# Patient Record
Sex: FEMALE | Race: WHITE | NOT HISPANIC OR LATINO | Employment: OTHER | URBAN - NONMETROPOLITAN AREA
[De-identification: names, ages, dates, MRNs, and addresses within clinical notes are randomized per-mention and may not be internally consistent; named-entity substitution may affect disease eponyms.]

---

## 2023-03-07 ENCOUNTER — TELEPHONE (OUTPATIENT)
Dept: PRIMARY CARE | Facility: CLINIC | Age: 88
End: 2023-03-07
Payer: MEDICARE

## 2023-03-07 NOTE — TELEPHONE ENCOUNTER
Dain with disability rehab asking for a verbal ok for the PT plan for patient. The PT want's to see patient for 1 time a week for 1 week and then 2 times a week for 8 weeks. Asking for a call at [phone].

## 2023-03-12 PROBLEM — R42 VERTIGO: Status: ACTIVE | Noted: 2023-03-12

## 2023-03-12 PROBLEM — N18.30 STAGE 3 CHRONIC KIDNEY DISEASE (MULTI): Status: ACTIVE | Noted: 2023-03-12

## 2023-03-12 PROBLEM — L30.1 ECZEMA, DYSHIDROTIC: Status: ACTIVE | Noted: 2023-03-12

## 2023-03-12 PROBLEM — E11.9 DM2 (DIABETES MELLITUS, TYPE 2) (MULTI): Status: ACTIVE | Noted: 2023-03-12

## 2023-03-12 PROBLEM — Z85.42 HISTORY OF ENDOMETRIAL CANCER: Status: ACTIVE | Noted: 2023-03-12

## 2023-03-12 PROBLEM — R29.6 FREQUENT FALLS: Status: ACTIVE | Noted: 2023-03-12

## 2023-03-12 PROBLEM — M54.2 NECK PAIN: Status: ACTIVE | Noted: 2023-03-12

## 2023-03-12 PROBLEM — R41.0 CONFUSION: Status: ACTIVE | Noted: 2023-03-12

## 2023-03-12 PROBLEM — E66.812 CLASS 2 SEVERE OBESITY WITH SERIOUS COMORBIDITY AND BODY MASS INDEX (BMI) OF 37.0 TO 37.9 IN ADULT: Status: ACTIVE | Noted: 2023-03-12

## 2023-03-12 PROBLEM — G89.29 CHRONIC LOW BACK PAIN: Status: ACTIVE | Noted: 2023-03-12

## 2023-03-12 PROBLEM — D64.9 ANEMIA: Status: ACTIVE | Noted: 2023-03-12

## 2023-03-12 PROBLEM — F33.1 DEPRESSION, MAJOR, RECURRENT, MODERATE (MULTI): Status: ACTIVE | Noted: 2023-03-12

## 2023-03-12 PROBLEM — R10.9 ABDOMINAL PAIN: Status: ACTIVE | Noted: 2023-03-12

## 2023-03-12 PROBLEM — E03.9 HYPOTHYROIDISM: Status: ACTIVE | Noted: 2023-03-12

## 2023-03-12 PROBLEM — J45.909 ASTHMA (HHS-HCC): Status: ACTIVE | Noted: 2023-03-12

## 2023-03-12 PROBLEM — D50.9 IRON DEFICIENCY ANEMIA: Status: ACTIVE | Noted: 2023-03-12

## 2023-03-12 PROBLEM — K92.2 LOWER GI BLEED: Status: ACTIVE | Noted: 2023-03-12

## 2023-03-12 PROBLEM — M67.911 TENDINOPATHY OF RIGHT ROTATOR CUFF: Status: ACTIVE | Noted: 2023-03-12

## 2023-03-12 PROBLEM — E78.2 MIXED DYSLIPIDEMIA: Status: ACTIVE | Noted: 2023-03-12

## 2023-03-12 PROBLEM — R15.9 FECAL INCONTINENCE: Status: ACTIVE | Noted: 2023-03-12

## 2023-03-12 PROBLEM — K44.9 HIATAL HERNIA: Status: ACTIVE | Noted: 2023-03-12

## 2023-03-12 PROBLEM — E66.01 CLASS 2 SEVERE OBESITY WITH SERIOUS COMORBIDITY AND BODY MASS INDEX (BMI) OF 37.0 TO 37.9 IN ADULT (MULTI): Status: ACTIVE | Noted: 2023-03-12

## 2023-03-12 PROBLEM — I10 HTN (HYPERTENSION): Status: ACTIVE | Noted: 2023-03-12

## 2023-03-12 PROBLEM — R05.9 COUGH: Status: ACTIVE | Noted: 2023-03-12

## 2023-03-12 PROBLEM — M54.50 CHRONIC LOW BACK PAIN: Status: ACTIVE | Noted: 2023-03-12

## 2023-03-12 PROBLEM — E79.0 HYPERURICEMIA: Status: ACTIVE | Noted: 2023-03-12

## 2023-03-12 PROBLEM — M54.9 MID BACK PAIN: Status: ACTIVE | Noted: 2023-03-12

## 2023-03-12 PROBLEM — M25.511 RIGHT SHOULDER PAIN: Status: ACTIVE | Noted: 2023-03-12

## 2023-03-12 PROBLEM — I73.9 CLAUDICATION (CMS-HCC): Status: ACTIVE | Noted: 2023-03-12

## 2023-03-12 RX ORDER — GLIMEPIRIDE 4 MG/1
4 TABLET ORAL
COMMUNITY
End: 2024-02-08 | Stop reason: WASHOUT

## 2023-03-12 RX ORDER — LEVOTHYROXINE SODIUM 137 UG/1
1 TABLET ORAL DAILY
COMMUNITY
Start: 2021-05-02 | End: 2023-05-26

## 2023-03-12 RX ORDER — CITALOPRAM 20 MG/1
0.5 TABLET, FILM COATED ORAL DAILY
COMMUNITY
End: 2023-03-17 | Stop reason: SDUPTHER

## 2023-03-12 RX ORDER — MECLIZINE HYDROCHLORIDE 25 MG/1
1 TABLET ORAL 3 TIMES DAILY PRN
COMMUNITY
Start: 2022-06-28 | End: 2023-04-21

## 2023-03-12 RX ORDER — BECLOMETHASONE DIPROPIONATE HFA 80 UG/1
2 AEROSOL, METERED RESPIRATORY (INHALATION) 2 TIMES DAILY
COMMUNITY
Start: 2022-05-04

## 2023-03-12 RX ORDER — METOPROLOL TARTRATE 50 MG/1
TABLET ORAL
COMMUNITY
End: 2023-08-18 | Stop reason: SDUPTHER

## 2023-03-12 RX ORDER — INDAPAMIDE 2.5 MG/1
1 TABLET ORAL DAILY
COMMUNITY
End: 2023-08-18 | Stop reason: SDUPTHER

## 2023-03-12 RX ORDER — LOVASTATIN 20 MG/1
1 TABLET ORAL NIGHTLY
COMMUNITY
End: 2024-01-18

## 2023-03-12 RX ORDER — HYDROCORTISONE 25 MG/G
CREAM TOPICAL
COMMUNITY
Start: 2022-06-28

## 2023-03-12 RX ORDER — MULTIVITAMIN
TABLET ORAL
COMMUNITY
End: 2024-03-07 | Stop reason: ALTCHOICE

## 2023-03-12 RX ORDER — LISINOPRIL 5 MG/1
1 TABLET ORAL DAILY
COMMUNITY
End: 2023-08-18 | Stop reason: SDUPTHER

## 2023-03-12 RX ORDER — FERROUS SULFATE 325(65) MG
1 TABLET ORAL DAILY
COMMUNITY

## 2023-03-12 RX ORDER — ALLOPURINOL 100 MG/1
1 TABLET ORAL DAILY
COMMUNITY

## 2023-03-12 RX ORDER — BLOOD SUGAR DIAGNOSTIC
STRIP MISCELLANEOUS
COMMUNITY
Start: 2021-12-01

## 2023-03-12 RX ORDER — LANOLIN ALCOHOL/MO/W.PET/CERES
1 CREAM (GRAM) TOPICAL DAILY
COMMUNITY

## 2023-03-12 RX ORDER — ALBUTEROL SULFATE 90 UG/1
2 AEROSOL, METERED RESPIRATORY (INHALATION) EVERY 4 HOURS PRN
COMMUNITY
Start: 2022-01-21 | End: 2023-04-21

## 2023-03-17 ENCOUNTER — OFFICE VISIT (OUTPATIENT)
Dept: PRIMARY CARE | Facility: CLINIC | Age: 88
End: 2023-03-17
Payer: MEDICARE

## 2023-03-17 VITALS
HEIGHT: 62 IN | WEIGHT: 206.3 LBS | BODY MASS INDEX: 37.97 KG/M2 | OXYGEN SATURATION: 94 % | DIASTOLIC BLOOD PRESSURE: 90 MMHG | HEART RATE: 94 BPM | SYSTOLIC BLOOD PRESSURE: 152 MMHG

## 2023-03-17 DIAGNOSIS — D64.9 ANEMIA, UNSPECIFIED TYPE: ICD-10-CM

## 2023-03-17 DIAGNOSIS — E03.9 HYPOTHYROIDISM, UNSPECIFIED TYPE: ICD-10-CM

## 2023-03-17 DIAGNOSIS — I10 PRIMARY HYPERTENSION: ICD-10-CM

## 2023-03-17 DIAGNOSIS — F33.1 DEPRESSION, MAJOR, RECURRENT, MODERATE (MULTI): Primary | ICD-10-CM

## 2023-03-17 PROCEDURE — 3080F DIAST BP >= 90 MM HG: CPT | Performed by: STUDENT IN AN ORGANIZED HEALTH CARE EDUCATION/TRAINING PROGRAM

## 2023-03-17 PROCEDURE — 3077F SYST BP >= 140 MM HG: CPT | Performed by: STUDENT IN AN ORGANIZED HEALTH CARE EDUCATION/TRAINING PROGRAM

## 2023-03-17 PROCEDURE — 99214 OFFICE O/P EST MOD 30 MIN: CPT | Performed by: STUDENT IN AN ORGANIZED HEALTH CARE EDUCATION/TRAINING PROGRAM

## 2023-03-17 PROCEDURE — 1036F TOBACCO NON-USER: CPT | Performed by: STUDENT IN AN ORGANIZED HEALTH CARE EDUCATION/TRAINING PROGRAM

## 2023-03-17 PROCEDURE — 1159F MED LIST DOCD IN RCRD: CPT | Performed by: STUDENT IN AN ORGANIZED HEALTH CARE EDUCATION/TRAINING PROGRAM

## 2023-03-17 PROCEDURE — 1157F ADVNC CARE PLAN IN RCRD: CPT | Performed by: STUDENT IN AN ORGANIZED HEALTH CARE EDUCATION/TRAINING PROGRAM

## 2023-03-17 RX ORDER — BUPROPION HYDROCHLORIDE 150 MG/1
1 TABLET, EXTENDED RELEASE ORAL 2 TIMES DAILY
COMMUNITY
Start: 2022-09-28 | End: 2023-04-21 | Stop reason: SDUPTHER

## 2023-03-17 RX ORDER — CITALOPRAM 10 MG/1
10 TABLET ORAL DAILY
Qty: 90 TABLET | Refills: 3 | Status: SHIPPED | OUTPATIENT
Start: 2023-03-17 | End: 2024-04-29 | Stop reason: HOSPADM

## 2023-03-17 ASSESSMENT — PATIENT HEALTH QUESTIONNAIRE - PHQ9
SUM OF ALL RESPONSES TO PHQ9 QUESTIONS 1 AND 2: 0
2. FEELING DOWN, DEPRESSED OR HOPELESS: NOT AT ALL
1. LITTLE INTEREST OR PLEASURE IN DOING THINGS: NOT AT ALL
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
## 2023-03-17 NOTE — PATIENT INSTRUCTIONS
Please start taking lower dose of Citalopram . I sent new prescription for 10mg. Please stop taking 20mg dose.     You can take Cyclobenzaprine for pain. Please let me know if you are taking it, then I will send a new prescription for pain medication.

## 2023-04-12 ENCOUNTER — LAB (OUTPATIENT)
Dept: LAB | Facility: LAB | Age: 88
End: 2023-04-12
Payer: MEDICARE

## 2023-04-12 DIAGNOSIS — E03.9 HYPOTHYROIDISM, UNSPECIFIED TYPE: ICD-10-CM

## 2023-04-12 DIAGNOSIS — I10 PRIMARY HYPERTENSION: ICD-10-CM

## 2023-04-12 DIAGNOSIS — D64.9 ANEMIA, UNSPECIFIED TYPE: ICD-10-CM

## 2023-04-12 LAB
ALANINE AMINOTRANSFERASE (SGPT) (U/L) IN SER/PLAS: 12 U/L (ref 7–45)
ALBUMIN (G/DL) IN SER/PLAS: 3.9 G/DL (ref 3.4–5)
ALKALINE PHOSPHATASE (U/L) IN SER/PLAS: 60 U/L (ref 33–136)
ANION GAP IN SER/PLAS: 15 MMOL/L (ref 10–20)
ASPARTATE AMINOTRANSFERASE (SGOT) (U/L) IN SER/PLAS: 16 U/L (ref 9–39)
BASOPHILS (10*3/UL) IN BLOOD BY AUTOMATED COUNT: 0.04 X10E9/L (ref 0–0.1)
BASOPHILS/100 LEUKOCYTES IN BLOOD BY AUTOMATED COUNT: 0.6 % (ref 0–2)
BILIRUBIN TOTAL (MG/DL) IN SER/PLAS: 0.3 MG/DL (ref 0–1.2)
CALCIUM (MG/DL) IN SER/PLAS: 9.2 MG/DL (ref 8.6–10.3)
CARBON DIOXIDE, TOTAL (MMOL/L) IN SER/PLAS: 28 MMOL/L (ref 21–32)
CHLORIDE (MMOL/L) IN SER/PLAS: 102 MMOL/L (ref 98–107)
CREATININE (MG/DL) IN SER/PLAS: 1.79 MG/DL (ref 0.5–1.05)
EOSINOPHILS (10*3/UL) IN BLOOD BY AUTOMATED COUNT: 0.2 X10E9/L (ref 0–0.4)
EOSINOPHILS/100 LEUKOCYTES IN BLOOD BY AUTOMATED COUNT: 2.9 % (ref 0–6)
ERYTHROCYTE DISTRIBUTION WIDTH (RATIO) BY AUTOMATED COUNT: 13.2 % (ref 11.5–14.5)
ERYTHROCYTE MEAN CORPUSCULAR HEMOGLOBIN CONCENTRATION (G/DL) BY AUTOMATED: 31.3 G/DL (ref 32–36)
ERYTHROCYTE MEAN CORPUSCULAR VOLUME (FL) BY AUTOMATED COUNT: 98 FL (ref 80–100)
ERYTHROCYTES (10*6/UL) IN BLOOD BY AUTOMATED COUNT: 3.73 X10E12/L (ref 4–5.2)
GFR FEMALE: 26 ML/MIN/1.73M2
GLUCOSE (MG/DL) IN SER/PLAS: 91 MG/DL (ref 74–99)
HEMATOCRIT (%) IN BLOOD BY AUTOMATED COUNT: 36.4 % (ref 36–46)
HEMOGLOBIN (G/DL) IN BLOOD: 11.4 G/DL (ref 12–16)
IMMATURE GRANULOCYTES/100 LEUKOCYTES IN BLOOD BY AUTOMATED COUNT: 0.7 % (ref 0–0.9)
LEUKOCYTES (10*3/UL) IN BLOOD BY AUTOMATED COUNT: 6.9 X10E9/L (ref 4.4–11.3)
LYMPHOCYTES (10*3/UL) IN BLOOD BY AUTOMATED COUNT: 1.79 X10E9/L (ref 0.8–3)
LYMPHOCYTES/100 LEUKOCYTES IN BLOOD BY AUTOMATED COUNT: 25.8 % (ref 13–44)
MONOCYTES (10*3/UL) IN BLOOD BY AUTOMATED COUNT: 0.56 X10E9/L (ref 0.05–0.8)
MONOCYTES/100 LEUKOCYTES IN BLOOD BY AUTOMATED COUNT: 8.1 % (ref 2–10)
NEUTROPHILS (10*3/UL) IN BLOOD BY AUTOMATED COUNT: 4.29 X10E9/L (ref 1.6–5.5)
NEUTROPHILS/100 LEUKOCYTES IN BLOOD BY AUTOMATED COUNT: 61.9 % (ref 40–80)
PLATELETS (10*3/UL) IN BLOOD AUTOMATED COUNT: 328 X10E9/L (ref 150–450)
POTASSIUM (MMOL/L) IN SER/PLAS: 4.8 MMOL/L (ref 3.5–5.3)
PROTEIN TOTAL: 6.4 G/DL (ref 6.4–8.2)
SODIUM (MMOL/L) IN SER/PLAS: 140 MMOL/L (ref 136–145)
THYROTROPIN (MIU/L) IN SER/PLAS BY DETECTION LIMIT <= 0.05 MIU/L: 33.95 MIU/L (ref 0.44–3.98)
UREA NITROGEN (MG/DL) IN SER/PLAS: 36 MG/DL (ref 6–23)

## 2023-04-12 PROCEDURE — 84443 ASSAY THYROID STIM HORMONE: CPT

## 2023-04-12 PROCEDURE — 80053 COMPREHEN METABOLIC PANEL: CPT

## 2023-04-12 PROCEDURE — 36415 COLL VENOUS BLD VENIPUNCTURE: CPT

## 2023-04-12 PROCEDURE — 85025 COMPLETE CBC W/AUTO DIFF WBC: CPT

## 2023-04-19 DIAGNOSIS — R42 DIZZINESS AND GIDDINESS: ICD-10-CM

## 2023-04-19 DIAGNOSIS — R06.02 SHORTNESS OF BREATH: ICD-10-CM

## 2023-04-21 ENCOUNTER — OFFICE VISIT (OUTPATIENT)
Dept: PRIMARY CARE | Facility: CLINIC | Age: 88
End: 2023-04-21
Payer: MEDICARE

## 2023-04-21 VITALS
BODY MASS INDEX: 37.59 KG/M2 | HEART RATE: 78 BPM | DIASTOLIC BLOOD PRESSURE: 72 MMHG | WEIGHT: 205.5 LBS | SYSTOLIC BLOOD PRESSURE: 120 MMHG | OXYGEN SATURATION: 95 %

## 2023-04-21 DIAGNOSIS — F41.9 ANXIETY: Primary | ICD-10-CM

## 2023-04-21 DIAGNOSIS — E03.9 HYPOTHYROIDISM, UNSPECIFIED TYPE: ICD-10-CM

## 2023-04-21 PROCEDURE — 1159F MED LIST DOCD IN RCRD: CPT | Performed by: STUDENT IN AN ORGANIZED HEALTH CARE EDUCATION/TRAINING PROGRAM

## 2023-04-21 PROCEDURE — 99214 OFFICE O/P EST MOD 30 MIN: CPT | Performed by: STUDENT IN AN ORGANIZED HEALTH CARE EDUCATION/TRAINING PROGRAM

## 2023-04-21 PROCEDURE — 3078F DIAST BP <80 MM HG: CPT | Performed by: STUDENT IN AN ORGANIZED HEALTH CARE EDUCATION/TRAINING PROGRAM

## 2023-04-21 PROCEDURE — 3074F SYST BP LT 130 MM HG: CPT | Performed by: STUDENT IN AN ORGANIZED HEALTH CARE EDUCATION/TRAINING PROGRAM

## 2023-04-21 PROCEDURE — 1036F TOBACCO NON-USER: CPT | Performed by: STUDENT IN AN ORGANIZED HEALTH CARE EDUCATION/TRAINING PROGRAM

## 2023-04-21 PROCEDURE — 1157F ADVNC CARE PLAN IN RCRD: CPT | Performed by: STUDENT IN AN ORGANIZED HEALTH CARE EDUCATION/TRAINING PROGRAM

## 2023-04-21 RX ORDER — ALBUTEROL SULFATE 90 UG/1
AEROSOL, METERED RESPIRATORY (INHALATION)
Qty: 18 G | Refills: 1 | Status: SHIPPED | OUTPATIENT
Start: 2023-04-21

## 2023-04-21 RX ORDER — HYDROXYZINE HYDROCHLORIDE 25 MG/1
25 TABLET, FILM COATED ORAL EVERY 8 HOURS PRN
Qty: 60 TABLET | Refills: 0 | Status: SHIPPED | OUTPATIENT
Start: 2023-04-21 | End: 2023-04-25 | Stop reason: SDUPTHER

## 2023-04-21 RX ORDER — MECLIZINE HYDROCHLORIDE 25 MG/1
TABLET ORAL
Qty: 15 TABLET | Refills: 1 | Status: SHIPPED | OUTPATIENT
Start: 2023-04-21 | End: 2023-06-15

## 2023-04-21 RX ORDER — BUPROPION HYDROCHLORIDE 150 MG/1
150 TABLET, EXTENDED RELEASE ORAL 2 TIMES DAILY
Qty: 180 TABLET | Refills: 3 | Status: SHIPPED | OUTPATIENT
Start: 2023-04-21 | End: 2024-05-10 | Stop reason: SDUPTHER

## 2023-04-21 ASSESSMENT — ENCOUNTER SYMPTOMS
LOSS OF SENSATION IN FEET: 0
OCCASIONAL FEELINGS OF UNSTEADINESS: 1
DEPRESSION: 1

## 2023-04-21 ASSESSMENT — PATIENT HEALTH QUESTIONNAIRE - PHQ9
SUM OF ALL RESPONSES TO PHQ9 QUESTIONS 1 AND 2: 2
10. IF YOU CHECKED OFF ANY PROBLEMS, HOW DIFFICULT HAVE THESE PROBLEMS MADE IT FOR YOU TO DO YOUR WORK, TAKE CARE OF THINGS AT HOME, OR GET ALONG WITH OTHER PEOPLE: SOMEWHAT DIFFICULT
1. LITTLE INTEREST OR PLEASURE IN DOING THINGS: NOT AT ALL
2. FEELING DOWN, DEPRESSED OR HOPELESS: MORE THAN HALF THE DAYS

## 2023-04-21 NOTE — PROGRESS NOTES
Subjective   Patient ID: Lakeshia Park is a 89 y.o. female who presents for 3 mth ov.    HPI    Has been having burning sensation in the left eye and has been noticing thick discharge.   She brought to the OTC eyedrops from the pharmacy recently.  She would like to try that to see if it improves her symptoms.    Discussed blood test results with the patient.    CKD4: Patient is previously seeing nephrologist.  Does not think she had any follow-up plan.  We have seen her at [facility].  This has been a year since she has seen them.  Recommended to follow-up with nephrologist.    Hypothyroidism: Her TSH is significantly elevated now.  Currently 33.95 now compared to 2.91 at the previous visit.   Patient reports that she is taking levothyroxine regularly.  However given the apparent increase I wonder if the patient taking the medication.  Difficult to take this medication fasting in the morning without any other medication or food.  Patient expressed understanding and she will try.  We will reassess closely in a few weeks.    Mild anemia noticed.  Recommended to eat iron rich food.    Still has some issues with her assisted living.  She is trying to work with them and resolving metabolic issues and other concerns.    Anxiety and depression: Reports that medications have fairly well.  Denies any significant side effects from the medication.    We discussed about her medications as well.       Review of Systems  ROS negative except discussed above in HPI.    Vitals:    04/21/23 1126   BP: 120/72   Pulse: 78   SpO2: 95%     Objective   Physical Exam  Constitutional:       Appearance: Normal appearance.   Cardiovascular:      Rate and Rhythm: Normal rate and regular rhythm.   Pulmonary:      Effort: Pulmonary effort is normal.      Breath sounds: Normal breath sounds.   Neurological:      Mental Status: She is alert.       Assessment/Plan   Lakeshia was seen today for 3 mth ov.  Diagnoses and all orders for this  visit:  Anxiety (Primary)  -     hydrOXYzine HCL (Atarax) 25 mg tablet; Take 1 tablet (25 mg) by mouth every 8 hours if needed for anxiety.  -     buPROPion SR (Wellbutrin SR) 150 mg 12 hr tablet; Take 1 tablet (150 mg) by mouth in the morning and 1 tablet (150 mg) before bedtime.  Hypothyroidism, unspecified type  -     Cancel: TSH with reflex to Free T4 if abnormal; Future  -     TSH with reflex to Free T4 if abnormal; Future      Follow up in 4 weeks.    Time Spent  Prep time on day of patient encounter: 5 minutes  Time spent directly with patient, family or caregiver: 30 minutes  Additional Time Spent on Patient Care Activities: 0 minutes  Documentation Time: 7 minutes  Other Time Spent: 0 minutes  Total: 42 minutes        Domingo Aquino MD MPH

## 2023-04-25 ENCOUNTER — OFFICE VISIT (OUTPATIENT)
Dept: PRIMARY CARE | Facility: CLINIC | Age: 88
End: 2023-04-25
Payer: MEDICARE

## 2023-04-25 VITALS
HEART RATE: 68 BPM | SYSTOLIC BLOOD PRESSURE: 132 MMHG | BODY MASS INDEX: 37.54 KG/M2 | HEIGHT: 62 IN | DIASTOLIC BLOOD PRESSURE: 80 MMHG | WEIGHT: 204 LBS

## 2023-04-25 DIAGNOSIS — F41.9 ANXIETY: ICD-10-CM

## 2023-04-25 PROCEDURE — 1036F TOBACCO NON-USER: CPT | Performed by: STUDENT IN AN ORGANIZED HEALTH CARE EDUCATION/TRAINING PROGRAM

## 2023-04-25 PROCEDURE — 1159F MED LIST DOCD IN RCRD: CPT | Performed by: STUDENT IN AN ORGANIZED HEALTH CARE EDUCATION/TRAINING PROGRAM

## 2023-04-25 PROCEDURE — 3075F SYST BP GE 130 - 139MM HG: CPT | Performed by: STUDENT IN AN ORGANIZED HEALTH CARE EDUCATION/TRAINING PROGRAM

## 2023-04-25 PROCEDURE — 3079F DIAST BP 80-89 MM HG: CPT | Performed by: STUDENT IN AN ORGANIZED HEALTH CARE EDUCATION/TRAINING PROGRAM

## 2023-04-25 PROCEDURE — 99213 OFFICE O/P EST LOW 20 MIN: CPT | Performed by: STUDENT IN AN ORGANIZED HEALTH CARE EDUCATION/TRAINING PROGRAM

## 2023-04-25 PROCEDURE — 1157F ADVNC CARE PLAN IN RCRD: CPT | Performed by: STUDENT IN AN ORGANIZED HEALTH CARE EDUCATION/TRAINING PROGRAM

## 2023-04-25 RX ORDER — HYDROXYZINE HYDROCHLORIDE 25 MG/1
25 TABLET, FILM COATED ORAL EVERY 8 HOURS PRN
Qty: 60 TABLET | Refills: 11 | Status: SHIPPED | OUTPATIENT
Start: 2023-04-25 | End: 2024-04-29 | Stop reason: HOSPADM

## 2023-04-25 ASSESSMENT — ANXIETY QUESTIONNAIRES
4. TROUBLE RELAXING: NEARLY EVERY DAY
5. BEING SO RESTLESS THAT IT IS HARD TO SIT STILL: NEARLY EVERY DAY
7. FEELING AFRAID AS IF SOMETHING AWFUL MIGHT HAPPEN: SEVERAL DAYS
1. FEELING NERVOUS, ANXIOUS, OR ON EDGE: MORE THAN HALF THE DAYS
2. NOT BEING ABLE TO STOP OR CONTROL WORRYING: NEARLY EVERY DAY
GAD7 TOTAL SCORE: 17
6. BECOMING EASILY ANNOYED OR IRRITABLE: MORE THAN HALF THE DAYS
3. WORRYING TOO MUCH ABOUT DIFFERENT THINGS: NEARLY EVERY DAY
IF YOU CHECKED OFF ANY PROBLEMS ON THIS QUESTIONNAIRE, HOW DIFFICULT HAVE THESE PROBLEMS MADE IT FOR YOU TO DO YOUR WORK, TAKE CARE OF THINGS AT HOME, OR GET ALONG WITH OTHER PEOPLE: EXTREMELY DIFFICULT

## 2023-04-25 NOTE — PROGRESS NOTES
Subjective   Patient ID: Lakeshia Park is a 89 y.o. female who presents for LOWER BACK PAIN  and Panic Attack (sTATES HER ANXIETY HAS INCREASED AND WANTS TO DISCUSS MEDICATION ).    HPI    Patient reports that she had lower back pain recently and started to feel better now.    Her main concern today was to clarify as needed medications that she is taking.  We discussed about the indications of the medications and how she should be taking her medications.    Reports that her anxiety and depression have been worse lately.  She misses her family(kids and grandchildren) and she has not been able to see them for the last 8 months.  Reports that she is going to be starting therapy soon.  Plan: Discussed about the options of medication adjustment at this time versus waiting until she goes to therapy.  We decided to wait until she goes to therapy for a few sessions.    Review of Systems  ROS negative except discussed above in HPI.    Vitals:    04/25/23 1225   BP: 132/80   Pulse: 68     Objective   Physical Exam  Neurological:      Mental Status: She is alert.   Psychiatric:      Comments: Tearful in the room.       Assessment/Plan   Lakeshia was seen today for lower back pain  and panic attack.  Diagnoses and all orders for this visit:  Anxiety  -     hydrOXYzine HCL (Atarax) 25 mg tablet; Take 1 tablet (25 mg) by mouth every 8 hours if needed for anxiety.      Follow up in 2 weeks.  Sooner if needed      Domingo Aquino MD MPH

## 2023-05-09 ENCOUNTER — APPOINTMENT (OUTPATIENT)
Dept: PRIMARY CARE | Facility: CLINIC | Age: 88
End: 2023-05-09
Payer: MEDICARE

## 2023-05-16 ENCOUNTER — LAB (OUTPATIENT)
Dept: LAB | Facility: LAB | Age: 88
End: 2023-05-16
Payer: MEDICARE

## 2023-05-16 DIAGNOSIS — R42 DIZZINESS AND GIDDINESS: ICD-10-CM

## 2023-05-16 DIAGNOSIS — E03.9 HYPOTHYROIDISM, UNSPECIFIED TYPE: ICD-10-CM

## 2023-05-16 DIAGNOSIS — I10 ESSENTIAL (PRIMARY) HYPERTENSION: ICD-10-CM

## 2023-05-16 LAB
THYROTROPIN (MIU/L) IN SER/PLAS BY DETECTION LIMIT <= 0.05 MIU/L: 23.91 MIU/L (ref 0.44–3.98)
THYROXINE (T4) FREE (NG/DL) IN SER/PLAS: 0.8 NG/DL (ref 0.61–1.12)

## 2023-05-16 PROCEDURE — 84439 ASSAY OF FREE THYROXINE: CPT

## 2023-05-16 PROCEDURE — 36415 COLL VENOUS BLD VENIPUNCTURE: CPT

## 2023-05-16 PROCEDURE — 84443 ASSAY THYROID STIM HORMONE: CPT

## 2023-05-16 RX ORDER — MECLIZINE HYDROCHLORIDE 25 MG/1
TABLET ORAL
Qty: 15 TABLET | Refills: 1 | OUTPATIENT
Start: 2023-05-16

## 2023-05-16 RX ORDER — INDAPAMIDE 2.5 MG/1
TABLET ORAL
Qty: 90 TABLET | Refills: 1 | OUTPATIENT
Start: 2023-05-16

## 2023-05-26 ENCOUNTER — OFFICE VISIT (OUTPATIENT)
Dept: PRIMARY CARE | Facility: CLINIC | Age: 88
End: 2023-05-26
Payer: MEDICARE

## 2023-05-26 VITALS
SYSTOLIC BLOOD PRESSURE: 109 MMHG | BODY MASS INDEX: 37.42 KG/M2 | OXYGEN SATURATION: 94 % | HEART RATE: 58 BPM | WEIGHT: 204.6 LBS | DIASTOLIC BLOOD PRESSURE: 71 MMHG

## 2023-05-26 DIAGNOSIS — E03.9 HYPOTHYROIDISM, UNSPECIFIED TYPE: Primary | ICD-10-CM

## 2023-05-26 PROCEDURE — 99213 OFFICE O/P EST LOW 20 MIN: CPT | Performed by: STUDENT IN AN ORGANIZED HEALTH CARE EDUCATION/TRAINING PROGRAM

## 2023-05-26 PROCEDURE — 3078F DIAST BP <80 MM HG: CPT | Performed by: STUDENT IN AN ORGANIZED HEALTH CARE EDUCATION/TRAINING PROGRAM

## 2023-05-26 PROCEDURE — 1036F TOBACCO NON-USER: CPT | Performed by: STUDENT IN AN ORGANIZED HEALTH CARE EDUCATION/TRAINING PROGRAM

## 2023-05-26 PROCEDURE — 1159F MED LIST DOCD IN RCRD: CPT | Performed by: STUDENT IN AN ORGANIZED HEALTH CARE EDUCATION/TRAINING PROGRAM

## 2023-05-26 PROCEDURE — 1157F ADVNC CARE PLAN IN RCRD: CPT | Performed by: STUDENT IN AN ORGANIZED HEALTH CARE EDUCATION/TRAINING PROGRAM

## 2023-05-26 PROCEDURE — 3074F SYST BP LT 130 MM HG: CPT | Performed by: STUDENT IN AN ORGANIZED HEALTH CARE EDUCATION/TRAINING PROGRAM

## 2023-05-26 RX ORDER — CYCLOBENZAPRINE HYDROCHLORIDE 7.5 MG/1
7.5 TABLET, FILM COATED ORAL EVERY 8 HOURS PRN
COMMUNITY

## 2023-05-26 ASSESSMENT — PATIENT HEALTH QUESTIONNAIRE - PHQ9
2. FEELING DOWN, DEPRESSED OR HOPELESS: NOT AT ALL
SUM OF ALL RESPONSES TO PHQ9 QUESTIONS 1 AND 2: 0
1. LITTLE INTEREST OR PLEASURE IN DOING THINGS: NOT AT ALL

## 2023-05-26 NOTE — PROGRESS NOTES
Subjective   Patient ID: Lakeshia Park is a 89 y.o. female who presents for Follow-up (5 week OV. Had labs done. ).    HPI        Hypothyroidism:  Increasing dose of levothyroxine.    Wants to swim [facility]. Provided recommendation to her facility regarding this.     Also wants higher toilet seat as it is difficult for her to stand up from blower toilet seat.     Clarified questions about medication. Cough improved with inhaler.     Back pain is feeling better.     Review of Systems  ROS negative except discussed above in HPI.    Vitals:    05/26/23 1105   BP: 109/71   Pulse: 58   SpO2: 94%     Objective   Physical Exam      Assessment/Plan   Lakeshia was seen today for follow-up.  Diagnoses and all orders for this visit:  Hypothyroidism, unspecified type (Primary)  -     TSH with reflex to Free T4 if abnormal; Future      Follow up in 3-4 weeks         Domingo Aquino MD MPH

## 2023-06-15 DIAGNOSIS — R42 DIZZINESS AND GIDDINESS: ICD-10-CM

## 2023-06-15 RX ORDER — MECLIZINE HYDROCHLORIDE 25 MG/1
TABLET ORAL
Qty: 15 TABLET | Refills: 1 | Status: SHIPPED | OUTPATIENT
Start: 2023-06-15 | End: 2023-08-21 | Stop reason: SDUPTHER

## 2023-06-20 ENCOUNTER — APPOINTMENT (OUTPATIENT)
Dept: PRIMARY CARE | Facility: CLINIC | Age: 88
End: 2023-06-20
Payer: MEDICARE

## 2023-06-21 ENCOUNTER — TELEPHONE (OUTPATIENT)
Dept: PRIMARY CARE | Facility: CLINIC | Age: 88
End: 2023-06-21
Payer: MEDICARE
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
## 2023-07-19 ENCOUNTER — LAB (OUTPATIENT)
Dept: LAB | Facility: LAB | Age: 88
End: 2023-07-19
Payer: MEDICARE

## 2023-07-19 DIAGNOSIS — E03.9 HYPOTHYROIDISM, UNSPECIFIED TYPE: ICD-10-CM

## 2023-07-19 LAB
THYROTROPIN (MIU/L) IN SER/PLAS BY DETECTION LIMIT <= 0.05 MIU/L: 10.92 MIU/L (ref 0.44–3.98)
THYROXINE (T4) FREE (NG/DL) IN SER/PLAS: 0.96 NG/DL (ref 0.61–1.12)

## 2023-07-19 PROCEDURE — 84439 ASSAY OF FREE THYROXINE: CPT

## 2023-07-19 PROCEDURE — 84443 ASSAY THYROID STIM HORMONE: CPT

## 2023-07-19 PROCEDURE — 36415 COLL VENOUS BLD VENIPUNCTURE: CPT

## 2023-07-21 ENCOUNTER — OFFICE VISIT (OUTPATIENT)
Dept: PRIMARY CARE | Facility: CLINIC | Age: 88
End: 2023-07-21
Payer: MEDICARE

## 2023-07-21 VITALS
DIASTOLIC BLOOD PRESSURE: 80 MMHG | BODY MASS INDEX: 37.6 KG/M2 | WEIGHT: 205.6 LBS | SYSTOLIC BLOOD PRESSURE: 110 MMHG | HEART RATE: 73 BPM | OXYGEN SATURATION: 96 %

## 2023-07-21 DIAGNOSIS — M54.9 MID BACK PAIN: ICD-10-CM

## 2023-07-21 DIAGNOSIS — E03.9 ACQUIRED HYPOTHYROIDISM: Primary | ICD-10-CM

## 2023-07-21 DIAGNOSIS — E78.2 MIXED DYSLIPIDEMIA: ICD-10-CM

## 2023-07-21 PROCEDURE — 99213 OFFICE O/P EST LOW 20 MIN: CPT | Performed by: STUDENT IN AN ORGANIZED HEALTH CARE EDUCATION/TRAINING PROGRAM

## 2023-07-21 PROCEDURE — 3074F SYST BP LT 130 MM HG: CPT | Performed by: STUDENT IN AN ORGANIZED HEALTH CARE EDUCATION/TRAINING PROGRAM

## 2023-07-21 PROCEDURE — 1157F ADVNC CARE PLAN IN RCRD: CPT | Performed by: STUDENT IN AN ORGANIZED HEALTH CARE EDUCATION/TRAINING PROGRAM

## 2023-07-21 PROCEDURE — 3079F DIAST BP 80-89 MM HG: CPT | Performed by: STUDENT IN AN ORGANIZED HEALTH CARE EDUCATION/TRAINING PROGRAM

## 2023-07-21 PROCEDURE — 1036F TOBACCO NON-USER: CPT | Performed by: STUDENT IN AN ORGANIZED HEALTH CARE EDUCATION/TRAINING PROGRAM

## 2023-07-21 PROCEDURE — 1159F MED LIST DOCD IN RCRD: CPT | Performed by: STUDENT IN AN ORGANIZED HEALTH CARE EDUCATION/TRAINING PROGRAM

## 2023-07-21 RX ORDER — LEVOTHYROXINE SODIUM 150 UG/1
150 TABLET ORAL
COMMUNITY
End: 2024-01-24 | Stop reason: SDUPTHER

## 2023-07-21 NOTE — PROGRESS NOTES
Subjective   Patient ID: Lakeshia Park is a 89 y.o. female who presents for Follow-up. Would like to talk about medication changes that were made at the nursing home, and has some itching all over her body that has been going on a week, denies changes to anything she uses and the only different activity that she did was go swimming but has never had a problem before. Denies rash just itching. Would like to talk about aquatic PT.     HPI    3 month follow up.    Ordered referral to aquatic therapy for back pain. I think it appropriate for her back pain. Ordered.     Her diarrhea has been better with Imodium. Can use intermittently.     Discussed about test results. Thyroid function is much better now. Will continue current dose.     Review of Systems  ROS negative except discussed above in HPI.    Vitals:    07/21/23 1045   BP: 110/80   Pulse: 73   SpO2: 96%     Objective   Physical Exam  Constitutional:       Appearance: Normal appearance.   Cardiovascular:      Rate and Rhythm: Normal rate and regular rhythm.      Pulses: Normal pulses.      Heart sounds: Normal heart sounds.   Pulmonary:      Effort: Pulmonary effort is normal.      Breath sounds: Normal breath sounds.   Neurological:      Mental Status: She is alert.       Assessment/Plan   Lakeshia was seen today for follow-up.  Diagnoses and all orders for this visit:  Acquired hypothyroidism (Primary)  -     TSH with reflex to Free T4 if abnormal; Future  Mid back pain  -     Referral to Physical Therapy; Future  Mixed dyslipidemia  -     Lipid Panel; Future      Follow up in 3 months.         Domingo Aquino MD MPH

## 2023-08-01 DIAGNOSIS — R15.9 INCONTINENCE OF FECES, UNSPECIFIED FECAL INCONTINENCE TYPE: Primary | ICD-10-CM

## 2023-08-01 DIAGNOSIS — R32 URINARY INCONTINENCE, UNSPECIFIED TYPE: ICD-10-CM

## 2023-08-01 RX ORDER — ROBITUSSIN DM 10; 100 MG/5ML; MG/5ML
LIQUID ORAL
Qty: 125 EACH | Refills: 11 | Status: SHIPPED | OUTPATIENT
Start: 2023-08-01 | End: 2023-10-25 | Stop reason: SDUPTHER

## 2023-08-18 ENCOUNTER — OFFICE VISIT (OUTPATIENT)
Dept: PRIMARY CARE | Facility: CLINIC | Age: 88
End: 2023-08-18
Payer: MEDICARE

## 2023-08-18 VITALS
BODY MASS INDEX: 36.85 KG/M2 | OXYGEN SATURATION: 92 % | SYSTOLIC BLOOD PRESSURE: 130 MMHG | WEIGHT: 201.5 LBS | HEART RATE: 67 BPM | DIASTOLIC BLOOD PRESSURE: 80 MMHG

## 2023-08-18 DIAGNOSIS — I10 HYPERTENSION, UNSPECIFIED TYPE: Primary | ICD-10-CM

## 2023-08-18 DIAGNOSIS — I10 HYPERTENSION, UNSPECIFIED TYPE: ICD-10-CM

## 2023-08-18 RX ORDER — METOPROLOL TARTRATE 50 MG/1
TABLET ORAL
Qty: 45 TABLET | Refills: 11 | Status: SHIPPED | OUTPATIENT
Start: 2023-08-18

## 2023-08-18 RX ORDER — INDAPAMIDE 2.5 MG/1
2.5 TABLET ORAL DAILY
Qty: 30 TABLET | Refills: 3 | Status: SHIPPED | OUTPATIENT
Start: 2023-08-18 | End: 2024-01-03 | Stop reason: SDUPTHER

## 2023-08-18 RX ORDER — LOPERAMIDE HYDROCHLORIDE 2 MG/1
4 CAPSULE ORAL 3 TIMES DAILY PRN
COMMUNITY

## 2023-08-18 RX ORDER — LISINOPRIL 5 MG/1
5 TABLET ORAL DAILY
Qty: 90 TABLET | Refills: 3 | Status: SHIPPED | OUTPATIENT
Start: 2023-08-18 | End: 2024-02-08 | Stop reason: WASHOUT

## 2023-08-18 ASSESSMENT — PATIENT HEALTH QUESTIONNAIRE - PHQ9
1. LITTLE INTEREST OR PLEASURE IN DOING THINGS: NOT AT ALL
2. FEELING DOWN, DEPRESSED OR HOPELESS: NOT AT ALL
SUM OF ALL RESPONSES TO PHQ9 QUESTIONS 1 AND 2: 0

## 2023-08-18 NOTE — PROGRESS NOTES
Patient was accidentally scheduled today for an appointment for refills.  She has an appointment in a month.  So we will cancel the appointment today.

## 2023-08-21 DIAGNOSIS — R42 DIZZINESS AND GIDDINESS: ICD-10-CM

## 2023-08-21 RX ORDER — MECLIZINE HYDROCHLORIDE 25 MG/1
25 TABLET ORAL 3 TIMES DAILY PRN
Qty: 15 TABLET | Refills: 2 | Status: SHIPPED | OUTPATIENT
Start: 2023-08-21 | End: 2024-01-18

## 2023-10-18 ENCOUNTER — LAB (OUTPATIENT)
Dept: LAB | Facility: LAB | Age: 88
End: 2023-10-18
Payer: MEDICARE

## 2023-10-18 DIAGNOSIS — E03.9 ACQUIRED HYPOTHYROIDISM: ICD-10-CM

## 2023-10-18 DIAGNOSIS — E78.2 MIXED DYSLIPIDEMIA: ICD-10-CM

## 2023-10-18 LAB
CHOLEST SERPL-MCNC: 150 MG/DL (ref 0–199)
CHOLESTEROL/HDL RATIO: 2.8
HDLC SERPL-MCNC: 54 MG/DL
LDLC SERPL CALC-MCNC: 80 MG/DL
NON HDL CHOLESTEROL: 96 MG/DL (ref 0–149)
T4 FREE SERPL-MCNC: 1.07 NG/DL (ref 0.61–1.12)
TRIGL SERPL-MCNC: 78 MG/DL (ref 0–149)
TSH SERPL-ACNC: 6.34 MIU/L (ref 0.44–3.98)
VLDL: 16 MG/DL (ref 0–40)

## 2023-10-18 PROCEDURE — 84443 ASSAY THYROID STIM HORMONE: CPT

## 2023-10-18 PROCEDURE — 84439 ASSAY OF FREE THYROXINE: CPT

## 2023-10-18 PROCEDURE — 36415 COLL VENOUS BLD VENIPUNCTURE: CPT

## 2023-10-18 PROCEDURE — 80061 LIPID PANEL: CPT

## 2023-10-20 ENCOUNTER — OFFICE VISIT (OUTPATIENT)
Dept: PRIMARY CARE | Facility: CLINIC | Age: 88
End: 2023-10-20
Payer: MEDICARE

## 2023-10-20 VITALS
OXYGEN SATURATION: 93 % | DIASTOLIC BLOOD PRESSURE: 66 MMHG | BODY MASS INDEX: 36.58 KG/M2 | WEIGHT: 200 LBS | HEART RATE: 61 BPM | SYSTOLIC BLOOD PRESSURE: 108 MMHG

## 2023-10-20 DIAGNOSIS — I10 HYPERTENSION, UNSPECIFIED TYPE: ICD-10-CM

## 2023-10-20 DIAGNOSIS — Z00.00 ROUTINE GENERAL MEDICAL EXAMINATION AT HEALTH CARE FACILITY: ICD-10-CM

## 2023-10-20 DIAGNOSIS — E66.01 CLASS 2 SEVERE OBESITY WITH SERIOUS COMORBIDITY AND BODY MASS INDEX (BMI) OF 37.0 TO 37.9 IN ADULT, UNSPECIFIED OBESITY TYPE (MULTI): ICD-10-CM

## 2023-10-20 DIAGNOSIS — D64.9 ANEMIA, UNSPECIFIED TYPE: ICD-10-CM

## 2023-10-20 DIAGNOSIS — E03.9 HYPOTHYROIDISM, UNSPECIFIED TYPE: ICD-10-CM

## 2023-10-20 DIAGNOSIS — R44.1 VISUAL HALLUCINATION: ICD-10-CM

## 2023-10-20 DIAGNOSIS — Z85.42 HISTORY OF ENDOMETRIAL CANCER: ICD-10-CM

## 2023-10-20 DIAGNOSIS — E11.69 TYPE 2 DIABETES MELLITUS WITH OTHER SPECIFIED COMPLICATION, WITHOUT LONG-TERM CURRENT USE OF INSULIN (MULTI): Primary | ICD-10-CM

## 2023-10-20 DIAGNOSIS — E21.3 HYPERPARATHYROIDISM (MULTI): ICD-10-CM

## 2023-10-20 DIAGNOSIS — N18.30 STAGE 3 CHRONIC KIDNEY DISEASE, UNSPECIFIED WHETHER STAGE 3A OR 3B CKD (MULTI): ICD-10-CM

## 2023-10-20 DIAGNOSIS — R41.3 MEMORY LOSS: ICD-10-CM

## 2023-10-20 DIAGNOSIS — E11.69 TYPE 2 DIABETES MELLITUS WITH OTHER SPECIFIED COMPLICATION, WITHOUT LONG-TERM CURRENT USE OF INSULIN (MULTI): ICD-10-CM

## 2023-10-20 DIAGNOSIS — I73.9 CLAUDICATION (CMS-HCC): ICD-10-CM

## 2023-10-20 DIAGNOSIS — Z13.31 DEPRESSION SCREENING: ICD-10-CM

## 2023-10-20 PROCEDURE — 1036F TOBACCO NON-USER: CPT | Performed by: STUDENT IN AN ORGANIZED HEALTH CARE EDUCATION/TRAINING PROGRAM

## 2023-10-20 PROCEDURE — G0439 PPPS, SUBSEQ VISIT: HCPCS | Performed by: STUDENT IN AN ORGANIZED HEALTH CARE EDUCATION/TRAINING PROGRAM

## 2023-10-20 PROCEDURE — 1159F MED LIST DOCD IN RCRD: CPT | Performed by: STUDENT IN AN ORGANIZED HEALTH CARE EDUCATION/TRAINING PROGRAM

## 2023-10-20 PROCEDURE — 99214 OFFICE O/P EST MOD 30 MIN: CPT | Performed by: STUDENT IN AN ORGANIZED HEALTH CARE EDUCATION/TRAINING PROGRAM

## 2023-10-20 PROCEDURE — 1170F FXNL STATUS ASSESSED: CPT | Performed by: STUDENT IN AN ORGANIZED HEALTH CARE EDUCATION/TRAINING PROGRAM

## 2023-10-20 PROCEDURE — 3074F SYST BP LT 130 MM HG: CPT | Performed by: STUDENT IN AN ORGANIZED HEALTH CARE EDUCATION/TRAINING PROGRAM

## 2023-10-20 PROCEDURE — 3078F DIAST BP <80 MM HG: CPT | Performed by: STUDENT IN AN ORGANIZED HEALTH CARE EDUCATION/TRAINING PROGRAM

## 2023-10-20 PROCEDURE — G0444 DEPRESSION SCREEN ANNUAL: HCPCS | Performed by: STUDENT IN AN ORGANIZED HEALTH CARE EDUCATION/TRAINING PROGRAM

## 2023-10-20 PROCEDURE — 1160F RVW MEDS BY RX/DR IN RCRD: CPT | Performed by: STUDENT IN AN ORGANIZED HEALTH CARE EDUCATION/TRAINING PROGRAM

## 2023-10-20 ASSESSMENT — PATIENT HEALTH QUESTIONNAIRE - PHQ9
9. THOUGHTS THAT YOU WOULD BE BETTER OFF DEAD, OR OF HURTING YOURSELF: NOT AT ALL
1. LITTLE INTEREST OR PLEASURE IN DOING THINGS: SEVERAL DAYS
10. IF YOU CHECKED OFF ANY PROBLEMS, HOW DIFFICULT HAVE THESE PROBLEMS MADE IT FOR YOU TO DO YOUR WORK, TAKE CARE OF THINGS AT HOME, OR GET ALONG WITH OTHER PEOPLE: SOMEWHAT DIFFICULT
2. FEELING DOWN, DEPRESSED OR HOPELESS: MORE THAN HALF THE DAYS
4. FEELING TIRED OR HAVING LITTLE ENERGY: NEARLY EVERY DAY
SUM OF ALL RESPONSES TO PHQ9 QUESTIONS 1 AND 2: 3
3. TROUBLE FALLING OR STAYING ASLEEP OR SLEEPING TOO MUCH: NEARLY EVERY DAY
6. FEELING BAD ABOUT YOURSELF - OR THAT YOU ARE A FAILURE OR HAVE LET YOURSELF OR YOUR FAMILY DOWN: NEARLY EVERY DAY
SUM OF ALL RESPONSES TO PHQ QUESTIONS 1-9: 12
7. TROUBLE CONCENTRATING ON THINGS, SUCH AS READING THE NEWSPAPER OR WATCHING TELEVISION: NOT AT ALL
8. MOVING OR SPEAKING SO SLOWLY THAT OTHER PEOPLE COULD HAVE NOTICED. OR THE OPPOSITE, BEING SO FIGETY OR RESTLESS THAT YOU HAVE BEEN MOVING AROUND A LOT MORE THAN USUAL: NOT AT ALL
5. POOR APPETITE OR OVEREATING: NOT AT ALL

## 2023-10-20 ASSESSMENT — ACTIVITIES OF DAILY LIVING (ADL)
TAKING_MEDICATION: NEEDS ASSISTANCE
GROCERY_SHOPPING: NEEDS ASSISTANCE
DOING_HOUSEWORK: NEEDS ASSISTANCE
MANAGING_FINANCES: INDEPENDENT
DRESSING: INDEPENDENT
BATHING: INDEPENDENT

## 2023-10-20 NOTE — PROGRESS NOTES
Subjective   Reason for Visit: Lakeshia Park is an 89 y.o. female here for a Medicare Wellness visit.     Past Medical, Surgical, and Family History reviewed and updated in chart.    Reviewed all medications by prescribing practitioner or clinical pharmacist (such as prescriptions, OTCs, herbal therapies and supplements) and documented in the medical record.    Chief Complaint   Patient presents with    Medicare Annual Wellness Visit Subsequent     HPI    Patient is here for follow-up and wellness visit.    Anemia: Patient is currently taking iron supplementation 3 times daily.  Anemia has improved now the most recent hemoglobin level being 11.7.  Given the concerns with diarrhea she will reduce the supplementation frequency to once daily to see if this improves her symptoms.    Diarrhea: Patient reports that she has been having intermittent episodes of diarrhea.  There is documentation that she takes staining on her close has been bad and unable to remove them.  As discussed above we will reduce iron supplementation and see her symptoms    Vertigo: Patient reports that her vertigo has been bad intermittently.  Reports that when she has vertigo she also has a hallucinations.  Her hesitations were worse recently but has improved.  She continues to have intermittent hallucinations like seeing a cat under the chair.  When she blinks her eyes and shakes her head the hallucinations typically go away.  Plan: CT scan of the head is ordered for the patient.    Depression Screening done  Patient reports that she feels depressed intermittent.  As she is not able to see her family as frequently as she would like.  Reports that all her family is in [place] but she rarely gets to see them.  She was sent that nobody helps her anymore.    Patient Care Team:  Domingo Aquino MD MPH as PCP - General  Domingo Aquino MD MPH as PCP - [employer] PCP     Review of Systems  ROS negative except discussed  above in HPI.    Objective   Vitals:  /66 (BP Location: Right arm, Patient Position: Sitting)   Pulse 61   Wt 90.7 kg (200 lb)   SpO2 93%   BMI 36.58 kg/m²       Physical Exam  Constitutional:       Appearance: Normal appearance.   Neurological:      Mental Status: She is alert.   Psychiatric:         Behavior: Behavior normal.      Comments: Slightly depressed.             Assessment/Plan   Problem List Items Addressed This Visit       Anemia    Relevant Orders    CBC and Auto Differential    Claudication (CMS/[facility])    DM2 (diabetes mellitus, type 2) (CMS/[facility]) - Primary    Relevant Orders    Hemoglobin A1C    History of endometrial cancer    HTN (hypertension)    Hypothyroidism    Relevant Orders    TSH with reflex to Free T4 if abnormal    Stage 3 chronic kidney disease (CMS/[facility])    Class 2 severe obesity with serious comorbidity and body mass index (BMI) of 37.0 to 37.9 in adult (CMS/[facility])    Hyperparathyroidism (CMS/[facility])    Relevant Orders    PTH, intact    Comprehensive Metabolic Panel     Other Visit Diagnoses       Visual hallucination        Relevant Orders    CT head wo IV contrast    Memory loss        Relevant Orders    CT head wo IV contrast    Routine general medical examination at health care facility        Depression screening                 Follow-up in 3 months    Voice mail was left with the results of CT head which did not show any acute changes.

## 2023-10-25 DIAGNOSIS — R15.9 INCONTINENCE OF FECES, UNSPECIFIED FECAL INCONTINENCE TYPE: ICD-10-CM

## 2023-10-25 DIAGNOSIS — R32 URINARY INCONTINENCE, UNSPECIFIED TYPE: ICD-10-CM

## 2023-10-25 RX ORDER — ROBITUSSIN DM 10; 100 MG/5ML; MG/5ML
LIQUID ORAL
Qty: 125 EACH | Refills: 11 | Status: SHIPPED | OUTPATIENT
Start: 2023-10-25

## 2023-11-01 ENCOUNTER — HOSPITAL ENCOUNTER (OUTPATIENT)
Dept: RADIOLOGY | Facility: HOSPITAL | Age: 88
Discharge: HOME | End: 2023-11-01
Payer: MEDICARE

## 2023-11-01 DIAGNOSIS — R44.1 VISUAL HALLUCINATION: ICD-10-CM

## 2023-11-01 DIAGNOSIS — R41.3 MEMORY LOSS: ICD-10-CM

## 2023-11-01 PROCEDURE — 70450 CT HEAD/BRAIN W/O DYE: CPT

## 2023-11-01 PROCEDURE — 70450 CT HEAD/BRAIN W/O DYE: CPT | Performed by: RADIOLOGY

## 2023-11-20 ENCOUNTER — TELEPHONE (OUTPATIENT)
Dept: PRIMARY CARE | Facility: CLINIC | Age: 88
End: 2023-11-20
Payer: MEDICARE

## 2023-11-20 NOTE — TELEPHONE ENCOUNTER
Needing new RX for size large pullup depends sent to [facility].

## 2023-11-21 DIAGNOSIS — R15.9 INCONTINENCE OF FECES, UNSPECIFIED FECAL INCONTINENCE TYPE: ICD-10-CM

## 2023-11-21 DIAGNOSIS — R15.9 INCONTINENCE OF FECES, UNSPECIFIED FECAL INCONTINENCE TYPE: Primary | ICD-10-CM

## 2023-11-21 RX ORDER — DIAPER,BRIEF,ADULT, DISPOSABLE
EACH MISCELLANEOUS
Qty: 120 EACH | Refills: 11 | Status: SHIPPED | OUTPATIENT
Start: 2023-11-21 | End: 2023-11-21 | Stop reason: SDUPTHER

## 2023-11-21 RX ORDER — DIAPER,BRIEF,ADULT, DISPOSABLE
EACH MISCELLANEOUS
Qty: 120 EACH | Refills: 11 | Status: SHIPPED | OUTPATIENT
Start: 2023-11-21

## 2023-12-06 ENCOUNTER — TELEPHONE (OUTPATIENT)
Dept: PRIMARY CARE | Facility: CLINIC | Age: 88
End: 2023-12-06
Payer: MEDICARE

## 2023-12-22 DIAGNOSIS — I10 HYPERTENSION, UNSPECIFIED TYPE: ICD-10-CM

## 2023-12-22 RX ORDER — INDAPAMIDE 2.5 MG/1
2.5 TABLET ORAL DAILY
Qty: 30 TABLET | Refills: 3 | OUTPATIENT
Start: 2023-12-22

## 2024-01-03 RX ORDER — INDAPAMIDE 2.5 MG/1
2.5 TABLET ORAL DAILY
Qty: 90 TABLET | Refills: 1 | Status: SHIPPED | OUTPATIENT
Start: 2024-01-03 | End: 2024-07-01

## 2024-01-17 ENCOUNTER — LAB (OUTPATIENT)
Dept: LAB | Facility: LAB | Age: 89
End: 2024-01-17
Payer: MEDICARE

## 2024-01-17 DIAGNOSIS — E11.69 TYPE 2 DIABETES MELLITUS WITH OTHER SPECIFIED COMPLICATION, WITHOUT LONG-TERM CURRENT USE OF INSULIN (MULTI): ICD-10-CM

## 2024-01-17 DIAGNOSIS — E03.9 HYPOTHYROIDISM, UNSPECIFIED TYPE: ICD-10-CM

## 2024-01-17 DIAGNOSIS — D64.9 ANEMIA, UNSPECIFIED TYPE: ICD-10-CM

## 2024-01-17 DIAGNOSIS — E21.3 HYPERPARATHYROIDISM (MULTI): ICD-10-CM

## 2024-01-17 LAB
ALBUMIN SERPL BCP-MCNC: 3.9 G/DL (ref 3.4–5)
ALP SERPL-CCNC: 69 U/L (ref 33–136)
ALT SERPL W P-5'-P-CCNC: 13 U/L (ref 7–45)
ANION GAP SERPL CALC-SCNC: 16 MMOL/L (ref 10–20)
AST SERPL W P-5'-P-CCNC: 15 U/L (ref 9–39)
BASOPHILS # BLD AUTO: 0.03 X10*3/UL (ref 0–0.1)
BASOPHILS NFR BLD AUTO: 0.5 %
BILIRUB SERPL-MCNC: 0.3 MG/DL (ref 0–1.2)
BUN SERPL-MCNC: 36 MG/DL (ref 6–23)
CALCIUM SERPL-MCNC: 8.6 MG/DL (ref 8.6–10.3)
CHLORIDE SERPL-SCNC: 105 MMOL/L (ref 98–107)
CO2 SERPL-SCNC: 25 MMOL/L (ref 21–32)
CREAT SERPL-MCNC: 2.26 MG/DL (ref 0.5–1.05)
EGFRCR SERPLBLD CKD-EPI 2021: 20 ML/MIN/1.73M*2
EOSINOPHIL # BLD AUTO: 0.2 X10*3/UL (ref 0–0.4)
EOSINOPHIL NFR BLD AUTO: 3.3 %
ERYTHROCYTE [DISTWIDTH] IN BLOOD BY AUTOMATED COUNT: 12.5 % (ref 11.5–14.5)
EST. AVERAGE GLUCOSE BLD GHB EST-MCNC: 117 MG/DL
GLUCOSE SERPL-MCNC: 92 MG/DL (ref 74–99)
HBA1C MFR BLD: 5.7 %
HCT VFR BLD AUTO: 35.8 % (ref 36–46)
HGB BLD-MCNC: 11.2 G/DL (ref 12–16)
IMM GRANULOCYTES # BLD AUTO: 0.02 X10*3/UL (ref 0–0.5)
IMM GRANULOCYTES NFR BLD AUTO: 0.3 % (ref 0–0.9)
LYMPHOCYTES # BLD AUTO: 1.19 X10*3/UL (ref 0.8–3)
LYMPHOCYTES NFR BLD AUTO: 19.7 %
MCH RBC QN AUTO: 31.5 PG (ref 26–34)
MCHC RBC AUTO-ENTMCNC: 31.3 G/DL (ref 32–36)
MCV RBC AUTO: 101 FL (ref 80–100)
MONOCYTES # BLD AUTO: 0.72 X10*3/UL (ref 0.05–0.8)
MONOCYTES NFR BLD AUTO: 11.9 %
NEUTROPHILS # BLD AUTO: 3.87 X10*3/UL (ref 1.6–5.5)
NEUTROPHILS NFR BLD AUTO: 64.3 %
NRBC BLD-RTO: 0 /100 WBCS (ref 0–0)
PLATELET # BLD AUTO: 294 X10*3/UL (ref 150–450)
POTASSIUM SERPL-SCNC: 4.5 MMOL/L (ref 3.5–5.3)
PROT SERPL-MCNC: 6.3 G/DL (ref 6.4–8.2)
PTH-INTACT SERPL-MCNC: 87.5 PG/ML (ref 18.5–88)
RBC # BLD AUTO: 3.56 X10*6/UL (ref 4–5.2)
SODIUM SERPL-SCNC: 141 MMOL/L (ref 136–145)
T4 FREE SERPL-MCNC: 1.19 NG/DL (ref 0.61–1.12)
TSH SERPL-ACNC: 6.19 MIU/L (ref 0.44–3.98)
WBC # BLD AUTO: 6 X10*3/UL (ref 4.4–11.3)

## 2024-01-17 PROCEDURE — 83970 ASSAY OF PARATHORMONE: CPT

## 2024-01-17 PROCEDURE — 80053 COMPREHEN METABOLIC PANEL: CPT

## 2024-01-17 PROCEDURE — 84443 ASSAY THYROID STIM HORMONE: CPT

## 2024-01-17 PROCEDURE — 83036 HEMOGLOBIN GLYCOSYLATED A1C: CPT

## 2024-01-17 PROCEDURE — 84439 ASSAY OF FREE THYROXINE: CPT

## 2024-01-17 PROCEDURE — 36415 COLL VENOUS BLD VENIPUNCTURE: CPT

## 2024-01-17 PROCEDURE — 85025 COMPLETE CBC W/AUTO DIFF WBC: CPT

## 2024-01-18 DIAGNOSIS — E11.9 TYPE 2 DIABETES MELLITUS WITHOUT COMPLICATIONS (MULTI): ICD-10-CM

## 2024-01-18 DIAGNOSIS — R42 DIZZINESS AND GIDDINESS: ICD-10-CM

## 2024-01-18 RX ORDER — LOVASTATIN 20 MG/1
20 TABLET ORAL NIGHTLY
Qty: 90 TABLET | Refills: 3 | Status: SHIPPED | OUTPATIENT
Start: 2024-01-18

## 2024-01-18 RX ORDER — MECLIZINE HYDROCHLORIDE 25 MG/1
25 TABLET ORAL 3 TIMES DAILY PRN
Qty: 15 TABLET | Refills: 2 | Status: SHIPPED | OUTPATIENT
Start: 2024-01-18

## 2024-01-24 ENCOUNTER — OFFICE VISIT (OUTPATIENT)
Dept: PRIMARY CARE | Facility: CLINIC | Age: 89
End: 2024-01-24
Payer: MEDICARE

## 2024-01-24 VITALS
DIASTOLIC BLOOD PRESSURE: 88 MMHG | WEIGHT: 195.3 LBS | BODY MASS INDEX: 35.72 KG/M2 | SYSTOLIC BLOOD PRESSURE: 136 MMHG | OXYGEN SATURATION: 94 % | HEART RATE: 84 BPM

## 2024-01-24 DIAGNOSIS — Z13.31 DEPRESSION SCREENING: ICD-10-CM

## 2024-01-24 DIAGNOSIS — E03.9 HYPOTHYROIDISM, UNSPECIFIED TYPE: Primary | ICD-10-CM

## 2024-01-24 DIAGNOSIS — R41.3 MEMORY LOSS: ICD-10-CM

## 2024-01-24 DIAGNOSIS — R19.7 DIARRHEA, UNSPECIFIED TYPE: ICD-10-CM

## 2024-01-24 DIAGNOSIS — N18.4: ICD-10-CM

## 2024-01-24 PROCEDURE — 1160F RVW MEDS BY RX/DR IN RCRD: CPT | Performed by: STUDENT IN AN ORGANIZED HEALTH CARE EDUCATION/TRAINING PROGRAM

## 2024-01-24 PROCEDURE — 3079F DIAST BP 80-89 MM HG: CPT | Performed by: STUDENT IN AN ORGANIZED HEALTH CARE EDUCATION/TRAINING PROGRAM

## 2024-01-24 PROCEDURE — 1159F MED LIST DOCD IN RCRD: CPT | Performed by: STUDENT IN AN ORGANIZED HEALTH CARE EDUCATION/TRAINING PROGRAM

## 2024-01-24 PROCEDURE — 1157F ADVNC CARE PLAN IN RCRD: CPT | Performed by: STUDENT IN AN ORGANIZED HEALTH CARE EDUCATION/TRAINING PROGRAM

## 2024-01-24 PROCEDURE — 99214 OFFICE O/P EST MOD 30 MIN: CPT | Performed by: STUDENT IN AN ORGANIZED HEALTH CARE EDUCATION/TRAINING PROGRAM

## 2024-01-24 PROCEDURE — 3075F SYST BP GE 130 - 139MM HG: CPT | Performed by: STUDENT IN AN ORGANIZED HEALTH CARE EDUCATION/TRAINING PROGRAM

## 2024-01-24 PROCEDURE — 1036F TOBACCO NON-USER: CPT | Performed by: STUDENT IN AN ORGANIZED HEALTH CARE EDUCATION/TRAINING PROGRAM

## 2024-01-24 RX ORDER — LEVOTHYROXINE SODIUM 175 UG/1
175 TABLET ORAL
Qty: 90 TABLET | Refills: 11 | Status: SHIPPED | OUTPATIENT
Start: 2024-01-24

## 2024-01-24 ASSESSMENT — PATIENT HEALTH QUESTIONNAIRE - PHQ9
1. LITTLE INTEREST OR PLEASURE IN DOING THINGS: NOT AT ALL
SUM OF ALL RESPONSES TO PHQ9 QUESTIONS 1 AND 2: 0
2. FEELING DOWN, DEPRESSED OR HOPELESS: NOT AT ALL

## 2024-01-24 NOTE — PROGRESS NOTES
Subjective   Patient ID: Lakeshia Park is a 89 y.o. female who presents for Follow-up (Pt is here today for 3 month OV. Reports she does have a cough, some congestion. Some wheezing, some SOB. ).    HPI    Reports that she is getting weaker. She is not allowed to go in to the pool in her facility.   Unable to schedule the ride which is she unable to use effectively to go to the local health center.     Still has concerns that she has not many people to talk to.     Patient reports that she is giving her medication administration over to the  facility staff.     Has not needed to take imodium frequently for her diarrhea.    Reports that food is worse- portion sizes are getting smaller. Anemia noticed on recent blood test.     Discussed results with patient.     CKD4 - worsening GFR. Patient has seen nephrology in thepast. She will contact them again to reestablish care given the worsening SOB.     Hypothyroidism: increasing the dose as     Anemia: She does not have any control on diet given that she is in facility. Discussed about diet and supplements.    Review of Systems  ROS negative except discussed above in HPI.    Vitals:    01/24/24 1101   BP: 136/88   Pulse: 84   SpO2: 94%     Objective   Physical Exam  Constitutional:       Appearance: Normal appearance.   Cardiovascular:      Rate and Rhythm: Normal rate and regular rhythm.   Pulmonary:      Effort: Pulmonary effort is normal.      Breath sounds: Normal breath sounds.   Musculoskeletal:      Cervical back: Normal range of motion and neck supple.   Lymphadenopathy:      Cervical: No cervical adenopathy.   Neurological:      Mental Status: She is alert.           Assessment/Plan   Lakeshia was seen today for follow-up.  Diagnoses and all orders for this visit:  Hypothyroidism, unspecified type (Primary)  -     levothyroxine (Synthroid, Levoxyl) 175 mcg tablet; Take 1 tablet (175 mcg) by mouth once daily in the morning. Take before meals.  Chronic kidney  disease (CKD) stage G4/A2, severely decreased glomerular filtration rate (GFR) between 15-29 mL/min/1.73 square meter and albuminuria creatinine ratio between  mg/g (CMS/[facility])  -     Basic Metabolic Panel; Future  Depression screening  Memory loss  Diarrhea, unspecified type      Follow up in 3 months     Domingo Aquino MD MPH

## 2024-02-06 DIAGNOSIS — M54.9 MID BACK PAIN: Primary | ICD-10-CM

## 2024-02-06 DIAGNOSIS — R26.81 UNSTABLE GAIT: ICD-10-CM

## 2024-02-06 DIAGNOSIS — G89.29 CHRONIC LOW BACK PAIN, UNSPECIFIED BACK PAIN LATERALITY, UNSPECIFIED WHETHER SCIATICA PRESENT: ICD-10-CM

## 2024-02-06 DIAGNOSIS — M54.50 CHRONIC LOW BACK PAIN, UNSPECIFIED BACK PAIN LATERALITY, UNSPECIFIED WHETHER SCIATICA PRESENT: ICD-10-CM

## 2024-02-06 DIAGNOSIS — R29.6 FREQUENT FALLS: ICD-10-CM

## 2024-02-06 RX ORDER — CALCIUM CARBONATE 160(400)MG
TABLET,CHEWABLE ORAL
Qty: 1 EACH | Refills: 0 | Status: SHIPPED | OUTPATIENT
Start: 2024-02-06

## 2024-02-08 ENCOUNTER — TELEPHONE (OUTPATIENT)
Dept: PRIMARY CARE | Facility: CLINIC | Age: 89
End: 2024-02-08

## 2024-02-08 ENCOUNTER — OFFICE VISIT (OUTPATIENT)
Dept: NEPHROLOGY | Facility: CLINIC | Age: 89
End: 2024-02-08
Payer: MEDICARE

## 2024-02-08 VITALS
WEIGHT: 200.2 LBS | BODY MASS INDEX: 36.84 KG/M2 | HEIGHT: 62 IN | HEART RATE: 78 BPM | DIASTOLIC BLOOD PRESSURE: 72 MMHG | SYSTOLIC BLOOD PRESSURE: 128 MMHG

## 2024-02-08 DIAGNOSIS — E11.22 TYPE 2 DIABETES MELLITUS WITH DIABETIC CHRONIC KIDNEY DISEASE, UNSPECIFIED CKD STAGE, UNSPECIFIED WHETHER LONG TERM INSULIN USE (MULTI): ICD-10-CM

## 2024-02-08 DIAGNOSIS — N18.4 STAGE 4 CHRONIC KIDNEY DISEASE (MULTI): Primary | ICD-10-CM

## 2024-02-08 DIAGNOSIS — E78.2 MIXED DYSLIPIDEMIA: ICD-10-CM

## 2024-02-08 DIAGNOSIS — I10 PRIMARY HYPERTENSION: ICD-10-CM

## 2024-02-08 PROCEDURE — 99214 OFFICE O/P EST MOD 30 MIN: CPT | Performed by: INTERNAL MEDICINE

## 2024-02-08 PROCEDURE — 3078F DIAST BP <80 MM HG: CPT | Performed by: INTERNAL MEDICINE

## 2024-02-08 PROCEDURE — 1160F RVW MEDS BY RX/DR IN RCRD: CPT | Performed by: INTERNAL MEDICINE

## 2024-02-08 PROCEDURE — 1157F ADVNC CARE PLAN IN RCRD: CPT | Performed by: INTERNAL MEDICINE

## 2024-02-08 PROCEDURE — 1036F TOBACCO NON-USER: CPT | Performed by: INTERNAL MEDICINE

## 2024-02-08 PROCEDURE — 1159F MED LIST DOCD IN RCRD: CPT | Performed by: INTERNAL MEDICINE

## 2024-02-08 PROCEDURE — 3074F SYST BP LT 130 MM HG: CPT | Performed by: INTERNAL MEDICINE

## 2024-02-08 ASSESSMENT — ENCOUNTER SYMPTOMS
ALLERGIC/IMMUNOLOGIC NEGATIVE: 1
PSYCHIATRIC NEGATIVE: 1
MUSCULOSKELETAL NEGATIVE: 1
EYES NEGATIVE: 1
GASTROINTESTINAL NEGATIVE: 1
CARDIOVASCULAR NEGATIVE: 1
RESPIRATORY NEGATIVE: 1
ENDOCRINE NEGATIVE: 1
NEUROLOGICAL NEGATIVE: 1
HEMATOLOGIC/LYMPHATIC NEGATIVE: 1
CONSTITUTIONAL NEGATIVE: 1

## 2024-02-08 NOTE — PROGRESS NOTES
Subjective   She is feeling well  No swelling  Has no major complaints  Diarrhea is much better    Patient ID: Lakeshia Park is a 89 y.o. female who presents for Follow-up (Review labs 1/17).  HPI  She is here for follow-up secondary to chronic kidney disease  She was asked to follow-up secondary to abnormal lab results  Her kidney function is worse than it has been recently  Labs were drawn on January 17  She is on lisinopril also on glimepiride  Her labs were reviewed  Her meds were also reviewed  Her blood pressure here in the office today is 120/72  Review of Systems   Constitutional: Negative.    HENT: Negative.     Eyes: Negative.    Respiratory: Negative.     Cardiovascular: Negative.    Gastrointestinal: Negative.    Endocrine: Negative.    Genitourinary: Negative.    Musculoskeletal: Negative.    Skin: Negative.    Allergic/Immunologic: Negative.    Neurological: Negative.    Hematological: Negative.    Psychiatric/Behavioral: Negative.         Objective   Physical Exam  Constitutional:       Appearance: Normal appearance.   HENT:      Head: Normocephalic and atraumatic.      Right Ear: External ear normal.      Left Ear: External ear normal.      Nose: Nose normal.      Mouth/Throat:      Mouth: Mucous membranes are moist.      Pharynx: Oropharynx is clear.   Eyes:      Extraocular Movements: Extraocular movements intact.      Conjunctiva/sclera: Conjunctivae normal.      Pupils: Pupils are equal, round, and reactive to light.   Cardiovascular:      Rate and Rhythm: Normal rate and regular rhythm.   Pulmonary:      Effort: Pulmonary effort is normal.      Breath sounds: Normal breath sounds.   Abdominal:      General: Abdomen is flat.      Palpations: Abdomen is soft.   Skin:     General: Skin is warm and dry.   Neurological:      General: No focal deficit present.      Mental Status: She is alert and oriented to person, place, and time.   Psychiatric:         Mood and Affect: Mood normal.          Behavior: Behavior normal.         Assessment/Plan   Problem List Items Addressed This Visit             ICD-10-CM    DM2 (diabetes mellitus, type 2) (CMS/[facility]) E11.9     A1C is 5.7.  Stop Glimepiride         HTN (hypertension) I10     BP is well controlled         Mixed dyslipidemia E78.2     On a statin         Stage 4 chronic kidney disease (CMS/[facility]) - Primary N18.4     Has had acute worsening  Stop Lisionpril  BP is good  Stop Glimepiride  Stay wellhydrated  Recheck labs in `1 month         Relevant Orders    Basic metabolic panel    Follow Up In Nephrology     Chronic kidney disease stage IIIb/IV with baseline creatinine 1.6-1.9  Acute kidney injury and worsening of kidney function recently  Diabetic Nephropathy and HTN  Diabetes mellitus type 2 on Metformin  Chronic diarrhea  History of gout on allopurinol  Hyperuricemia  Hypertension with blood pressure now on the low side  Hyperlipidemia  Bilateral renal cysts  Positive REJI screen with slightly high RNP  Depression   Slight Peripheral Edema        Dharmesh Garcia,  02/08/24 9:45 AM

## 2024-02-08 NOTE — ASSESSMENT & PLAN NOTE
Has had acute worsening  Stop Lisionpril  BP is good  Stop Glimepiride  Stay wellhydrated  Recheck labs in `1 month

## 2024-02-08 NOTE — TELEPHONE ENCOUNTER
Patient called and said that she has an apt with Kidney dr tomorrow and just wanted to update us, that way  didn't think that she was not giving him information from the apt.

## 2024-02-20 ENCOUNTER — LAB REQUISITION (OUTPATIENT)
Dept: LAB | Facility: HOSPITAL | Age: 89
End: 2024-02-20
Payer: MEDICARE

## 2024-02-20 DIAGNOSIS — R44.3 HALLUCINATIONS, UNSPECIFIED: ICD-10-CM

## 2024-02-20 LAB
APPEARANCE UR: CLEAR
BILIRUB UR STRIP.AUTO-MCNC: NEGATIVE MG/DL
COLOR UR: NORMAL
GLUCOSE UR STRIP.AUTO-MCNC: NEGATIVE MG/DL
KETONES UR STRIP.AUTO-MCNC: NEGATIVE MG/DL
LEUKOCYTE ESTERASE UR QL STRIP.AUTO: NEGATIVE
NITRITE UR QL STRIP.AUTO: NEGATIVE
PH UR STRIP.AUTO: 6 [PH]
PROT UR STRIP.AUTO-MCNC: NEGATIVE MG/DL
RBC # UR STRIP.AUTO: NEGATIVE /UL
SP GR UR STRIP.AUTO: 1.01
UROBILINOGEN UR STRIP.AUTO-MCNC: <2 MG/DL

## 2024-02-20 PROCEDURE — 87086 URINE CULTURE/COLONY COUNT: CPT | Mod: OUT,SAMLAB | Performed by: STUDENT IN AN ORGANIZED HEALTH CARE EDUCATION/TRAINING PROGRAM

## 2024-02-20 PROCEDURE — 81003 URINALYSIS AUTO W/O SCOPE: CPT | Mod: OUT | Performed by: STUDENT IN AN ORGANIZED HEALTH CARE EDUCATION/TRAINING PROGRAM

## 2024-02-21 LAB — BACTERIA UR CULT: NORMAL

## 2024-03-06 ENCOUNTER — LAB (OUTPATIENT)
Dept: LAB | Facility: LAB | Age: 89
End: 2024-03-06
Payer: MEDICARE

## 2024-03-06 DIAGNOSIS — N18.4 STAGE 4 CHRONIC KIDNEY DISEASE (MULTI): ICD-10-CM

## 2024-03-06 LAB
ANION GAP SERPL CALC-SCNC: 13 MMOL/L (ref 10–20)
BUN SERPL-MCNC: 35 MG/DL (ref 6–23)
CALCIUM SERPL-MCNC: 8.8 MG/DL (ref 8.6–10.3)
CHLORIDE SERPL-SCNC: 103 MMOL/L (ref 98–107)
CO2 SERPL-SCNC: 30 MMOL/L (ref 21–32)
CREAT SERPL-MCNC: 2.26 MG/DL (ref 0.5–1.05)
EGFRCR SERPLBLD CKD-EPI 2021: 20 ML/MIN/1.73M*2
GLUCOSE SERPL-MCNC: 156 MG/DL (ref 74–99)
POTASSIUM SERPL-SCNC: 4.3 MMOL/L (ref 3.5–5.3)
SODIUM SERPL-SCNC: 142 MMOL/L (ref 136–145)

## 2024-03-06 PROCEDURE — 36415 COLL VENOUS BLD VENIPUNCTURE: CPT

## 2024-03-06 PROCEDURE — 80048 BASIC METABOLIC PNL TOTAL CA: CPT

## 2024-03-07 ENCOUNTER — OFFICE VISIT (OUTPATIENT)
Dept: NEPHROLOGY | Facility: CLINIC | Age: 89
End: 2024-03-07
Payer: MEDICARE

## 2024-03-07 VITALS
SYSTOLIC BLOOD PRESSURE: 132 MMHG | BODY MASS INDEX: 36.03 KG/M2 | WEIGHT: 197 LBS | DIASTOLIC BLOOD PRESSURE: 80 MMHG | HEART RATE: 63 BPM | OXYGEN SATURATION: 95 %

## 2024-03-07 DIAGNOSIS — N18.4 STAGE 4 CHRONIC KIDNEY DISEASE (MULTI): ICD-10-CM

## 2024-03-07 DIAGNOSIS — I10 PRIMARY HYPERTENSION: Primary | ICD-10-CM

## 2024-03-07 DIAGNOSIS — N18.4 TYPE 2 DIABETES MELLITUS WITH STAGE 4 CHRONIC KIDNEY DISEASE, WITHOUT LONG-TERM CURRENT USE OF INSULIN (MULTI): ICD-10-CM

## 2024-03-07 DIAGNOSIS — E11.22 TYPE 2 DIABETES MELLITUS WITH STAGE 4 CHRONIC KIDNEY DISEASE, WITHOUT LONG-TERM CURRENT USE OF INSULIN (MULTI): ICD-10-CM

## 2024-03-07 PROCEDURE — 1036F TOBACCO NON-USER: CPT | Performed by: INTERNAL MEDICINE

## 2024-03-07 PROCEDURE — 3079F DIAST BP 80-89 MM HG: CPT | Performed by: INTERNAL MEDICINE

## 2024-03-07 PROCEDURE — 1157F ADVNC CARE PLAN IN RCRD: CPT | Performed by: INTERNAL MEDICINE

## 2024-03-07 PROCEDURE — 99213 OFFICE O/P EST LOW 20 MIN: CPT | Performed by: INTERNAL MEDICINE

## 2024-03-07 PROCEDURE — 1159F MED LIST DOCD IN RCRD: CPT | Performed by: INTERNAL MEDICINE

## 2024-03-07 PROCEDURE — 3075F SYST BP GE 130 - 139MM HG: CPT | Performed by: INTERNAL MEDICINE

## 2024-03-07 PROCEDURE — 1160F RVW MEDS BY RX/DR IN RCRD: CPT | Performed by: INTERNAL MEDICINE

## 2024-03-07 RX ORDER — ALUMINUM HYDROXIDE, MAGNESIUM HYDROXIDE, AND SIMETHICONE 2400; 240; 2400 MG/30ML; MG/30ML; MG/30ML
15 SUSPENSION ORAL EVERY 8 HOURS PRN
COMMUNITY

## 2024-03-07 RX ORDER — ADHESIVE BANDAGE
15 BANDAGE TOPICAL EVERY 12 HOURS PRN
COMMUNITY

## 2024-03-07 RX ORDER — GUAIFENESIN/DEXTROMETHORPHAN 100-10MG/5
15 SYRUP ORAL EVERY 4 HOURS PRN
COMMUNITY

## 2024-03-07 RX ORDER — POLYETHYLENE GLYCOL 3350 17 G/17G
17 POWDER, FOR SOLUTION ORAL 2 TIMES DAILY PRN
COMMUNITY

## 2024-03-07 ASSESSMENT — ENCOUNTER SYMPTOMS
MUSCULOSKELETAL NEGATIVE: 1
HEMATOLOGIC/LYMPHATIC NEGATIVE: 1
ENDOCRINE NEGATIVE: 1
WEAKNESS: 1
FATIGUE: 1
GASTROINTESTINAL NEGATIVE: 1
CARDIOVASCULAR NEGATIVE: 1
ALLERGIC/IMMUNOLOGIC NEGATIVE: 1
PSYCHIATRIC NEGATIVE: 1
EYES NEGATIVE: 1
RESPIRATORY NEGATIVE: 1

## 2024-03-07 NOTE — ASSESSMENT & PLAN NOTE
Baseline appears to be now new about 2-2.4  Off Lisinopril  Sugars are stable  Have optimized treatment as much as we can currently.    Electrolytes look good

## 2024-03-07 NOTE — PROGRESS NOTES
Subjective   She states her biggest issue right now is her mobility  Getting worse  Having balance issues    Patient ID: Lakeshia Park is a 89 y.o. female who presents for Follow-up (1 month follow-up).  HPI  She is here for follow-up with chronic kidney disease and had acute worsening of her renal function lately with diabetic nephropathy and hypertension  Also has a little bit of peripheral edema  At last visit we stopped lisinopril and glimepiride    Labs were repeated yesterday.  His renal function is about the same as it was a month ago.  His glucose 156  Electrolytes look pretty good BUN is 35 with a creatinine of 2.26 with an estimated GFR of 20 calcium is normal    Medications are reviewed currently on allopurinol iron levothyroxine loperamide lovastatin metoprolol  Review of Systems   Constitutional:  Positive for fatigue.   HENT: Negative.     Eyes: Negative.    Respiratory: Negative.     Cardiovascular: Negative.    Gastrointestinal: Negative.    Endocrine: Negative.    Genitourinary: Negative.    Musculoskeletal: Negative.    Skin: Negative.    Allergic/Immunologic: Negative.    Neurological:  Positive for weakness.        Falling often  Losing her balance   Hematological: Negative.    Psychiatric/Behavioral: Negative.         Objective   Physical Exam  Constitutional:       Appearance: Normal appearance.   HENT:      Head: Normocephalic and atraumatic.      Right Ear: External ear normal.      Left Ear: External ear normal.      Nose: Nose normal.      Mouth/Throat:      Mouth: Mucous membranes are moist.      Pharynx: Oropharynx is clear.   Eyes:      Extraocular Movements: Extraocular movements intact.      Conjunctiva/sclera: Conjunctivae normal.      Pupils: Pupils are equal, round, and reactive to light.   Cardiovascular:      Rate and Rhythm: Normal rate and regular rhythm.   Pulmonary:      Effort: Pulmonary effort is normal.      Breath sounds: Normal breath sounds.   Abdominal:      General:  Abdomen is flat.      Palpations: Abdomen is soft.   Skin:     General: Skin is warm and dry.   Neurological:      General: No focal deficit present.      Mental Status: She is alert and oriented to person, place, and time.   Psychiatric:         Mood and Affect: Mood normal.         Behavior: Behavior normal.       Assessment/Plan   Problem List Items Addressed This Visit             ICD-10-CM    DM2 (diabetes mellitus, type 2) (CMS/[facility]) E11.9    HTN (hypertension) - Primary I10    Stage 4 chronic kidney disease (CMS/[facility]) N18.4     Baseline appears to be now new about 2-2.4  Off Lisinopril  Sugars are stable  Have optimized treatment as much as we can currently.    Electrolytes look good        Discussion:  She seems to be progressing in a negative direction.  Balance and falling is an issue  BP is stable.  Renal function is stable.  This appears to be her new baseline  I would recommend palliative care.    Please call with any further issues but renal function is stable and I would not make any further medical changes besides what I have done now.    Chronic kidney disease stage IIIb/IV with baseline creatinine 2-2.4  Diabetic Nephropathy and HTN  Diabetes mellitus type 2  Chronic diarrhea  History of gout on allopurinol  Hyperuricemia  Hypertension BP is good now.  Hyperlipidemia  Bilateral renal cysts  Positive REJI screen with slightly high RNP  Depression        Dharmesh Garcia DO 03/07/24 11:32 AM

## 2024-03-14 ENCOUNTER — TELEPHONE (OUTPATIENT)
Dept: PRIMARY CARE | Facility: CLINIC | Age: 89
End: 2024-03-14
Payer: MEDICARE

## 2024-04-05 ENCOUNTER — TELEPHONE (OUTPATIENT)
Dept: PRIMARY CARE | Facility: CLINIC | Age: 89
End: 2024-04-05
Payer: MEDICARE

## 2024-04-05 DIAGNOSIS — R26.2 DIFFICULTY IN WALKING: Primary | ICD-10-CM

## 2024-04-05 NOTE — TELEPHONE ENCOUNTER
Brent are you able to send in this rx?   I spoke with Eliseo- pt would like walker with wheels and a [facility]   thank you!

## 2024-04-05 NOTE — TELEPHONE ENCOUNTER
Eliseo at [facility] asking for a RX for a walker to be sent to [facility] or can be faxed to [facility] [phone].

## 2024-04-11 ENCOUNTER — TELEPHONE (OUTPATIENT)
Dept: PRIMARY CARE | Facility: CLINIC | Age: 89
End: 2024-04-11
Payer: MEDICARE

## 2024-04-11 NOTE — TELEPHONE ENCOUNTER
Has had a cough for a couple weeks. Not getting better. Coughing up yellow phlegm, very tired. Calling about getting an x-ray or ATB. Can call [phone]

## 2024-04-12 ENCOUNTER — HOSPITAL ENCOUNTER (OUTPATIENT)
Dept: RADIOLOGY | Facility: HOSPITAL | Age: 89
Discharge: HOME | End: 2024-04-12
Payer: MEDICARE

## 2024-04-12 ENCOUNTER — OFFICE VISIT (OUTPATIENT)
Dept: URGENT CARE | Facility: CLINIC | Age: 89
End: 2024-04-12
Payer: MEDICARE

## 2024-04-12 VITALS
RESPIRATION RATE: 14 BRPM | TEMPERATURE: 98 F | HEART RATE: 60 BPM | SYSTOLIC BLOOD PRESSURE: 117 MMHG | DIASTOLIC BLOOD PRESSURE: 73 MMHG | WEIGHT: 197 LBS | HEIGHT: 62 IN | OXYGEN SATURATION: 95 % | BODY MASS INDEX: 36.25 KG/M2

## 2024-04-12 DIAGNOSIS — J20.9 ACUTE BRONCHITIS, UNSPECIFIED ORGANISM: Primary | ICD-10-CM

## 2024-04-12 DIAGNOSIS — J20.9 ACUTE BRONCHITIS, UNSPECIFIED ORGANISM: ICD-10-CM

## 2024-04-12 DIAGNOSIS — H60.501 ACUTE OTITIS EXTERNA OF RIGHT EAR, UNSPECIFIED TYPE: ICD-10-CM

## 2024-04-12 LAB
POC RAPID INFLUENZA A: NEGATIVE
POC RAPID INFLUENZA B: NEGATIVE
POC RSV RAPID ANTIGEN: NEGATIVE
POC SARS-COV-2 AG: NORMAL

## 2024-04-12 PROCEDURE — 87426 SARSCOV CORONAVIRUS AG IA: CPT | Performed by: NURSE PRACTITIONER

## 2024-04-12 PROCEDURE — 87804 INFLUENZA ASSAY W/OPTIC: CPT | Performed by: NURSE PRACTITIONER

## 2024-04-12 PROCEDURE — 99213 OFFICE O/P EST LOW 20 MIN: CPT | Performed by: NURSE PRACTITIONER

## 2024-04-12 PROCEDURE — 71046 X-RAY EXAM CHEST 2 VIEWS: CPT | Performed by: RADIOLOGY

## 2024-04-12 PROCEDURE — 71046 X-RAY EXAM CHEST 2 VIEWS: CPT

## 2024-04-12 RX ORDER — AZITHROMYCIN 250 MG/1
TABLET, FILM COATED ORAL
Qty: 6 TABLET | Refills: 0 | Status: ON HOLD | OUTPATIENT
Start: 2024-04-12 | End: 2024-04-28 | Stop reason: ALTCHOICE

## 2024-04-12 RX ORDER — NEOMYCIN SULFATE, POLYMYXIN B SULFATE, HYDROCORTISONE 3.5; 10000; 1 MG/ML; [USP'U]/ML; MG/ML
3 SOLUTION/ DROPS AURICULAR (OTIC) 4 TIMES DAILY
Qty: 10 ML | Refills: 0 | Status: SHIPPED | OUTPATIENT
Start: 2024-04-12 | End: 2024-04-19

## 2024-04-12 RX ORDER — BENZONATATE 100 MG/1
100 CAPSULE ORAL EVERY 8 HOURS PRN
Qty: 30 CAPSULE | Refills: 0 | Status: ON HOLD | OUTPATIENT
Start: 2024-04-12 | End: 2024-04-28 | Stop reason: ALTCHOICE

## 2024-04-12 NOTE — PROGRESS NOTES
[facility]  Valery Haines, APRN-CNP     Visit Note - 4/12/2024 2:38 PM   This note was generated with voice recognition software and may contain errors including spelling, grammar, syntax, and misrecognization of what was dictated.    Patient: Lakeshia Park, MRN: [id number], 89 y.o., female   PCP: Domingo Aquino MD M[facility]  ------------------------------------  ALLERGIES:   Allergies   Allergen Reactions    Celecoxib Other     Abdominal pain     Ciprofloxacin Unknown    Diazepam Hallucinations    Esomeprazole Other     Stomach pain    Ibuprofen Other     Kidney problems    Milk Diarrhea    Naproxen Unknown        CURRENT MEDICATIONS:   Current Outpatient Medications   Medication Instructions    albuterol 90 mcg/actuation inhaler INHALE 2 PUFFS Every 4 hours as needed for shortness of breath or wheezing    allopurinol (Zyloprim) 100 mg tablet 1 tablet, oral, Daily    alum-mag hydroxide-simeth (Mylanta Maximum Strength) 400-400-40 mg/5 mL suspension 15 mL, oral, Every 8 hours PRN    azithromycin (Zithromax Z-Sudarshan) 250 mg tablet Take 2 tablets by mouth at once on day 1, then 1 tablet once a day on days 2-5. Take with a meal.    beclomethasone dipropionate (Qvar RediHaler) 80 mcg/actuation inhaler 2 puffs, inhalation, 2 times daily    benzonatate (TESSALON) 100 mg, oral, Every 8 hours PRN, Do not crush or chew.    buPROPion SR (WELLBUTRIN SR) 150 mg, oral, 2 times daily    citalopram (CELEXA) 10 mg, oral, Daily    cyanocobalamin (Vitamin B-12) 1,000 mcg tablet 1 tablet, oral, Daily    cyclobenzaprine (FEXMID) 7.5 mg, oral, Every 8 hours PRN    dextromethorphan-guaifenesin (Robitussin DM)  mg/5 mL oral liquid 15 mL, oral, Every 4 hours PRN    diaper,brief,adult,disposable (Depend Underwear For Women Lrg) misc Use 4 times per day    ferrous sulfate 325 (65 Fe) MG tablet 1 tablet, oral, Daily    hydrocortisone 2.5 % cream Topical, Sparingly to affected area(s) 2 to 3 times daily     hydrOXYzine HCL (ATARAX) 25 mg, oral, Every 8 hours PRN    incontinence pad, liner, disp (Pads For Women) pad Extra long pads up to 4 per day    indapamide (LOZOL) 2.5 mg, oral, Daily    levothyroxine (SYNTHROID, LEVOXYL) 175 mcg, oral, Daily before breakfast    loperamide (IMODIUM) 4 mg, oral, 3 times daily PRN    lovastatin (MEVACOR) 20 mg, oral, Nightly    magnesium hydroxide (Milk of Magnesia) 400 mg/5 mL suspension 15 mL, oral, Every 12 hours PRN    meclizine (ANTIVERT) 25 mg, oral, 3 times daily PRN    metoprolol tartrate (Lopressor) 50 mg tablet Take 1/2 tablet in the morning and 1 tablet in the night    OneTouch Ultra Test strip Check blood sugar once a day    polyethylene glycol (GLYCOLAX, MIRALAX) 17 g, oral, Daily    pyridoxine HCl, vitamin B6, (VITAMIN B-6 ORAL) Vitamin B6 TABS   Refills: 0       Active    walker (Ultra-Light Rollator) misc Use for ambulation     ------------------------------------  PAST MEDICAL HX:  Patient Active Problem List   Diagnosis    Abdominal pain    Anemia    Asthma (HHS-HCC)    Chronic low back pain    Claudication (CMS-HCC)    Confusion    Cough    Depression, major, recurrent, moderate (Multi)    DM2 (diabetes mellitus, type 2) (Multi)    Eczema, dyshidrotic    Fecal incontinence    Frequent falls    Hiatal hernia    History of endometrial cancer    HTN (hypertension)    Hyperuricemia    Hypothyroidism    Iron deficiency anemia    Lower GI bleed    Mixed dyslipidemia    Neck pain    Right shoulder pain    Stage 4 chronic kidney disease (Multi)    Tendinopathy of right rotator cuff    Vertigo    Mid back pain    Class 2 severe obesity with serious comorbidity and body mass index (BMI) of 37.0 to 37.9 in adult (Multi)    Hyperparathyroidism (Multi)      SURGICAL HX:  Past Surgical History:   Procedure Laterality Date    OTHER SURGICAL HISTORY  09/13/2019    Facial surgery    OTHER SURGICAL HISTORY  09/13/2019    Gallbladder surgery    OTHER SURGICAL HISTORY  09/13/2019     Appendectomy    OTHER SURGICAL HISTORY  09/13/2019    Hysterectomy    OTHER SURGICAL HISTORY  09/13/2019    Colon surgery    OTHER SURGICAL HISTORY  10/24/2019    Thyroid surgery      FAMILY HX:   No pertinent history.   SOCIAL HX:    reports that she has never smoked. She has never used smokeless tobacco. Lives at [facility]. Previously worked in healthcare and also taught swim.   ------------------------------------  CHIEF COMPLAINT:   Chief Complaint   Patient presents with    Cough     COUGH X 5 WEEKS      HISTORY OF PRESENT ILLNESS: The history was obtained from patient. Lakeshia is a 89 y.o. female, who presents with a chief complaint of a productive cough (yellow phlegm), fatigue, chest congestion, intermittent wheezing, body aches, and chills - sxs started ~4 weeks ago. Winifred has history of asthma - has been using Albuterol inhaler and Qvar with good temporary relief, but is frustrated due to the duration of her cough. Also reports her R ear has been itching and draining white fluid. Denies any fevers, runny nose/congestion, sore throat, ear pain, abdominal pain, chest pain, shortness of breath, rashes, urinary symptoms, and nausea/vomiting. Winifred has intermittent diarrhea but she attributes this to her current diet. Denies any lightheadedness or dizziness; no changes in mental status. Winifred has intermittent visual hallucinations but reports PCP is aware and monitoring her sxs; is also following with a mental health specialist, per her report. Feels her mental health sxs are baseline and no worse than usual. No swelling in legs. Appetite is normal; is able to eat and drink fluids without difficulty; denies loss of sense of taste or smell. Reports symptoms are unchanged since onset. Has been taking  OTC cough syrup  without much relief; no other over-the-counter medications or home remedies for symptom management.  Reports she lives at [facility] and several residents have been sick recently; no  "other known ill contacts. Has received the COVID vaccine x 4; Has received this season's influenza vaccine. No known history of COVID infection.  Is not a smoker.  Denies any recent antibiotic use.      REVIEW OF SYSTEMS:  10 systems reviewed negative with exception of history of present illness as listed above.    TODAY'S VITALS: /73   Pulse 60   Temp 36.7 °C (98 °F)   Resp 14   Ht 1.575 m (5' 2\")   Wt 89.4 kg (197 lb)   SpO2 95%   BMI 36.03 kg/m²     PHYSICAL EXAMINATION:  General:  Mildly ill-appearing, well nourished elderly female; alert and oriented; in no acute distress. Sitting comfortably on exam table. Non-dyspneic.   Eyes:  Pupils equal, round and reactive to light. No conjunctival erythema; no scleral icterus.   HENT: No sinus tenderness; no notable audible nasal congestion. Airway patent, Bilat TMs unremarkable, R ear canal mildly edematous and erythematous, with milky otorrhea; L ear canal clear/unremarkable. Nasal mucosa mildly injected and edematous. Oral mucosa moist. Posterior pharynx mildly injected but without vesicles or oropharyngeal exudate. Uvula is midline. Managing oral secretions without difficulty.  Neck:  Supple. Mildly tender, mobile anterior cervical lymphadenopathy bilat (R>L). Trachea is midline.  Respiratory:  Respirations easy and unlabored, Breath sounds equal. Lungs clear to auscultation; no wheezing, rhonchi, or rales. Has good air movement throughout. Harsh, semi-productive cough noted. Non-dyspneic with ambulation; able to maintain SpO2.  Cardiovascular:  Normal rate, Regular rhythm. Normal S1S2. No m/r/g. No peripheral edema.   Gastrointestinal:  Soft, non-tender, non-distended; no palpable masses or organomegaly. Bowel sounds normoactive.  Musculoskeletal:  Grossly normal; appropriate for age.     Integumentary:  Pink, warm, dry, and intact. No rashes or skin discoloration appreciated. Good skin turgor.  Neurologic:  Alert and oriented, no gross deficits.  "   Cognition and Speech:  Oriented, Speech clear and coherent.    Psychiatric:  Cooperative, Appropriate mood & affect.       ------------------------------------  Medical Decision Making  LABORATORY or RADIOLOGICAL IMAGING ORDERS/RESULTS:     Study Result    Narrative & Impression   Interpreted By:  Sebas Garsia,   STUDY:  XR CHEST 2 VIEWS;  4/12/2024 3:15 pm      INDICATION:  Signs/Symptoms:Cough x 4-5 weeks..      COMPARISON:  05/15/2020      ACCESSION NUMBER(S):  [id number]      ORDERING CLINICIAN:  ELLIOT CARLSON      FINDINGS:      The cardiac silhouette is mildly enlarged. Costophrenic angles are  sharp. Lungs are hyperinflated and clear. The trachea is midline.  There is no pneumothorax. Degenerative changes and mild  dextroscoliosis of the thoracic spine are noted.      IMPRESSION:  1.  Mild cardiomegaly. No acute pulmonary process.          Signed by: Sebas Garsia 4/12/2024 3:32 PM  Dictation workstation:   [id number]     Recent Results (from the past 24 hour(s))   POCT BD Veritor Covid-19 Ag manually resulted    Collection Time: 04/12/24  3:17 PM   Result Value Ref Range    POC SARS-CoV Ag  Presumptive negative test for SARS-CoV-2 (no antigen detected)     Presumptive negative test for SARS-CoV-2 (no antigen detected)   POCT Influenza A/B manually resulted    Collection Time: 04/12/24  3:17 PM   Result Value Ref Range    POC Rapid Influenza A Negative Negative    POC Rapid Influenza B Negative Negative   POCT respiratory syncytial virus manually resulted    Collection Time: 04/12/24  3:17 PM   Result Value Ref Range    RSV Rapid Ag Negative Negative       Study Result    Narrative & Impression   Interpreted By:  Sebas Garsia,   STUDY:  XR CHEST 2 VIEWS;  4/12/2024 3:15 pm      INDICATION:  Signs/Symptoms:Cough x 4-5 weeks..      COMPARISON:  05/15/2020      ACCESSION NUMBER(S):  [id number]      ORDERING CLINICIAN:  ELLIOT CARLSON      FINDINGS:      The cardiac silhouette is mildly enlarged. Costophrenic  angles are  sharp. Lungs are hyperinflated and clear. The trachea is midline.  There is no pneumothorax. Degenerative changes and mild  dextroscoliosis of the thoracic spine are noted.      IMPRESSION:  1.  Mild cardiomegaly. No acute pulmonary process.          Signed by: Sebas Garsia 4/12/2024 3:32 PM  Dictation workstation:   [id number]     IMPRESSION/PLAN:  Course: Worsening; stable    1. Acute bronchitis, unspecified organism  2. Acute otitis externa of R ear  - XR chest 2 views; Future  - POCT BD Veritor Covid-19 Ag manually resulted  - POCT Influenza A/B manually resulted  - POCT respiratory syncytial virus manually resulted  - azithromycin (Zithromax Z-Sudarshan) 250 mg tablet; Take 2 tablets by mouth at once on day 1, then 1 tablet once a day on days 2-5. Take with a meal.  Dispense: 6 tablet; Refill: 0  - benzonatate (Tessalon) 100 mg capsule; Take 1 capsule (100 mg) by mouth every 8 hours if needed for cough. Do not crush or chew.  Dispense: 30 capsule; Refill: 0  -Cortisporin Otic Drops    No red flags on exam today, but stressed importance of close monitoring - advised should recheck with PCP first thing next week. I have reviewed the  COVID-19 algorithm, and counseled pt on COVID-19 current recommendations. CXR today showed mild cardiomegaly and hyperinflation of lungs, but no other acute abnormalities. Testing for COVID/influenza/RSV done - results were negative. Due to severity and duration of symptoms, will begin treatment with Zithromax and Cortisporin drops for R ear; will also start cough medication (Benzonatate), and advised to continue albuterol inhaler for PRN use, as well as scheduled Qvar. Instructed to push fluids, rest, and to use appropriate over the counter medications as needed for management of symptoms - plain Mucinex may be helpful. Reviewed instructions for self-isolation and continued monitoring. Reviewed red flags to monitor for, counseled on potential adverse reactions of  treatments, expectations for improvement in sxs, and advised to follow-up with primary care provider in 2-3 days for a recheck (and to discuss mild cardiomegaly noted on CXR), or to seek care sooner if worsening or if any additional concerns/red flags develop in the meantime.  Patient agreed with plan of care; questions were encouraged and answered.      JASSI Aquino-CNP   Advanced Practice Provider  [facility]

## 2024-04-12 NOTE — PATIENT INSTRUCTIONS
Sending for Chest X-Ray today due to duration of cough. Testing for COVID/influenza/RSV also done - will contact with results of all tests once available.     Will start Zpac antibiotic, antibiotic ear drops for R outer ear infection, and will start benzonatate as needed for cough. Should continue to drink plenty of fluids, and to rest.     Needs to follow up with family doctor if not improving over the next 5 days, or sooner if worsening or if any new symptoms/red flags develop.

## 2024-04-24 ENCOUNTER — TELEPHONE (OUTPATIENT)
Dept: PHARMACY | Facility: HOSPITAL | Age: 89
End: 2024-04-24
Payer: MEDICARE

## 2024-04-24 NOTE — TELEPHONE ENCOUNTER
Population Health: Outreach by Ambulatory Pharmacy Team    Payor: [employer]  Reason: Adherence  Medication: Lisinopril 5 mg   Outcome: Left Voicemail   Additional Notes:    Noted by Dr. Garcia to have stopped lisinopril 03/07/2024 due to peripheral edema. Attempted to contact patient regarding switching to  Home Delivery.   Anastasiya Phillips, PharmD

## 2024-04-26 ENCOUNTER — LAB (OUTPATIENT)
Dept: LAB | Facility: LAB | Age: 89
End: 2024-04-26
Payer: MEDICARE

## 2024-04-26 DIAGNOSIS — N18.4: ICD-10-CM

## 2024-04-26 LAB
ANION GAP SERPL CALC-SCNC: 12 MMOL/L (ref 10–20)
BUN SERPL-MCNC: 38 MG/DL (ref 6–23)
CALCIUM SERPL-MCNC: 9.1 MG/DL (ref 8.6–10.3)
CHLORIDE SERPL-SCNC: 100 MMOL/L (ref 98–107)
CO2 SERPL-SCNC: 30 MMOL/L (ref 21–32)
CREAT SERPL-MCNC: 2.02 MG/DL (ref 0.5–1.05)
EGFRCR SERPLBLD CKD-EPI 2021: 23 ML/MIN/1.73M*2
GLUCOSE SERPL-MCNC: 148 MG/DL (ref 74–99)
POTASSIUM SERPL-SCNC: 4.3 MMOL/L (ref 3.5–5.3)
SODIUM SERPL-SCNC: 138 MMOL/L (ref 136–145)

## 2024-04-26 PROCEDURE — 80048 BASIC METABOLIC PNL TOTAL CA: CPT

## 2024-04-26 PROCEDURE — 36415 COLL VENOUS BLD VENIPUNCTURE: CPT

## 2024-04-27 ENCOUNTER — HOSPITAL ENCOUNTER (OUTPATIENT)
Facility: HOSPITAL | Age: 89
Setting detail: OBSERVATION
LOS: 1 days | Discharge: HOME | DRG: 378 | End: 2024-04-29
Attending: EMERGENCY MEDICINE | Admitting: HOSPITALIST
Payer: MEDICARE

## 2024-04-27 ENCOUNTER — APPOINTMENT (OUTPATIENT)
Dept: RADIOLOGY | Facility: HOSPITAL | Age: 89
DRG: 378 | End: 2024-04-27
Payer: MEDICARE

## 2024-04-27 DIAGNOSIS — K92.2 UPPER GI BLEED: Primary | ICD-10-CM

## 2024-04-27 LAB
ABO GROUP (TYPE) IN BLOOD: NORMAL
ALBUMIN SERPL BCP-MCNC: 3.8 G/DL (ref 3.4–5)
ALP SERPL-CCNC: 51 U/L (ref 33–136)
ALT SERPL W P-5'-P-CCNC: 16 U/L (ref 7–45)
ANION GAP SERPL CALC-SCNC: 13 MMOL/L (ref 10–20)
ANTIBODY SCREEN: NORMAL
APPEARANCE UR: ABNORMAL
APTT PPP: 24 SECONDS (ref 27–38)
AST SERPL W P-5'-P-CCNC: 20 U/L (ref 9–39)
BACTERIA #/AREA URNS AUTO: ABNORMAL /HPF
BASOPHILS # BLD AUTO: 0.01 X10*3/UL (ref 0–0.1)
BASOPHILS NFR BLD AUTO: 0.1 %
BILIRUB SERPL-MCNC: 0.4 MG/DL (ref 0–1.2)
BILIRUB UR STRIP.AUTO-MCNC: NEGATIVE MG/DL
BUN SERPL-MCNC: 70 MG/DL (ref 6–23)
CALCIUM SERPL-MCNC: 8.6 MG/DL (ref 8.6–10.3)
CARDIAC TROPONIN I PNL SERPL HS: 5 NG/L (ref 0–13)
CHLORIDE SERPL-SCNC: 103 MMOL/L (ref 98–107)
CO2 SERPL-SCNC: 25 MMOL/L (ref 21–32)
COLOR UR: YELLOW
CREAT SERPL-MCNC: 2.48 MG/DL (ref 0.5–1.05)
EGFRCR SERPLBLD CKD-EPI 2021: 18 ML/MIN/1.73M*2
EOSINOPHIL # BLD AUTO: 0.02 X10*3/UL (ref 0–0.4)
EOSINOPHIL NFR BLD AUTO: 0.2 %
ERYTHROCYTE [DISTWIDTH] IN BLOOD BY AUTOMATED COUNT: 14.9 % (ref 11.5–14.5)
FERRITIN SERPL-MCNC: 66 NG/ML (ref 8–150)
GLUCOSE BLD MANUAL STRIP-MCNC: 163 MG/DL (ref 74–99)
GLUCOSE SERPL-MCNC: 200 MG/DL (ref 74–99)
GLUCOSE UR STRIP.AUTO-MCNC: NEGATIVE MG/DL
HCT VFR BLD AUTO: 32.2 % (ref 36–46)
HGB BLD-MCNC: 9.9 G/DL (ref 12–16)
HYALINE CASTS #/AREA URNS AUTO: ABNORMAL /LPF
IMM GRANULOCYTES # BLD AUTO: 0.06 X10*3/UL (ref 0–0.5)
IMM GRANULOCYTES NFR BLD AUTO: 0.5 % (ref 0–0.9)
INR PPP: 1 (ref 0.9–1.1)
IRON SATN MFR SERPL: 13 % (ref 25–45)
IRON SERPL-MCNC: 38 UG/DL (ref 35–150)
KETONES UR STRIP.AUTO-MCNC: NEGATIVE MG/DL
LACTATE SERPL-SCNC: 1.6 MMOL/L (ref 0.4–2)
LEUKOCYTE ESTERASE UR QL STRIP.AUTO: ABNORMAL
LYMPHOCYTES # BLD AUTO: 0.28 X10*3/UL (ref 0.8–3)
LYMPHOCYTES NFR BLD AUTO: 2.5 %
MCH RBC QN AUTO: 30.8 PG (ref 26–34)
MCHC RBC AUTO-ENTMCNC: 30.7 G/DL (ref 32–36)
MCV RBC AUTO: 100 FL (ref 80–100)
MONOCYTES # BLD AUTO: 0.5 X10*3/UL (ref 0.05–0.8)
MONOCYTES NFR BLD AUTO: 4.4 %
MUCOUS THREADS #/AREA URNS AUTO: ABNORMAL /LPF
NEUTROPHILS # BLD AUTO: 10.49 X10*3/UL (ref 1.6–5.5)
NEUTROPHILS NFR BLD AUTO: 92.3 %
NITRITE UR QL STRIP.AUTO: NEGATIVE
NRBC BLD-RTO: 0 /100 WBCS (ref 0–0)
PH UR STRIP.AUTO: 5 [PH]
PLATELET # BLD AUTO: 297 X10*3/UL (ref 150–450)
POTASSIUM SERPL-SCNC: 4.3 MMOL/L (ref 3.5–5.3)
PROT SERPL-MCNC: 6.9 G/DL (ref 6.4–8.2)
PROT UR STRIP.AUTO-MCNC: ABNORMAL MG/DL
PROTHROMBIN TIME: 11.7 SECONDS (ref 9.8–12.8)
RBC # BLD AUTO: 3.21 X10*6/UL (ref 4–5.2)
RBC # UR STRIP.AUTO: ABNORMAL /UL
RBC #/AREA URNS AUTO: ABNORMAL /HPF
RH FACTOR (ANTIGEN D): NORMAL
SODIUM SERPL-SCNC: 137 MMOL/L (ref 136–145)
SP GR UR STRIP.AUTO: 1.02
SQUAMOUS #/AREA URNS AUTO: ABNORMAL /HPF
TIBC SERPL-MCNC: 284 UG/DL (ref 240–445)
UIBC SERPL-MCNC: 246 UG/DL (ref 110–370)
UROBILINOGEN UR STRIP.AUTO-MCNC: <2 MG/DL
WBC # BLD AUTO: 11.4 X10*3/UL (ref 4.4–11.3)
WBC #/AREA URNS AUTO: ABNORMAL /HPF
WBC CLUMPS #/AREA URNS AUTO: ABNORMAL /HPF

## 2024-04-27 PROCEDURE — 1200000002 HC GENERAL ROOM WITH TELEMETRY DAILY

## 2024-04-27 PROCEDURE — 86901 BLOOD TYPING SEROLOGIC RH(D): CPT | Performed by: HOSPITALIST

## 2024-04-27 PROCEDURE — 36415 COLL VENOUS BLD VENIPUNCTURE: CPT | Performed by: HOSPITALIST

## 2024-04-27 PROCEDURE — C9113 INJ PANTOPRAZOLE SODIUM, VIA: HCPCS | Performed by: HOSPITALIST

## 2024-04-27 PROCEDURE — 82270 OCCULT BLOOD FECES: CPT | Mod: SAMLAB | Performed by: HOSPITALIST

## 2024-04-27 PROCEDURE — 82947 ASSAY GLUCOSE BLOOD QUANT: CPT | Mod: 59

## 2024-04-27 PROCEDURE — 80053 COMPREHEN METABOLIC PANEL: CPT | Performed by: EMERGENCY MEDICINE

## 2024-04-27 PROCEDURE — 96375 TX/PRO/DX INJ NEW DRUG ADDON: CPT

## 2024-04-27 PROCEDURE — 2500000004 HC RX 250 GENERAL PHARMACY W/ HCPCS (ALT 636 FOR OP/ED): Performed by: EMERGENCY MEDICINE

## 2024-04-27 PROCEDURE — C9113 INJ PANTOPRAZOLE SODIUM, VIA: HCPCS | Performed by: EMERGENCY MEDICINE

## 2024-04-27 PROCEDURE — 84484 ASSAY OF TROPONIN QUANT: CPT | Performed by: HOSPITALIST

## 2024-04-27 PROCEDURE — 85730 THROMBOPLASTIN TIME PARTIAL: CPT | Performed by: EMERGENCY MEDICINE

## 2024-04-27 PROCEDURE — 99223 1ST HOSP IP/OBS HIGH 75: CPT | Performed by: HOSPITALIST

## 2024-04-27 PROCEDURE — 2500000004 HC RX 250 GENERAL PHARMACY W/ HCPCS (ALT 636 FOR OP/ED): Mod: JZ | Performed by: HOSPITALIST

## 2024-04-27 PROCEDURE — 85610 PROTHROMBIN TIME: CPT | Performed by: EMERGENCY MEDICINE

## 2024-04-27 PROCEDURE — 87040 BLOOD CULTURE FOR BACTERIA: CPT | Mod: SAMLAB | Performed by: HOSPITALIST

## 2024-04-27 PROCEDURE — G0378 HOSPITAL OBSERVATION PER HR: HCPCS

## 2024-04-27 PROCEDURE — 36415 COLL VENOUS BLD VENIPUNCTURE: CPT | Performed by: EMERGENCY MEDICINE

## 2024-04-27 PROCEDURE — 96365 THER/PROPH/DIAG IV INF INIT: CPT

## 2024-04-27 PROCEDURE — 99285 EMERGENCY DEPT VISIT HI MDM: CPT | Mod: 25

## 2024-04-27 PROCEDURE — 87086 URINE CULTURE/COLONY COUNT: CPT | Mod: SAMLAB | Performed by: EMERGENCY MEDICINE

## 2024-04-27 PROCEDURE — 83605 ASSAY OF LACTIC ACID: CPT | Performed by: EMERGENCY MEDICINE

## 2024-04-27 PROCEDURE — 83540 ASSAY OF IRON: CPT | Performed by: HOSPITALIST

## 2024-04-27 PROCEDURE — 85025 COMPLETE CBC W/AUTO DIFF WBC: CPT | Performed by: EMERGENCY MEDICINE

## 2024-04-27 PROCEDURE — 74176 CT ABD & PELVIS W/O CONTRAST: CPT | Performed by: STUDENT IN AN ORGANIZED HEALTH CARE EDUCATION/TRAINING PROGRAM

## 2024-04-27 PROCEDURE — 82728 ASSAY OF FERRITIN: CPT | Performed by: HOSPITALIST

## 2024-04-27 PROCEDURE — 74176 CT ABD & PELVIS W/O CONTRAST: CPT

## 2024-04-27 PROCEDURE — 81001 URINALYSIS AUTO W/SCOPE: CPT | Performed by: EMERGENCY MEDICINE

## 2024-04-27 RX ORDER — METRONIDAZOLE 500 MG/100ML
500 INJECTION, SOLUTION INTRAVENOUS EVERY 8 HOURS
Status: DISCONTINUED | OUTPATIENT
Start: 2024-04-27 | End: 2024-04-29

## 2024-04-27 RX ORDER — BENZONATATE 100 MG/1
100 CAPSULE ORAL EVERY 8 HOURS PRN
Status: DISCONTINUED | OUTPATIENT
Start: 2024-04-27 | End: 2024-04-29 | Stop reason: HOSPADM

## 2024-04-27 RX ORDER — POLYETHYLENE GLYCOL 3350 17 G/17G
17 POWDER, FOR SOLUTION ORAL DAILY
Status: DISCONTINUED | OUTPATIENT
Start: 2024-04-27 | End: 2024-04-29 | Stop reason: HOSPADM

## 2024-04-27 RX ORDER — INDAPAMIDE 2.5 MG/1
2.5 TABLET ORAL DAILY
Status: DISCONTINUED | OUTPATIENT
Start: 2024-04-27 | End: 2024-04-29 | Stop reason: HOSPADM

## 2024-04-27 RX ORDER — BUPROPION HYDROCHLORIDE 150 MG/1
150 TABLET, EXTENDED RELEASE ORAL 2 TIMES DAILY
Status: DISCONTINUED | OUTPATIENT
Start: 2024-04-27 | End: 2024-04-28

## 2024-04-27 RX ORDER — DEXTROSE 50 % IN WATER (D50W) INTRAVENOUS SYRINGE
12.5
Status: DISCONTINUED | OUTPATIENT
Start: 2024-04-27 | End: 2024-04-29 | Stop reason: HOSPADM

## 2024-04-27 RX ORDER — PANTOPRAZOLE SODIUM 40 MG/10ML
40 INJECTION, POWDER, LYOPHILIZED, FOR SOLUTION INTRAVENOUS 2 TIMES DAILY
Status: DISCONTINUED | OUTPATIENT
Start: 2024-04-27 | End: 2024-04-29 | Stop reason: HOSPADM

## 2024-04-27 RX ORDER — ONDANSETRON 4 MG/1
4 TABLET, ORALLY DISINTEGRATING ORAL EVERY 8 HOURS PRN
Status: DISCONTINUED | OUTPATIENT
Start: 2024-04-27 | End: 2024-04-29 | Stop reason: HOSPADM

## 2024-04-27 RX ORDER — ACETAMINOPHEN 160 MG/5ML
650 SOLUTION ORAL EVERY 4 HOURS PRN
Status: DISCONTINUED | OUTPATIENT
Start: 2024-04-27 | End: 2024-04-29 | Stop reason: HOSPADM

## 2024-04-27 RX ORDER — DEXTROSE 50 % IN WATER (D50W) INTRAVENOUS SYRINGE
25
Status: DISCONTINUED | OUTPATIENT
Start: 2024-04-27 | End: 2024-04-29 | Stop reason: HOSPADM

## 2024-04-27 RX ORDER — LOVASTATIN 20 MG/1
20 TABLET ORAL NIGHTLY
Status: DISCONTINUED | OUTPATIENT
Start: 2024-04-27 | End: 2024-04-28

## 2024-04-27 RX ORDER — LANOLIN ALCOHOL/MO/W.PET/CERES
1000 CREAM (GRAM) TOPICAL DAILY
Status: DISCONTINUED | OUTPATIENT
Start: 2024-04-28 | End: 2024-04-29 | Stop reason: HOSPADM

## 2024-04-27 RX ORDER — ONDANSETRON HYDROCHLORIDE 2 MG/ML
4 INJECTION, SOLUTION INTRAVENOUS EVERY 8 HOURS PRN
Status: DISCONTINUED | OUTPATIENT
Start: 2024-04-27 | End: 2024-04-29

## 2024-04-27 RX ORDER — CEFTRIAXONE 1 G/50ML
1 INJECTION, SOLUTION INTRAVENOUS ONCE
Status: COMPLETED | OUTPATIENT
Start: 2024-04-27 | End: 2024-04-27

## 2024-04-27 RX ORDER — PANTOPRAZOLE SODIUM 40 MG/10ML
40 INJECTION, POWDER, LYOPHILIZED, FOR SOLUTION INTRAVENOUS ONCE
Status: COMPLETED | OUTPATIENT
Start: 2024-04-27 | End: 2024-04-27

## 2024-04-27 RX ORDER — ACETAMINOPHEN 650 MG/1
650 SUPPOSITORY RECTAL EVERY 4 HOURS PRN
Status: DISCONTINUED | OUTPATIENT
Start: 2024-04-27 | End: 2024-04-29 | Stop reason: HOSPADM

## 2024-04-27 RX ORDER — CITALOPRAM 10 MG/1
10 TABLET ORAL DAILY
Status: DISCONTINUED | OUTPATIENT
Start: 2024-04-28 | End: 2024-04-28

## 2024-04-27 RX ORDER — CEFTRIAXONE 1 G/50ML
1 INJECTION, SOLUTION INTRAVENOUS EVERY 24 HOURS
Status: DISCONTINUED | OUTPATIENT
Start: 2024-04-28 | End: 2024-04-28

## 2024-04-27 RX ORDER — FERROUS SULFATE 325(65) MG
1 TABLET ORAL DAILY
Status: DISCONTINUED | OUTPATIENT
Start: 2024-04-28 | End: 2024-04-29 | Stop reason: HOSPADM

## 2024-04-27 RX ORDER — ACETAMINOPHEN 325 MG/1
650 TABLET ORAL EVERY 4 HOURS PRN
Status: DISCONTINUED | OUTPATIENT
Start: 2024-04-27 | End: 2024-04-29 | Stop reason: HOSPADM

## 2024-04-27 RX ORDER — INSULIN LISPRO 100 [IU]/ML
0-5 INJECTION, SOLUTION INTRAVENOUS; SUBCUTANEOUS
Status: DISCONTINUED | OUTPATIENT
Start: 2024-04-28 | End: 2024-04-29 | Stop reason: HOSPADM

## 2024-04-27 RX ADMIN — CEFTRIAXONE SODIUM 1 G: 1 INJECTION, SOLUTION INTRAVENOUS at 16:22

## 2024-04-27 RX ADMIN — PANTOPRAZOLE SODIUM 40 MG: 40 INJECTION, POWDER, FOR SOLUTION INTRAVENOUS at 14:36

## 2024-04-27 RX ADMIN — PANTOPRAZOLE SODIUM 40 MG: 40 INJECTION, POWDER, FOR SOLUTION INTRAVENOUS at 20:30

## 2024-04-27 RX ADMIN — METRONIDAZOLE 500 MG: 500 INJECTION, SOLUTION INTRAVENOUS at 20:31

## 2024-04-27 SDOH — SOCIAL STABILITY: SOCIAL INSECURITY: HAVE YOU HAD THOUGHTS OF HARMING ANYONE ELSE?: NO

## 2024-04-27 SDOH — SOCIAL STABILITY: SOCIAL INSECURITY: ARE THERE ANY APPARENT SIGNS OF INJURIES/BEHAVIORS THAT COULD BE RELATED TO ABUSE/NEGLECT?: NO

## 2024-04-27 SDOH — SOCIAL STABILITY: SOCIAL INSECURITY: ABUSE: ADULT

## 2024-04-27 SDOH — SOCIAL STABILITY: SOCIAL INSECURITY: DO YOU FEEL ANYONE HAS EXPLOITED OR TAKEN ADVANTAGE OF YOU FINANCIALLY OR OF YOUR PERSONAL PROPERTY?: NO

## 2024-04-27 SDOH — SOCIAL STABILITY: SOCIAL INSECURITY: WERE YOU ABLE TO COMPLETE ALL THE BEHAVIORAL HEALTH SCREENINGS?: YES

## 2024-04-27 SDOH — SOCIAL STABILITY: SOCIAL INSECURITY: HAS ANYONE EVER THREATENED TO HURT YOUR FAMILY OR YOUR PETS?: NO

## 2024-04-27 SDOH — SOCIAL STABILITY: SOCIAL INSECURITY: DO YOU FEEL UNSAFE GOING BACK TO THE PLACE WHERE YOU ARE LIVING?: NO

## 2024-04-27 SDOH — SOCIAL STABILITY: SOCIAL INSECURITY: DOES ANYONE TRY TO KEEP YOU FROM HAVING/CONTACTING OTHER FRIENDS OR DOING THINGS OUTSIDE YOUR HOME?: NO

## 2024-04-27 SDOH — SOCIAL STABILITY: SOCIAL INSECURITY: ARE YOU OR HAVE YOU BEEN THREATENED OR ABUSED PHYSICALLY, EMOTIONALLY, OR SEXUALLY BY ANYONE?: NO

## 2024-04-27 SDOH — SOCIAL STABILITY: SOCIAL INSECURITY: HAVE YOU HAD ANY THOUGHTS OF HARMING ANYONE ELSE?: NO

## 2024-04-27 ASSESSMENT — COLUMBIA-SUICIDE SEVERITY RATING SCALE - C-SSRS
6. HAVE YOU EVER DONE ANYTHING, STARTED TO DO ANYTHING, OR PREPARED TO DO ANYTHING TO END YOUR LIFE?: NO
2. HAVE YOU ACTUALLY HAD ANY THOUGHTS OF KILLING YOURSELF?: NO
1. IN THE PAST MONTH, HAVE YOU WISHED YOU WERE DEAD OR WISHED YOU COULD GO TO SLEEP AND NOT WAKE UP?: NO

## 2024-04-27 ASSESSMENT — COGNITIVE AND FUNCTIONAL STATUS - GENERAL
MOVING TO AND FROM BED TO CHAIR: A LITTLE
DAILY ACTIVITIY SCORE: 21
TOILETING: A LITTLE
DAILY ACTIVITIY SCORE: 21
DRESSING REGULAR LOWER BODY CLOTHING: A LITTLE
MOBILITY SCORE: 21
MOVING TO AND FROM BED TO CHAIR: A LITTLE
TOILETING: A LITTLE
HELP NEEDED FOR BATHING: A LITTLE
WALKING IN HOSPITAL ROOM: A LITTLE
WALKING IN HOSPITAL ROOM: A LITTLE
DAILY ACTIVITIY SCORE: 21
HELP NEEDED FOR BATHING: A LITTLE
TOILETING: A LITTLE
MOVING TO AND FROM BED TO CHAIR: A LITTLE
DRESSING REGULAR LOWER BODY CLOTHING: A LITTLE
DRESSING REGULAR LOWER BODY CLOTHING: A LITTLE
MOBILITY SCORE: 21
WALKING IN HOSPITAL ROOM: A LITTLE
PATIENT BASELINE BEDBOUND: YES
CLIMB 3 TO 5 STEPS WITH RAILING: A LITTLE
CLIMB 3 TO 5 STEPS WITH RAILING: A LITTLE
HELP NEEDED FOR BATHING: A LITTLE
MOBILITY SCORE: 21
CLIMB 3 TO 5 STEPS WITH RAILING: A LITTLE

## 2024-04-27 ASSESSMENT — PATIENT HEALTH QUESTIONNAIRE - PHQ9
1. LITTLE INTEREST OR PLEASURE IN DOING THINGS: NOT AT ALL
SUM OF ALL RESPONSES TO PHQ9 QUESTIONS 1 & 2: 0
2. FEELING DOWN, DEPRESSED OR HOPELESS: NOT AT ALL

## 2024-04-27 ASSESSMENT — ACTIVITIES OF DAILY LIVING (ADL)
HEARING - RIGHT EAR: DIFFICULTY WITH NOISE
FEEDING YOURSELF: INDEPENDENT
BATHING: NEEDS ASSISTANCE
LACK_OF_TRANSPORTATION: NO
GROOMING: INDEPENDENT
PATIENT'S MEMORY ADEQUATE TO SAFELY COMPLETE DAILY ACTIVITIES?: YES
ADEQUATE_TO_COMPLETE_ADL: YES
WALKS IN HOME: NEEDS ASSISTANCE
DRESSING YOURSELF: NEEDS ASSISTANCE
ASSISTIVE_DEVICE: EYEGLASSES;DENTURES UPPER;WALKER
TOILETING: NEEDS ASSISTANCE
JUDGMENT_ADEQUATE_SAFELY_COMPLETE_DAILY_ACTIVITIES: YES
HEARING - LEFT EAR: DIFFICULTY WITH NOISE

## 2024-04-27 ASSESSMENT — LIFESTYLE VARIABLES
AUDIT-C TOTAL SCORE: 0
AUDIT-C TOTAL SCORE: 0
HOW MANY STANDARD DRINKS CONTAINING ALCOHOL DO YOU HAVE ON A TYPICAL DAY: PATIENT DOES NOT DRINK
HOW OFTEN DO YOU HAVE 6 OR MORE DRINKS ON ONE OCCASION: NEVER
SKIP TO QUESTIONS 9-10: 1
PRESCIPTION_ABUSE_PAST_12_MONTHS: NO
SUBSTANCE_ABUSE_PAST_12_MONTHS: NO
HOW OFTEN DO YOU HAVE A DRINK CONTAINING ALCOHOL: NEVER

## 2024-04-27 ASSESSMENT — PAIN SCALES - GENERAL
PAINLEVEL_OUTOF10: 0 - NO PAIN
PAINLEVEL_OUTOF10: 0 - NO PAIN

## 2024-04-27 ASSESSMENT — PAIN - FUNCTIONAL ASSESSMENT
PAIN_FUNCTIONAL_ASSESSMENT: 0-10
PAIN_FUNCTIONAL_ASSESSMENT: 0-10

## 2024-04-27 NOTE — H&P
History Of Present Illness  Lakeshia Park is a 89 y.o. female presenting with abdominal discomfort, episode of nausea vomiting/hematemesis x 1 as well melena.  Patient denies taking any blood thinners at home.  No appetite change lately or alarming weight loss.  She is unable to tell details of her past history but had GI bleeding according to her few years ago possibly but could not recall details.  She does have memory deficits.  Denies any history of heart attack or stroke to me.  No cough or hemoptysis.  Denies any shortness of breath or syncope or lightheadedness or worsening neck swelling.  Currently hemodynamically stable maintaining vital saturations.  No back pain flank pain hematuria or dysuria.  No signs of coagulopathy.  No ecchymosis bruising or fall or petechia.  Looks slightly dehydrated and elevated creatinine on lab workup with possible NUBIA.  No headache or focal weakness.  No joint pains or skin rash.  No URI per the patient.  Gastroenterology consulted with concern of GI bleeding.  No other complaints at this time  No burning acidity or any relationship of pain to food or meals.  Unable to get any further history with her memory issues  Past Medical History  Hypertension  Hyperlipidemia  Anxiety/depression  Anemia  Hypothyroidism  Diabetes mellitus type 2  Stage III-IV chronic kidney disease severe obesity  Claudication  Iron deficiency anemia/lower GI bleeding  Endometrial cancer  Surgical History  Past Surgical History:   Procedure Laterality Date    OTHER SURGICAL HISTORY  09/13/2019    Facial surgery    OTHER SURGICAL HISTORY  09/13/2019    Gallbladder surgery    OTHER SURGICAL HISTORY  09/13/2019    Appendectomy    OTHER SURGICAL HISTORY  09/13/2019    Hysterectomy    OTHER SURGICAL HISTORY  09/13/2019    Colon surgery    OTHER SURGICAL HISTORY  10/24/2019    Thyroid surgery        Social History  She reports that she has never smoked. She has never used smokeless tobacco. She reports that  "she does not currently use alcohol. She reports that she does not use drugs.    Family History  Family History   Problem Relation Name Age of Onset    Diabetes Mother      Brain cancer Mother      Brain cancer Brother          Allergies  Celecoxib, Ciprofloxacin, Diazepam, Esomeprazole, Ibuprofen, Milk, and Naproxen    Review of Systems  All other 12 point review of systems negative except HPI  Physical Exam   General Appearance: AAO x 3, not in acute distress  Skin: skin color pink, warm, and dry; no suspicious rashes or lesions  Eyes : PERRL, EOM's intact  ENT: mucous membranes pink and moist  Neck: normocephalic  Respiratory: lungs clear to auscultation anteriorly; no wheezing, rhonchi, or crackles.   Heart: regular rate and rhythm.   Abdomen: Nondistended, positive bowel sounds x4, soft,  nontender  Extremities: no edema   Peripheral pulses: normal x4 extremities  Neuro: alert, coherent and conversant, no focal motor deficits  Last Recorded Vitals  Blood pressure 105/60, pulse 75, temperature 35.9 °C (96.6 °F), temperature source Temporal, resp. rate 18, height 1.6 m (5' 2.99\"), weight 87.2 kg (192 lb 4.8 oz), SpO2 93%.    Relevant Results    Scheduled medications  buPROPion SR, 150 mg, oral, BID  [START ON 4/28/2024] cefTRIAXone, 1 g, intravenous, q24h  [START ON 4/28/2024] citalopram, 10 mg, oral, Daily  [START ON 4/28/2024] cyanocobalamin, 1,000 mcg, oral, Daily  [START ON 4/28/2024] ferrous sulfate (325 mg ferrous sulfate), 1 tablet, oral, Daily  indapamide, 2.5 mg, oral, Daily  [START ON 4/28/2024] levothyroxine, 175 mcg, oral, Daily before breakfast  lovastatin, 20 mg, oral, Nightly  pantoprazole, 40 mg, intravenous, BID  polyethylene glycol, 17 g, oral, Daily      Continuous medications     PRN medications  PRN medications: acetaminophen **OR** acetaminophen **OR** acetaminophen, acetaminophen **OR** acetaminophen **OR** acetaminophen, benzonatate, ondansetron ODT **OR** ondansetron    Results for orders " placed or performed during the hospital encounter of 04/27/24 (from the past 24 hour(s))   CBC and Auto Differential   Result Value Ref Range    WBC 11.4 (H) 4.4 - 11.3 x10*3/uL    nRBC 0.0 0.0 - 0.0 /100 WBCs    RBC 3.21 (L) 4.00 - 5.20 x10*6/uL    Hemoglobin 9.9 (L) 12.0 - 16.0 g/dL    Hematocrit 32.2 (L) 36.0 - 46.0 %     80 - 100 fL    MCH 30.8 26.0 - 34.0 pg    MCHC 30.7 (L) 32.0 - 36.0 g/dL    RDW 14.9 (H) 11.5 - 14.5 %    Platelets 297 150 - 450 x10*3/uL    Neutrophils % 92.3 40.0 - 80.0 %    Immature Granulocytes %, Automated 0.5 0.0 - 0.9 %    Lymphocytes % 2.5 13.0 - 44.0 %    Monocytes % 4.4 2.0 - 10.0 %    Eosinophils % 0.2 0.0 - 6.0 %    Basophils % 0.1 0.0 - 2.0 %    Neutrophils Absolute 10.49 (H) 1.60 - 5.50 x10*3/uL    Immature Granulocytes Absolute, Automated 0.06 0.00 - 0.50 x10*3/uL    Lymphocytes Absolute 0.28 (L) 0.80 - 3.00 x10*3/uL    Monocytes Absolute 0.50 0.05 - 0.80 x10*3/uL    Eosinophils Absolute 0.02 0.00 - 0.40 x10*3/uL    Basophils Absolute 0.01 0.00 - 0.10 x10*3/uL   Comprehensive metabolic panel   Result Value Ref Range    Glucose 200 (H) 74 - 99 mg/dL    Sodium 137 136 - 145 mmol/L    Potassium 4.3 3.5 - 5.3 mmol/L    Chloride 103 98 - 107 mmol/L    Bicarbonate 25 21 - 32 mmol/L    Anion Gap 13 10 - 20 mmol/L    Urea Nitrogen 70 (H) 6 - 23 mg/dL    Creatinine 2.48 (H) 0.50 - 1.05 mg/dL    eGFR 18 (L) >60 mL/min/1.73m*2    Calcium 8.6 8.6 - 10.3 mg/dL    Albumin 3.8 3.4 - 5.0 g/dL    Alkaline Phosphatase 51 33 - 136 U/L    Total Protein 6.9 6.4 - 8.2 g/dL    AST 20 9 - 39 U/L    Bilirubin, Total 0.4 0.0 - 1.2 mg/dL    ALT 16 7 - 45 U/L   Protime-INR   Result Value Ref Range    Protime 11.7 9.8 - 12.8 seconds    INR 1.0 0.9 - 1.1   aPTT   Result Value Ref Range    aPTT 24 (L) 27 - 38 seconds   Lactate   Result Value Ref Range    Lactate 1.6 0.4 - 2.0 mmol/L   Urinalysis with Reflex Culture and Microscopic   Result Value Ref Range    Color, Urine Yellow Straw, Yellow     Appearance, Urine Hazy (N) Clear    Specific Gravity, Urine 1.018 1.005 - 1.035    pH, Urine 5.0 5.0, 5.5, 6.0, 6.5, 7.0, 7.5, 8.0    Protein, Urine 30 (1+) (N) NEGATIVE mg/dL    Glucose, Urine NEGATIVE NEGATIVE mg/dL    Blood, Urine LARGE (3+) (A) NEGATIVE    Ketones, Urine NEGATIVE NEGATIVE mg/dL    Bilirubin, Urine NEGATIVE NEGATIVE    Urobilinogen, Urine <2.0 <2.0 mg/dL    Nitrite, Urine NEGATIVE NEGATIVE    Leukocyte Esterase, Urine SMALL (1+) (A) NEGATIVE   Microscopic Only, Urine   Result Value Ref Range    WBC, Urine 6-10 (A) 1-5, NONE /HPF    WBC Clumps, Urine OCCASIONAL Reference range not established. /HPF    RBC, Urine 3-5 NONE, 1-2, 3-5 /HPF    Squamous Epithelial Cells, Urine 10-25 (FEW) Reference range not established. /HPF    Bacteria, Urine 2+ (A) NONE SEEN /HPF    Mucus, Urine 1+ Reference range not established. /LPF    Hyaline Casts, Urine 2+ (A) NONE /LPF   Type and screen   Result Value Ref Range    ABO TYPE O     Rh TYPE POS        CT abdomen pelvis wo IV contrast    Result Date: 4/27/2024  Interpreted By:  Michael Randolph, STUDY: CT ABDOMEN PELVIS WO IV CONTRAST;  4/27/2024 3:19 pm   INDICATION: Signs/Symptoms:GI bleed.   COMPARISON: CT abdomen and pelvis 12/06/2019   ACCESSION NUMBER(S): [id number]   ORDERING CLINICIAN: FORTUNATO SHIRLEY   TECHNIQUE: CT of the abdomen and pelvis was performed. Contiguous axial images were obtained at 3 mm slice thickness through the abdomen and pelvis. Coronal and sagittal reconstructions at 3 mm slice thickness were performed.  No intravenous contrast was administered.   FINDINGS: Please note that the evaluation of vessels, lymph nodes and organs is limited without intravenous contrast.   LOWER CHEST: Within normal limits   ABDOMEN:   LIVER: The liver demonstrates homogeneous attenuation without evidence of focal liver lesions.   BILE DUCTS: The intrahepatic and extrahepatic ducts are not dilated.   GALLBLADDER: The gallbladder is surgically absent.    PANCREAS: The pancreas appears unremarkable without evidence of ductal dilatation or masses.   SPLEEN: The spleen is normal in size without focal lesions.   ADRENAL GLANDS: Left adrenal thickening. Right adrenal gland is unremarkable.   KIDNEYS AND URETERS: The kidneys are normal in size and unremarkable in appearance. Bilateral simple cysts the larger on the left measures 4.2 cm. No hydroureteronephrosis or nephroureterolithiasis is identified.   PELVIS:   BLADDER: The urinary bladder appears normal without abnormal wall thickening.   REPRODUCTIVE ORGANS: No pelvic masses.   BOWEL: Large hiatal hernia. The stomach is unremarkable. Prior postoperative changes partial colectomy with colocolic anastomosis in the right lower quadrant. No bowel dilation or significant wall thickening. Multiple fluid-filled small bowel and colonic loops, nonspecific, but can be seen in the setting of infectious enterocolitis. Colonic diverticulosis without diverticulitis. The appendix is not definitely visualized. There is however no pericecal stranding or fluid.   VESSELS: Severe atherosclerotic changes are noted to the aorta and branching vessels. There is no aneurysmal dilatation.   PERITONEUM/RETROPERITONEUM/LYMPH NODES: No ascites or free air, no fluid collection.  No abdominopelvic lymphadenopathy is present.   ABDOMINAL WALL: The abdominal wall soft tissues appear normal.   BONES: No suspicious osseous lesions are identified. Moderate levoscoliosis. Degenerative discogenic disease is noted in the lower thoracic and lumbar spine.       No acute findings in the abdomen and pelvis. Multiple fluid-filled nondilated small bowel and colonic loops, concerning for infectious enterocolitis. Colonic diverticulosis without diverticulitis.   Signed by: Michael Randolph 4/27/2024 4:15 PM Dictation workstation:   [id number]        Assessment/Plan   Principal Problem:    Upper GI bleed  89-year-old female with history  of  Hypertension  Hyperlipidemia  Anxiety/depression  Anemia  Hypothyroidism  Diabetes mellitus type 2  Stage III-IV chronic kidney disease severe obesity  Claudication  Iron deficiency anemia/lower GI bleeding  Endometrial cancer  Presented with  Possible GI bleeding  Infectious enterocolitis  UTI  Likely chronic kidney disease with dehydration    Plan  Cardiopulmonary monitoring and EKG  Lactate level fine  Follow vitals  Current hemodynamically stable maintaining vital saturations  Hold nephrotoxic medicines and prevent hypotension  Hold any antihypertensive medicines  Monitor clinically  Follow gastroenterology  Type and screen  Protonix 40 Mg IV twice daily  Avoid blood thinners  SCDs for DVT prophylaxis  Ceftriaxone and Flagyl IV, follow blood and urine cultures  Daily CBC BMP and monitor urine output  Watch for any signs of active bleeding  Consider palliative care consultation  Supportive care  Monitor fingerstick  On Wellbutrin for anxiety  Clear liquid diet  On citalopram for depression  Watch for deformity  Fall, aspiration, seizure precautions  B12 supplement  Iron for anemia  Iron panel and serum ferritin  Monitor fingerstick on insulin sliding scale  Antiemetics and pain control if needed  Transfuse for hemoglobin less than 7  Tylenol for pain control  Continue home medicines as tolerated      Eleni Mcmahon MD

## 2024-04-27 NOTE — ED PROVIDER NOTES
HPI   Chief Complaint   Patient presents with    Black or Bloody Stool     Brought to ED per [facility] squad from [facility] with report of black tarry rectal bleeding and vomiting black emesis last night and today after eating Chinese food for dinner. She denies any pain. EKG done at         Limitations to History: None    HPI: 89-year-old female presents with concern for dark stools as well as coffee-ground emesis.  States it began last night.  1 episode today.  States that she had Chinese for dinner.  Denies any fever, chills, chest pain, shortness of breath, abdominal pain, urinary symptoms.  States that she has had a history of lower GI bleeding approximately 5 years ago but attributes this to a surgery where she had an unknown form of cancer.    ------------------------------------------------------------------------------------------------------------------------------------------  Physical Exam:    VS: As documented in the triage note and EMR flowsheet from this visit were reviewed.    Appearance: Alert. cooperative,  in no acute distress.   Skin: Intact,  dry skin, no lesions, rash, petechiae or purpura.   Eyes: PERRLA, EOMs intact,  Conjunctiva pink with no redness or exudates.   HENT: Normocephalic, atraumatic. Nares patent. No intraoral lesions.   Neck: Supple, without meningismus. Trachea at midline. No lymphadenopathy.  Pulmonary: Clear bilaterally with good chest wall excursion. No rales, rhonchi or wheezing. No accessory muscle use or stridor.  Cardiac: Regular rate and rhythm, no rubs, murmurs, or gallops.   Abdomen: Abdomen is soft, nontender, and nondistended.  No palpable organomegaly.  No rebound or guarding.  No CVA tenderness. Nonsurgical abdomen.  Genitourinary: Exam deferred.  Musculoskeletal: Full range of motion.  Pulses full and equal. No cyanosis, clubbing, or edema.  Neurological:  Cranial nerves are grossly intact, grossly normal sensation, no weakness, no focal findings  identified.  Psychiatric: Appropriate mood and affect.                            Hillman Coma Scale Score: 15                     Patient History   No past medical history on file.  Past Surgical History:   Procedure Laterality Date    OTHER SURGICAL HISTORY  09/13/2019    Facial surgery    OTHER SURGICAL HISTORY  09/13/2019    Gallbladder surgery    OTHER SURGICAL HISTORY  09/13/2019    Appendectomy    OTHER SURGICAL HISTORY  09/13/2019    Hysterectomy    OTHER SURGICAL HISTORY  09/13/2019    Colon surgery    OTHER SURGICAL HISTORY  10/24/2019    Thyroid surgery     Family History   Problem Relation Name Age of Onset    Diabetes Mother      Brain cancer Mother      Brain cancer Brother       Social History     Tobacco Use    Smoking status: Never    Smokeless tobacco: Never   Vaping Use    Vaping status: Never Used   Substance Use Topics    Alcohol use: Not Currently    Drug use: Never       Physical Exam   ED Triage Vitals [04/27/24 1358]   Temperature Heart Rate Respirations BP   37.1 °C (98.7 °F) 69 16 111/69      Pulse Ox Temp src Heart Rate Source Patient Position   95 % -- -- --      BP Location FiO2 (%)     -- --       Physical Exam    ED Course & MDM   Diagnoses as of 04/27/24 1837   Upper GI bleed       Medical Decision Making  Labs Reviewed  CBC WITH AUTO DIFFERENTIAL - Abnormal     WBC                           11.4 (*)               nRBC                          0.0                    RBC                           3.21 (*)               Hemoglobin                    9.9 (*)                Hematocrit                    32.2 (*)               MCV                           100                    MCH                           30.8                   MCHC                          30.7 (*)               RDW                           14.9 (*)               Platelets                     297                    Neutrophils %                 92.3                   Immature Granulocytes %, Automated   0.5                     Lymphocytes %                 2.5                    Monocytes %                   4.4                    Eosinophils %                 0.2                    Basophils %                   0.1                    Neutrophils Absolute          10.49 (*)               Immature Granulocytes Absolute, Au*   0.06                   Lymphocytes Absolute          0.28 (*)               Monocytes Absolute            0.50                   Eosinophils Absolute          0.02                   Basophils Absolute            0.01                COMPREHENSIVE METABOLIC PANEL - Abnormal     Glucose                       200 (*)                Sodium                        137                    Potassium                     4.3                    Chloride                      103                    Bicarbonate                   25                     Anion Gap                     13                     Urea Nitrogen                 70 (*)                 Creatinine                    2.48 (*)               eGFR                          18 (*)                 Calcium                       8.6                    Albumin                       3.8                    Alkaline Phosphatase          51                     Total Protein                 6.9                    AST                           20                     Bilirubin, Total              0.4                    ALT                           16                  APTT - Abnormal     aPTT                          24 (*)                     Narrative: The APTT is no longer used for monitoring Unfractionated Heparin Therapy. For monitoring Heparin Therapy, use the Heparin Assay.  URINALYSIS WITH REFLEX CULTURE AND MICROSCOPIC - Abnormal     Color, Urine                  Yellow                 Appearance, Urine             Hazy (*)               Specific Gravity, Urine       1.018                  pH, Urine                     5.0                    Protein, Urine                 30 (1+) (*)               Glucose, Urine                NEGATIVE                Blood, Urine                  LARGE (3+) (*)               Ketones, Urine                NEGATIVE                Bilirubin, Urine              NEGATIVE                Urobilinogen, Urine           <2.0                   Nitrite, Urine                NEGATIVE                Leukocyte Esterase, Urine     SMALL (1+) (*)            MICROSCOPIC ONLY, URINE - Abnormal     WBC, Urine                    6-10 (*)               WBC Clumps, Urine             OCCASIONAL                RBC, Urine                    3-5                    Squamous Epithelial Cells, Urine   10-25 (FEW)                Bacteria, Urine               2+ (*)                 Mucus, Urine                  1+                     Hyaline Casts, Urine          2+ (*)              PROTIME-INR - Normal     Protime                       11.7                   INR                           1.0                 LACTATE - Normal     Lactate                       1.6                        Narrative: Venipuncture immediately after or during the administration of Metamizole may lead to falsely low results. Testing should be performed immediately                  prior to Metamizole dosing.  URINE CULTURE  OCCULT BLOOD X1, STOOL  URINALYSIS WITH REFLEX CULTURE AND MICROSCOPIC         Narrative: The following orders were created for panel order Urinalysis with Reflex Culture and Microscopic.                  Procedure                               Abnormality         Status                                     ---------                               -----------         ------                                     Urinalysis with Reflex C...[[id number]]  Abnormal            Final result                               Extra Urine Gray Tube[[id number]]                            In process                                                   Please view results for these tests on the individual  orders.  EXTRA URINE GRAY TUBE  TYPE AND SCREEN  IRON AND TIBC  FERRITIN  TROPONIN I, HIGH SENSITIVITY  CT abdomen pelvis wo IV contrast   Final Result    No acute findings in the abdomen and pelvis.    Multiple fluid-filled nondilated small bowel and colonic loops,    concerning for infectious enterocolitis. Colonic diverticulosis    without diverticulitis.          Signed by: Michael Randolph 4/27/2024 4:15 PM    Dictation workstation:   [id number]     Medical Decision Making:    Patient appears well nontoxic.  Vital signs within normal limits.  Does have an episode of melena in the emergency department.  Hemoglobin of 9.9.  Elevated BUN.  Concern for upper GI bleed the patient was treated with intravenous Protonix.  CT unremarkable.  Patient found to have a urinary tract infection and treated with Rocephin.  Case discussed with hospitalist who is agreeable with admission.  Patient also agreeable and admitted in stable condition.    Differential Diagnoses Considered: Upper GI bleed, anemia, electrolyte abnormality    Independent Interpretation of Studies:  I independently interpreted: CT of the abdomen pelvis shows no intra-abdominal free air.    Escalation of Care:  Appropriate for admission for further treatment and evaluation.    Discussion of Management with Other Providers:   I discussed the patient/results with: Admitting hospitalist.          Procedure  Procedures     Paulino Veronica DO  04/27/24 2577

## 2024-04-28 LAB
ALBUMIN SERPL BCP-MCNC: 3.2 G/DL (ref 3.4–5)
ALP SERPL-CCNC: 36 U/L (ref 33–136)
ALT SERPL W P-5'-P-CCNC: 17 U/L (ref 7–45)
ANION GAP SERPL CALC-SCNC: 12 MMOL/L (ref 10–20)
AST SERPL W P-5'-P-CCNC: 24 U/L (ref 9–39)
BILIRUB SERPL-MCNC: 0.2 MG/DL (ref 0–1.2)
BUN SERPL-MCNC: 75 MG/DL (ref 6–23)
C DIF TOX TCDA+TCDB STL QL NAA+PROBE: NOT DETECTED
CALCIUM SERPL-MCNC: 7.9 MG/DL (ref 8.6–10.3)
CHLORIDE SERPL-SCNC: 105 MMOL/L (ref 98–107)
CO2 SERPL-SCNC: 22 MMOL/L (ref 21–32)
CREAT SERPL-MCNC: 2.5 MG/DL (ref 0.5–1.05)
EGFRCR SERPLBLD CKD-EPI 2021: 18 ML/MIN/1.73M*2
ERYTHROCYTE [DISTWIDTH] IN BLOOD BY AUTOMATED COUNT: 15 % (ref 11.5–14.5)
GLUCOSE BLD MANUAL STRIP-MCNC: 122 MG/DL (ref 74–99)
GLUCOSE BLD MANUAL STRIP-MCNC: 124 MG/DL (ref 74–99)
GLUCOSE BLD MANUAL STRIP-MCNC: 134 MG/DL (ref 74–99)
GLUCOSE BLD MANUAL STRIP-MCNC: 138 MG/DL (ref 74–99)
GLUCOSE SERPL-MCNC: 125 MG/DL (ref 74–99)
HCT VFR BLD AUTO: 26 % (ref 36–46)
HCT VFR BLD AUTO: 27 % (ref 36–46)
HGB BLD-MCNC: 8.1 G/DL (ref 12–16)
HGB BLD-MCNC: 8.3 G/DL (ref 12–16)
HOLD SPECIMEN: NORMAL
MCH RBC QN AUTO: 30.9 PG (ref 26–34)
MCHC RBC AUTO-ENTMCNC: 31.2 G/DL (ref 32–36)
MCV RBC AUTO: 99 FL (ref 80–100)
NRBC BLD-RTO: 0 /100 WBCS (ref 0–0)
PLATELET # BLD AUTO: 226 X10*3/UL (ref 150–450)
POTASSIUM SERPL-SCNC: 3.7 MMOL/L (ref 3.5–5.3)
PROT SERPL-MCNC: 5.8 G/DL (ref 6.4–8.2)
RBC # BLD AUTO: 2.62 X10*6/UL (ref 4–5.2)
SODIUM SERPL-SCNC: 135 MMOL/L (ref 136–145)
WBC # BLD AUTO: 6.6 X10*3/UL (ref 4.4–11.3)

## 2024-04-28 PROCEDURE — 2500000001 HC RX 250 WO HCPCS SELF ADMINISTERED DRUGS (ALT 637 FOR MEDICARE OP): Performed by: HOSPITALIST

## 2024-04-28 PROCEDURE — 1200000002 HC GENERAL ROOM WITH TELEMETRY DAILY

## 2024-04-28 PROCEDURE — 2500000004 HC RX 250 GENERAL PHARMACY W/ HCPCS (ALT 636 FOR OP/ED): Mod: JZ | Performed by: HOSPITALIST

## 2024-04-28 PROCEDURE — 82947 ASSAY GLUCOSE BLOOD QUANT: CPT

## 2024-04-28 PROCEDURE — 36415 COLL VENOUS BLD VENIPUNCTURE: CPT | Performed by: HOSPITALIST

## 2024-04-28 PROCEDURE — C9113 INJ PANTOPRAZOLE SODIUM, VIA: HCPCS | Performed by: HOSPITALIST

## 2024-04-28 PROCEDURE — 84075 ASSAY ALKALINE PHOSPHATASE: CPT | Performed by: HOSPITALIST

## 2024-04-28 PROCEDURE — 2500000004 HC RX 250 GENERAL PHARMACY W/ HCPCS (ALT 636 FOR OP/ED)

## 2024-04-28 PROCEDURE — 36415 COLL VENOUS BLD VENIPUNCTURE: CPT | Performed by: INTERNAL MEDICINE

## 2024-04-28 PROCEDURE — 99222 1ST HOSP IP/OBS MODERATE 55: CPT | Performed by: INTERNAL MEDICINE

## 2024-04-28 PROCEDURE — 85014 HEMATOCRIT: CPT | Performed by: INTERNAL MEDICINE

## 2024-04-28 PROCEDURE — 85027 COMPLETE CBC AUTOMATED: CPT | Performed by: HOSPITALIST

## 2024-04-28 PROCEDURE — 87506 IADNA-DNA/RNA PROBE TQ 6-11: CPT | Mod: SAMLAB

## 2024-04-28 PROCEDURE — 2500000001 HC RX 250 WO HCPCS SELF ADMINISTERED DRUGS (ALT 637 FOR MEDICARE OP)

## 2024-04-28 PROCEDURE — 87493 C DIFF AMPLIFIED PROBE: CPT | Mod: 59

## 2024-04-28 PROCEDURE — G0378 HOSPITAL OBSERVATION PER HR: HCPCS

## 2024-04-28 RX ORDER — HYDROCHLOROTHIAZIDE 12.5 MG/1
12.5 CAPSULE ORAL DAILY
Status: DISCONTINUED | OUTPATIENT
Start: 2024-04-28 | End: 2024-04-29 | Stop reason: HOSPADM

## 2024-04-28 RX ORDER — SENNOSIDES 8.6 MG/1
1 TABLET ORAL 2 TIMES DAILY PRN
COMMUNITY

## 2024-04-28 RX ORDER — CALCIUM CARBONATE 200(500)MG
1 TABLET,CHEWABLE ORAL 4 TIMES DAILY PRN
COMMUNITY

## 2024-04-28 RX ORDER — PRAVASTATIN SODIUM 20 MG/1
20 TABLET ORAL NIGHTLY
Status: DISCONTINUED | OUTPATIENT
Start: 2024-04-28 | End: 2024-04-29 | Stop reason: HOSPADM

## 2024-04-28 RX ORDER — DOCUSATE SODIUM 100 MG/1
100 CAPSULE, LIQUID FILLED ORAL 2 TIMES DAILY PRN
COMMUNITY

## 2024-04-28 RX ORDER — CEFTRIAXONE 2 G/50ML
2 INJECTION, SOLUTION INTRAVENOUS EVERY 24 HOURS
Status: DISCONTINUED | OUTPATIENT
Start: 2024-04-28 | End: 2024-04-29

## 2024-04-28 RX ORDER — ESCITALOPRAM OXALATE 10 MG/1
5 TABLET ORAL DAILY
Status: DISCONTINUED | OUTPATIENT
Start: 2024-04-28 | End: 2024-04-29 | Stop reason: HOSPADM

## 2024-04-28 RX ORDER — BUPROPION HYDROCHLORIDE 300 MG/1
300 TABLET ORAL DAILY
Status: DISCONTINUED | OUTPATIENT
Start: 2024-04-28 | End: 2024-04-29 | Stop reason: HOSPADM

## 2024-04-28 RX ORDER — ACETAMINOPHEN 500 MG
500 TABLET ORAL EVERY 6 HOURS PRN
COMMUNITY

## 2024-04-28 RX ADMIN — HYDROCHLOROTHIAZIDE 12.5 MG: 12.5 CAPSULE ORAL at 11:35

## 2024-04-28 RX ADMIN — Medication 1000 MCG: at 09:34

## 2024-04-28 RX ADMIN — METRONIDAZOLE 500 MG: 500 INJECTION, SOLUTION INTRAVENOUS at 02:39

## 2024-04-28 RX ADMIN — ESCITALOPRAM OXALATE 5 MG: 10 TABLET ORAL at 09:34

## 2024-04-28 RX ADMIN — FERROUS SULFATE TAB 325 MG (65 MG ELEMENTAL FE) 1 TABLET: 325 (65 FE) TAB at 09:34

## 2024-04-28 RX ADMIN — METRONIDAZOLE 500 MG: 500 INJECTION, SOLUTION INTRAVENOUS at 10:35

## 2024-04-28 RX ADMIN — CEFTRIAXONE SODIUM 2 G: 2 INJECTION, SOLUTION INTRAVENOUS at 16:27

## 2024-04-28 RX ADMIN — METRONIDAZOLE 500 MG: 500 INJECTION, SOLUTION INTRAVENOUS at 18:05

## 2024-04-28 RX ADMIN — PANTOPRAZOLE SODIUM 40 MG: 40 INJECTION, POWDER, FOR SOLUTION INTRAVENOUS at 09:34

## 2024-04-28 RX ADMIN — PRAVASTATIN SODIUM 20 MG: 20 TABLET ORAL at 20:19

## 2024-04-28 RX ADMIN — PANTOPRAZOLE SODIUM 40 MG: 40 INJECTION, POWDER, FOR SOLUTION INTRAVENOUS at 20:19

## 2024-04-28 RX ADMIN — LEVOTHYROXINE SODIUM 175 MCG: 0.03 TABLET ORAL at 06:32

## 2024-04-28 RX ADMIN — BUPROPION HYDROCHLORIDE 300 MG: 300 TABLET, EXTENDED RELEASE ORAL at 09:34

## 2024-04-28 ASSESSMENT — ENCOUNTER SYMPTOMS
RESPIRATORY NEGATIVE: 1
EYES NEGATIVE: 1
NEUROLOGICAL NEGATIVE: 1
HEMATOLOGIC/LYMPHATIC NEGATIVE: 1
BLOOD IN STOOL: 1
ENDOCRINE NEGATIVE: 1
CONSTITUTIONAL NEGATIVE: 1
NAUSEA: 1
CARDIOVASCULAR NEGATIVE: 1

## 2024-04-28 ASSESSMENT — COGNITIVE AND FUNCTIONAL STATUS - GENERAL
CLIMB 3 TO 5 STEPS WITH RAILING: A LITTLE
DAILY ACTIVITIY SCORE: 21
WALKING IN HOSPITAL ROOM: A LITTLE
MOVING TO AND FROM BED TO CHAIR: A LITTLE
MOBILITY SCORE: 21
TOILETING: A LITTLE
MOVING TO AND FROM BED TO CHAIR: A LITTLE
HELP NEEDED FOR BATHING: A LITTLE
DAILY ACTIVITIY SCORE: 21
WALKING IN HOSPITAL ROOM: A LITTLE
HELP NEEDED FOR BATHING: A LITTLE
DRESSING REGULAR LOWER BODY CLOTHING: A LITTLE
MOBILITY SCORE: 21
TOILETING: A LITTLE
CLIMB 3 TO 5 STEPS WITH RAILING: A LITTLE
DRESSING REGULAR LOWER BODY CLOTHING: A LITTLE

## 2024-04-28 ASSESSMENT — PAIN - FUNCTIONAL ASSESSMENT
PAIN_FUNCTIONAL_ASSESSMENT: 0-10
PAIN_FUNCTIONAL_ASSESSMENT: 0-10

## 2024-04-28 ASSESSMENT — PAIN SCALES - GENERAL
PAINLEVEL_OUTOF10: 0 - NO PAIN
PAINLEVEL_OUTOF10: 0 - NO PAIN

## 2024-04-28 NOTE — CARE PLAN
Problem: Pain  Goal: My pain/discomfort is manageable  4/28/2024 0752 by Ambar Gonzalez RN  Outcome: Progressing  4/27/2024 1804 by Ambar Gonzalez RN  Outcome: Progressing     Problem: Safety  Goal: Patient will be injury free during hospitalization  4/28/2024 0752 by Ambar Gonzalez RN  Outcome: Progressing  4/27/2024 1804 by Ambar Gonzalez RN  Outcome: Progressing  Goal: I will remain free of falls  4/28/2024 0752 by Ambar Gonzalez RN  Outcome: Progressing  4/27/2024 1804 by Ambar Gonzalez RN  Outcome: Progressing     Problem: Daily Care  Goal: Daily care needs are met  4/28/2024 0752 by Ambar Gonzalez RN  Outcome: Progressing  4/27/2024 1804 by Ambar Gonzalez RN  Outcome: Progressing     Problem: Psychosocial Needs  Goal: Demonstrates ability to cope with hospitalization/illness  4/28/2024 0752 by Ambar Gonzalez RN  Outcome: Progressing  4/27/2024 1804 by Ambar Gonzalez RN  Outcome: Progressing  Goal: Collaborate with me, my family, and caregiver to identify my specific goals  4/28/2024 0752 by Ambar Gonzalez RN  Outcome: Progressing  Flowsheets (Taken 4/27/2024 1843)  Cultural Requests During Hospitalization: none  Spiritual Requests During Hospitalization: Uses Mr. Valle for spiritual help at the [facility]  4/27/2024 1804 by Ambar Gonzalez RN  Outcome: Progressing     Problem: Discharge Barriers  Goal: My discharge needs are met  4/28/2024 0752 by Ambar Gonzalez RN  Outcome: Progressing  4/27/2024 1804 by Ambar Gonzalez RN  Outcome: Progressing   The patient's goals for the shift include no falls    The clinical goals for the shift include no bloody stools    Over the shift, the patient did not make progress toward the following goals. Barriers to progression include   . Recommendations to address these barriers include   .

## 2024-04-28 NOTE — PROGRESS NOTES
Lakeshia Park is a 89 y.o. female on day 1 of admission presenting with Upper GI bleed.      Subjective   This delightful lady is resting quietly in bed.  Currently denies complaints of pain or shortness of breath.  States she had a small black stool this a.m. but does not feel that it was bloody       Objective     Last Recorded Vitals  /59 (BP Location: Right arm, Patient Position: Lying)   Pulse 82   Temp 36.8 °C (98.2 °F) (Temporal)   Resp 20   Wt 87.2 kg (192 lb 4.8 oz)   SpO2 90%   Intake/Output last 3 Shifts:    Intake/Output Summary (Last 24 hours) at 4/28/2024 1308  Last data filed at 4/28/2024 1135  Gross per 24 hour   Intake 730 ml   Output --   Net 730 ml       Admission Weight  Weight: 86.2 kg (190 lb) (04/27/24 1358)    Daily Weight  04/27/24 : 87.2 kg (192 lb 4.8 oz)    Image Results  CT abdomen pelvis wo IV contrast  Narrative: Interpreted By:  Mcihael Randolph,   STUDY:  CT ABDOMEN PELVIS WO IV CONTRAST;  4/27/2024 3:19 pm      INDICATION:  Signs/Symptoms:GI bleed.      COMPARISON:  CT abdomen and pelvis 12/06/2019      ACCESSION NUMBER(S):  [id number]      ORDERING CLINICIAN:  FORTUNATO SHIRLEY      TECHNIQUE:  CT of the abdomen and pelvis was performed. Contiguous axial images  were obtained at 3 mm slice thickness through the abdomen and pelvis.  Coronal and sagittal reconstructions at 3 mm slice thickness were  performed.  No intravenous contrast was administered.      FINDINGS:  Please note that the evaluation of vessels, lymph nodes and organs is  limited without intravenous contrast.      LOWER CHEST:  Within normal limits      ABDOMEN:      LIVER:  The liver demonstrates homogeneous attenuation without evidence of  focal liver lesions.      BILE DUCTS:  The intrahepatic and extrahepatic ducts are not dilated.      GALLBLADDER:  The gallbladder is surgically absent.      PANCREAS:  The pancreas appears unremarkable without evidence of ductal  dilatation or masses.       SPLEEN:  The spleen is normal in size without focal lesions.      ADRENAL GLANDS:  Left adrenal thickening. Right adrenal gland is unremarkable.      KIDNEYS AND URETERS:  The kidneys are normal in size and unremarkable in appearance.  Bilateral simple cysts the larger on the left measures 4.2 cm. No  hydroureteronephrosis or nephroureterolithiasis is identified.      PELVIS:      BLADDER:  The urinary bladder appears normal without abnormal wall thickening.      REPRODUCTIVE ORGANS:  No pelvic masses.      BOWEL:  Large hiatal hernia. The stomach is unremarkable. Prior postoperative  changes partial colectomy with colocolic anastomosis in the right  lower quadrant. No bowel dilation or significant wall thickening.  Multiple fluid-filled small bowel and colonic loops, nonspecific, but  can be seen in the setting of infectious enterocolitis. Colonic  diverticulosis without diverticulitis. The appendix is not definitely  visualized. There is however no pericecal stranding or fluid.      VESSELS:  Severe atherosclerotic changes are noted to the aorta and branching  vessels. There is no aneurysmal dilatation.      PERITONEUM/RETROPERITONEUM/LYMPH NODES:  No ascites or free air, no fluid collection.  No abdominopelvic  lymphadenopathy is present.      ABDOMINAL WALL:  The abdominal wall soft tissues appear normal.      BONES:  No suspicious osseous lesions are identified. Moderate levoscoliosis.  Degenerative discogenic disease is noted in the lower thoracic and  lumbar spine.      Impression: No acute findings in the abdomen and pelvis.  Multiple fluid-filled nondilated small bowel and colonic loops,  concerning for infectious enterocolitis. Colonic diverticulosis  without diverticulitis.      Signed by: Michael Randolph 4/27/2024 4:15 PM  Dictation workstation:   [id number]      Physical Exam  General Appearance: AAO x 3, not in acute distress  Skin: skin color pink, warm, and dry; no suspicious rashes or  lesions  Eyes : PERRL, EOM's intact  ENT: mucous membranes pink and moist  Neck: normocephalic  Respiratory: lungs clear to auscultation anteriorly; no wheezing, rhonchi, or crackles.   Heart: regular rate and rhythm. telemetry shows sinus rhythm  Abdomen: Nondistended, positive bowel sounds x4, soft,  nontender  Extremities: no edema   Peripheral pulses: normal x4 extremities  Neuro: alert, coherent and conversant, no focal motor deficits    Relevant Results  Results for orders placed or performed during the hospital encounter of 04/27/24 (from the past 24 hour(s))   CBC and Auto Differential   Result Value Ref Range    WBC 11.4 (H) 4.4 - 11.3 x10*3/uL    nRBC 0.0 0.0 - 0.0 /100 WBCs    RBC 3.21 (L) 4.00 - 5.20 x10*6/uL    Hemoglobin 9.9 (L) 12.0 - 16.0 g/dL    Hematocrit 32.2 (L) 36.0 - 46.0 %     80 - 100 fL    MCH 30.8 26.0 - 34.0 pg    MCHC 30.7 (L) 32.0 - 36.0 g/dL    RDW 14.9 (H) 11.5 - 14.5 %    Platelets 297 150 - 450 x10*3/uL    Neutrophils % 92.3 40.0 - 80.0 %    Immature Granulocytes %, Automated 0.5 0.0 - 0.9 %    Lymphocytes % 2.5 13.0 - 44.0 %    Monocytes % 4.4 2.0 - 10.0 %    Eosinophils % 0.2 0.0 - 6.0 %    Basophils % 0.1 0.0 - 2.0 %    Neutrophils Absolute 10.49 (H) 1.60 - 5.50 x10*3/uL    Immature Granulocytes Absolute, Automated 0.06 0.00 - 0.50 x10*3/uL    Lymphocytes Absolute 0.28 (L) 0.80 - 3.00 x10*3/uL    Monocytes Absolute 0.50 0.05 - 0.80 x10*3/uL    Eosinophils Absolute 0.02 0.00 - 0.40 x10*3/uL    Basophils Absolute 0.01 0.00 - 0.10 x10*3/uL   Comprehensive metabolic panel   Result Value Ref Range    Glucose 200 (H) 74 - 99 mg/dL    Sodium 137 136 - 145 mmol/L    Potassium 4.3 3.5 - 5.3 mmol/L    Chloride 103 98 - 107 mmol/L    Bicarbonate 25 21 - 32 mmol/L    Anion Gap 13 10 - 20 mmol/L    Urea Nitrogen 70 (H) 6 - 23 mg/dL    Creatinine 2.48 (H) 0.50 - 1.05 mg/dL    eGFR 18 (L) >60 mL/min/1.73m*2    Calcium 8.6 8.6 - 10.3 mg/dL    Albumin 3.8 3.4 - 5.0 g/dL    Alkaline  Phosphatase 51 33 - 136 U/L    Total Protein 6.9 6.4 - 8.2 g/dL    AST 20 9 - 39 U/L    Bilirubin, Total 0.4 0.0 - 1.2 mg/dL    ALT 16 7 - 45 U/L   Protime-INR   Result Value Ref Range    Protime 11.7 9.8 - 12.8 seconds    INR 1.0 0.9 - 1.1   aPTT   Result Value Ref Range    aPTT 24 (L) 27 - 38 seconds   Lactate   Result Value Ref Range    Lactate 1.6 0.4 - 2.0 mmol/L   Urinalysis with Reflex Culture and Microscopic   Result Value Ref Range    Color, Urine Yellow Straw, Yellow    Appearance, Urine Hazy (N) Clear    Specific Gravity, Urine 1.018 1.005 - 1.035    pH, Urine 5.0 5.0, 5.5, 6.0, 6.5, 7.0, 7.5, 8.0    Protein, Urine 30 (1+) (N) NEGATIVE mg/dL    Glucose, Urine NEGATIVE NEGATIVE mg/dL    Blood, Urine LARGE (3+) (A) NEGATIVE    Ketones, Urine NEGATIVE NEGATIVE mg/dL    Bilirubin, Urine NEGATIVE NEGATIVE    Urobilinogen, Urine <2.0 <2.0 mg/dL    Nitrite, Urine NEGATIVE NEGATIVE    Leukocyte Esterase, Urine SMALL (1+) (A) NEGATIVE   Extra Urine Gray Tube   Result Value Ref Range    Extra Tube Hold for add-ons.    Microscopic Only, Urine   Result Value Ref Range    WBC, Urine 6-10 (A) 1-5, NONE /HPF    WBC Clumps, Urine OCCASIONAL Reference range not established. /HPF    RBC, Urine 3-5 NONE, 1-2, 3-5 /HPF    Squamous Epithelial Cells, Urine 10-25 (FEW) Reference range not established. /HPF    Bacteria, Urine 2+ (A) NONE SEEN /HPF    Mucus, Urine 1+ Reference range not established. /LPF    Hyaline Casts, Urine 2+ (A) NONE /LPF   Type and screen   Result Value Ref Range    ABO TYPE O     Rh TYPE POS     ANTIBODY SCREEN NEG    Iron and TIBC   Result Value Ref Range    Iron 38 35 - 150 ug/dL    UIBC 246 110 - 370 ug/dL    TIBC 284 240 - 445 ug/dL    % Saturation 13 (L) 25 - 45 %   Ferritin   Result Value Ref Range    Ferritin 66 8 - 150 ng/mL   Troponin I, High Sensitivity   Result Value Ref Range    Troponin I, High Sensitivity 5 0 - 13 ng/L   Blood Culture    Specimen: Peripheral Venipuncture; Blood culture    Result Value Ref Range    Blood Culture Loaded on Instrument - Culture in progress    POCT GLUCOSE   Result Value Ref Range    POCT Glucose 163 (H) 74 - 99 mg/dL   Comprehensive metabolic panel   Result Value Ref Range    Glucose 125 (H) 74 - 99 mg/dL    Sodium 135 (L) 136 - 145 mmol/L    Potassium 3.7 3.5 - 5.3 mmol/L    Chloride 105 98 - 107 mmol/L    Bicarbonate 22 21 - 32 mmol/L    Anion Gap 12 10 - 20 mmol/L    Urea Nitrogen 75 (H) 6 - 23 mg/dL    Creatinine 2.50 (H) 0.50 - 1.05 mg/dL    eGFR 18 (L) >60 mL/min/1.73m*2    Calcium 7.9 (L) 8.6 - 10.3 mg/dL    Albumin 3.2 (L) 3.4 - 5.0 g/dL    Alkaline Phosphatase 36 33 - 136 U/L    Total Protein 5.8 (L) 6.4 - 8.2 g/dL    AST 24 9 - 39 U/L    Bilirubin, Total 0.2 0.0 - 1.2 mg/dL    ALT 17 7 - 45 U/L   CBC   Result Value Ref Range    WBC 6.6 4.4 - 11.3 x10*3/uL    nRBC 0.0 0.0 - 0.0 /100 WBCs    RBC 2.62 (L) 4.00 - 5.20 x10*6/uL    Hemoglobin 8.1 (L) 12.0 - 16.0 g/dL    Hematocrit 26.0 (L) 36.0 - 46.0 %    MCV 99 80 - 100 fL    MCH 30.9 26.0 - 34.0 pg    MCHC 31.2 (L) 32.0 - 36.0 g/dL    RDW 15.0 (H) 11.5 - 14.5 %    Platelets 226 150 - 450 x10*3/uL   POCT GLUCOSE   Result Value Ref Range    POCT Glucose 122 (H) 74 - 99 mg/dL   POCT GLUCOSE   Result Value Ref Range    POCT Glucose 134 (H) 74 - 99 mg/dL     Scheduled medications  buPROPion XL, 300 mg, oral, Daily  cefTRIAXone, 2 g, intravenous, q24h  cyanocobalamin, 1,000 mcg, oral, Daily  escitalopram, 5 mg, oral, Daily  ferrous sulfate (325 mg ferrous sulfate), 1 tablet, oral, Daily  hydroCHLOROthiazide, 12.5 mg, oral, Daily  [Held by provider] indapamide, 2.5 mg, oral, Daily  insulin lispro, 0-5 Units, subcutaneous, TID with meals  levothyroxine, 175 mcg, oral, Daily before breakfast  metroNIDAZOLE, 500 mg, intravenous, q8h  pantoprazole, 40 mg, intravenous, BID  polyethylene glycol, 17 g, oral, Daily  pravastatin, 20 mg, oral, Nightly      Continuous medications     PRN medications  PRN medications:  acetaminophen **OR** acetaminophen **OR** acetaminophen, acetaminophen **OR** acetaminophen **OR** acetaminophen, benzonatate, dextrose, dextrose, glucagon, glucagon, ondansetron ODT **OR** ondansetron    Assessment/Plan      Principal Problem:    Upper GI bleed    89-year-old female with history of Hypertension, Hyperlipidemia,  Anxiety/depression,Anemia, Hypothyroidism, Diabetes mellitus type 2, Stage III-IV chronic kidney disease severe obesity, Claudication, Iron deficiency anemia/lower GI bleeding, Endometrial cancer Presented with Possible GI bleeding, Infectious enterocolitis, UTI. Likely chronic kidney disease with dehydration    Plan:  Upper GI bleed  -Dr Kinney consulted- appreciate recommendations. Plan for EGD in AM  -NPO after midnight. Continue clear liquid diet today  -drop in H&H FROM 9.9 to 8.1  -stool for occult collected and results pending  -continue ferrous sulfate  -iron panel done and within normal limits except saturation at 13%  -continue vit b12  -continue protonix  -H&H repeated at 4. Monitor labs    Infectious enterocolitis  -spoke with pharmacy and recommending increasing rocephin to 2gm. Continue flagyl. Patient has an allergy to cipro  -informed per nursing that patient having multiple loose stool. Will order a culture and c-diff  -will hold miralax secondary to increased stools    Depression  -continue bupropion and lexapro    Hypertension  -pharmacy does not have patient indapamide on formulary so changed to hydrochlorothiazide    Hypothyroidism  -continue levothyroxine    Hyperlipidemia  -continue pravastatin    Discharge disposition: Anticipate return to assisted living when medically stable    Sary Finn, APRN-CNP

## 2024-04-28 NOTE — H&P (VIEW-ONLY)
Consults    Reason For Consult  Melena    History Of Present Illness  Lakeshia Park is a 89 y.o. female presenting with melena/anemia presents with diarrhea melena beginning after having Chinese food.  States she had Chinese food Friday night awoke Saturday with stomach ache had several melanic stools followed by vomiting of what appeared to be coffee-ground material.  At that point she was brought by squad to the emergency room.  Patient has had drop in hemoglobin since admission she has had no stool overnight denies any abdominal pain.  Had prior episode of bleeding although it was bright red rectal bleeding 5 years ago workup was unremarkable.    She denies any abdominal pain nausea chest pain or dizziness..     Past Medical History  She has no past medical history on file.    Surgical History  She has a past surgical history that includes Other surgical history (09/13/2019); Other surgical history (09/13/2019); Other surgical history (09/13/2019); Other surgical history (09/13/2019); Other surgical history (09/13/2019); and Other surgical history (10/24/2019).     Social History  She reports that she has never smoked. She has never used smokeless tobacco. She reports that she does not currently use alcohol. She reports that she does not use drugs.    Family History  Family History   Problem Relation Name Age of Onset    Diabetes Mother      Brain cancer Mother      Brain cancer Brother          Allergies  Celecoxib, Ciprofloxacin, Diazepam, Esomeprazole, Ibuprofen, and Naproxen    Review of Systems   Constitutional: Negative.    HENT: Negative.     Eyes: Negative.    Respiratory: Negative.     Cardiovascular: Negative.    Gastrointestinal:  Positive for blood in stool and nausea.   Endocrine: Negative.    Genitourinary: Negative.    Neurological: Negative.    Hematological: Negative.         Physical Exam  Vitals and nursing note reviewed.   Constitutional:       Appearance: Normal appearance.   HENT:       Head: Normocephalic.      Mouth/Throat:      Mouth: Mucous membranes are moist.      Pharynx: Oropharynx is clear.   Eyes:      Conjunctiva/sclera: Conjunctivae normal.      Pupils: Pupils are equal, round, and reactive to light.   Cardiovascular:      Rate and Rhythm: Normal rate and regular rhythm.      Heart sounds: Normal heart sounds.   Pulmonary:      Effort: Pulmonary effort is normal.      Breath sounds: Normal breath sounds.   Abdominal:      General: Abdomen is flat. Bowel sounds are normal.      Palpations: Abdomen is soft.   Musculoskeletal:         General: Normal range of motion.      Cervical back: Normal range of motion and neck supple.   Skin:     General: Skin is warm and dry.   Neurological:      General: No focal deficit present.      Mental Status: She is alert and oriented to person, place, and time.   Psychiatric:         Behavior: Behavior normal.          Last Recorded Vitals  /59 (BP Location: Right arm, Patient Position: Lying)   Pulse 82   Temp 36.8 °C (98.2 °F) (Temporal)   Resp 20   Wt 87.2 kg (192 lb 4.8 oz)   SpO2 90%     Relevant Results  Results for orders placed or performed during the hospital encounter of 04/27/24 (from the past 24 hour(s))   CBC and Auto Differential   Result Value Ref Range    WBC 11.4 (H) 4.4 - 11.3 x10*3/uL    nRBC 0.0 0.0 - 0.0 /100 WBCs    RBC 3.21 (L) 4.00 - 5.20 x10*6/uL    Hemoglobin 9.9 (L) 12.0 - 16.0 g/dL    Hematocrit 32.2 (L) 36.0 - 46.0 %     80 - 100 fL    MCH 30.8 26.0 - 34.0 pg    MCHC 30.7 (L) 32.0 - 36.0 g/dL    RDW 14.9 (H) 11.5 - 14.5 %    Platelets 297 150 - 450 x10*3/uL    Neutrophils % 92.3 40.0 - 80.0 %    Immature Granulocytes %, Automated 0.5 0.0 - 0.9 %    Lymphocytes % 2.5 13.0 - 44.0 %    Monocytes % 4.4 2.0 - 10.0 %    Eosinophils % 0.2 0.0 - 6.0 %    Basophils % 0.1 0.0 - 2.0 %    Neutrophils Absolute 10.49 (H) 1.60 - 5.50 x10*3/uL    Immature Granulocytes Absolute, Automated 0.06 0.00 - 0.50 x10*3/uL     Lymphocytes Absolute 0.28 (L) 0.80 - 3.00 x10*3/uL    Monocytes Absolute 0.50 0.05 - 0.80 x10*3/uL    Eosinophils Absolute 0.02 0.00 - 0.40 x10*3/uL    Basophils Absolute 0.01 0.00 - 0.10 x10*3/uL   Comprehensive metabolic panel   Result Value Ref Range    Glucose 200 (H) 74 - 99 mg/dL    Sodium 137 136 - 145 mmol/L    Potassium 4.3 3.5 - 5.3 mmol/L    Chloride 103 98 - 107 mmol/L    Bicarbonate 25 21 - 32 mmol/L    Anion Gap 13 10 - 20 mmol/L    Urea Nitrogen 70 (H) 6 - 23 mg/dL    Creatinine 2.48 (H) 0.50 - 1.05 mg/dL    eGFR 18 (L) >60 mL/min/1.73m*2    Calcium 8.6 8.6 - 10.3 mg/dL    Albumin 3.8 3.4 - 5.0 g/dL    Alkaline Phosphatase 51 33 - 136 U/L    Total Protein 6.9 6.4 - 8.2 g/dL    AST 20 9 - 39 U/L    Bilirubin, Total 0.4 0.0 - 1.2 mg/dL    ALT 16 7 - 45 U/L   Protime-INR   Result Value Ref Range    Protime 11.7 9.8 - 12.8 seconds    INR 1.0 0.9 - 1.1   aPTT   Result Value Ref Range    aPTT 24 (L) 27 - 38 seconds   Lactate   Result Value Ref Range    Lactate 1.6 0.4 - 2.0 mmol/L   Urinalysis with Reflex Culture and Microscopic   Result Value Ref Range    Color, Urine Yellow Straw, Yellow    Appearance, Urine Hazy (N) Clear    Specific Gravity, Urine 1.018 1.005 - 1.035    pH, Urine 5.0 5.0, 5.5, 6.0, 6.5, 7.0, 7.5, 8.0    Protein, Urine 30 (1+) (N) NEGATIVE mg/dL    Glucose, Urine NEGATIVE NEGATIVE mg/dL    Blood, Urine LARGE (3+) (A) NEGATIVE    Ketones, Urine NEGATIVE NEGATIVE mg/dL    Bilirubin, Urine NEGATIVE NEGATIVE    Urobilinogen, Urine <2.0 <2.0 mg/dL    Nitrite, Urine NEGATIVE NEGATIVE    Leukocyte Esterase, Urine SMALL (1+) (A) NEGATIVE   Extra Urine Gray Tube   Result Value Ref Range    Extra Tube Hold for add-ons.    Microscopic Only, Urine   Result Value Ref Range    WBC, Urine 6-10 (A) 1-5, NONE /HPF    WBC Clumps, Urine OCCASIONAL Reference range not established. /HPF    RBC, Urine 3-5 NONE, 1-2, 3-5 /HPF    Squamous Epithelial Cells, Urine 10-25 (FEW) Reference range not established. /HPF     Bacteria, Urine 2+ (A) NONE SEEN /HPF    Mucus, Urine 1+ Reference range not established. /LPF    Hyaline Casts, Urine 2+ (A) NONE /LPF   Type and screen   Result Value Ref Range    ABO TYPE O     Rh TYPE POS     ANTIBODY SCREEN NEG    Iron and TIBC   Result Value Ref Range    Iron 38 35 - 150 ug/dL    UIBC 246 110 - 370 ug/dL    TIBC 284 240 - 445 ug/dL    % Saturation 13 (L) 25 - 45 %   Ferritin   Result Value Ref Range    Ferritin 66 8 - 150 ng/mL   Troponin I, High Sensitivity   Result Value Ref Range    Troponin I, High Sensitivity 5 0 - 13 ng/L   Blood Culture    Specimen: Peripheral Venipuncture; Blood culture   Result Value Ref Range    Blood Culture Loaded on Instrument - Culture in progress    POCT GLUCOSE   Result Value Ref Range    POCT Glucose 163 (H) 74 - 99 mg/dL   Comprehensive metabolic panel   Result Value Ref Range    Glucose 125 (H) 74 - 99 mg/dL    Sodium 135 (L) 136 - 145 mmol/L    Potassium 3.7 3.5 - 5.3 mmol/L    Chloride 105 98 - 107 mmol/L    Bicarbonate 22 21 - 32 mmol/L    Anion Gap 12 10 - 20 mmol/L    Urea Nitrogen 75 (H) 6 - 23 mg/dL    Creatinine 2.50 (H) 0.50 - 1.05 mg/dL    eGFR 18 (L) >60 mL/min/1.73m*2    Calcium 7.9 (L) 8.6 - 10.3 mg/dL    Albumin 3.2 (L) 3.4 - 5.0 g/dL    Alkaline Phosphatase 36 33 - 136 U/L    Total Protein 5.8 (L) 6.4 - 8.2 g/dL    AST 24 9 - 39 U/L    Bilirubin, Total 0.2 0.0 - 1.2 mg/dL    ALT 17 7 - 45 U/L   CBC   Result Value Ref Range    WBC 6.6 4.4 - 11.3 x10*3/uL    nRBC 0.0 0.0 - 0.0 /100 WBCs    RBC 2.62 (L) 4.00 - 5.20 x10*6/uL    Hemoglobin 8.1 (L) 12.0 - 16.0 g/dL    Hematocrit 26.0 (L) 36.0 - 46.0 %    MCV 99 80 - 100 fL    MCH 30.9 26.0 - 34.0 pg    MCHC 31.2 (L) 32.0 - 36.0 g/dL    RDW 15.0 (H) 11.5 - 14.5 %    Platelets 226 150 - 450 x10*3/uL   POCT GLUCOSE   Result Value Ref Range    POCT Glucose 122 (H) 74 - 99 mg/dL     CT abdomen pelvis wo IV contrast    Result Date: 4/27/2024  Interpreted By:  Michael Randolph, STUDY: CT ABDOMEN PELVIS  WO IV CONTRAST;  4/27/2024 3:19 pm   INDICATION: Signs/Symptoms:GI bleed.   COMPARISON: CT abdomen and pelvis 12/06/2019   ACCESSION NUMBER(S): [id number]   ORDERING CLINICIAN: FORTUNATO SHIRLEY   TECHNIQUE: CT of the abdomen and pelvis was performed. Contiguous axial images were obtained at 3 mm slice thickness through the abdomen and pelvis. Coronal and sagittal reconstructions at 3 mm slice thickness were performed.  No intravenous contrast was administered.   FINDINGS: Please note that the evaluation of vessels, lymph nodes and organs is limited without intravenous contrast.   LOWER CHEST: Within normal limits   ABDOMEN:   LIVER: The liver demonstrates homogeneous attenuation without evidence of focal liver lesions.   BILE DUCTS: The intrahepatic and extrahepatic ducts are not dilated.   GALLBLADDER: The gallbladder is surgically absent.   PANCREAS: The pancreas appears unremarkable without evidence of ductal dilatation or masses.   SPLEEN: The spleen is normal in size without focal lesions.   ADRENAL GLANDS: Left adrenal thickening. Right adrenal gland is unremarkable.   KIDNEYS AND URETERS: The kidneys are normal in size and unremarkable in appearance. Bilateral simple cysts the larger on the left measures 4.2 cm. No hydroureteronephrosis or nephroureterolithiasis is identified.   PELVIS:   BLADDER: The urinary bladder appears normal without abnormal wall thickening.   REPRODUCTIVE ORGANS: No pelvic masses.   BOWEL: Large hiatal hernia. The stomach is unremarkable. Prior postoperative changes partial colectomy with colocolic anastomosis in the right lower quadrant. No bowel dilation or significant wall thickening. Multiple fluid-filled small bowel and colonic loops, nonspecific, but can be seen in the setting of infectious enterocolitis. Colonic diverticulosis without diverticulitis. The appendix is not definitely visualized. There is however no pericecal stranding or fluid.   VESSELS: Severe atherosclerotic  changes are noted to the aorta and branching vessels. There is no aneurysmal dilatation.   PERITONEUM/RETROPERITONEUM/LYMPH NODES: No ascites or free air, no fluid collection.  No abdominopelvic lymphadenopathy is present.   ABDOMINAL WALL: The abdominal wall soft tissues appear normal.   BONES: No suspicious osseous lesions are identified. Moderate levoscoliosis. Degenerative discogenic disease is noted in the lower thoracic and lumbar spine.       No acute findings in the abdomen and pelvis. Multiple fluid-filled nondilated small bowel and colonic loops, concerning for infectious enterocolitis. Colonic diverticulosis without diverticulitis.   Signed by: Michale Randolph 4/27/2024 4:15 PM Dictation workstation:   [id number]    XR chest 2 views    Result Date: 4/12/2024  Interpreted By:  Sebas Garsia, STUDY: XR CHEST 2 VIEWS;  4/12/2024 3:15 pm   INDICATION: Signs/Symptoms:Cough x 4-5 weeks..   COMPARISON: 05/15/2020   ACCESSION NUMBER(S): [id number]   ORDERING CLINICIAN: ELLIOT CARLSON   FINDINGS:   The cardiac silhouette is mildly enlarged. Costophrenic angles are sharp. Lungs are hyperinflated and clear. The trachea is midline. There is no pneumothorax. Degenerative changes and mild dextroscoliosis of the thoracic spine are noted.       1.  Mild cardiomegaly. No acute pulmonary process.     Signed by: Sebas Garsia 4/12/2024 3:32 PM Dictation workstation:   [id number]        Assessment/Plan     89-year-old female with melena, signs of upper GI bleeding now with sudden drop in hemoglobin.  Recommend EGD.  Will arrange for tomorrow morning continue PPI therapy    Julian Kinney DO

## 2024-04-28 NOTE — CONSULTS
Consults    Reason For Consult  Melena    History Of Present Illness  Lakeshia Park is a 89 y.o. female presenting with melena/anemia presents with diarrhea melena beginning after having Chinese food.  States she had Chinese food Friday night awoke Saturday with stomach ache had several melanic stools followed by vomiting of what appeared to be coffee-ground material.  At that point she was brought by squad to the emergency room.  Patient has had drop in hemoglobin since admission she has had no stool overnight denies any abdominal pain.  Had prior episode of bleeding although it was bright red rectal bleeding 5 years ago workup was unremarkable.    She denies any abdominal pain nausea chest pain or dizziness..     Past Medical History  She has no past medical history on file.    Surgical History  She has a past surgical history that includes Other surgical history (09/13/2019); Other surgical history (09/13/2019); Other surgical history (09/13/2019); Other surgical history (09/13/2019); Other surgical history (09/13/2019); and Other surgical history (10/24/2019).     Social History  She reports that she has never smoked. She has never used smokeless tobacco. She reports that she does not currently use alcohol. She reports that she does not use drugs.    Family History  Family History   Problem Relation Name Age of Onset    Diabetes Mother      Brain cancer Mother      Brain cancer Brother          Allergies  Celecoxib, Ciprofloxacin, Diazepam, Esomeprazole, Ibuprofen, and Naproxen    Review of Systems   Constitutional: Negative.    HENT: Negative.     Eyes: Negative.    Respiratory: Negative.     Cardiovascular: Negative.    Gastrointestinal:  Positive for blood in stool and nausea.   Endocrine: Negative.    Genitourinary: Negative.    Neurological: Negative.    Hematological: Negative.         Physical Exam  Vitals and nursing note reviewed.   Constitutional:       Appearance: Normal appearance.   HENT:       Head: Normocephalic.      Mouth/Throat:      Mouth: Mucous membranes are moist.      Pharynx: Oropharynx is clear.   Eyes:      Conjunctiva/sclera: Conjunctivae normal.      Pupils: Pupils are equal, round, and reactive to light.   Cardiovascular:      Rate and Rhythm: Normal rate and regular rhythm.      Heart sounds: Normal heart sounds.   Pulmonary:      Effort: Pulmonary effort is normal.      Breath sounds: Normal breath sounds.   Abdominal:      General: Abdomen is flat. Bowel sounds are normal.      Palpations: Abdomen is soft.   Musculoskeletal:         General: Normal range of motion.      Cervical back: Normal range of motion and neck supple.   Skin:     General: Skin is warm and dry.   Neurological:      General: No focal deficit present.      Mental Status: She is alert and oriented to person, place, and time.   Psychiatric:         Behavior: Behavior normal.          Last Recorded Vitals  /59 (BP Location: Right arm, Patient Position: Lying)   Pulse 82   Temp 36.8 °C (98.2 °F) (Temporal)   Resp 20   Wt 87.2 kg (192 lb 4.8 oz)   SpO2 90%     Relevant Results  Results for orders placed or performed during the hospital encounter of 04/27/24 (from the past 24 hour(s))   CBC and Auto Differential   Result Value Ref Range    WBC 11.4 (H) 4.4 - 11.3 x10*3/uL    nRBC 0.0 0.0 - 0.0 /100 WBCs    RBC 3.21 (L) 4.00 - 5.20 x10*6/uL    Hemoglobin 9.9 (L) 12.0 - 16.0 g/dL    Hematocrit 32.2 (L) 36.0 - 46.0 %     80 - 100 fL    MCH 30.8 26.0 - 34.0 pg    MCHC 30.7 (L) 32.0 - 36.0 g/dL    RDW 14.9 (H) 11.5 - 14.5 %    Platelets 297 150 - 450 x10*3/uL    Neutrophils % 92.3 40.0 - 80.0 %    Immature Granulocytes %, Automated 0.5 0.0 - 0.9 %    Lymphocytes % 2.5 13.0 - 44.0 %    Monocytes % 4.4 2.0 - 10.0 %    Eosinophils % 0.2 0.0 - 6.0 %    Basophils % 0.1 0.0 - 2.0 %    Neutrophils Absolute 10.49 (H) 1.60 - 5.50 x10*3/uL    Immature Granulocytes Absolute, Automated 0.06 0.00 - 0.50 x10*3/uL     Lymphocytes Absolute 0.28 (L) 0.80 - 3.00 x10*3/uL    Monocytes Absolute 0.50 0.05 - 0.80 x10*3/uL    Eosinophils Absolute 0.02 0.00 - 0.40 x10*3/uL    Basophils Absolute 0.01 0.00 - 0.10 x10*3/uL   Comprehensive metabolic panel   Result Value Ref Range    Glucose 200 (H) 74 - 99 mg/dL    Sodium 137 136 - 145 mmol/L    Potassium 4.3 3.5 - 5.3 mmol/L    Chloride 103 98 - 107 mmol/L    Bicarbonate 25 21 - 32 mmol/L    Anion Gap 13 10 - 20 mmol/L    Urea Nitrogen 70 (H) 6 - 23 mg/dL    Creatinine 2.48 (H) 0.50 - 1.05 mg/dL    eGFR 18 (L) >60 mL/min/1.73m*2    Calcium 8.6 8.6 - 10.3 mg/dL    Albumin 3.8 3.4 - 5.0 g/dL    Alkaline Phosphatase 51 33 - 136 U/L    Total Protein 6.9 6.4 - 8.2 g/dL    AST 20 9 - 39 U/L    Bilirubin, Total 0.4 0.0 - 1.2 mg/dL    ALT 16 7 - 45 U/L   Protime-INR   Result Value Ref Range    Protime 11.7 9.8 - 12.8 seconds    INR 1.0 0.9 - 1.1   aPTT   Result Value Ref Range    aPTT 24 (L) 27 - 38 seconds   Lactate   Result Value Ref Range    Lactate 1.6 0.4 - 2.0 mmol/L   Urinalysis with Reflex Culture and Microscopic   Result Value Ref Range    Color, Urine Yellow Straw, Yellow    Appearance, Urine Hazy (N) Clear    Specific Gravity, Urine 1.018 1.005 - 1.035    pH, Urine 5.0 5.0, 5.5, 6.0, 6.5, 7.0, 7.5, 8.0    Protein, Urine 30 (1+) (N) NEGATIVE mg/dL    Glucose, Urine NEGATIVE NEGATIVE mg/dL    Blood, Urine LARGE (3+) (A) NEGATIVE    Ketones, Urine NEGATIVE NEGATIVE mg/dL    Bilirubin, Urine NEGATIVE NEGATIVE    Urobilinogen, Urine <2.0 <2.0 mg/dL    Nitrite, Urine NEGATIVE NEGATIVE    Leukocyte Esterase, Urine SMALL (1+) (A) NEGATIVE   Extra Urine Gray Tube   Result Value Ref Range    Extra Tube Hold for add-ons.    Microscopic Only, Urine   Result Value Ref Range    WBC, Urine 6-10 (A) 1-5, NONE /HPF    WBC Clumps, Urine OCCASIONAL Reference range not established. /HPF    RBC, Urine 3-5 NONE, 1-2, 3-5 /HPF    Squamous Epithelial Cells, Urine 10-25 (FEW) Reference range not established. /HPF     Bacteria, Urine 2+ (A) NONE SEEN /HPF    Mucus, Urine 1+ Reference range not established. /LPF    Hyaline Casts, Urine 2+ (A) NONE /LPF   Type and screen   Result Value Ref Range    ABO TYPE O     Rh TYPE POS     ANTIBODY SCREEN NEG    Iron and TIBC   Result Value Ref Range    Iron 38 35 - 150 ug/dL    UIBC 246 110 - 370 ug/dL    TIBC 284 240 - 445 ug/dL    % Saturation 13 (L) 25 - 45 %   Ferritin   Result Value Ref Range    Ferritin 66 8 - 150 ng/mL   Troponin I, High Sensitivity   Result Value Ref Range    Troponin I, High Sensitivity 5 0 - 13 ng/L   Blood Culture    Specimen: Peripheral Venipuncture; Blood culture   Result Value Ref Range    Blood Culture Loaded on Instrument - Culture in progress    POCT GLUCOSE   Result Value Ref Range    POCT Glucose 163 (H) 74 - 99 mg/dL   Comprehensive metabolic panel   Result Value Ref Range    Glucose 125 (H) 74 - 99 mg/dL    Sodium 135 (L) 136 - 145 mmol/L    Potassium 3.7 3.5 - 5.3 mmol/L    Chloride 105 98 - 107 mmol/L    Bicarbonate 22 21 - 32 mmol/L    Anion Gap 12 10 - 20 mmol/L    Urea Nitrogen 75 (H) 6 - 23 mg/dL    Creatinine 2.50 (H) 0.50 - 1.05 mg/dL    eGFR 18 (L) >60 mL/min/1.73m*2    Calcium 7.9 (L) 8.6 - 10.3 mg/dL    Albumin 3.2 (L) 3.4 - 5.0 g/dL    Alkaline Phosphatase 36 33 - 136 U/L    Total Protein 5.8 (L) 6.4 - 8.2 g/dL    AST 24 9 - 39 U/L    Bilirubin, Total 0.2 0.0 - 1.2 mg/dL    ALT 17 7 - 45 U/L   CBC   Result Value Ref Range    WBC 6.6 4.4 - 11.3 x10*3/uL    nRBC 0.0 0.0 - 0.0 /100 WBCs    RBC 2.62 (L) 4.00 - 5.20 x10*6/uL    Hemoglobin 8.1 (L) 12.0 - 16.0 g/dL    Hematocrit 26.0 (L) 36.0 - 46.0 %    MCV 99 80 - 100 fL    MCH 30.9 26.0 - 34.0 pg    MCHC 31.2 (L) 32.0 - 36.0 g/dL    RDW 15.0 (H) 11.5 - 14.5 %    Platelets 226 150 - 450 x10*3/uL   POCT GLUCOSE   Result Value Ref Range    POCT Glucose 122 (H) 74 - 99 mg/dL     CT abdomen pelvis wo IV contrast    Result Date: 4/27/2024  Interpreted By:  Michael Randolph, STUDY: CT ABDOMEN PELVIS  WO IV CONTRAST;  4/27/2024 3:19 pm   INDICATION: Signs/Symptoms:GI bleed.   COMPARISON: CT abdomen and pelvis 12/06/2019   ACCESSION NUMBER(S): [id number]   ORDERING CLINICIAN: FORTUNATO SHIRLEY   TECHNIQUE: CT of the abdomen and pelvis was performed. Contiguous axial images were obtained at 3 mm slice thickness through the abdomen and pelvis. Coronal and sagittal reconstructions at 3 mm slice thickness were performed.  No intravenous contrast was administered.   FINDINGS: Please note that the evaluation of vessels, lymph nodes and organs is limited without intravenous contrast.   LOWER CHEST: Within normal limits   ABDOMEN:   LIVER: The liver demonstrates homogeneous attenuation without evidence of focal liver lesions.   BILE DUCTS: The intrahepatic and extrahepatic ducts are not dilated.   GALLBLADDER: The gallbladder is surgically absent.   PANCREAS: The pancreas appears unremarkable without evidence of ductal dilatation or masses.   SPLEEN: The spleen is normal in size without focal lesions.   ADRENAL GLANDS: Left adrenal thickening. Right adrenal gland is unremarkable.   KIDNEYS AND URETERS: The kidneys are normal in size and unremarkable in appearance. Bilateral simple cysts the larger on the left measures 4.2 cm. No hydroureteronephrosis or nephroureterolithiasis is identified.   PELVIS:   BLADDER: The urinary bladder appears normal without abnormal wall thickening.   REPRODUCTIVE ORGANS: No pelvic masses.   BOWEL: Large hiatal hernia. The stomach is unremarkable. Prior postoperative changes partial colectomy with colocolic anastomosis in the right lower quadrant. No bowel dilation or significant wall thickening. Multiple fluid-filled small bowel and colonic loops, nonspecific, but can be seen in the setting of infectious enterocolitis. Colonic diverticulosis without diverticulitis. The appendix is not definitely visualized. There is however no pericecal stranding or fluid.   VESSELS: Severe atherosclerotic  changes are noted to the aorta and branching vessels. There is no aneurysmal dilatation.   PERITONEUM/RETROPERITONEUM/LYMPH NODES: No ascites or free air, no fluid collection.  No abdominopelvic lymphadenopathy is present.   ABDOMINAL WALL: The abdominal wall soft tissues appear normal.   BONES: No suspicious osseous lesions are identified. Moderate levoscoliosis. Degenerative discogenic disease is noted in the lower thoracic and lumbar spine.       No acute findings in the abdomen and pelvis. Multiple fluid-filled nondilated small bowel and colonic loops, concerning for infectious enterocolitis. Colonic diverticulosis without diverticulitis.   Signed by: Michael Randolph 4/27/2024 4:15 PM Dictation workstation:   [id number]    XR chest 2 views    Result Date: 4/12/2024  Interpreted By:  Sebas Garsia, STUDY: XR CHEST 2 VIEWS;  4/12/2024 3:15 pm   INDICATION: Signs/Symptoms:Cough x 4-5 weeks..   COMPARISON: 05/15/2020   ACCESSION NUMBER(S): [id number]   ORDERING CLINICIAN: ELLIOT CARLSON   FINDINGS:   The cardiac silhouette is mildly enlarged. Costophrenic angles are sharp. Lungs are hyperinflated and clear. The trachea is midline. There is no pneumothorax. Degenerative changes and mild dextroscoliosis of the thoracic spine are noted.       1.  Mild cardiomegaly. No acute pulmonary process.     Signed by: Sebas Garsia 4/12/2024 3:32 PM Dictation workstation:   [id number]        Assessment/Plan     89-year-old female with melena, signs of upper GI bleeding now with sudden drop in hemoglobin.  Recommend EGD.  Will arrange for tomorrow morning continue PPI therapy    Julian Kinney DO

## 2024-04-29 ENCOUNTER — ANESTHESIA (OUTPATIENT)
Dept: OPERATING ROOM | Facility: HOSPITAL | Age: 89
DRG: 378 | End: 2024-04-29
Payer: MEDICARE

## 2024-04-29 ENCOUNTER — ANESTHESIA EVENT (OUTPATIENT)
Dept: OPERATING ROOM | Facility: HOSPITAL | Age: 89
DRG: 378 | End: 2024-04-29
Payer: MEDICARE

## 2024-04-29 ENCOUNTER — APPOINTMENT (OUTPATIENT)
Dept: OPERATING ROOM | Facility: HOSPITAL | Age: 89
DRG: 378 | End: 2024-04-29
Payer: MEDICARE

## 2024-04-29 ENCOUNTER — APPOINTMENT (OUTPATIENT)
Dept: PRIMARY CARE | Facility: CLINIC | Age: 89
End: 2024-04-29
Payer: MEDICARE

## 2024-04-29 VITALS
WEIGHT: 192.3 LBS | BODY MASS INDEX: 34.07 KG/M2 | HEIGHT: 63 IN | RESPIRATION RATE: 16 BRPM | TEMPERATURE: 97.5 F | SYSTOLIC BLOOD PRESSURE: 135 MMHG | HEART RATE: 70 BPM | DIASTOLIC BLOOD PRESSURE: 78 MMHG | OXYGEN SATURATION: 94 %

## 2024-04-29 PROBLEM — K92.2 UPPER GI BLEED: Status: RESOLVED | Noted: 2024-04-27 | Resolved: 2024-04-29

## 2024-04-29 LAB
ANION GAP SERPL CALC-SCNC: 13 MMOL/L (ref 10–20)
BACTERIA UR CULT: NORMAL
BUN SERPL-MCNC: 63 MG/DL (ref 6–23)
C COLI+JEJ+UPSA DNA STL QL NAA+PROBE: NOT DETECTED
CALCIUM SERPL-MCNC: 7.7 MG/DL (ref 8.6–10.3)
CHLORIDE SERPL-SCNC: 106 MMOL/L (ref 98–107)
CO2 SERPL-SCNC: 20 MMOL/L (ref 21–32)
CREAT SERPL-MCNC: 2.56 MG/DL (ref 0.5–1.05)
EC STX1 GENE STL QL NAA+PROBE: NOT DETECTED
EC STX2 GENE STL QL NAA+PROBE: NOT DETECTED
EGFRCR SERPLBLD CKD-EPI 2021: 17 ML/MIN/1.73M*2
ERYTHROCYTE [DISTWIDTH] IN BLOOD BY AUTOMATED COUNT: 14.8 % (ref 11.5–14.5)
ERYTHROCYTE [DISTWIDTH] IN BLOOD BY AUTOMATED COUNT: 15 % (ref 11.5–14.5)
GLUCOSE BLD MANUAL STRIP-MCNC: 132 MG/DL (ref 74–99)
GLUCOSE BLD MANUAL STRIP-MCNC: 162 MG/DL (ref 74–99)
GLUCOSE SERPL-MCNC: 130 MG/DL (ref 74–99)
HCT VFR BLD AUTO: 25.9 % (ref 36–46)
HCT VFR BLD AUTO: 29.5 % (ref 36–46)
HEMOCCULT SP1 STL QL: POSITIVE
HGB BLD-MCNC: 8.1 G/DL (ref 12–16)
HGB BLD-MCNC: 9.1 G/DL (ref 12–16)
MCH RBC QN AUTO: 31 PG (ref 26–34)
MCH RBC QN AUTO: 31.4 PG (ref 26–34)
MCHC RBC AUTO-ENTMCNC: 30.8 G/DL (ref 32–36)
MCHC RBC AUTO-ENTMCNC: 31.3 G/DL (ref 32–36)
MCV RBC AUTO: 100 FL (ref 80–100)
MCV RBC AUTO: 100 FL (ref 80–100)
NOROVIRUS GI + GII RNA STL NAA+PROBE: DETECTED
NRBC BLD-RTO: 0 /100 WBCS (ref 0–0)
NRBC BLD-RTO: 0 /100 WBCS (ref 0–0)
PLATELET # BLD AUTO: 198 X10*3/UL (ref 150–450)
PLATELET # BLD AUTO: 217 X10*3/UL (ref 150–450)
POTASSIUM SERPL-SCNC: 3.1 MMOL/L (ref 3.5–5.3)
RBC # BLD AUTO: 2.58 X10*6/UL (ref 4–5.2)
RBC # BLD AUTO: 2.94 X10*6/UL (ref 4–5.2)
RV RNA STL NAA+PROBE: NOT DETECTED
SALMONELLA DNA STL QL NAA+PROBE: NOT DETECTED
SHIGELLA DNA SPEC QL NAA+PROBE: NOT DETECTED
SODIUM SERPL-SCNC: 136 MMOL/L (ref 136–145)
V CHOLERAE DNA STL QL NAA+PROBE: NOT DETECTED
WBC # BLD AUTO: 3.6 X10*3/UL (ref 4.4–11.3)
WBC # BLD AUTO: 4.3 X10*3/UL (ref 4.4–11.3)
Y ENTEROCOL DNA STL QL NAA+PROBE: NOT DETECTED

## 2024-04-29 PROCEDURE — 43270 EGD LESION ABLATION: CPT | Performed by: INTERNAL MEDICINE

## 2024-04-29 PROCEDURE — 85027 COMPLETE CBC AUTOMATED: CPT | Mod: 59,91 | Performed by: NURSE PRACTITIONER

## 2024-04-29 PROCEDURE — 36415 COLL VENOUS BLD VENIPUNCTURE: CPT

## 2024-04-29 PROCEDURE — 36415 COLL VENOUS BLD VENIPUNCTURE: CPT | Performed by: NURSE PRACTITIONER

## 2024-04-29 PROCEDURE — 85027 COMPLETE CBC AUTOMATED: CPT

## 2024-04-29 PROCEDURE — 2500000001 HC RX 250 WO HCPCS SELF ADMINISTERED DRUGS (ALT 637 FOR MEDICARE OP): Performed by: HOSPITALIST

## 2024-04-29 PROCEDURE — 2500000004 HC RX 250 GENERAL PHARMACY W/ HCPCS (ALT 636 FOR OP/ED): Performed by: ANESTHESIOLOGY

## 2024-04-29 PROCEDURE — 99222 1ST HOSP IP/OBS MODERATE 55: CPT | Performed by: INTERNAL MEDICINE

## 2024-04-29 PROCEDURE — 3600000002 HC OR TIME - INITIAL BASE CHARGE - PROCEDURE LEVEL TWO: Performed by: INTERNAL MEDICINE

## 2024-04-29 PROCEDURE — 7100000001 HC RECOVERY ROOM TIME - INITIAL BASE CHARGE: Performed by: INTERNAL MEDICINE

## 2024-04-29 PROCEDURE — 2500000001 HC RX 250 WO HCPCS SELF ADMINISTERED DRUGS (ALT 637 FOR MEDICARE OP): Performed by: NURSE PRACTITIONER

## 2024-04-29 PROCEDURE — 82947 ASSAY GLUCOSE BLOOD QUANT: CPT

## 2024-04-29 PROCEDURE — 3600000007 HC OR TIME - EACH INCREMENTAL 1 MINUTE - PROCEDURE LEVEL TWO: Performed by: INTERNAL MEDICINE

## 2024-04-29 PROCEDURE — 0753T DGTZ GLS MCRSCP SLD LEVEL IV: CPT | Mod: TC,SUR,SAMLAB | Performed by: INTERNAL MEDICINE

## 2024-04-29 PROCEDURE — 43239 EGD BIOPSY SINGLE/MULTIPLE: CPT | Performed by: INTERNAL MEDICINE

## 2024-04-29 PROCEDURE — 80048 BASIC METABOLIC PNL TOTAL CA: CPT

## 2024-04-29 PROCEDURE — 88342 IMHCHEM/IMCYTCHM 1ST ANTB: CPT | Performed by: PATHOLOGY

## 2024-04-29 PROCEDURE — 2500000001 HC RX 250 WO HCPCS SELF ADMINISTERED DRUGS (ALT 637 FOR MEDICARE OP)

## 2024-04-29 PROCEDURE — 2500000004 HC RX 250 GENERAL PHARMACY W/ HCPCS (ALT 636 FOR OP/ED): Mod: JZ | Performed by: HOSPITALIST

## 2024-04-29 PROCEDURE — 88305 TISSUE EXAM BY PATHOLOGIST: CPT | Performed by: PATHOLOGY

## 2024-04-29 PROCEDURE — 3700000001 HC GENERAL ANESTHESIA TIME - INITIAL BASE CHARGE: Performed by: INTERNAL MEDICINE

## 2024-04-29 PROCEDURE — 2500000005 HC RX 250 GENERAL PHARMACY W/O HCPCS: Performed by: ANESTHESIOLOGY

## 2024-04-29 PROCEDURE — C9113 INJ PANTOPRAZOLE SODIUM, VIA: HCPCS | Performed by: HOSPITALIST

## 2024-04-29 PROCEDURE — 3700000002 HC GENERAL ANESTHESIA TIME - EACH INCREMENTAL 1 MINUTE: Performed by: INTERNAL MEDICINE

## 2024-04-29 PROCEDURE — 7100000002 HC RECOVERY ROOM TIME - EACH INCREMENTAL 1 MINUTE: Performed by: INTERNAL MEDICINE

## 2024-04-29 PROCEDURE — G0378 HOSPITAL OBSERVATION PER HR: HCPCS

## 2024-04-29 RX ORDER — PANTOPRAZOLE SODIUM 40 MG/1
40 TABLET, DELAYED RELEASE ORAL DAILY
Qty: 30 TABLET | Refills: 0 | Status: SHIPPED | OUTPATIENT
Start: 2024-04-29 | End: 2024-05-29

## 2024-04-29 RX ORDER — POTASSIUM CHLORIDE 1.5 G/1.58G
40 POWDER, FOR SOLUTION ORAL ONCE
Status: COMPLETED | OUTPATIENT
Start: 2024-04-29 | End: 2024-04-29

## 2024-04-29 RX ORDER — LOPERAMIDE HYDROCHLORIDE 2 MG/1
2 CAPSULE ORAL 4 TIMES DAILY PRN
Status: DISCONTINUED | OUTPATIENT
Start: 2024-04-29 | End: 2024-04-29 | Stop reason: HOSPADM

## 2024-04-29 RX ORDER — POTASSIUM CHLORIDE 1.5 G/1.58G
40 POWDER, FOR SOLUTION ORAL ONCE
Status: DISCONTINUED | OUTPATIENT
Start: 2024-04-29 | End: 2024-04-29

## 2024-04-29 RX ORDER — ONDANSETRON HYDROCHLORIDE 2 MG/ML
4 INJECTION, SOLUTION INTRAVENOUS ONCE AS NEEDED
OUTPATIENT
Start: 2024-04-29

## 2024-04-29 RX ORDER — SODIUM CHLORIDE, SODIUM LACTATE, POTASSIUM CHLORIDE, CALCIUM CHLORIDE 600; 310; 30; 20 MG/100ML; MG/100ML; MG/100ML; MG/100ML
50 INJECTION, SOLUTION INTRAVENOUS CONTINUOUS
Status: CANCELLED | OUTPATIENT
Start: 2024-04-29

## 2024-04-29 RX ORDER — MORPHINE SULFATE 4 MG/ML
4 INJECTION, SOLUTION INTRAMUSCULAR; INTRAVENOUS EVERY 5 MIN PRN
OUTPATIENT
Start: 2024-04-29

## 2024-04-29 RX ORDER — BENZONATATE 100 MG/1
100 CAPSULE ORAL EVERY 8 HOURS PRN
Qty: 20 CAPSULE | Refills: 0 | Status: SHIPPED | OUTPATIENT
Start: 2024-04-29

## 2024-04-29 RX ORDER — POTASSIUM CHLORIDE 20 MEQ/1
40 TABLET, EXTENDED RELEASE ORAL ONCE
Status: DISCONTINUED | OUTPATIENT
Start: 2024-04-29 | End: 2024-04-29

## 2024-04-29 RX ORDER — PROPOFOL 10 MG/ML
INJECTION, EMULSION INTRAVENOUS AS NEEDED
Status: DISCONTINUED | OUTPATIENT
Start: 2024-04-29 | End: 2024-04-29

## 2024-04-29 RX ORDER — MORPHINE SULFATE 2 MG/ML
2 INJECTION, SOLUTION INTRAMUSCULAR; INTRAVENOUS EVERY 5 MIN PRN
OUTPATIENT
Start: 2024-04-29

## 2024-04-29 RX ORDER — LABETALOL HYDROCHLORIDE 5 MG/ML
5 INJECTION, SOLUTION INTRAVENOUS ONCE AS NEEDED
OUTPATIENT
Start: 2024-04-29

## 2024-04-29 RX ORDER — SODIUM CHLORIDE, SODIUM LACTATE, POTASSIUM CHLORIDE, CALCIUM CHLORIDE 600; 310; 30; 20 MG/100ML; MG/100ML; MG/100ML; MG/100ML
100 INJECTION, SOLUTION INTRAVENOUS CONTINUOUS
OUTPATIENT
Start: 2024-04-29

## 2024-04-29 RX ORDER — OXYCODONE HYDROCHLORIDE 5 MG/1
5 TABLET ORAL EVERY 4 HOURS PRN
OUTPATIENT
Start: 2024-04-29

## 2024-04-29 RX ORDER — ONDANSETRON HYDROCHLORIDE 2 MG/ML
4 INJECTION, SOLUTION INTRAVENOUS ONCE
Status: CANCELLED | OUTPATIENT
Start: 2024-04-29 | End: 2024-04-29

## 2024-04-29 RX ADMIN — PROPOFOL 100 MG: 10 INJECTION, EMULSION INTRAVENOUS at 07:26

## 2024-04-29 RX ADMIN — ESCITALOPRAM OXALATE 5 MG: 10 TABLET ORAL at 09:50

## 2024-04-29 RX ADMIN — METRONIDAZOLE 500 MG: 500 INJECTION, SOLUTION INTRAVENOUS at 02:20

## 2024-04-29 RX ADMIN — Medication 20 MG: at 07:26

## 2024-04-29 RX ADMIN — FERROUS SULFATE TAB 325 MG (65 MG ELEMENTAL FE) 1 TABLET: 325 (65 FE) TAB at 09:50

## 2024-04-29 RX ADMIN — HYDROCHLOROTHIAZIDE 12.5 MG: 12.5 CAPSULE ORAL at 09:50

## 2024-04-29 RX ADMIN — LEVOTHYROXINE SODIUM 175 MCG: 0.03 TABLET ORAL at 09:50

## 2024-04-29 RX ADMIN — PANTOPRAZOLE SODIUM 40 MG: 40 INJECTION, POWDER, FOR SOLUTION INTRAVENOUS at 09:51

## 2024-04-29 RX ADMIN — METRONIDAZOLE 500 MG: 500 INJECTION, SOLUTION INTRAVENOUS at 10:04

## 2024-04-29 RX ADMIN — BUPROPION HYDROCHLORIDE 300 MG: 300 TABLET, EXTENDED RELEASE ORAL at 09:50

## 2024-04-29 RX ADMIN — Medication 1000 MCG: at 09:50

## 2024-04-29 RX ADMIN — POTASSIUM CHLORIDE 40 MEQ: 1.5 POWDER, FOR SOLUTION ORAL at 10:04

## 2024-04-29 ASSESSMENT — COGNITIVE AND FUNCTIONAL STATUS - GENERAL
TOILETING: A LITTLE
HELP NEEDED FOR BATHING: A LITTLE
WALKING IN HOSPITAL ROOM: A LITTLE
CLIMB 3 TO 5 STEPS WITH RAILING: A LITTLE
MOBILITY SCORE: 21
DRESSING REGULAR LOWER BODY CLOTHING: A LITTLE
DAILY ACTIVITIY SCORE: 21
MOVING TO AND FROM BED TO CHAIR: A LITTLE

## 2024-04-29 ASSESSMENT — PAIN SCALES - GENERAL
PAINLEVEL_OUTOF10: 0 - NO PAIN

## 2024-04-29 ASSESSMENT — PAIN - FUNCTIONAL ASSESSMENT
PAIN_FUNCTIONAL_ASSESSMENT: 0-10

## 2024-04-29 ASSESSMENT — ACTIVITIES OF DAILY LIVING (ADL): LACK_OF_TRANSPORTATION: NO

## 2024-04-29 NOTE — PROGRESS NOTES
04/29/24 1103   Discharge Planning   Living Arrangements Alone  (in [facility]   Support Systems Children   Assistance Needed independent in bathing, dressing, some cleaning. Facility does cooking, some cleaning, driving.Facility manages meds. Frequent falls, uses walker, shower seat.   Type of Residence Assisted living   Do you have animals or pets at home? No   Care Facility Name [facility].   Who is requesting discharge planning? Provider   Home or Post Acute Services In home services   Type of Post Acute Facility Services Assisted living   Type of Home Care Services Home OT;Home PT   Patient expects to be discharged to: [facility].   Does the patient need discharge transport arranged? Yes   RoundTrip coordination needed? Yes   Has discharge transport been arranged? No   Financial Resource Strain   How hard is it for you to pay for the very basics like food, housing, medical care, and heating? Not hard   Housing Stability   In the last 12 months, was there a time when you were not able to pay the mortgage or rent on time? N   In the last 12 months, how many places have you lived? 1   In the last 12 months, was there a time when you did not have a steady place to sleep or slept in a shelter (including now)? N   Transportation Needs   In the past 12 months, has lack of transportation kept you from medical appointments or from getting medications? no   In the past 12 months, has lack of transportation kept you from meetings, work, or from getting things needed for daily living? No     REMOTE COVERAGE- Called into pt room, role of TCC explained. Demographics confirmed. PT sees Dr. Gregory Q6 months. Pharmacy- through [facility] who manages all meds. Pt here for GI bleed, to have hgb rechecked and possible discharge later today. Pt here under [place] Medicare dual advantage. Reports frequent falls. [facility] 21. [facility] nurse reports that pt may return if ambulating at baseline. Pt reports that  Medical Week 4 Survey      Responses   [facility] patient discharged from? [facility]   Does the patient have one of the following disease processes/diagnoses(primary or secondary)? Other   Week 4 attempt successful? Yes   Call start time 1007   Call end time 1008   Discharge diagnosis Pacemaker placement    Person spoke with today (if not patient) and relationship    Meds reviewed with patient/caregiver? Yes   Is the patient taking all medications as directed (includes completed medication regime)? Yes   Has the patient kept scheduled appointments due by today? Yes   Comments CT scan Nov 23rd- completed   Is the patient still receiving Home Health Services? N/A   Psychosocial issues? No   What is the patient's perception of their health status since discharge? Improving   Is the patient/caregiver able to teach back signs and symptoms related to disease process for when to call PCP? Yes   Is the patient/caregiver able to teach back signs and symptoms related to disease process for when to call 911? Yes   Is the patient/caregiver able to teach back the hierarchy of who to call/visit for symptoms/problems? PCP, Specialist, Home health nurse, Urgent Care, ED, 911 Yes   Week 4 Call Completed? Yes   Would the patient like one additional call? No   Graduated Yes   Is the patient interested in additional calls from an ambulatory ?  NOTE:  applies to high risk patients requiring additional follow-up. No   Wrap up additional comments  states Pt is doing well. No needs.           Sumi Leach RN   for therapy she desires to go to [facility] for swimming but has not been able to because she is too weak and cannot pay a . Pt also likes silver sneakers but does not have a ride to same. Pt does not prefer [facility] but states that she knows she needs it and is willing to participate. Pt requests that call be made to [facility] to see who they normally use or who they have used for her in the past. Call made to [facility] nurse who stats that they use [facility] and request referral be made to same. Built and sent referral. Awaiting acceptance and SOC. Notified provider of orders needed. Pt will need a ride home. CT will follow.      1230- [facility] and require fax. Sent H&P, consult notes and AVS via Epic right fax. Spoke to Nancy at [employer] who confirms they did receive. Unable to send [facility]CO orders in the same manner. Attempted to save hco as pdf and send email. Same unsuccessful. Reached out to on-site staff to assist in same. HCO orders to be faxed to [employer] at [phone]. Will call [facility] to follow up on acceptance and SOC. CT will follow.

## 2024-04-29 NOTE — ANESTHESIA PROCEDURE NOTES
Airway  Date/Time: 4/29/2024 7:26 AM  Urgency: elective      Staffing  Performed: ASHKAN   Authorized by: Franklin Tirado MD    Performed by: Franklin Tirado MD  Patient location during procedure: OR    Indications and Patient Condition  Indications for airway management: anesthesia      Final Airway Details  Final airway type: supraglottic airway      Successful airway: Size 4

## 2024-04-29 NOTE — PROCEDURES
Upper endoscopy performed under general anesthetic.    Findings: Moderate hiatal hernia, 4 cm.  Single angiectasia in duodenal bulb treated with monopolar cautery.  Shallow ulceration duodenal bulb no bleeding.  Biopsy taken from antrum for H. pylori testing.    Advance diet continue to monitor blood count.

## 2024-04-29 NOTE — CARE PLAN
Problem: Pain  Goal: My pain/discomfort is manageable  Outcome: Progressing     Problem: Safety  Goal: Patient will be injury free during hospitalization  Outcome: Progressing  Goal: I will remain free of falls  Outcome: Progressing     Problem: Daily Care  Goal: Daily care needs are met  Outcome: Progressing     Problem: Psychosocial Needs  Goal: Demonstrates ability to cope with hospitalization/illness  Outcome: Progressing  Goal: Collaborate with me, my family, and caregiver to identify my specific goals  Outcome: Progressing     Problem: Discharge Barriers  Goal: My discharge needs are met  Outcome: Progressing   The patient's goals for the shift include use the call light    The clinical goals for the shift include no bloody stools    Over the shift, the patient did not make progress toward the following goals. Barriers to progression include . Recommendations to address these barriers include .

## 2024-04-29 NOTE — CONSULTS
Reason For Consult  CKD    History Of Present Illness  Lakeshia Park is a 89 y.o. female presenting with Hematemesis.   She presented to the emergency room secondary to abdominal pain with nausea and vomiting and she had hematemesis and also had some melena.  I was asked to see her secondary to renal dysfunction.  She had a workup for her GI bleeding and this a.m. when I met her she was actually just back from her endoscopy.  She had a negative endoscopy and there was no findings of note.  This a.m. she states she is feeling pretty well she denies any swelling.  She denies feeling poorly at all currently feels like she is back to her normal self    She has a past medical history of chronic kidney disease with a baseline creatinine of 2-2.4  Past Medical History  She has no past medical history on file.    Surgical History  She has a past surgical history that includes Other surgical history (09/13/2019); Other surgical history (09/13/2019); Other surgical history (09/13/2019); Other surgical history (09/13/2019); Other surgical history (09/13/2019); Other surgical history (10/24/2019); and Esophagogastroduodenoscopy (04/29/2024).     Social History  She reports that she has never smoked. She has never used smokeless tobacco. She reports that she does not currently use alcohol. She reports that she does not use drugs.    Family History  Family History   Problem Relation Name Age of Onset    Diabetes Mother      Brain cancer Mother      Brain cancer Brother          Allergies  Celecoxib, Ciprofloxacin, Diazepam, Esomeprazole, Ibuprofen, and Naproxen    Review of Systems  A full 10 point review of systems was obtained is negative except HPI as above     Physical Exam  Physical Exam  Constitutional:       Appearance: Normal appearance. She is obese.   HENT:      Head: Normocephalic and atraumatic.      Right Ear: External ear normal.      Left Ear: External ear normal.      Nose: Nose normal.      Mouth/Throat:       Mouth: Mucous membranes are moist.      Pharynx: Oropharynx is clear.   Eyes:      Extraocular Movements: Extraocular movements intact.      Conjunctiva/sclera: Conjunctivae normal.      Pupils: Pupils are equal, round, and reactive to light.   Cardiovascular:      Rate and Rhythm: Normal rate and regular rhythm.   Pulmonary:      Effort: Pulmonary effort is normal.      Breath sounds: Normal breath sounds.   Abdominal:      General: Abdomen is flat.      Palpations: Abdomen is soft.   Skin:     General: Skin is warm and dry.   Neurological:      General: No focal deficit present.      Mental Status: She is alert and oriented to person, place, and time.   Psychiatric:         Mood and Affect: Mood normal.         Behavior: Behavior normal.                 I&O 24HR    Intake/Output Summary (Last 24 hours) at 4/29/2024 1131  Last data filed at 4/29/2024 1104  Gross per 24 hour   Intake 1050 ml   Output 2 ml   Net 1048 ml       Vitals 24HR  Heart Rate:  [65-79]   Temp:  [36.2 °C (97.1 °F)-36.9 °C (98.5 °F)]   Resp:  [11-20]   BP: (104-151)/(55-91)   SpO2:  [93 %-99 %]     Scheduled medications  buPROPion XL, 300 mg, oral, Daily  cyanocobalamin, 1,000 mcg, oral, Daily  escitalopram, 5 mg, oral, Daily  ferrous sulfate (325 mg ferrous sulfate), 1 tablet, oral, Daily  hydroCHLOROthiazide, 12.5 mg, oral, Daily  [Held by provider] indapamide, 2.5 mg, oral, Daily  insulin lispro, 0-5 Units, subcutaneous, TID with meals  levothyroxine, 175 mcg, oral, Daily before breakfast  pantoprazole, 40 mg, intravenous, BID  [Held by provider] polyethylene glycol, 17 g, oral, Daily  pravastatin, 20 mg, oral, Nightly      Continuous medications     PRN medications  PRN medications: acetaminophen **OR** acetaminophen **OR** acetaminophen, acetaminophen **OR** acetaminophen **OR** acetaminophen, benzonatate, dextrose, dextrose, glucagon, glucagon, loperamide, lubricating eye drops, ondansetron ODT **OR** [DISCONTINUED]  ondansetron      Relevant Results  Reviewed lab results     Assessment/Plan       Chronic kidney disease stage IIIb/IV with baseline creatinine 2-2.4  Diabetic Nephropathy and HTN  Diabetes mellitus type 2  Chronic diarrhea  History of gout on allopurinol  Hyperuricemia  Hypertension BP is good now.  Hyperlipidemia  Bilateral renal cysts  Positive REJI screen with slightly high RNP  Depression  Hypokalemia    Plan:  At this time her renal function is fairly stable and pretty close to her baseline  I would continue as she is currently on supportive measures  Hemoglobin is stable  From my standpoint no major changes  She does have a very slight metabolic acidosis which we will have to watch closely  Thanks for the consult    Active Problems:  There are no active Hospital Problems.          Dharmesh Garcia, DO

## 2024-04-29 NOTE — ANESTHESIA PREPROCEDURE EVALUATION
Patient: Lakeshia Park    Procedure Information       Date/Time: 04/29/24 0700    Scheduled providers: Julian Kinney DO    Procedure: EGD    Location: [facility] OR            Relevant Problems   Cardiac   (+) HTN (hypertension)      Pulmonary   (+) Asthma (HHS-HCC)      Neuro   (+) Depression, major, recurrent, moderate (Multi)      GI   (+) Hiatal hernia   (+) Lower GI bleed   (+) Upper GI bleed      Endocrine   (+) Class 2 severe obesity with serious comorbidity and body mass index (BMI) of 37.0 to 37.9 in adult (Multi)   (+) DM2 (diabetes mellitus, type 2) (Multi)   (+) Hyperparathyroidism (Multi)   (+) Hypothyroidism      Hematology   (+) Anemia   (+) Iron deficiency anemia      Musculoskeletal   (+) Chronic low back pain      Skin   (+) Eczema, dyshidrotic       Clinical information reviewed:    Allergies  Meds               NPO Detail:  NPO/Void Status  Carbohydrate Drink Given Prior to Surgery? : N  Date of Last Liquid: 04/28/24  Time of Last Liquid: 2359  Date of Last Solid: 04/28/24  Time of Last Solid: 1800  Last Intake Type: Clear fluids  Time of Last Void: 0632         Physical Exam    Airway  Mallampati: II  TM distance: >3 FB  Neck ROM: full     Cardiovascular   Rhythm: regular     Dental    Pulmonary    Abdominal        Anesthesia Plan    History of general anesthesia?: yes  History of complications of general anesthesia?: no    ASA 2     general     intravenous induction   Anesthetic plan and risks discussed with patient.

## 2024-04-29 NOTE — ANESTHESIA POSTPROCEDURE EVALUATION
Patient: Lakeshia Park    Procedure Summary       Date: 04/29/24 Room / Location: [facility] OR    Anesthesia Start: 0717 Anesthesia Stop: 0739    Procedure: EGD Diagnosis: Upper GI bleed    Scheduled Providers: Julian Kinney DO Responsible Provider: Franklin Tirado MD    Anesthesia Type: general ASA Status: 2            Anesthesia Type: general    Vitals Value Taken Time   Vitals:    04/29/24 0630   BP: 127/55   Pulse: 70   Resp: 12   Temp: 36.9 °C (98.5 °F)   SpO2: 96%       04/29/24 0739     04/29/24 0739     04/29/24 0739     04/29/24 0739   SpO2 98 04/29/24 0739       Anesthesia Post Evaluation    Patient location during evaluation: bedside  Patient participation: complete - patient participated  Level of consciousness: sleepy but conscious  Pain management: adequate  Airway patency: patent  Cardiovascular status: acceptable  Respiratory status: acceptable  Hydration status: acceptable  Postoperative Nausea and Vomiting: none      No notable events documented.

## 2024-04-29 NOTE — NURSING NOTE
Discharge Note: 4/29/2024 1630 Discharged via stretcher by [facility] to [facility] living, paperwork packet sent with transporter, personal belongings taken by transporter, no distress noted, no complaints voiced. Abbey WILSON

## 2024-04-29 NOTE — DISCHARGE SUMMARY
Discharge Diagnosis  Upper GI bleed    Issues Requiring Follow-Up  It is recommended that she follow up with primary care physician in 1 week for hospital follow up     Discharge Meds     Your medication list        START taking these medications        Instructions Last Dose Given Next Dose Due   pantoprazole 40 mg EC tablet  Commonly known as: ProtoNix      Take 1 tablet (40 mg) by mouth once daily. Do not crush, chew, or split.              CONTINUE taking these medications        Instructions Last Dose Given Next Dose Due   acetaminophen 500 mg tablet  Commonly known as: Tylenol           albuterol 90 mcg/actuation inhaler      INHALE 2 PUFFS Every 4 hours as needed for shortness of breath or wheezing       allopurinol 100 mg tablet  Commonly known as: Zyloprim           benzonatate 100 mg capsule  Commonly known as: Tessalon      Take 1 capsule (100 mg) by mouth every 8 hours if needed for cough. Do not crush or chew.       buPROPion  mg 12 hr tablet  Commonly known as: Wellbutrin SR      Take 1 tablet (150 mg) by mouth in the morning and 1 tablet (150 mg) before bedtime.       calcium carbonate 200 mg calcium chewable tablet  Commonly known as: Tums           cyanocobalamin 1,000 mcg tablet  Commonly known as: Vitamin B-12           cyclobenzaprine 7.5 mg tablet  Commonly known as: Fexmid           Depend Underwear For Women Lrg misc  Generic drug: diaper,brief,adult,disposable      Use 4 times per day       dextromethorphan-guaifenesin  mg/5 mL oral liquid  Commonly known as: Robitussin DM           docusate sodium 100 mg capsule  Commonly known as: Colace           ferrous sulfate (325 mg ferrous sulfate) tablet           hydrocortisone 2.5 % cream           indapamide 2.5 mg tablet  Commonly known as: Lozol      Take 1 tablet (2.5 mg) by mouth once daily.       levothyroxine 175 mcg tablet  Commonly known as: Synthroid, Levoxyl      Take 1 tablet (175 mcg) by mouth once daily in the morning.  Take before meals.       loperamide 2 mg capsule  Commonly known as: Imodium           lovastatin 20 mg tablet  Commonly known as: Mevacor      TAKE 1 TABLET Bedtime       lubricating eye drops ophthalmic solution           meclizine 25 mg tablet  Commonly known as: Antivert      TAKE 1 TABLET BY MOUTH THREE TIMES DAILY AS NEEDED for dizziness       metoprolol tartrate 50 mg tablet  Commonly known as: Lopressor      Take 1/2 tablet in the morning and 1 tablet in the night       Milk of Magnesia 400 mg/5 mL suspension  Generic drug: magnesium hydroxide           Mylanta Maximum Strength 400-400-40 mg/5 mL suspension  Generic drug: alum-mag hydroxide-simeth           OneTouch Ultra Test strip  Generic drug: blood sugar diagnostic           Pads For Women pad  Generic drug: incontinence pad, liner, disp      Extra long pads up to 4 per day       polyethylene glycol 17 gram packet  Commonly known as: Glycolax, Miralax           Qvar RediHaler 80 mcg/actuation inhaler  Generic drug: beclomethasone dipropionate           sennosides 8.6 mg tablet  Commonly known as: Senokot           Ultra-Light Rollator misc  Generic drug: walker      Use for ambulation       VITAMIN B-6 ORAL                  STOP taking these medications      citalopram 10 mg tablet  Commonly known as: CeleXA        hydrOXYzine HCL 25 mg tablet  Commonly known as: Atarax                  Where to Get Your Medications        These medications were sent to [id number] - [place] [id number]  [facility] [address]      Phone: [phone]   benzonatate 100 mg capsule  pantoprazole 40 mg EC tablet         Test Results Pending At Discharge  Pending Labs       Order Current Status    Surgical Pathology Exam Collected (04/29/24 1603)    Blood Culture Preliminary result            Hospital Course   89-year-old female with history of Hypertension, Hyperlipidemia,  Anxiety/depression,Anemia, Hypothyroidism, Diabetes  mellitus type 2, Stage III-IV chronic kidney disease severe obesity, Claudication, Iron deficiency anemia/lower GI bleeding, Endometrial cancer Presented with Possible GI bleeding, Infectious enterocolitis, UTI. Likely chronic kidney disease with dehydration. Pt was seen by GI and had an EGD done today and it was found that she moderate hiatal hernia and small shallow ulceration but not bleeding. She was also seen by Dr. Garcia for her CKD and it is close to her baseline and her hemoglobin is close to her baseline an it is recommended that she continue to follow up with Dr. Garcia as needed. It is recommended that she follow up with Primary care physician in 1 week for hospital follow up     Pertinent Physical Exam At Time of Discharge  Physical Exam  General Appearance: AAO x 3, not in acute distress  Skin: skin color pink, warm, and dry; no suspicious rashes or lesions  Eyes : PERRL, EOM's intact  ENT: mucous membranes pink and moist  Neck: normocephalic  Respiratory: lungs clear to auscultation anteriorly; no wheezing, rhonchi, or crackles.   Heart: regular rate and rhythm. telemetry shows sinus rhythm  Abdomen: Nondistended, positive bowel sounds x4, soft,  nontender  Extremities: no edema   Peripheral pulses: normal x4 extremities  Neuro: alert, coherent and conversant, no focal motor deficits    Outpatient Follow-Up  No future appointments.      Johanny Akins, JASSI-CNP

## 2024-05-01 ENCOUNTER — PATIENT OUTREACH (OUTPATIENT)
Dept: PRIMARY CARE | Facility: CLINIC | Age: 89
End: 2024-05-01
Payer: MEDICARE

## 2024-05-01 NOTE — PROGRESS NOTES
Discharge Facility: [facility]  Discharge Diagnosis: GI Bleed  Admission Date: 4/27/2024  Discharge Date: 4/29/2024    PCP Appointment Date: 5/10/2024  Specialist Appointment Date: none  Hospital Encounter and Summary: Linked     START taking:   pantoprazole (ProtoNix)    STOP taking:   citalopram 10 mg tablet (CeleXA)   hydrOXYzine HCL 25 mg tablet (Atarax)     Unsuccessful attempts x 2 to reach patient for PCP follow up.   Patient has an appt with PCP scheduled

## 2024-05-02 LAB — BACTERIA BLD CULT: NORMAL

## 2024-05-07 DIAGNOSIS — J45.909 ASTHMA, UNSPECIFIED ASTHMA SEVERITY, UNSPECIFIED WHETHER COMPLICATED, UNSPECIFIED WHETHER PERSISTENT (HHS-HCC): Primary | ICD-10-CM

## 2024-05-10 ENCOUNTER — TELEPHONE (OUTPATIENT)
Dept: PRIMARY CARE | Facility: CLINIC | Age: 89
End: 2024-05-10

## 2024-05-10 ENCOUNTER — OFFICE VISIT (OUTPATIENT)
Dept: PRIMARY CARE | Facility: CLINIC | Age: 89
End: 2024-05-10
Payer: MEDICARE

## 2024-05-10 VITALS
DIASTOLIC BLOOD PRESSURE: 92 MMHG | SYSTOLIC BLOOD PRESSURE: 134 MMHG | HEART RATE: 55 BPM | BODY MASS INDEX: 34.09 KG/M2 | WEIGHT: 192.4 LBS | OXYGEN SATURATION: 98 % | HEIGHT: 63 IN

## 2024-05-10 DIAGNOSIS — N18.30 STAGE 3 CHRONIC KIDNEY DISEASE, UNSPECIFIED WHETHER STAGE 3A OR 3B CKD (MULTI): ICD-10-CM

## 2024-05-10 DIAGNOSIS — F45.8 OTHER SOMATOFORM DISORDERS: ICD-10-CM

## 2024-05-10 DIAGNOSIS — M54.9 MID BACK PAIN: ICD-10-CM

## 2024-05-10 DIAGNOSIS — F03.93 DEMENTIA WITH MOOD DISTURBANCE, UNSPECIFIED DEMENTIA SEVERITY, UNSPECIFIED DEMENTIA TYPE (MULTI): ICD-10-CM

## 2024-05-10 DIAGNOSIS — R26.81 UNSTABLE GAIT: ICD-10-CM

## 2024-05-10 DIAGNOSIS — Z09 HOSPITAL DISCHARGE FOLLOW-UP: Primary | ICD-10-CM

## 2024-05-10 DIAGNOSIS — R44.1 VISUAL HALLUCINATIONS: ICD-10-CM

## 2024-05-10 DIAGNOSIS — F41.9 ANXIETY: ICD-10-CM

## 2024-05-10 LAB
LAB AP ASR DISCLAIMER: NORMAL
LABORATORY COMMENT REPORT: NORMAL
PATH REPORT.FINAL DX SPEC: NORMAL
PATH REPORT.GROSS SPEC: NORMAL
PATH REPORT.TOTAL CANCER: NORMAL
POC APPEARANCE, URINE: CLEAR
POC BILIRUBIN, URINE: NEGATIVE
POC BLOOD, URINE: NEGATIVE
POC COLOR, URINE: YELLOW
POC GLUCOSE, URINE: NEGATIVE MG/DL
POC KETONES, URINE: NEGATIVE MG/DL
POC LEUKOCYTES, URINE: NEGATIVE
POC NITRITE,URINE: NEGATIVE
POC PH, URINE: 5.5 PH
POC PROTEIN, URINE: NEGATIVE MG/DL
POC SPECIFIC GRAVITY, URINE: 1.02
POC UROBILINOGEN, URINE: 0.2 EU/DL

## 2024-05-10 PROCEDURE — 3075F SYST BP GE 130 - 139MM HG: CPT | Performed by: STUDENT IN AN ORGANIZED HEALTH CARE EDUCATION/TRAINING PROGRAM

## 2024-05-10 PROCEDURE — 1157F ADVNC CARE PLAN IN RCRD: CPT | Performed by: STUDENT IN AN ORGANIZED HEALTH CARE EDUCATION/TRAINING PROGRAM

## 2024-05-10 PROCEDURE — 1159F MED LIST DOCD IN RCRD: CPT | Performed by: STUDENT IN AN ORGANIZED HEALTH CARE EDUCATION/TRAINING PROGRAM

## 2024-05-10 PROCEDURE — 99214 OFFICE O/P EST MOD 30 MIN: CPT | Performed by: STUDENT IN AN ORGANIZED HEALTH CARE EDUCATION/TRAINING PROGRAM

## 2024-05-10 PROCEDURE — 81003 URINALYSIS AUTO W/O SCOPE: CPT | Performed by: STUDENT IN AN ORGANIZED HEALTH CARE EDUCATION/TRAINING PROGRAM

## 2024-05-10 PROCEDURE — 3080F DIAST BP >= 90 MM HG: CPT | Performed by: STUDENT IN AN ORGANIZED HEALTH CARE EDUCATION/TRAINING PROGRAM

## 2024-05-10 PROCEDURE — 1036F TOBACCO NON-USER: CPT | Performed by: STUDENT IN AN ORGANIZED HEALTH CARE EDUCATION/TRAINING PROGRAM

## 2024-05-10 PROCEDURE — 1111F DSCHRG MED/CURRENT MED MERGE: CPT | Performed by: STUDENT IN AN ORGANIZED HEALTH CARE EDUCATION/TRAINING PROGRAM

## 2024-05-10 PROCEDURE — 1160F RVW MEDS BY RX/DR IN RCRD: CPT | Performed by: STUDENT IN AN ORGANIZED HEALTH CARE EDUCATION/TRAINING PROGRAM

## 2024-05-10 RX ORDER — BUPROPION HYDROCHLORIDE 150 MG/1
150 TABLET, EXTENDED RELEASE ORAL 2 TIMES DAILY
Qty: 180 TABLET | Refills: 3 | Status: SHIPPED | OUTPATIENT
Start: 2024-05-10 | End: 2025-05-10

## 2024-05-10 RX ORDER — CALCIUM CARBONATE 160(400)MG
TABLET,CHEWABLE ORAL
Qty: 1 EACH | Refills: 0 | Status: SHIPPED | OUTPATIENT
Start: 2024-05-10

## 2024-05-10 RX ORDER — CITALOPRAM 10 MG/1
TABLET ORAL
COMMUNITY
Start: 2024-05-09

## 2024-05-10 RX ORDER — METOPROLOL TARTRATE 25 MG/1
TABLET, FILM COATED ORAL
COMMUNITY
Start: 2024-05-09

## 2024-05-10 ASSESSMENT — PATIENT HEALTH QUESTIONNAIRE - PHQ9
2. FEELING DOWN, DEPRESSED OR HOPELESS: NOT AT ALL
1. LITTLE INTEREST OR PLEASURE IN DOING THINGS: NOT AT ALL
SUM OF ALL RESPONSES TO PHQ9 QUESTIONS 1 AND 2: 0

## 2024-05-10 NOTE — PROGRESS NOTES
Subjective   Patient ID: Lakeshia Park is a 89 y.o. female who presents for Hospital FUV (PT is here today for a hospital FUV. Also needs an order for a new walker. AWV needs scheduled 10/21/24 and Dexa scan needs ordered. ).    HPI    Reports that she is not happy with the nursing facility she is in.   Concerned about her medication. Feels that she is being ignored.     Reports that she does not have any further bleeding at this time.     Patient uses rollator to ambulate but her current rollator is damaged and is a safety issue at this time. Ordering a new rollator. Has balance issues, arthritis of the lower extremities.     Has been having visual hallucinations. Reports that she is seeing people who have already . She feels like they are there and talking to her.  She has psychologist. She will ask if they can get her connected to psychiatrist.   Will check her urine for UTI.     Was recommended to see nephrologist when discharged from hospital.  Referral to nephrology is provided.       Review of Systems  ROS negative except discussed above in HPI.    Vitals:    05/10/24 1055   BP: (!) 134/92   Pulse: 55   SpO2: 98%     Objective   Physical Exam  Constitutional:       Appearance: Normal appearance.   Cardiovascular:      Rate and Rhythm: Normal rate and regular rhythm.      Pulses: Normal pulses.   Pulmonary:      Effort: Pulmonary effort is normal.      Breath sounds: Normal breath sounds.   Musculoskeletal:      Comments: Ambulating with rollator. Unstable gait without walker.    Neurological:      Mental Status: She is alert.           Assessment/Plan   Lakeshia was seen today for hospital fuv.  Diagnoses and all orders for this visit:  Hospital discharge follow-up (Primary)  Anxiety  -     buPROPion SR (Wellbutrin SR) 150 mg 12 hr tablet; Take 1 tablet (150 mg) by mouth 2 times a day.  Stage 3 chronic kidney disease, unspecified whether stage 3a or 3b CKD (Multi)  -     Referral to Nephrology;  Future  Mid back pain  -     walker (Ultra-Light Rollator) misc; Use with ambulation to support your balance.  Unstable gait  -     walker (Ultra-Light Rollator) misc; Use with ambulation to support your balance.      Follow up in 3 months.          Domingo Aquino MD MPH

## 2024-05-10 NOTE — PATIENT INSTRUCTIONS
I am placing a referral to nephrology for checking your kidney function.     Please ask your psychologist if they have a psychiatrist that you can see.     I ordered rollator again.

## 2024-05-13 ENCOUNTER — OFFICE VISIT (OUTPATIENT)
Dept: NEPHROLOGY | Facility: CLINIC | Age: 89
End: 2024-05-13
Payer: MEDICARE

## 2024-05-13 VITALS
DIASTOLIC BLOOD PRESSURE: 80 MMHG | BODY MASS INDEX: 35.12 KG/M2 | HEART RATE: 68 BPM | WEIGHT: 198.2 LBS | SYSTOLIC BLOOD PRESSURE: 134 MMHG | HEIGHT: 63 IN

## 2024-05-13 DIAGNOSIS — N18.30 STAGE 3 CHRONIC KIDNEY DISEASE, UNSPECIFIED WHETHER STAGE 3A OR 3B CKD (MULTI): ICD-10-CM

## 2024-05-13 DIAGNOSIS — N18.4 STAGE 4 CHRONIC KIDNEY DISEASE (MULTI): ICD-10-CM

## 2024-05-13 DIAGNOSIS — E21.3 HYPERPARATHYROIDISM (MULTI): ICD-10-CM

## 2024-05-13 DIAGNOSIS — I10 PRIMARY HYPERTENSION: Primary | ICD-10-CM

## 2024-05-13 PROCEDURE — 3075F SYST BP GE 130 - 139MM HG: CPT | Performed by: CLINICAL NURSE SPECIALIST

## 2024-05-13 PROCEDURE — 1036F TOBACCO NON-USER: CPT | Performed by: CLINICAL NURSE SPECIALIST

## 2024-05-13 PROCEDURE — 1160F RVW MEDS BY RX/DR IN RCRD: CPT | Performed by: CLINICAL NURSE SPECIALIST

## 2024-05-13 PROCEDURE — 1159F MED LIST DOCD IN RCRD: CPT | Performed by: CLINICAL NURSE SPECIALIST

## 2024-05-13 PROCEDURE — 1157F ADVNC CARE PLAN IN RCRD: CPT | Performed by: CLINICAL NURSE SPECIALIST

## 2024-05-13 PROCEDURE — 3079F DIAST BP 80-89 MM HG: CPT | Performed by: CLINICAL NURSE SPECIALIST

## 2024-05-13 PROCEDURE — 99213 OFFICE O/P EST LOW 20 MIN: CPT | Performed by: CLINICAL NURSE SPECIALIST

## 2024-05-13 PROCEDURE — 1111F DSCHRG MED/CURRENT MED MERGE: CPT | Performed by: CLINICAL NURSE SPECIALIST

## 2024-05-13 ASSESSMENT — ENCOUNTER SYMPTOMS
NEUROLOGICAL NEGATIVE: 1
MUSCULOSKELETAL NEGATIVE: 1
ENDOCRINE NEGATIVE: 1
GASTROINTESTINAL NEGATIVE: 1
RESPIRATORY NEGATIVE: 1
PSYCHIATRIC NEGATIVE: 1
CARDIOVASCULAR NEGATIVE: 1
CONSTITUTIONAL NEGATIVE: 1

## 2024-05-13 NOTE — ASSESSMENT & PLAN NOTE
Lab work stable at time of discharge, did have diarrhea which would not have her potassium low and increase her creatinine slightly, will repeat lab work sometime this week

## 2024-05-13 NOTE — PROGRESS NOTES
Subjective   Patient ID: Lakeshia Park is a 89 y.o. female who presents for Chronic Kidney Disease and Follow-up (Review Labs 4/27).  Patient being seen post discharge from the hospital, no labs were completed prior to her appointment with us  Last labs completed on April 29 showed H&H of 9.1 and 29.5  BMP with glucose 130  Sodium 136, potassium 3.1, chloride 106, bicarb 20  Renal function with a BUN of 63 and creatinine of 2.56, these labs were taken on the day of discharge from the hospital    She is being seen postdischarge from the hospital because she states that she did not get many answers while she was there  She has not had any repeat lab work  She will get that today  She has not had any further diarrhea  She is eating well  She is drinking without difficulties  Her biggest concern is that she has been seeing people who are no longer living, she also states that she is depressed, she has not been able to see her sons for quite some time  She has not been able to go to [facility] for about 1 year        Review of Systems   Constitutional: Negative.    Respiratory: Negative.     Cardiovascular: Negative.    Gastrointestinal: Negative.    Endocrine: Negative.    Genitourinary: Negative.    Musculoskeletal: Negative.    Skin: Negative.    Neurological: Negative.    Psychiatric/Behavioral: Negative.         Objective   Physical Exam  Vitals reviewed.   Constitutional:       Appearance: Normal appearance.   HENT:      Head: Normocephalic.   Cardiovascular:      Rate and Rhythm: Normal rate and regular rhythm.   Pulmonary:      Effort: Pulmonary effort is normal.      Breath sounds: Normal breath sounds.   Abdominal:      Palpations: Abdomen is soft.   Musculoskeletal:      Comments: Uses a walker   Skin:     General: Skin is warm and dry.   Neurological:      Mental Status: She is alert and oriented to person, place, and time.   Psychiatric:         Mood and Affect: Mood normal.         Behavior: Behavior normal.          Assessment/Plan   Problem List Items Addressed This Visit             ICD-10-CM    HTN (hypertension) - Primary I10     Blood pressure is currently well-controlled on metoprolol         Stage 4 chronic kidney disease (Multi) N18.4     Lab work stable at time of discharge, did have diarrhea which would not have her potassium low and increase her creatinine slightly, will repeat lab work sometime this week         Hyperparathyroidism (Multi) E21.3     Other Visit Diagnoses         Codes    Stage 3 chronic kidney disease, unspecified whether stage 3a or 3b CKD (Multi)     N18.30    Relevant Orders    Basic metabolic panel    CBC    Follow Up In Nephrology          Chronic kidney disease stage IIIb/IV with baseline creatinine 2-2.4  Diabetic Nephropathy and HTN  Diabetes mellitus type 2  Chronic diarrhea  History of gout on allopurinol  Hyperuricemia  Hypertension BP is good now.  Hyperlipidemia  Bilateral renal cysts  Positive REJI screen with slightly high RNP  Depression  Hypokalemia          JASSI Allen-LU, DNP 05/13/24 10:53 AM

## 2024-05-15 ENCOUNTER — PATIENT OUTREACH (OUTPATIENT)
Dept: PRIMARY CARE | Facility: CLINIC | Age: 89
End: 2024-05-15
Payer: MEDICARE

## 2024-05-15 NOTE — PROGRESS NOTES
Unable to reach patient for call back after patient's follow up appointment with PCP.  5/10/2024  M with call back number for patient to call if needed   If no voicemail available call attempts x 2 were made to contact the patient to assist with any questions or concerns patient may have.

## 2024-05-17 ENCOUNTER — LAB (OUTPATIENT)
Dept: LAB | Facility: LAB | Age: 89
End: 2024-05-17
Payer: MEDICARE

## 2024-05-17 DIAGNOSIS — N18.30 STAGE 3 CHRONIC KIDNEY DISEASE, UNSPECIFIED WHETHER STAGE 3A OR 3B CKD (MULTI): ICD-10-CM

## 2024-05-17 LAB
ANION GAP SERPL CALC-SCNC: 13 MMOL/L (ref 10–20)
BUN SERPL-MCNC: 29 MG/DL (ref 6–23)
CALCIUM SERPL-MCNC: 8.2 MG/DL (ref 8.6–10.3)
CHLORIDE SERPL-SCNC: 100 MMOL/L (ref 98–107)
CO2 SERPL-SCNC: 30 MMOL/L (ref 21–32)
CREAT SERPL-MCNC: 2.22 MG/DL (ref 0.5–1.05)
EGFRCR SERPLBLD CKD-EPI 2021: 20 ML/MIN/1.73M*2
ERYTHROCYTE [DISTWIDTH] IN BLOOD BY AUTOMATED COUNT: 14.9 % (ref 11.5–14.5)
GLUCOSE SERPL-MCNC: 162 MG/DL (ref 74–99)
HCT VFR BLD AUTO: 31.2 % (ref 36–46)
HGB BLD-MCNC: 9.5 G/DL (ref 12–16)
MCH RBC QN AUTO: 30.4 PG (ref 26–34)
MCHC RBC AUTO-ENTMCNC: 30.4 G/DL (ref 32–36)
MCV RBC AUTO: 100 FL (ref 80–100)
NRBC BLD-RTO: 0 /100 WBCS (ref 0–0)
PLATELET # BLD AUTO: 288 X10*3/UL (ref 150–450)
POTASSIUM SERPL-SCNC: 4.5 MMOL/L (ref 3.5–5.3)
RBC # BLD AUTO: 3.13 X10*6/UL (ref 4–5.2)
SODIUM SERPL-SCNC: 138 MMOL/L (ref 136–145)
WBC # BLD AUTO: 6.3 X10*3/UL (ref 4.4–11.3)

## 2024-05-17 PROCEDURE — 85027 COMPLETE CBC AUTOMATED: CPT

## 2024-05-17 PROCEDURE — 80048 BASIC METABOLIC PNL TOTAL CA: CPT

## 2024-05-17 PROCEDURE — 36415 COLL VENOUS BLD VENIPUNCTURE: CPT

## 2024-06-10 ENCOUNTER — TELEPHONE (OUTPATIENT)
Dept: PRIMARY CARE | Facility: CLINIC | Age: 89
End: 2024-06-10
Payer: MEDICARE

## 2024-06-10 DIAGNOSIS — F41.9 ANXIETY: Primary | ICD-10-CM

## 2024-06-10 RX ORDER — HYDROXYZINE HYDROCHLORIDE 25 MG/1
25 TABLET, FILM COATED ORAL EVERY 8 HOURS PRN
Qty: 90 TABLET | Refills: 0 | Status: SHIPPED | OUTPATIENT
Start: 2024-06-10 | End: 2024-07-10

## 2024-06-10 NOTE — TELEPHONE ENCOUNTER
Patient called in she has an appointment with you on the 25th but she is having an increase in panic attacks. She was wondering if there was something you could do before her appointment.    Thank you

## 2024-06-14 ENCOUNTER — PATIENT OUTREACH (OUTPATIENT)
Dept: PRIMARY CARE | Facility: CLINIC | Age: 89
End: 2024-06-14
Payer: MEDICARE

## 2024-06-19 ENCOUNTER — TELEPHONE (OUTPATIENT)
Dept: PRIMARY CARE | Facility: CLINIC | Age: 89
End: 2024-06-19
Payer: MEDICARE

## 2024-06-19 NOTE — TELEPHONE ENCOUNTER
Asking for a verbal order for: [facility] dns?  Nurse line is [phone] ([place])  Due to Smelly urine, increased urination

## 2024-06-25 ENCOUNTER — APPOINTMENT (OUTPATIENT)
Dept: PRIMARY CARE | Facility: CLINIC | Age: 89
End: 2024-06-25
Payer: MEDICARE

## 2024-06-25 ENCOUNTER — LAB REQUISITION (OUTPATIENT)
Dept: LAB | Facility: HOSPITAL | Age: 89
End: 2024-06-25
Payer: MEDICARE

## 2024-06-25 DIAGNOSIS — R30.0 DYSURIA: ICD-10-CM

## 2024-06-25 LAB
APPEARANCE UR: CLEAR
BILIRUB UR STRIP.AUTO-MCNC: NEGATIVE MG/DL
COLOR UR: NORMAL
GLUCOSE UR STRIP.AUTO-MCNC: NORMAL MG/DL
KETONES UR STRIP.AUTO-MCNC: NEGATIVE MG/DL
LEUKOCYTE ESTERASE UR QL STRIP.AUTO: NEGATIVE
NITRITE UR QL STRIP.AUTO: NEGATIVE
PH UR STRIP.AUTO: 5 [PH]
PROT UR STRIP.AUTO-MCNC: NORMAL MG/DL
RBC # UR STRIP.AUTO: NEGATIVE /UL
RBC #/AREA URNS AUTO: NORMAL /HPF
SP GR UR STRIP.AUTO: 1.02
SQUAMOUS #/AREA URNS AUTO: NORMAL /HPF
UROBILINOGEN UR STRIP.AUTO-MCNC: NORMAL MG/DL
WBC #/AREA URNS AUTO: NORMAL /HPF

## 2024-06-25 PROCEDURE — 87086 URINE CULTURE/COLONY COUNT: CPT | Mod: OUT,SAMLAB | Performed by: STUDENT IN AN ORGANIZED HEALTH CARE EDUCATION/TRAINING PROGRAM

## 2024-06-25 PROCEDURE — 81003 URINALYSIS AUTO W/O SCOPE: CPT | Mod: OUT | Performed by: STUDENT IN AN ORGANIZED HEALTH CARE EDUCATION/TRAINING PROGRAM

## 2024-06-27 LAB — BACTERIA UR CULT: NORMAL

## 2024-07-16 ENCOUNTER — PATIENT OUTREACH (OUTPATIENT)
Dept: PRIMARY CARE | Facility: CLINIC | Age: 89
End: 2024-07-16
Payer: MEDICARE

## 2024-08-08 ENCOUNTER — LAB (OUTPATIENT)
Dept: LAB | Facility: LAB | Age: 89
End: 2024-08-08
Payer: MEDICARE

## 2024-08-08 ENCOUNTER — TRANSCRIBE ORDERS (OUTPATIENT)
Dept: NEPHROLOGY | Facility: CLINIC | Age: 89
End: 2024-08-08
Payer: MEDICARE

## 2024-08-08 DIAGNOSIS — E11.9 TYPE 2 DIABETES MELLITUS WITHOUT COMPLICATION, UNSPECIFIED WHETHER LONG TERM INSULIN USE (MULTI): ICD-10-CM

## 2024-08-08 DIAGNOSIS — N18.4 ANEMIA DUE TO STAGE 4 CHRONIC KIDNEY DISEASE (MULTI): ICD-10-CM

## 2024-08-08 DIAGNOSIS — D63.1 ANEMIA DUE TO STAGE 4 CHRONIC KIDNEY DISEASE (MULTI): ICD-10-CM

## 2024-08-08 LAB
ANION GAP SERPL CALC-SCNC: 12 MMOL/L (ref 10–20)
BASOPHILS # BLD AUTO: 0.04 X10*3/UL (ref 0–0.1)
BASOPHILS NFR BLD AUTO: 0.5 %
BUN SERPL-MCNC: 35 MG/DL (ref 6–23)
CALCIUM SERPL-MCNC: 8.8 MG/DL (ref 8.6–10.3)
CHLORIDE SERPL-SCNC: 103 MMOL/L (ref 98–107)
CO2 SERPL-SCNC: 29 MMOL/L (ref 21–32)
CREAT SERPL-MCNC: 2.15 MG/DL (ref 0.5–1.05)
EGFRCR SERPLBLD CKD-EPI 2021: 21 ML/MIN/1.73M*2
EOSINOPHIL # BLD AUTO: 0.11 X10*3/UL (ref 0–0.4)
EOSINOPHIL NFR BLD AUTO: 1.5 %
ERYTHROCYTE [DISTWIDTH] IN BLOOD BY AUTOMATED COUNT: 13.6 % (ref 11.5–14.5)
GLUCOSE SERPL-MCNC: 145 MG/DL (ref 74–99)
HCT VFR BLD AUTO: 38.7 % (ref 36–46)
HGB BLD-MCNC: 12 G/DL (ref 12–16)
IMM GRANULOCYTES # BLD AUTO: 0.03 X10*3/UL (ref 0–0.5)
IMM GRANULOCYTES NFR BLD AUTO: 0.4 % (ref 0–0.9)
LYMPHOCYTES # BLD AUTO: 1.52 X10*3/UL (ref 0.8–3)
LYMPHOCYTES NFR BLD AUTO: 20.6 %
MCH RBC QN AUTO: 29.7 PG (ref 26–34)
MCHC RBC AUTO-ENTMCNC: 31 G/DL (ref 32–36)
MCV RBC AUTO: 96 FL (ref 80–100)
MONOCYTES # BLD AUTO: 0.63 X10*3/UL (ref 0.05–0.8)
MONOCYTES NFR BLD AUTO: 8.5 %
NEUTROPHILS # BLD AUTO: 5.05 X10*3/UL (ref 1.6–5.5)
NEUTROPHILS NFR BLD AUTO: 68.5 %
NRBC BLD-RTO: 0 /100 WBCS (ref 0–0)
PLATELET # BLD AUTO: 281 X10*3/UL (ref 150–450)
POTASSIUM SERPL-SCNC: 4.4 MMOL/L (ref 3.5–5.3)
RBC # BLD AUTO: 4.04 X10*6/UL (ref 4–5.2)
SODIUM SERPL-SCNC: 140 MMOL/L (ref 136–145)
WBC # BLD AUTO: 7.4 X10*3/UL (ref 4.4–11.3)

## 2024-08-08 PROCEDURE — 36415 COLL VENOUS BLD VENIPUNCTURE: CPT

## 2024-08-08 PROCEDURE — 85025 COMPLETE CBC W/AUTO DIFF WBC: CPT

## 2024-08-08 PROCEDURE — 80048 BASIC METABOLIC PNL TOTAL CA: CPT

## 2024-08-12 ENCOUNTER — APPOINTMENT (OUTPATIENT)
Dept: PRIMARY CARE | Facility: CLINIC | Age: 89
End: 2024-08-12
Payer: MEDICARE

## 2024-08-13 ENCOUNTER — APPOINTMENT (OUTPATIENT)
Dept: NEPHROLOGY | Facility: CLINIC | Age: 89
End: 2024-08-13
Payer: MEDICARE

## 2024-08-13 VITALS
BODY MASS INDEX: 34.62 KG/M2 | WEIGHT: 195.4 LBS | DIASTOLIC BLOOD PRESSURE: 78 MMHG | HEART RATE: 70 BPM | SYSTOLIC BLOOD PRESSURE: 142 MMHG | HEIGHT: 63 IN

## 2024-08-13 DIAGNOSIS — E78.2 MIXED DYSLIPIDEMIA: ICD-10-CM

## 2024-08-13 DIAGNOSIS — E79.0 HYPERURICEMIA: ICD-10-CM

## 2024-08-13 DIAGNOSIS — N18.4 STAGE 4 CHRONIC KIDNEY DISEASE (MULTI): Primary | ICD-10-CM

## 2024-08-13 PROCEDURE — 3077F SYST BP >= 140 MM HG: CPT | Performed by: CLINICAL NURSE SPECIALIST

## 2024-08-13 PROCEDURE — 1159F MED LIST DOCD IN RCRD: CPT | Performed by: CLINICAL NURSE SPECIALIST

## 2024-08-13 PROCEDURE — 1160F RVW MEDS BY RX/DR IN RCRD: CPT | Performed by: CLINICAL NURSE SPECIALIST

## 2024-08-13 PROCEDURE — 1036F TOBACCO NON-USER: CPT | Performed by: CLINICAL NURSE SPECIALIST

## 2024-08-13 PROCEDURE — 3078F DIAST BP <80 MM HG: CPT | Performed by: CLINICAL NURSE SPECIALIST

## 2024-08-13 PROCEDURE — 1157F ADVNC CARE PLAN IN RCRD: CPT | Performed by: CLINICAL NURSE SPECIALIST

## 2024-08-13 PROCEDURE — 99213 OFFICE O/P EST LOW 20 MIN: CPT | Performed by: CLINICAL NURSE SPECIALIST

## 2024-08-13 ASSESSMENT — ENCOUNTER SYMPTOMS
RESPIRATORY NEGATIVE: 1
CARDIOVASCULAR NEGATIVE: 1
PSYCHIATRIC NEGATIVE: 1
WEAKNESS: 1
ENDOCRINE NEGATIVE: 1
MUSCULOSKELETAL NEGATIVE: 1
GASTROINTESTINAL NEGATIVE: 1
CONSTITUTIONAL NEGATIVE: 1

## 2024-08-13 NOTE — ASSESSMENT & PLAN NOTE
Renal function is stable with creatinine of 2.15, she is staying within her baseline, blood pressure is well-controlled, not having any hypotension, only on metoprolol at this time, not on any diabetic medications, not using nephrotoxic medications

## 2024-08-13 NOTE — PROGRESS NOTES
Subjective   Patient ID: Lakeshia Park is a 89 y.o. female who presents for Follow-up (3 month ck/Review labs ).  Patient being seen in follow-up for chronic kidney disease stage IIIb/IV with history of diabetes and hypertension    Labs reviewed  H&H 12.0 and 38.7  Glucose 145  Sodium 140, potassium 4.4, chloride 103, bicarb 29  Renal function with BUN of 35 and creatinine of 2.15, GFR is 21    She is doing well  Her biggest concern is that she wanted to start some water aerobics and she did not pass the physical therapy test for this  She is trying to increase some of her activity so she will be able to do this  Blood pressure is well-controlled  She has no edema  She is voiding without difficulties        Review of Systems   Constitutional: Negative.    Respiratory: Negative.     Cardiovascular: Negative.    Gastrointestinal: Negative.    Endocrine: Negative.    Genitourinary: Negative.    Musculoskeletal: Negative.    Skin: Negative.    Neurological:  Positive for weakness.   Psychiatric/Behavioral: Negative.         Objective   Physical Exam  Vitals reviewed.   Constitutional:       Appearance: Normal appearance.   HENT:      Head: Normocephalic.   Cardiovascular:      Rate and Rhythm: Normal rate and regular rhythm.   Pulmonary:      Effort: Pulmonary effort is normal.      Breath sounds: Normal breath sounds.   Abdominal:      Palpations: Abdomen is soft.   Musculoskeletal:      Comments: Walks with a walker   Skin:     General: Skin is warm and dry.   Neurological:      Mental Status: She is alert and oriented to person, place, and time.   Psychiatric:         Mood and Affect: Mood normal.         Behavior: Behavior normal.         Assessment/Plan   Problem List Items Addressed This Visit             ICD-10-CM    Hyperuricemia E79.0     On allopurinol 100 mg daily, will recheck at her next office visit         Relevant Orders    Uric acid    Mixed dyslipidemia E78.2    Stage 4 chronic kidney disease  (Multi) - Primary N18.4     Renal function is stable with creatinine of 2.15, she is staying within her baseline, blood pressure is well-controlled, not having any hypotension, only on metoprolol at this time, not on any diabetic medications, not using nephrotoxic medications         Relevant Orders    Basic metabolic panel    Follow Up In Nephrology     Chronic kidney disease stage IIIb/IV with baseline creatinine 2-2.4  Diabetic Nephropathy and HTN  Diabetes mellitus type 2  Chronic diarrhea  History of gout on allopurinol  Hyperuricemia  Hypertension BP is good now.  Hyperlipidemia  Bilateral renal cysts  Positive REJI screen with slightly high RNP  Depression  Hypokalemia       Tracy Garcia, JASSI-LU, DNP 08/13/24 10:57 AM

## 2024-10-24 ENCOUNTER — TELEPHONE (OUTPATIENT)
Age: 89
End: 2024-10-24
Payer: MEDICARE

## 2024-10-24 NOTE — TELEPHONE ENCOUNTER
Stefany from [facility] called stating the pt has an apt to establish care with [facility] 12/4/24; former MD was Dr. Aquino. States pt fell a couple of days ago; has some bruising on left hand along with some swelling but does have full mobility. States called [facility] to ask if someone there could order an xray for the pt; per Yu they told her to call JO. I advised since the pt has not established with [facility] she cannot order an xray on her. Advised she call [facility] back to see who is covering for Dr. Aquino since he is no longer with the practice. Advised if they won't order the xray they could take the pt to an urgent care. Yu states understanding of all this.

## 2024-10-26 ENCOUNTER — APPOINTMENT (OUTPATIENT)
Dept: RADIOLOGY | Facility: HOSPITAL | Age: 89
DRG: 194 | End: 2024-10-26
Payer: MEDICARE

## 2024-10-26 ENCOUNTER — APPOINTMENT (OUTPATIENT)
Dept: CARDIOLOGY | Facility: HOSPITAL | Age: 89
DRG: 194 | End: 2024-10-26
Payer: MEDICARE

## 2024-10-26 ENCOUNTER — HOSPITAL ENCOUNTER (INPATIENT)
Facility: HOSPITAL | Age: 89
DRG: 194 | End: 2024-10-26
Attending: STUDENT IN AN ORGANIZED HEALTH CARE EDUCATION/TRAINING PROGRAM | Admitting: STUDENT IN AN ORGANIZED HEALTH CARE EDUCATION/TRAINING PROGRAM
Payer: MEDICARE
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
## 2024-10-26 DIAGNOSIS — E83.42 HYPOMAGNESEMIA: ICD-10-CM

## 2024-10-26 DIAGNOSIS — K92.2 LOWER GI BLEED: ICD-10-CM

## 2024-10-26 DIAGNOSIS — M54.9 MID BACK PAIN: ICD-10-CM

## 2024-10-26 DIAGNOSIS — I73.9 CLAUDICATION (CMS-HCC): ICD-10-CM

## 2024-10-26 DIAGNOSIS — N18.9 CHRONIC RENAL IMPAIRMENT, UNSPECIFIED CKD STAGE: ICD-10-CM

## 2024-10-26 DIAGNOSIS — R09.02 HYPOXIA: ICD-10-CM

## 2024-10-26 DIAGNOSIS — S80.01XA CONTUSION OF RIGHT KNEE, INITIAL ENCOUNTER: ICD-10-CM

## 2024-10-26 DIAGNOSIS — S09.90XA CLOSED HEAD INJURY, INITIAL ENCOUNTER: ICD-10-CM

## 2024-10-26 DIAGNOSIS — S52.515A CLOSED NONDISPLACED FRACTURE OF STYLOID PROCESS OF LEFT RADIUS, INITIAL ENCOUNTER: ICD-10-CM

## 2024-10-26 DIAGNOSIS — J18.9 COMMUNITY ACQUIRED PNEUMONIA, BILATERAL: ICD-10-CM

## 2024-10-26 LAB
ALBUMIN SERPL BCP-MCNC: 3.8 G/DL (ref 3.4–5)
ALP SERPL-CCNC: 56 U/L (ref 33–136)
ALT SERPL W P-5'-P-CCNC: 11 U/L (ref 7–45)
ANION GAP SERPL CALC-SCNC: 15 MMOL/L (ref 10–20)
APPEARANCE UR: CLEAR
APTT PPP: 30 SECONDS (ref 27–38)
AST SERPL W P-5'-P-CCNC: 15 U/L (ref 9–39)
BASOPHILS # BLD AUTO: 0.02 X10*3/UL (ref 0–0.1)
BASOPHILS NFR BLD AUTO: 0.3 %
BILIRUB SERPL-MCNC: 0.5 MG/DL (ref 0–1.2)
BILIRUB UR STRIP.AUTO-MCNC: NEGATIVE MG/DL
BUN SERPL-MCNC: 35 MG/DL (ref 6–23)
CALCIUM SERPL-MCNC: 8.5 MG/DL (ref 8.6–10.3)
CARDIAC TROPONIN I PNL SERPL HS: 7 NG/L (ref 0–13)
CHLORIDE SERPL-SCNC: 101 MMOL/L (ref 98–107)
CO2 SERPL-SCNC: 25 MMOL/L (ref 21–32)
COLOR UR: NORMAL
CREAT SERPL-MCNC: 2.32 MG/DL (ref 0.5–1.05)
EGFRCR SERPLBLD CKD-EPI 2021: 19 ML/MIN/1.73M*2
EOSINOPHIL # BLD AUTO: 0.07 X10*3/UL (ref 0–0.4)
EOSINOPHIL NFR BLD AUTO: 1.1 %
ERYTHROCYTE [DISTWIDTH] IN BLOOD BY AUTOMATED COUNT: 14 % (ref 11.5–14.5)
GLUCOSE BLD MANUAL STRIP-MCNC: 143 MG/DL (ref 74–99)
GLUCOSE BLD MANUAL STRIP-MCNC: 201 MG/DL (ref 74–99)
GLUCOSE SERPL-MCNC: 254 MG/DL (ref 74–99)
GLUCOSE UR STRIP.AUTO-MCNC: NORMAL MG/DL
HCT VFR BLD AUTO: 35.8 % (ref 36–46)
HGB BLD-MCNC: 11.6 G/DL (ref 12–16)
HOLD SPECIMEN: NORMAL
HOLD SPECIMEN: NORMAL
IMM GRANULOCYTES # BLD AUTO: 0.05 X10*3/UL (ref 0–0.5)
IMM GRANULOCYTES NFR BLD AUTO: 0.8 % (ref 0–0.9)
INR PPP: 1 (ref 0.9–1.1)
KETONES UR STRIP.AUTO-MCNC: NEGATIVE MG/DL
LACTATE SERPL-SCNC: 1.2 MMOL/L (ref 0.4–2)
LEUKOCYTE ESTERASE UR QL STRIP.AUTO: NEGATIVE
LYMPHOCYTES # BLD AUTO: 1.08 X10*3/UL (ref 0.8–3)
LYMPHOCYTES NFR BLD AUTO: 17 %
MAGNESIUM SERPL-MCNC: 1.4 MG/DL (ref 1.6–2.4)
MCH RBC QN AUTO: 31.2 PG (ref 26–34)
MCHC RBC AUTO-ENTMCNC: 32.4 G/DL (ref 32–36)
MCV RBC AUTO: 96 FL (ref 80–100)
MONOCYTES # BLD AUTO: 0.62 X10*3/UL (ref 0.05–0.8)
MONOCYTES NFR BLD AUTO: 9.7 %
MUCOUS THREADS #/AREA URNS AUTO: NORMAL /LPF
NEUTROPHILS # BLD AUTO: 4.53 X10*3/UL (ref 1.6–5.5)
NEUTROPHILS NFR BLD AUTO: 71.1 %
NITRITE UR QL STRIP.AUTO: NEGATIVE
NRBC BLD-RTO: 0 /100 WBCS (ref 0–0)
PH UR STRIP.AUTO: 5.5 [PH]
PLATELET # BLD AUTO: 244 X10*3/UL (ref 150–450)
POTASSIUM SERPL-SCNC: 3.7 MMOL/L (ref 3.5–5.3)
PROT SERPL-MCNC: 6.5 G/DL (ref 6.4–8.2)
PROT UR STRIP.AUTO-MCNC: NORMAL MG/DL
PROTHROMBIN TIME: 11.8 SECONDS (ref 9.8–12.8)
RBC # BLD AUTO: 3.72 X10*6/UL (ref 4–5.2)
RBC # UR STRIP.AUTO: NEGATIVE /UL
RBC #/AREA URNS AUTO: NORMAL /HPF
SODIUM SERPL-SCNC: 137 MMOL/L (ref 136–145)
SP GR UR STRIP.AUTO: 1.02
SQUAMOUS #/AREA URNS AUTO: NORMAL /HPF
UROBILINOGEN UR STRIP.AUTO-MCNC: NORMAL MG/DL
WBC # BLD AUTO: 6.4 X10*3/UL (ref 4.4–11.3)
WBC #/AREA URNS AUTO: NORMAL /HPF

## 2024-10-26 PROCEDURE — 85025 COMPLETE CBC W/AUTO DIFF WBC: CPT | Performed by: PHYSICIAN ASSISTANT

## 2024-10-26 PROCEDURE — 73090 X-RAY EXAM OF FOREARM: CPT | Mod: LEFT SIDE | Performed by: RADIOLOGY

## 2024-10-26 PROCEDURE — 70486 CT MAXILLOFACIAL W/O DYE: CPT

## 2024-10-26 PROCEDURE — 72125 CT NECK SPINE W/O DYE: CPT | Performed by: RADIOLOGY

## 2024-10-26 PROCEDURE — 2500000002 HC RX 250 W HCPCS SELF ADMINISTERED DRUGS (ALT 637 FOR MEDICARE OP, ALT 636 FOR OP/ED): Performed by: NURSE PRACTITIONER

## 2024-10-26 PROCEDURE — 99221 1ST HOSP IP/OBS SF/LOW 40: CPT | Performed by: NURSE PRACTITIONER

## 2024-10-26 PROCEDURE — 70486 CT MAXILLOFACIAL W/O DYE: CPT | Performed by: RADIOLOGY

## 2024-10-26 PROCEDURE — 87075 CULTR BACTERIA EXCEPT BLOOD: CPT | Mod: SAMLAB | Performed by: PHYSICIAN ASSISTANT

## 2024-10-26 PROCEDURE — 94762 N-INVAS EAR/PLS OXIMTRY CONT: CPT

## 2024-10-26 PROCEDURE — 1200000002 HC GENERAL ROOM WITH TELEMETRY DAILY

## 2024-10-26 PROCEDURE — 70450 CT HEAD/BRAIN W/O DYE: CPT

## 2024-10-26 PROCEDURE — 94664 DEMO&/EVAL PT USE INHALER: CPT

## 2024-10-26 PROCEDURE — 96366 THER/PROPH/DIAG IV INF ADDON: CPT

## 2024-10-26 PROCEDURE — 94761 N-INVAS EAR/PLS OXIMETRY MLT: CPT

## 2024-10-26 PROCEDURE — 73564 X-RAY EXAM KNEE 4 OR MORE: CPT | Mod: RT

## 2024-10-26 PROCEDURE — 72125 CT NECK SPINE W/O DYE: CPT

## 2024-10-26 PROCEDURE — 73090 X-RAY EXAM OF FOREARM: CPT | Mod: LT

## 2024-10-26 PROCEDURE — 73110 X-RAY EXAM OF WRIST: CPT | Mod: LEFT SIDE | Performed by: RADIOLOGY

## 2024-10-26 PROCEDURE — 80053 COMPREHEN METABOLIC PANEL: CPT | Performed by: PHYSICIAN ASSISTANT

## 2024-10-26 PROCEDURE — 2500000004 HC RX 250 GENERAL PHARMACY W/ HCPCS (ALT 636 FOR OP/ED): Performed by: STUDENT IN AN ORGANIZED HEALTH CARE EDUCATION/TRAINING PROGRAM

## 2024-10-26 PROCEDURE — 73564 X-RAY EXAM KNEE 4 OR MORE: CPT | Mod: RIGHT SIDE | Performed by: RADIOLOGY

## 2024-10-26 PROCEDURE — 36415 COLL VENOUS BLD VENIPUNCTURE: CPT | Performed by: PHYSICIAN ASSISTANT

## 2024-10-26 PROCEDURE — 96375 TX/PRO/DX INJ NEW DRUG ADDON: CPT

## 2024-10-26 PROCEDURE — 84145 PROCALCITONIN (PCT): CPT | Mod: SAMLAB | Performed by: NURSE PRACTITIONER

## 2024-10-26 PROCEDURE — 85730 THROMBOPLASTIN TIME PARTIAL: CPT | Performed by: PHYSICIAN ASSISTANT

## 2024-10-26 PROCEDURE — 2500000001 HC RX 250 WO HCPCS SELF ADMINISTERED DRUGS (ALT 637 FOR MEDICARE OP): Performed by: NURSE PRACTITIONER

## 2024-10-26 PROCEDURE — 76377 3D RENDER W/INTRP POSTPROCES: CPT

## 2024-10-26 PROCEDURE — 85610 PROTHROMBIN TIME: CPT | Performed by: PHYSICIAN ASSISTANT

## 2024-10-26 PROCEDURE — 83735 ASSAY OF MAGNESIUM: CPT | Performed by: PHYSICIAN ASSISTANT

## 2024-10-26 PROCEDURE — 84484 ASSAY OF TROPONIN QUANT: CPT | Performed by: PHYSICIAN ASSISTANT

## 2024-10-26 PROCEDURE — 71045 X-RAY EXAM CHEST 1 VIEW: CPT | Performed by: RADIOLOGY

## 2024-10-26 PROCEDURE — 82947 ASSAY GLUCOSE BLOOD QUANT: CPT

## 2024-10-26 PROCEDURE — 73110 X-RAY EXAM OF WRIST: CPT | Mod: LT

## 2024-10-26 PROCEDURE — 83605 ASSAY OF LACTIC ACID: CPT | Performed by: PHYSICIAN ASSISTANT

## 2024-10-26 PROCEDURE — 2500000004 HC RX 250 GENERAL PHARMACY W/ HCPCS (ALT 636 FOR OP/ED): Performed by: PHYSICIAN ASSISTANT

## 2024-10-26 PROCEDURE — 96367 TX/PROPH/DG ADDL SEQ IV INF: CPT

## 2024-10-26 PROCEDURE — 93005 ELECTROCARDIOGRAM TRACING: CPT

## 2024-10-26 PROCEDURE — 96365 THER/PROPH/DIAG IV INF INIT: CPT

## 2024-10-26 PROCEDURE — 2500000005 HC RX 250 GENERAL PHARMACY W/O HCPCS: Performed by: STUDENT IN AN ORGANIZED HEALTH CARE EDUCATION/TRAINING PROGRAM

## 2024-10-26 PROCEDURE — 99291 CRITICAL CARE FIRST HOUR: CPT | Performed by: PHYSICIAN ASSISTANT

## 2024-10-26 PROCEDURE — 94640 AIRWAY INHALATION TREATMENT: CPT

## 2024-10-26 PROCEDURE — 71045 X-RAY EXAM CHEST 1 VIEW: CPT

## 2024-10-26 PROCEDURE — 76377 3D RENDER W/INTRP POSTPROCES: CPT | Performed by: RADIOLOGY

## 2024-10-26 PROCEDURE — 70450 CT HEAD/BRAIN W/O DYE: CPT | Performed by: RADIOLOGY

## 2024-10-26 PROCEDURE — 81001 URINALYSIS AUTO W/SCOPE: CPT | Performed by: PHYSICIAN ASSISTANT

## 2024-10-26 RX ORDER — INDAPAMIDE 2.5 MG/1
2.5 TABLET ORAL DAILY
Status: DISCONTINUED | OUTPATIENT
Start: 2024-10-26 | End: 2024-10-26 | Stop reason: ENTERED-IN-ERROR

## 2024-10-26 RX ORDER — METOPROLOL TARTRATE 50 MG/1
50 TABLET ORAL EVERY EVENING
Status: DISCONTINUED | OUTPATIENT
Start: 2024-10-26 | End: 2024-10-28 | Stop reason: HOSPADM

## 2024-10-26 RX ORDER — CEFTRIAXONE 2 G/50ML
2 INJECTION, SOLUTION INTRAVENOUS ONCE
Status: COMPLETED | OUTPATIENT
Start: 2024-10-26 | End: 2024-10-26

## 2024-10-26 RX ORDER — LANOLIN ALCOHOL/MO/W.PET/CERES
1000 CREAM (GRAM) TOPICAL DAILY
Status: DISCONTINUED | OUTPATIENT
Start: 2024-10-26 | End: 2024-10-28 | Stop reason: HOSPADM

## 2024-10-26 RX ORDER — IPRATROPIUM BROMIDE AND ALBUTEROL SULFATE 2.5; .5 MG/3ML; MG/3ML
3 SOLUTION RESPIRATORY (INHALATION) EVERY 6 HOURS PRN
Status: DISCONTINUED | OUTPATIENT
Start: 2024-10-26 | End: 2024-10-28 | Stop reason: HOSPADM

## 2024-10-26 RX ORDER — HYDROXYZINE PAMOATE 25 MG/1
25 CAPSULE ORAL 2 TIMES DAILY PRN
Status: DISCONTINUED | OUTPATIENT
Start: 2024-10-26 | End: 2024-10-28 | Stop reason: HOSPADM

## 2024-10-26 RX ORDER — LOVASTATIN 20 MG/1
20 TABLET ORAL NIGHTLY
Status: DISCONTINUED | OUTPATIENT
Start: 2024-10-26 | End: 2024-10-26 | Stop reason: CLARIF

## 2024-10-26 RX ORDER — BUPROPION HYDROCHLORIDE 150 MG/1
150 TABLET, EXTENDED RELEASE ORAL 2 TIMES DAILY
Status: DISCONTINUED | OUTPATIENT
Start: 2024-10-26 | End: 2024-10-26 | Stop reason: CLARIF

## 2024-10-26 RX ORDER — BENZONATATE 100 MG/1
100 CAPSULE ORAL EVERY 8 HOURS PRN
Status: DISCONTINUED | OUTPATIENT
Start: 2024-10-26 | End: 2024-10-28 | Stop reason: HOSPADM

## 2024-10-26 RX ORDER — SENNOSIDES 8.6 MG/1
1 TABLET ORAL 2 TIMES DAILY PRN
Status: DISCONTINUED | OUTPATIENT
Start: 2024-10-26 | End: 2024-10-28 | Stop reason: HOSPADM

## 2024-10-26 RX ORDER — HYDROXYZINE HYDROCHLORIDE 50 MG/1
25 TABLET, FILM COATED ORAL 2 TIMES DAILY PRN
Status: DISCONTINUED | OUTPATIENT
Start: 2024-10-26 | End: 2024-10-26 | Stop reason: CLARIF

## 2024-10-26 RX ORDER — POLYETHYLENE GLYCOL 3350 17 G/17G
17 POWDER, FOR SOLUTION ORAL DAILY
Status: DISCONTINUED | OUTPATIENT
Start: 2024-10-26 | End: 2024-10-28 | Stop reason: HOSPADM

## 2024-10-26 RX ORDER — FERROUS SULFATE 325(65) MG
1 TABLET ORAL DAILY
Status: DISCONTINUED | OUTPATIENT
Start: 2024-10-26 | End: 2024-10-28 | Stop reason: HOSPADM

## 2024-10-26 RX ORDER — CYCLOBENZAPRINE HYDROCHLORIDE 7.5 MG/1
7.5 TABLET, FILM COATED ORAL EVERY 8 HOURS PRN
Status: DISCONTINUED | OUTPATIENT
Start: 2024-10-26 | End: 2024-10-26 | Stop reason: CLARIF

## 2024-10-26 RX ORDER — MAGNESIUM SULFATE HEPTAHYDRATE 40 MG/ML
2 INJECTION, SOLUTION INTRAVENOUS ONCE
Status: COMPLETED | OUTPATIENT
Start: 2024-10-26 | End: 2024-10-26

## 2024-10-26 RX ORDER — CYCLOBENZAPRINE HCL 10 MG
10 TABLET ORAL 3 TIMES DAILY PRN
Status: DISCONTINUED | OUTPATIENT
Start: 2024-10-26 | End: 2024-10-28 | Stop reason: HOSPADM

## 2024-10-26 RX ORDER — BUDESONIDE 0.5 MG/2ML
0.5 INHALANT ORAL
Status: DISCONTINUED | OUTPATIENT
Start: 2024-10-26 | End: 2024-10-28 | Stop reason: HOSPADM

## 2024-10-26 RX ORDER — METOPROLOL TARTRATE 25 MG/1
25 TABLET, FILM COATED ORAL EVERY MORNING
Status: DISCONTINUED | OUTPATIENT
Start: 2024-10-27 | End: 2024-10-28 | Stop reason: HOSPADM

## 2024-10-26 RX ORDER — HYDROCORTISONE 25 MG/G
CREAM TOPICAL 2 TIMES DAILY
Status: DISCONTINUED | OUTPATIENT
Start: 2024-10-26 | End: 2024-10-28 | Stop reason: HOSPADM

## 2024-10-26 RX ORDER — ADHESIVE BANDAGE
15 BANDAGE TOPICAL EVERY 12 HOURS PRN
Status: DISCONTINUED | OUTPATIENT
Start: 2024-10-26 | End: 2024-10-28 | Stop reason: HOSPADM

## 2024-10-26 RX ORDER — ALUMINUM HYDROXIDE, MAGNESIUM HYDROXIDE, AND SIMETHICONE 1200; 120; 1200 MG/30ML; MG/30ML; MG/30ML
30 SUSPENSION ORAL 2 TIMES DAILY PRN
Status: DISCONTINUED | OUTPATIENT
Start: 2024-10-26 | End: 2024-10-28 | Stop reason: HOSPADM

## 2024-10-26 RX ORDER — GUAIFENESIN/DEXTROMETHORPHAN 100-10MG/5
15 SYRUP ORAL EVERY 4 HOURS PRN
Status: DISCONTINUED | OUTPATIENT
Start: 2024-10-26 | End: 2024-10-28 | Stop reason: HOSPADM

## 2024-10-26 RX ORDER — ENOXAPARIN SODIUM 100 MG/ML
30 INJECTION SUBCUTANEOUS EVERY 24 HOURS
Status: DISCONTINUED | OUTPATIENT
Start: 2024-10-26 | End: 2024-10-28 | Stop reason: HOSPADM

## 2024-10-26 RX ORDER — ALLOPURINOL 100 MG/1
100 TABLET ORAL DAILY
Status: DISCONTINUED | OUTPATIENT
Start: 2024-10-26 | End: 2024-10-28 | Stop reason: HOSPADM

## 2024-10-26 RX ORDER — CEFTRIAXONE 2 G/50ML
2 INJECTION, SOLUTION INTRAVENOUS EVERY 24 HOURS
Status: DISCONTINUED | OUTPATIENT
Start: 2024-10-27 | End: 2024-10-28 | Stop reason: HOSPADM

## 2024-10-26 RX ORDER — CALCIUM CARBONATE 200(500)MG
1 TABLET,CHEWABLE ORAL 4 TIMES DAILY PRN
Status: DISCONTINUED | OUTPATIENT
Start: 2024-10-26 | End: 2024-10-28 | Stop reason: HOSPADM

## 2024-10-26 RX ORDER — PANTOPRAZOLE SODIUM 40 MG/1
40 TABLET, DELAYED RELEASE ORAL
Status: DISCONTINUED | OUTPATIENT
Start: 2024-10-27 | End: 2024-10-28 | Stop reason: HOSPADM

## 2024-10-26 RX ORDER — DOCUSATE SODIUM 100 MG/1
100 CAPSULE, LIQUID FILLED ORAL 2 TIMES DAILY PRN
Status: DISCONTINUED | OUTPATIENT
Start: 2024-10-26 | End: 2024-10-26

## 2024-10-26 RX ORDER — BUPROPION HYDROCHLORIDE 150 MG/1
150 TABLET ORAL DAILY
Status: DISCONTINUED | OUTPATIENT
Start: 2024-10-26 | End: 2024-10-28 | Stop reason: HOSPADM

## 2024-10-26 RX ORDER — PRAVASTATIN SODIUM 20 MG/1
20 TABLET ORAL NIGHTLY
Status: DISCONTINUED | OUTPATIENT
Start: 2024-10-26 | End: 2024-10-28 | Stop reason: HOSPADM

## 2024-10-26 SDOH — SOCIAL STABILITY: SOCIAL INSECURITY
WITHIN THE LAST YEAR, HAVE YOU BEEN RAPED OR FORCED TO HAVE ANY KIND OF SEXUAL ACTIVITY BY YOUR PARTNER OR EX-PARTNER?: NO

## 2024-10-26 SDOH — SOCIAL STABILITY: SOCIAL INSECURITY: HAS ANYONE EVER THREATENED TO HURT YOUR FAMILY OR YOUR PETS?: NO

## 2024-10-26 SDOH — ECONOMIC STABILITY: TRANSPORTATION INSECURITY: IN THE PAST 12 MONTHS, HAS LACK OF TRANSPORTATION KEPT YOU FROM MEDICAL APPOINTMENTS OR FROM GETTING MEDICATIONS?: YES

## 2024-10-26 SDOH — ECONOMIC STABILITY: FOOD INSECURITY: WITHIN THE PAST 12 MONTHS, YOU WORRIED THAT YOUR FOOD WOULD RUN OUT BEFORE YOU GOT THE MONEY TO BUY MORE.: NEVER TRUE

## 2024-10-26 SDOH — ECONOMIC STABILITY: INCOME INSECURITY: IN THE PAST 12 MONTHS HAS THE ELECTRIC, GAS, OIL, OR WATER COMPANY THREATENED TO SHUT OFF SERVICES IN YOUR HOME?: NO

## 2024-10-26 SDOH — SOCIAL STABILITY: SOCIAL INSECURITY: WITHIN THE LAST YEAR, HAVE YOU BEEN AFRAID OF YOUR PARTNER OR EX-PARTNER?: NO

## 2024-10-26 SDOH — ECONOMIC STABILITY: HOUSING INSECURITY: IN THE PAST 12 MONTHS, HOW MANY TIMES HAVE YOU MOVED WHERE YOU WERE LIVING?: 0

## 2024-10-26 SDOH — SOCIAL STABILITY: SOCIAL INSECURITY: ABUSE: ADULT

## 2024-10-26 SDOH — ECONOMIC STABILITY: FOOD INSECURITY: WITHIN THE PAST 12 MONTHS, THE FOOD YOU BOUGHT JUST DIDN'T LAST AND YOU DIDN'T HAVE MONEY TO GET MORE.: NEVER TRUE

## 2024-10-26 SDOH — ECONOMIC STABILITY: HOUSING INSECURITY: IN THE LAST 12 MONTHS, WAS THERE A TIME WHEN YOU WERE NOT ABLE TO PAY THE MORTGAGE OR RENT ON TIME?: NO

## 2024-10-26 SDOH — SOCIAL STABILITY: SOCIAL INSECURITY: ARE YOU OR HAVE YOU BEEN THREATENED OR ABUSED PHYSICALLY, EMOTIONALLY, OR SEXUALLY BY ANYONE?: NO

## 2024-10-26 SDOH — SOCIAL STABILITY: SOCIAL INSECURITY
WITHIN THE LAST YEAR, HAVE YOU BEEN KICKED, HIT, SLAPPED, OR OTHERWISE PHYSICALLY HURT BY YOUR PARTNER OR EX-PARTNER?: NO

## 2024-10-26 SDOH — SOCIAL STABILITY: SOCIAL INSECURITY: DO YOU FEEL UNSAFE GOING BACK TO THE PLACE WHERE YOU ARE LIVING?: NO

## 2024-10-26 SDOH — SOCIAL STABILITY: SOCIAL INSECURITY: DO YOU FEEL ANYONE HAS EXPLOITED OR TAKEN ADVANTAGE OF YOU FINANCIALLY OR OF YOUR PERSONAL PROPERTY?: NO

## 2024-10-26 SDOH — SOCIAL STABILITY: SOCIAL INSECURITY: WERE YOU ABLE TO COMPLETE ALL THE BEHAVIORAL HEALTH SCREENINGS?: YES

## 2024-10-26 SDOH — SOCIAL STABILITY: SOCIAL INSECURITY: WITHIN THE LAST YEAR, HAVE YOU BEEN HUMILIATED OR EMOTIONALLY ABUSED IN OTHER WAYS BY YOUR PARTNER OR EX-PARTNER?: NO

## 2024-10-26 SDOH — ECONOMIC STABILITY: HOUSING INSECURITY: AT ANY TIME IN THE PAST 12 MONTHS, WERE YOU HOMELESS OR LIVING IN A SHELTER (INCLUDING NOW)?: NO

## 2024-10-26 SDOH — SOCIAL STABILITY: SOCIAL INSECURITY: DOES ANYONE TRY TO KEEP YOU FROM HAVING/CONTACTING OTHER FRIENDS OR DOING THINGS OUTSIDE YOUR HOME?: NO

## 2024-10-26 SDOH — SOCIAL STABILITY: SOCIAL INSECURITY: HAVE YOU HAD THOUGHTS OF HARMING ANYONE ELSE?: NO

## 2024-10-26 SDOH — SOCIAL STABILITY: SOCIAL INSECURITY: POSSIBLE ABUSE REPORTED TO:: SOCIAL SERVICES

## 2024-10-26 SDOH — ECONOMIC STABILITY: FOOD INSECURITY: HOW HARD IS IT FOR YOU TO PAY FOR THE VERY BASICS LIKE FOOD, HOUSING, MEDICAL CARE, AND HEATING?: NOT HARD AT ALL

## 2024-10-26 SDOH — SOCIAL STABILITY: SOCIAL INSECURITY: ARE THERE ANY APPARENT SIGNS OF INJURIES/BEHAVIORS THAT COULD BE RELATED TO ABUSE/NEGLECT?: NO

## 2024-10-26 ASSESSMENT — ENCOUNTER SYMPTOMS
COUGH: 0
FEVER: 0
COLOR CHANGE: 0
SHORTNESS OF BREATH: 0
ARTHRALGIAS: 0
CHILLS: 0
EYE PAIN: 0
PALPITATIONS: 0
ABDOMINAL PAIN: 0
HEMATURIA: 0
VOMITING: 0
DYSURIA: 0
BACK PAIN: 0
SORE THROAT: 0
SEIZURES: 0

## 2024-10-26 ASSESSMENT — ACTIVITIES OF DAILY LIVING (ADL)
LACK_OF_TRANSPORTATION: YES
LACK_OF_TRANSPORTATION: YES
BATHING: INDEPENDENT
ADEQUATE_TO_COMPLETE_ADL: YES
BATHING: INDEPENDENT
FEEDING YOURSELF: INDEPENDENT
HEARING - LEFT EAR: HEARING AID
DRESSING YOURSELF: INDEPENDENT
HEARING - RIGHT EAR: HEARING AID
DRESSING YOURSELF: INDEPENDENT
GROOMING: INDEPENDENT
GROOMING: INDEPENDENT
FEEDING YOURSELF: INDEPENDENT
HEARING - RIGHT EAR: DIFFICULTY WITH NOISE
TOILETING: INDEPENDENT
JUDGMENT_ADEQUATE_SAFELY_COMPLETE_DAILY_ACTIVITIES: YES
WALKS IN HOME: INDEPENDENT
WALKS IN HOME: INDEPENDENT
JUDGMENT_ADEQUATE_SAFELY_COMPLETE_DAILY_ACTIVITIES: YES
TOILETING: INDEPENDENT
PATIENT'S MEMORY ADEQUATE TO SAFELY COMPLETE DAILY ACTIVITIES?: YES
HEARING - LEFT EAR: DIFFICULTY WITH NOISE
ADEQUATE_TO_COMPLETE_ADL: YES

## 2024-10-26 ASSESSMENT — LIFESTYLE VARIABLES
AUDIT-C TOTAL SCORE: 0
HOW MANY STANDARD DRINKS CONTAINING ALCOHOL DO YOU HAVE ON A TYPICAL DAY: PATIENT DOES NOT DRINK
AUDIT-C TOTAL SCORE: 0
HOW OFTEN DO YOU HAVE 6 OR MORE DRINKS ON ONE OCCASION: NEVER
SKIP TO QUESTIONS 9-10: 1
HOW OFTEN DO YOU HAVE A DRINK CONTAINING ALCOHOL: NEVER

## 2024-10-26 ASSESSMENT — COGNITIVE AND FUNCTIONAL STATUS - GENERAL
DRESSING REGULAR LOWER BODY CLOTHING: A LITTLE
MOBILITY SCORE: 23
CLIMB 3 TO 5 STEPS WITH RAILING: A LITTLE
DAILY ACTIVITIY SCORE: 23
MOBILITY SCORE: 23
PATIENT BASELINE BEDBOUND: NO
CLIMB 3 TO 5 STEPS WITH RAILING: A LITTLE
DAILY ACTIVITIY SCORE: 23
DRESSING REGULAR LOWER BODY CLOTHING: A LITTLE

## 2024-10-26 ASSESSMENT — COLUMBIA-SUICIDE SEVERITY RATING SCALE - C-SSRS
2. HAVE YOU ACTUALLY HAD ANY THOUGHTS OF KILLING YOURSELF?: NO
6. HAVE YOU EVER DONE ANYTHING, STARTED TO DO ANYTHING, OR PREPARED TO DO ANYTHING TO END YOUR LIFE?: NO
1. IN THE PAST MONTH, HAVE YOU WISHED YOU WERE DEAD OR WISHED YOU COULD GO TO SLEEP AND NOT WAKE UP?: NO

## 2024-10-26 ASSESSMENT — PAIN DESCRIPTION - LOCATION: LOCATION: WRIST

## 2024-10-26 ASSESSMENT — PAIN DESCRIPTION - ORIENTATION: ORIENTATION: LEFT

## 2024-10-26 ASSESSMENT — PAIN SCALES - GENERAL
PAINLEVEL_OUTOF10: 0 - NO PAIN
PAINLEVEL_OUTOF10: 0 - NO PAIN

## 2024-10-26 ASSESSMENT — PATIENT HEALTH QUESTIONNAIRE - PHQ9
SUM OF ALL RESPONSES TO PHQ9 QUESTIONS 1 & 2: 2
2. FEELING DOWN, DEPRESSED OR HOPELESS: SEVERAL DAYS
1. LITTLE INTEREST OR PLEASURE IN DOING THINGS: SEVERAL DAYS

## 2024-10-26 ASSESSMENT — PAIN - FUNCTIONAL ASSESSMENT
PAIN_FUNCTIONAL_ASSESSMENT: 0-10

## 2024-10-26 ASSESSMENT — PAIN DESCRIPTION - DESCRIPTORS: DESCRIPTORS: ACHING

## 2024-10-26 ASSESSMENT — PAIN DESCRIPTION - ONSET: ONSET: PROGRESSIVE

## 2024-10-26 NOTE — ED PROVIDER NOTES
Patient is a 89-year-old female who presents to the emergency room with a chief complaint of a fall.  She states that 4 days ago she had stood up from her chair to walk to the bathroom and brush her teeth when she fell hitting her head and landing on her wrist.  Patient denies loss of consciousness.  She denies being on anticoagulants.  She states that she originally did not come in given that the nurse felt that she did not need to be evaluated.  Has had increased left wrist pain today and felt that she should be evaluated.  Patient does not know why she fell.  She reports that she falls occasionally and its likely secondary to losing her balance but she is unsure.  She denies tripping.  She states it is positive that she did not pass out.  She denies any preceding symptoms.  No chest pain, shortness of breath, dizziness, nausea, vomiting, or abdominal pain.  She has bruising to her face and also an abrasion to her lip.  She is up-to-date on her tetanus immunization.           Review of Systems   Constitutional:  Negative for chills and fever.   HENT:  Negative for ear pain and sore throat.    Eyes:  Negative for pain and visual disturbance.   Respiratory:  Negative for cough and shortness of breath.    Cardiovascular:  Negative for chest pain and palpitations.   Gastrointestinal:  Negative for abdominal pain and vomiting.   Genitourinary:  Negative for dysuria and hematuria.   Musculoskeletal:  Negative for arthralgias and back pain.        Left wrist pain   Skin:  Negative for color change and rash.   Neurological:  Negative for seizures and syncope.   All other systems reviewed and are negative.       Physical Exam  Vitals and nursing note reviewed.   Constitutional:       General: She is not in acute distress.     Appearance: Normal appearance. She is well-developed.   HENT:      Head: Normocephalic. Raccoon eyes and contusion present.      Right Ear: Tympanic membrane, ear canal and external ear normal. No  hemotympanum.      Left Ear: Tympanic membrane, ear canal and external ear normal. No hemotympanum.      Nose: Nasal tenderness present.      Mouth/Throat:      Pharynx: No oropharyngeal exudate or posterior oropharyngeal erythema.      Comments: Scab noted to lower lip  Eyes:      Extraocular Movements: Extraocular movements intact.      Conjunctiva/sclera: Conjunctivae normal.   Cardiovascular:      Rate and Rhythm: Normal rate and regular rhythm.      Heart sounds: No murmur heard.  Pulmonary:      Effort: Pulmonary effort is normal. No respiratory distress.      Breath sounds: Normal breath sounds. No stridor. No wheezing or rhonchi.   Chest:       Abdominal:      General: There is no distension.      Palpations: Abdomen is soft.      Tenderness: There is no abdominal tenderness. There is no guarding or rebound.      Hernia: No hernia is present.   Musculoskeletal:         General: No swelling.      Cervical back: Normal range of motion and neck supple. No rigidity or tenderness.   Skin:     General: Skin is warm and dry.      Capillary Refill: Capillary refill takes less than 2 seconds.      Findings: Bruising (facial) present.   Neurological:      General: No focal deficit present.      Mental Status: She is alert and oriented to person, place, and time.      Cranial Nerves: No cranial nerve deficit.      Sensory: No sensory deficit.      Motor: No weakness.      Coordination: Coordination normal.   Psychiatric:         Mood and Affect: Mood normal.          Labs Reviewed   CBC WITH AUTO DIFFERENTIAL - Abnormal       Result Value    WBC 6.4      nRBC 0.0      RBC 3.72 (*)     Hemoglobin 11.6 (*)     Hematocrit 35.8 (*)     MCV 96      MCH 31.2      MCHC 32.4      RDW 14.0      Platelets 244      Neutrophils % 71.1      Immature Granulocytes %, Automated 0.8      Lymphocytes % 17.0      Monocytes % 9.7      Eosinophils % 1.1      Basophils % 0.3      Neutrophils Absolute 4.53      Immature Granulocytes  Absolute, Automated 0.05      Lymphocytes Absolute 1.08      Monocytes Absolute 0.62      Eosinophils Absolute 0.07      Basophils Absolute 0.02     COMPREHENSIVE METABOLIC PANEL - Abnormal    Glucose 254 (*)     Sodium 137      Potassium 3.7      Chloride 101      Bicarbonate 25      Anion Gap 15      Urea Nitrogen 35 (*)     Creatinine 2.32 (*)     eGFR 19 (*)     Calcium 8.5 (*)     Albumin 3.8      Alkaline Phosphatase 56      Total Protein 6.5      AST 15      Bilirubin, Total 0.5      ALT 11     MAGNESIUM - Abnormal    Magnesium 1.40 (*)    APTT - Normal    aPTT 30      Narrative:     The APTT is no longer used for monitoring Unfractionated Heparin Therapy. For monitoring Heparin Therapy, use the Heparin Assay.   PROTIME-INR - Normal    Protime 11.8      INR 1.0     SERIAL TROPONIN-INITIAL - Normal    Troponin I, High Sensitivity 7      Narrative:     Less than 99th percentile of normal range cutoff-  Female and children under 18 years old <14 ng/L; Male <21 ng/L: Negative  Repeat testing should be performed if clinically indicated.     Female and children under 18 years old 14-50 ng/L; Male 21-50 ng/L:  Consistent with possible cardiac damage and possible increased clinical   risk. Serial measurements may help to assess extent of myocardial damage.     >50 ng/L: Consistent with cardiac damage, increased clinical risk and  myocardial infarction. Serial measurements may help assess extent of   myocardial damage.      NOTE: Children less than 1 year old may have higher baseline troponin   levels and results should be interpreted in conjunction with the overall   clinical context.     NOTE: Troponin I testing is performed using a different   testing methodology at [facility] than at other   [facility]. Direct result comparisons should only   be made within the same method.   URINALYSIS WITH REFLEX CULTURE AND MICROSCOPIC - Normal    Color, Urine Light-Yellow      Appearance, Urine Clear       Specific Gravity, Urine 1.021      pH, Urine 5.5      Protein, Urine 20 (TRACE)      Glucose, Urine Normal      Blood, Urine NEGATIVE      Ketones, Urine NEGATIVE      Bilirubin, Urine NEGATIVE      Urobilinogen, Urine Normal      Nitrite, Urine NEGATIVE      Leukocyte Esterase, Urine NEGATIVE     LACTATE - Normal    Lactate 1.2      Narrative:     Venipuncture immediately after or during the administration of Metamizole may lead to falsely low results. Testing should be performed immediately prior to Metamizole dosing.   BLOOD CULTURE   BLOOD CULTURE   TROPONIN SERIES- (INITIAL, 1 HR)    Narrative:     The following orders were created for panel order Troponin I Series, High Sensitivity (0, 1 HR).  Procedure                               Abnormality         Status                     ---------                               -----------         ------                     Troponin I, High Sensiti...[[id number]]  Normal              Final result               Troponin, High Sensitivi...[[id number]]                                                   Please view results for these tests on the individual orders.   URINALYSIS WITH REFLEX CULTURE AND MICROSCOPIC    Narrative:     The following orders were created for panel order Urinalysis with Reflex Culture and Microscopic.  Procedure                               Abnormality         Status                     ---------                               -----------         ------                     Urinalysis with Reflex C...[[id number]]  Normal              Final result               Extra Urine Gray Tube[[id number]]                            In process                   Please view results for these tests on the individual orders.   EXTRA URINE GRAY TUBE   PROCALCITONIN   URINALYSIS MICROSCOPIC WITH REFLEX CULTURE    WBC, Urine 1-5      RBC, Urine 1-2      Squamous Epithelial Cells, Urine 1-9 (SPARSE)      Mucus, Urine FEW          XR chest 1 view   Final Result    Left-greater-than-right perihilar infiltrates. Enlarged cardiac   silhouette. Hiatal hernia.        MACRO:   None.        Signed by: Bhargavi Reese 10/26/2024 11:38 AM   Dictation workstation:   [id number]      XR wrist left 3+ views   Final Result   No carpal bone fracture or displacement.        Faint lucency in the radial styloid process, nondisplaced fracture   not entirely excluded. Correlation with site of injury and overlying   point tenderness recommended.        Multifocal productive/degenerative changes.        MACRO:   None.        Signed by: Bhargavi Reese 10/26/2024 11:51 AM   Dictation workstation:   [id number]      XR forearm left 2 views   Final Result   No evidence of acute fracture. Questionable distal forearm soft   tissue swelling.        MACRO:   None.        Signed by: Bhargavi Reese 10/26/2024 11:49 AM   Dictation workstation:   [id number]      XR knee right 4+ views   Final Result   No evidence of acute fracture. Status post TKA with intact hardware.   Faint periarticular densities.        MACRO:   None.        Signed by: Bhargavi Reese 10/26/2024 11:48 AM   Dictation workstation:   [id number]      CT head wo IV contrast   Final Result   No acute intracranial hemorrhage or mass-effect.        MACRO:   None.        Signed by: Bhargavi Reese 10/26/2024 11:32 AM   Dictation workstation:   [id number]      CT cervical spine wo IV contrast   Final Result   No acute cervical vertebral displacement or acute displaced fracture.   Multilevel productive/degenerative changes and straightening of the   cervical lordosis similar to 05/15/2023.        MACRO:   None.        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 No evidence of acute displaced facial   bone fracture.        Bilateral TM joint  arthritic/degenerative changes.        Bilateral ethmoid and maxillary sinus mucosal thickening. Partial   opacification of right mastoid air cells. Additional variants of   sinonasal anatomy as above.        MACRO:   None.             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 No evidence of acute displaced facial   bone fracture.        Bilateral TM joint arthritic/degenerative changes.        Bilateral ethmoid and maxillary sinus mucosal thickening. Partial   opacification of right mastoid air cells. Additional variants of   sinonasal anatomy as above.        MACRO:   None.             Signed by: Bhargavi Reese 10/26/2024 11:46 AM   Dictation workstation:   [id number]           ECG 12 Lead    Performed by: Jocelyne King PA-C  Authorized by: Jocelyne King PA-C    ECG interpreted by ED Physician in the absence of a cardiologist: yes    Comments:      EKG shows sinus rhythm with first-degree AV block.  Rate of 68 bpm.  No ST elevation.  Parables 240 ms and QRS duration is 82 ms.  EKG interpretation per myself, Jocelyne King  Critical Care    Performed by: Jocelyne King PA-C  Authorized by: Jocelyne King PA-C    Critical care provider statement:     Critical care time (minutes):  45    Critical care time was exclusive of:  Separately billable procedures and treating other patients    Critical care was necessary to treat or prevent imminent or life-threatening deterioration of the following conditions:  Respiratory failure    Critical care was time spent personally by me on the following activities:  Pulse oximetry, re-evaluation of patient's condition, blood draw for specimens, development of treatment plan with patient or surrogate, discussions with consultants and discussions with primary provider    Care  discussed with: admitting provider         Medical Decision Making  Patient is a 89-year-old female who presents to the ED for a chief complaint of a fall. Patient states that she fell on Tuesday, she does not know why she fell although she is extremely unsteady on her feet. She denies syncope. Workup is remarkable for bilateral pneumonia on chest x-ray. She does report a cough, states it is chronic though, maybe slightly worse. Pulse ox has dropped down to 88-91%% and goes down to 85% while sleeping. She was placed on 2L oxygen and is 94-95%. Tx with rocephin and azithromycin. Magnesium is 1.40, IV magnesium given. She also has a suspected radial styloid fracture. She was placed in a prefabricated thumb spica splint which was applied by the nurse. Follow up with orthopedics is recommended. Neurovascularly intact. Patient is recommended for admission.    DDX includes but not limited to: fall, contusion, sprain, fracture, sepsis, UTI, pneumonia    Amount and/or Complexity of Data Reviewed  Labs: ordered. Decision-making details documented in ED Course.  Radiology: independent interpretation performed. Decision-making details documented in ED Course.  ECG/medicine tests: ordered and independent interpretation performed. Decision-making details documented in ED Course.         Diagnoses as of 10/26/24 1624   Fall, initial encounter   Closed head injury, initial encounter   Hypomagnesemia   Chronic renal impairment, unspecified CKD stage   Community acquired pneumonia, bilateral   Hypoxia   Contusion of right knee, initial encounter   Closed nondisplaced fracture of styloid process of left radius, initial encounter                    Jocelyne King PA-C  10/26/24 1624

## 2024-10-26 NOTE — H&P
[facility] Medicine H&P    Lakeshia Park is a 89 y.o. female with Pmhx of PVD, anemia, hypothyroididsm, dm type 2, hld, anxiety/depressiom, htn, cad, hernia, gi bleed, gout, ckdstage 3, uterine cancer  presenting to [facility] ED on 10/26 for mechanical fall. In the ED, labs significant for , Creatinine 2.32, mag 1.4.  Right knee x-ray negative for acute processes shows status post TKA with intact hardware.  Left forearm x-ray negative, faint lucency in the radial styloid process, nondisplaced fracture not excluded left wrist, chest x-ray showed bilateral infiltrates and hiatal hernia.  CT facial bones showed 6 sinus mucosal thickening.  CT C-spine and CT head negative. vital signs are 6.4, 64, 16, 149/89, 96% on 2 L nasal cannula.  Patient was given Rocephin, azithromycin, and 2 g of mag.  Patient will be admitted for mechanical fall and working diagnosis of community-acquired pneumonia.    Subjective   Patient examined at bedside in the ER.  Patient states that she fell last Tuesday and did not want to come to the hospital for treatment.  Reports that [facility] made her come today.  States she was getting up from her chair to walk to the bathroom and fell hitting her head face down on her stomach.  Also hit her left wrist.  Reporting bruising to her face right knee and left wrist.  Patient was slightly hypoxic on arrival 88 to 91% was placed on 2 L nasal cannula.  Denies any chest pain, shortness of breath, nausea, or vomiting.    Objective   General Appearance: AAO x 3, not in acute distress  Skin: skin color, texture, turgor normal; no suspicious rashes or lesions  Eyes : PERRL, EOM's intact, conjunctiva pink  ENT: no oral thrush, no pharyngeal erythema or exudates  Neck: no JVD, no lymphadenopathy  Respiratory: lungs CTA, no rhonchi, wheezing, or crackles  Heart: RRR without murmur, gallop, or rubs, no ectopy  Abdomen: Nondistended, positive bowel sounds, soft,  nontender  Extremities: no edema, ROM x 4  extremities  Peripheral pulses: normal and present x 4 extremities  Neuro: alert, coherent and conversant, no focal motor deficits  Extensive bruising to face in various stages of healing  Bruising noted to left arm and right knee,  Also bruising noted on right chest wall    Last Recorded Vitals  /74   Pulse 59   Temp 36.4 °C (97.5 °F) (Temporal)   Resp 16   Wt 88.9 kg (196 lb)   SpO2 96%     Intake/Output last 3 Shifts:    Intake/Output Summary (Last 24 hours) at 10/26/2024 1524  Last data filed at 10/26/2024 1423  Gross per 24 hour   Intake 50 ml   Output --   Net 50 ml     Admission Weight  Weight: 88.9 kg (196 lb) (10/26/24 1007)    Daily Weight  10/26/24 : 88.9 kg (196 lb)    Lab results  Results for orders placed or performed during the hospital encounter of 10/26/24 (from the past 24 hours)   CBC and Auto Differential   Result Value Ref Range    WBC 6.4 4.4 - 11.3 x10*3/uL    nRBC 0.0 0.0 - 0.0 /100 WBCs    RBC 3.72 (L) 4.00 - 5.20 x10*6/uL    Hemoglobin 11.6 (L) 12.0 - 16.0 g/dL    Hematocrit 35.8 (L) 36.0 - 46.0 %    MCV 96 80 - 100 fL    MCH 31.2 26.0 - 34.0 pg    MCHC 32.4 32.0 - 36.0 g/dL    RDW 14.0 11.5 - 14.5 %    Platelets 244 150 - 450 x10*3/uL    Neutrophils % 71.1 40.0 - 80.0 %    Immature Granulocytes %, Automated 0.8 0.0 - 0.9 %    Lymphocytes % 17.0 13.0 - 44.0 %    Monocytes % 9.7 2.0 - 10.0 %    Eosinophils % 1.1 0.0 - 6.0 %    Basophils % 0.3 0.0 - 2.0 %    Neutrophils Absolute 4.53 1.60 - 5.50 x10*3/uL    Immature Granulocytes Absolute, Automated 0.05 0.00 - 0.50 x10*3/uL    Lymphocytes Absolute 1.08 0.80 - 3.00 x10*3/uL    Monocytes Absolute 0.62 0.05 - 0.80 x10*3/uL    Eosinophils Absolute 0.07 0.00 - 0.40 x10*3/uL    Basophils Absolute 0.02 0.00 - 0.10 x10*3/uL   Comprehensive Metabolic Panel   Result Value Ref Range    Glucose 254 (H) 74 - 99 mg/dL    Sodium 137 136 - 145 mmol/L    Potassium 3.7 3.5 - 5.3 mmol/L    Chloride 101 98 - 107 mmol/L    Bicarbonate 25 21 - 32 mmol/L     Anion Gap 15 10 - 20 mmol/L    Urea Nitrogen 35 (H) 6 - 23 mg/dL    Creatinine 2.32 (H) 0.50 - 1.05 mg/dL    eGFR 19 (L) >60 mL/min/1.73m*2    Calcium 8.5 (L) 8.6 - 10.3 mg/dL    Albumin 3.8 3.4 - 5.0 g/dL    Alkaline Phosphatase 56 33 - 136 U/L    Total Protein 6.5 6.4 - 8.2 g/dL    AST 15 9 - 39 U/L    Bilirubin, Total 0.5 0.0 - 1.2 mg/dL    ALT 11 7 - 45 U/L   Magnesium   Result Value Ref Range    Magnesium 1.40 (L) 1.60 - 2.40 mg/dL   aPTT   Result Value Ref Range    aPTT 30 27 - 38 seconds   Protime-INR   Result Value Ref Range    Protime 11.8 9.8 - 12.8 seconds    INR 1.0 0.9 - 1.1   Troponin I, High Sensitivity, Initial   Result Value Ref Range    Troponin I, High Sensitivity 7 0 - 13 ng/L   Urinalysis with Reflex Culture and Microscopic   Result Value Ref Range    Color, Urine Light-Yellow Light-Yellow, Yellow, Dark-Yellow    Appearance, Urine Clear Clear    Specific Gravity, Urine 1.021 1.005 - 1.035    pH, Urine 5.5 5.0, 5.5, 6.0, 6.5, 7.0, 7.5, 8.0    Protein, Urine 20 (TRACE) NEGATIVE, 10 (TRACE), 20 (TRACE) mg/dL    Glucose, Urine Normal Normal mg/dL    Blood, Urine NEGATIVE NEGATIVE    Ketones, Urine NEGATIVE NEGATIVE mg/dL    Bilirubin, Urine NEGATIVE NEGATIVE    Urobilinogen, Urine Normal Normal mg/dL    Nitrite, Urine NEGATIVE NEGATIVE    Leukocyte Esterase, Urine NEGATIVE NEGATIVE   Urinalysis Microscopic   Result Value Ref Range    WBC, Urine 1-5 1-5, NONE /HPF    RBC, Urine 1-2 NONE, 1-2, 3-5 /HPF    Squamous Epithelial Cells, Urine 1-9 (SPARSE) Reference range not established. /HPF    Mucus, Urine FEW Reference range not established. /LPF   Lactate   Result Value Ref Range    Lactate 1.2 0.4 - 2.0 mmol/L   SST TOP   Result Value Ref Range    Extra Tube Hold for add-ons.      Image Results  XR wrist left 3+ views    Result Date: 10/26/2024  Interpreted By:  Bhargavi Reese, STUDY: XR WRIST LEFT 3+ VIEWS;  10/26/2024 11:20 am   INDICATION: Signs/Symptoms:fall.     COMPARISON: None.   ACCESSION  NUMBER(S): [id number]   ORDERING CLINICIAN: EVER BALES   FINDINGS: Four views of the left wrist obtained.   No carpal bone fracture or displacement. Subtle lucency at the radial styloid process. Radiocarpal and 1st CMC joint space narrowing. Multiple faint periarticular densities including the region of the triangular fibrocartilage and around the 1st CMC joint.       No carpal bone fracture or displacement.   Faint lucency in the radial styloid process, nondisplaced fracture not entirely excluded. Correlation with site of injury and overlying point tenderness recommended.   Multifocal productive/degenerative changes.   MACRO: None.   Signed by: Bhargavi Reese 10/26/2024 11:51 AM Dictation workstation:   [id number]    XR forearm left 2 views    Result Date: 10/26/2024  Interpreted By:  Bhargavi Reese, STUDY: XR FOREARM LEFT 2 VIEWS;  10/26/2024 11:20 am   INDICATION: Signs/Symptoms:fall.     COMPARISON: None.   ACCESSION NUMBER(S): [id number]   ORDERING CLINICIAN: EVER BALES   FINDINGS: Two views of the left radius/ulna obtained.   IV catheter/tubing overlies the proximal forearm. No definite focal disruption of the radial or ulnar contours to suggest acute displaced fracture. Grossly intact alignment at the elbow and wrist joints. Questionable soft tissue swelling in the distal forearm.       No evidence of acute fracture. Questionable distal forearm soft tissue swelling.   MACRO: None.   Signed by: Bhargavi Reese 10/26/2024 11:49 AM Dictation workstation:   [id number]    XR knee right 4+ views    Result Date: 10/26/2024  Interpreted By:  Bhargavi Reese, STUDY: XR KNEE RIGHT 4+ VIEWS;  10/26/2024 11:20 am   INDICATION: Signs/Symptoms:fall.     COMPARISON: None.   ACCESSION NUMBER(S): [id number]   ORDERING CLINICIAN: EVER BALES   FINDINGS: Four views of the right knee obtained.   Total knee arthroplasty components in anatomic alignment. No abnormal lucency or acute fracture around the  hardware. Small faint periarticular densities. No significant suprapatellar effusion. Faint densities along the anterior distal femur/suprapatellar region. Presumed vascular calcifications in the posterior soft tissues. No focal soft tissue swelling is apparent.       No evidence of acute fracture. Status post TKA with intact hardware. Faint periarticular densities.   MACRO: None.   Signed by: Bhargavi Reese 10/26/2024 11:48 AM Dictation workstation:   [id number]    CT maxillofacial bones wo IV contrast    Result Date: 10/26/2024  Interpreted By:  Bhargavi Reese, STUDY: CT FACIAL BONES WO IV CONTRAST; CT 3D RECONSTRUCTION  10/26/2024 11:18 am   INDICATION: Signs/Symptoms:fall, bruising; Signs/Symptoms:trauma     COMPARISON: None.   ACCESSION NUMBER(S): [id number]; [id number]   ORDERING CLINICIAN: EVER BALES   TECHNIQUE: Thin cut axial CT images through the facial bones were obtained and reconstructed in the coronal  and sagittal plane. 3D reconstructions were created on an independent workstation and provided for review.   FINDINGS: Metallic streak artifact from dental hardware in the oral cavity limits assessment of adjacent structures. 17 mm defect in the anterior right maxilla with adjacent punctate calcifications/ossifications and dots of air and marked thinning or absence of the midline maxilla more posteriorly. No acute displaced facial bone fracture.   The orbital walls are intact. The globes and orbital contents are intact and symmetric.   No mandibular fracture or dislocation at the temporomandibular joints. Bilateral TM joint space narrowing with faint densities and flattening of the mandibular condyles.   Bilateral ethmoid and maxillary sinus mucosal thickening. Under developed and partially opacified right mastoid air cells. Marked right-sided nasal septal deviation. 9 mm left-sided mario bullosa.       Smooth likely remote congenital or surgical defect of the right anterior and midline  maxilla. No evidence of acute displaced facial bone fracture.   Bilateral TM joint arthritic/degenerative changes.   Bilateral ethmoid and maxillary sinus mucosal thickening. Partial opacification of right mastoid air cells. Additional variants of sinonasal anatomy as above.   MACRO: None.     Signed by: Bhargavi Reese 10/26/2024 11:46 AM Dictation workstation:   [id number]    CT 3D reconstruction    Result Date: 10/26/2024  Interpreted By:  Bhargavi Reese, STUDY: CT FACIAL BONES WO IV CONTRAST; CT 3D RECONSTRUCTION  10/26/2024 11:18 am   INDICATION: Signs/Symptoms:fall, bruising; Signs/Symptoms:trauma     COMPARISON: None.   ACCESSION NUMBER(S): [id number]; [id number]   ORDERING CLINICIAN: EVER BALES   TECHNIQUE: Thin cut axial CT images through the facial bones were obtained and reconstructed in the coronal  and sagittal plane. 3D reconstructions were created on an independent workstation and provided for review.   FINDINGS: Metallic streak artifact from dental hardware in the oral cavity limits assessment of adjacent structures. 17 mm defect in the anterior right maxilla with adjacent punctate calcifications/ossifications and dots of air and marked thinning or absence of the midline maxilla more posteriorly. No acute displaced facial bone fracture.   The orbital walls are intact. The globes and orbital contents are intact and symmetric.   No mandibular fracture or dislocation at the temporomandibular joints. Bilateral TM joint space narrowing with faint densities and flattening of the mandibular condyles.   Bilateral ethmoid and maxillary sinus mucosal thickening. Under developed and partially opacified right mastoid air cells. Marked right-sided nasal septal deviation. 9 mm left-sided mario bullosa.       Smooth likely remote congenital or surgical defect of the right anterior and midline maxilla. No evidence of acute displaced facial bone fracture.   Bilateral TM joint arthritic/degenerative  changes.   Bilateral ethmoid and maxillary sinus mucosal thickening. Partial opacification of right mastoid air cells. Additional variants of sinonasal anatomy as above.   MACRO: None.     Signed by: Bhargavi Reese 10/26/2024 11:46 AM Dictation workstation:   [id number]    XR chest 1 view    Result Date: 10/26/2024  Interpreted By:  Bhargavi Reese, STUDY: XR CHEST 1 VIEW;  10/26/2024 11:20 am   INDICATION: Signs/Symptoms:Chest Pain.     COMPARISON: 04/12/2024   ACCESSION NUMBER(S): [id number]   ORDERING CLINICIAN: EVER BALES   FINDINGS: Artifact from overlying monitoring leads noted. Limited pulmonary inflation. Left-greater-than-right perihilar infiltrates. No definite pleural effusion. Enlarged cardiomediastinal silhouette with gas density overlying the heart shadow consistent with hiatal hernia. Aortic calcifications.       Left-greater-than-right perihilar infiltrates. Enlarged cardiac silhouette. Hiatal hernia.   MACRO: None.   Signed by: Bhargavi Reese 10/26/2024 11:38 AM Dictation workstation:   [id number]    CT cervical spine wo IV contrast    Result Date: 10/26/2024  Interpreted By:  Bhargavi Reese, STUDY: CT CERVICAL SPINE WO IV CONTRAST;  10/26/2024 11:18 am   INDICATION: Signs/Symptoms:fall.     COMPARISON: 05/15/2023   ACCESSION NUMBER(S): [id number]   ORDERING CLINICIAN: EVER BALES   TECHNIQUE: CT images were obtained through the cervical spine. Sagittal and coronal reconstructions were generated.   FINDINGS:     ALIGNMENT: Mild levocurvature distally. Straightening of the lower cervical lordosis. No significant spondylolisthesis.   VERTEBRAE/DISC SPACES: No compression deformity or acute displaced fracture.  Multilevel productive/degenerative changes including atlantoaxial joint space narrowing with spurring and faint calcifications around the dens, diffuse intervertebral disc space narrowing with endplate sclerosis, spurring and intervertebral disc calcifications relatively  sparing C2-3, multilevel bilateral facet joint narrowing and spurring with at least partial fusion across bilateral C2-3 and C3-4 facet joints. These findings are similar to the previous exam.   ADDITIONAL FINDINGS: No abnormal thickening of the prevertebral soft tissues.  Pulmonary apical bullous/emphysematous changes. Prominent atherosclerotic calcifications in the visualized aorta.       No acute cervical vertebral displacement or acute displaced fracture. Multilevel productive/degenerative changes and straightening of the cervical lordosis similar to 05/15/2023.   MACRO: None.   Signed by: Bhargavi Reese 10/26/2024 11:36 AM Dictation workstation:   [id number]    CT head wo IV contrast    Result Date: 10/26/2024  Interpreted By:  Bhargavi Reese, STUDY: CT HEAD WO IV CONTRAST;  10/26/2024 11:18 am   INDICATION: Signs/Symptoms:fall.     COMPARISON: 11/01/2023   ACCESSION NUMBER(S): [id number]   ORDERING CLINICIAN: EVER BALES   TECHNIQUE: Unenhanced CT images of the head were obtained.   FINDINGS: The ventricles, cisterns and sulci are enlarged, consistent with diffuse volume loss. There are areas of nonspecific white matter hypodensity, which are probably age related or microvascular in nature. These findings are similar to the previous exam. There is no acute intracranial hemorrhage, mass effect or midline shift. No extraaxial fluid collection.   No acute displaced calvarial fracture.   Visualized paranasal sinuses are clear. Under developed right mastoid air cells.           No acute intracranial hemorrhage or mass-effect.   MACRO: None.   Signed by: Bhargavi Reese 10/26/2024 11:32 AM Dictation workstation:   [id number]    Assessment/Plan   Lakeshia Park is a 89 y.o. female with Pmhx of PVD, anemia, hypothyroididsm, dm type 2, hld, anxiety/depressiom, htn, cad, hernia, gi bleed, gout, ckdstage 3, uterine cancer  presenting to [facility] ED on 10/26 for mechanical fall. Patient will be admitted for  mechanical fall and working diagnosis of community-acquired pneumonia.    Principal Problem:    Fall, initial encounter    PLAN:  From [facility]     Community-acquired PNA  -procal pending   -rocephin 2g IV day 1  -azithroymcin day 1/3  -tesslon pearles for cough  -continue home asmanex  -duonebs  -telemetry monitoring  -PT/OT eval and tx  -oxygen, wean as tolerated, no home O2, will need walking pulse ox prior to dc     Possible slight radial styloid fracture  -can FU OP with ortho  -splint placed in ED    Hypothyroidism  -levothyroxine 175mcg po daily     Gout  -allopurinol 100mg po daily     Depression  -wellbutrin 150mg po bid     Essential hypertension  -metoprolol 25mg in am and 50mg in pm    MARISSA  -ferrous sulfate 325mg po daily     Dvt prophylaxis: lovenox subcu   Dispo: anticipate 24-48  depending on clinical course    Loretta Prasad, DNP, APRN, AGNP-BC  Hospitalist Nurse Practitioner

## 2024-10-27 LAB
ALBUMIN SERPL BCP-MCNC: 3.5 G/DL (ref 3.4–5)
ALP SERPL-CCNC: 47 U/L (ref 33–136)
ALT SERPL W P-5'-P-CCNC: 9 U/L (ref 7–45)
ANION GAP SERPL CALC-SCNC: 11 MMOL/L (ref 10–20)
AST SERPL W P-5'-P-CCNC: 12 U/L (ref 9–39)
BACTERIA BLD CULT: NORMAL
BACTERIA BLD CULT: NORMAL
BILIRUB SERPL-MCNC: 0.4 MG/DL (ref 0–1.2)
BUN SERPL-MCNC: 35 MG/DL (ref 6–23)
CALCIUM SERPL-MCNC: 8.2 MG/DL (ref 8.6–10.3)
CHLORIDE SERPL-SCNC: 101 MMOL/L (ref 98–107)
CO2 SERPL-SCNC: 29 MMOL/L (ref 21–32)
CREAT SERPL-MCNC: 2.12 MG/DL (ref 0.5–1.05)
EGFRCR SERPLBLD CKD-EPI 2021: 21 ML/MIN/1.73M*2
ERYTHROCYTE [DISTWIDTH] IN BLOOD BY AUTOMATED COUNT: 13.6 % (ref 11.5–14.5)
GLUCOSE BLD MANUAL STRIP-MCNC: 125 MG/DL (ref 74–99)
GLUCOSE BLD MANUAL STRIP-MCNC: 179 MG/DL (ref 74–99)
GLUCOSE BLD MANUAL STRIP-MCNC: 194 MG/DL (ref 74–99)
GLUCOSE SERPL-MCNC: 134 MG/DL (ref 74–99)
HCT VFR BLD AUTO: 34.9 % (ref 36–46)
HGB BLD-MCNC: 11.1 G/DL (ref 12–16)
MCH RBC QN AUTO: 30.7 PG (ref 26–34)
MCHC RBC AUTO-ENTMCNC: 31.8 G/DL (ref 32–36)
MCV RBC AUTO: 96 FL (ref 80–100)
NRBC BLD-RTO: 0 /100 WBCS (ref 0–0)
PLATELET # BLD AUTO: 234 X10*3/UL (ref 150–450)
POTASSIUM SERPL-SCNC: 3.8 MMOL/L (ref 3.5–5.3)
PROCALCITONIN SERPL-MCNC: 0.09 NG/ML
PROT SERPL-MCNC: 6.4 G/DL (ref 6.4–8.2)
RBC # BLD AUTO: 3.62 X10*6/UL (ref 4–5.2)
SODIUM SERPL-SCNC: 137 MMOL/L (ref 136–145)
WBC # BLD AUTO: 5.9 X10*3/UL (ref 4.4–11.3)

## 2024-10-27 PROCEDURE — 99231 SBSQ HOSP IP/OBS SF/LOW 25: CPT | Performed by: NURSE PRACTITIONER

## 2024-10-27 PROCEDURE — 97162 PT EVAL MOD COMPLEX 30 MIN: CPT | Mod: GP | Performed by: PHYSICAL THERAPIST

## 2024-10-27 PROCEDURE — 85027 COMPLETE CBC AUTOMATED: CPT | Performed by: NURSE PRACTITIONER

## 2024-10-27 PROCEDURE — 2500000001 HC RX 250 WO HCPCS SELF ADMINISTERED DRUGS (ALT 637 FOR MEDICARE OP): Performed by: NURSE PRACTITIONER

## 2024-10-27 PROCEDURE — 2500000004 HC RX 250 GENERAL PHARMACY W/ HCPCS (ALT 636 FOR OP/ED): Performed by: NURSE PRACTITIONER

## 2024-10-27 PROCEDURE — 94640 AIRWAY INHALATION TREATMENT: CPT

## 2024-10-27 PROCEDURE — 36415 COLL VENOUS BLD VENIPUNCTURE: CPT | Performed by: NURSE PRACTITIONER

## 2024-10-27 PROCEDURE — 94761 N-INVAS EAR/PLS OXIMETRY MLT: CPT

## 2024-10-27 PROCEDURE — 82947 ASSAY GLUCOSE BLOOD QUANT: CPT

## 2024-10-27 PROCEDURE — 2500000004 HC RX 250 GENERAL PHARMACY W/ HCPCS (ALT 636 FOR OP/ED): Performed by: STUDENT IN AN ORGANIZED HEALTH CARE EDUCATION/TRAINING PROGRAM

## 2024-10-27 PROCEDURE — 2500000002 HC RX 250 W HCPCS SELF ADMINISTERED DRUGS (ALT 637 FOR MEDICARE OP, ALT 636 FOR OP/ED): Performed by: NURSE PRACTITIONER

## 2024-10-27 PROCEDURE — 1200000002 HC GENERAL ROOM WITH TELEMETRY DAILY

## 2024-10-27 PROCEDURE — 84075 ASSAY ALKALINE PHOSPHATASE: CPT | Performed by: NURSE PRACTITIONER

## 2024-10-27 PROCEDURE — 2500000005 HC RX 250 GENERAL PHARMACY W/O HCPCS: Performed by: STUDENT IN AN ORGANIZED HEALTH CARE EDUCATION/TRAINING PROGRAM

## 2024-10-27 ASSESSMENT — COGNITIVE AND FUNCTIONAL STATUS - GENERAL
CLIMB 3 TO 5 STEPS WITH RAILING: A LITTLE
MOBILITY SCORE: 18
DRESSING REGULAR LOWER BODY CLOTHING: A LITTLE
MOVING TO AND FROM BED TO CHAIR: A LITTLE
CLIMB 3 TO 5 STEPS WITH RAILING: A LOT
STANDING UP FROM CHAIR USING ARMS: A LITTLE
WALKING IN HOSPITAL ROOM: A LITTLE
DAILY ACTIVITIY SCORE: 22
CLIMB 3 TO 5 STEPS WITH RAILING: A LITTLE
HELP NEEDED FOR BATHING: A LITTLE
MOBILITY SCORE: 23
TURNING FROM BACK TO SIDE WHILE IN FLAT BAD: A LITTLE
HELP NEEDED FOR BATHING: A LITTLE
MOBILITY SCORE: 23
DAILY ACTIVITIY SCORE: 22
DRESSING REGULAR LOWER BODY CLOTHING: A LITTLE

## 2024-10-27 ASSESSMENT — PAIN - FUNCTIONAL ASSESSMENT
PAIN_FUNCTIONAL_ASSESSMENT: 0-10
PAIN_FUNCTIONAL_ASSESSMENT: 0-10

## 2024-10-27 ASSESSMENT — PAIN SCALES - GENERAL
PAINLEVEL_OUTOF10: 3
PAINLEVEL_OUTOF10: 0 - NO PAIN

## 2024-10-27 NOTE — CARE PLAN
Problem: Fall/Injury  Goal: Not fall by end of shift  10/27/2024 0747 by Ambar Gonzalez RN  Outcome: Progressing  10/27/2024 0747 by Ambar Gonzalez RN  Outcome: Progressing  Goal: Be free from injury by end of the shift  10/27/2024 0747 by Ambar Gonzalez RN  Outcome: Progressing  10/27/2024 0747 by Ambar Gonzalez RN  Outcome: Progressing     Problem: Pain - Adult  Goal: Verbalizes/displays adequate comfort level or baseline comfort level  Outcome: Progressing     Problem: Safety - Adult  Goal: Free from fall injury  Outcome: Progressing     Problem: Discharge Planning  Goal: Discharge to home or other facility with appropriate resources  Outcome: Progressing     Problem: Chronic Conditions and Co-morbidities  Goal: Patient's chronic conditions and co-morbidity symptoms are monitored and maintained or improved  Outcome: Progressing   The patient's goals for the shift include      The clinical goals for the shift include no falls    Over the shift, the patient did not make progress toward the following goals. Barriers to progression include . Recommendations to address these barriers include .

## 2024-10-27 NOTE — PROGRESS NOTES
Hospital Medicine Daily Progress Note    Subjective   Patient examined at bedside.  Patient alert and oriented only very pleasant  female she is sitting in the chair at bedside just got done working with PT.  Patient states she is feeling much better today.  She is a little weak in her legs.  States her breathing is about the same.    Objective   General Appearance: AAO x 3, not in acute distress  Skin: skin color, texture, turgor normal; no suspicious rashes or lesions  Eyes : PERRL, EOM's intact, conjunctiva pink  ENT: no oral thrush, no pharyngeal erythema or exudates  Neck: no JVD, no lymphadenopathy  Respiratory: lungs CTA, no rhonchi, wheezing, or crackles, 1LNC in place  Heart: RRR without murmur, gallop, or rubs, no ectopy  Abdomen: Nondistended, positive bowel sounds, soft,  nontender  Extremities: no edema, ROM x 4 extremities  Peripheral pulses: normal and present x 4 extremities  Neuro: alert, coherent and conversant, no focal motor deficits  Extensive bruising to face in various stages of healing  Bruising noted to left arm and right knee,  Also bruising noted on right chest wall    Last Recorded Vitals  /83   Pulse 60   Temp 36 °C (96.8 °F)   Resp 20   Wt 88 kg (194 lb)   SpO2 99%     Intake/Output last 3 Shifts:    Intake/Output Summary (Last 24 hours) at 10/27/2024 1027  Last data filed at 10/27/2024 0900  Gross per 24 hour   Intake 410 ml   Output --   Net 410 ml     Admission Weight  Weight: 88.9 kg (196 lb) (10/26/24 1007)    Daily Weight  10/26/24 : 88 kg (194 lb)    Lab results  Results for orders placed or performed during the hospital encounter of 10/26/24 (from the past 24 hours)   Urinalysis with Reflex Culture and Microscopic   Result Value Ref Range    Color, Urine Light-Yellow Light-Yellow, Yellow, Dark-Yellow    Appearance, Urine Clear Clear    Specific Gravity, Urine 1.021 1.005 - 1.035    pH, Urine 5.5 5.0, 5.5, 6.0, 6.5, 7.0, 7.5, 8.0    Protein, Urine 20 (TRACE)  NEGATIVE, 10 (TRACE), 20 (TRACE) mg/dL    Glucose, Urine Normal Normal mg/dL    Blood, Urine NEGATIVE NEGATIVE    Ketones, Urine NEGATIVE NEGATIVE mg/dL    Bilirubin, Urine NEGATIVE NEGATIVE    Urobilinogen, Urine Normal Normal mg/dL    Nitrite, Urine NEGATIVE NEGATIVE    Leukocyte Esterase, Urine NEGATIVE NEGATIVE   Extra Urine Gray Tube   Result Value Ref Range    Extra Tube Hold for add-ons.    Urinalysis Microscopic   Result Value Ref Range    WBC, Urine 1-5 1-5, NONE /HPF    RBC, Urine 1-2 NONE, 1-2, 3-5 /HPF    Squamous Epithelial Cells, Urine 1-9 (SPARSE) Reference range not established. /HPF    Mucus, Urine FEW Reference range not established. /LPF   Lactate   Result Value Ref Range    Lactate 1.2 0.4 - 2.0 mmol/L   Blood Culture    Specimen: Peripheral Venipuncture; Blood culture   Result Value Ref Range    Blood Culture Loaded on Instrument - Culture in progress    Blood Culture    Specimen: Peripheral Venipuncture; Blood culture   Result Value Ref Range    Blood Culture Loaded on Instrument - Culture in progress    SST TOP   Result Value Ref Range    Extra Tube Hold for add-ons.    Procalcitonin   Result Value Ref Range    Procalcitonin 0.09 (H) <=0.07 ng/mL   POCT GLUCOSE   Result Value Ref Range    POCT Glucose 143 (H) 74 - 99 mg/dL   POCT GLUCOSE   Result Value Ref Range    POCT Glucose 201 (H) 74 - 99 mg/dL   Comprehensive metabolic panel   Result Value Ref Range    Glucose 134 (H) 74 - 99 mg/dL    Sodium 137 136 - 145 mmol/L    Potassium 3.8 3.5 - 5.3 mmol/L    Chloride 101 98 - 107 mmol/L    Bicarbonate 29 21 - 32 mmol/L    Anion Gap 11 10 - 20 mmol/L    Urea Nitrogen 35 (H) 6 - 23 mg/dL    Creatinine 2.12 (H) 0.50 - 1.05 mg/dL    eGFR 21 (L) >60 mL/min/1.73m*2    Calcium 8.2 (L) 8.6 - 10.3 mg/dL    Albumin 3.5 3.4 - 5.0 g/dL    Alkaline Phosphatase 47 33 - 136 U/L    Total Protein 6.4 6.4 - 8.2 g/dL    AST 12 9 - 39 U/L    Bilirubin, Total 0.4 0.0 - 1.2 mg/dL    ALT 9 7 - 45 U/L   CBC   Result  Value Ref Range    WBC 5.9 4.4 - 11.3 x10*3/uL    nRBC 0.0 0.0 - 0.0 /100 WBCs    RBC 3.62 (L) 4.00 - 5.20 x10*6/uL    Hemoglobin 11.1 (L) 12.0 - 16.0 g/dL    Hematocrit 34.9 (L) 36.0 - 46.0 %    MCV 96 80 - 100 fL    MCH 30.7 26.0 - 34.0 pg    MCHC 31.8 (L) 32.0 - 36.0 g/dL    RDW 13.6 11.5 - 14.5 %    Platelets 234 150 - 450 x10*3/uL   POCT GLUCOSE   Result Value Ref Range    POCT Glucose 125 (H) 74 - 99 mg/dL     Image Results  XR wrist left 3+ views    Result Date: 10/26/2024  Interpreted By:  Bhargavi Reese, STUDY: XR WRIST LEFT 3+ VIEWS;  10/26/2024 11:20 am   INDICATION: Signs/Symptoms:fall.     COMPARISON: None.   ACCESSION NUMBER(S): [id number]   ORDERING CLINICIAN: EVER BALES   FINDINGS: Four views of the left wrist obtained.   No carpal bone fracture or displacement. Subtle lucency at the radial styloid process. Radiocarpal and 1st CMC joint space narrowing. Multiple faint periarticular densities including the region of the triangular fibrocartilage and around the 1st CMC joint.       No carpal bone fracture or displacement.   Faint lucency in the radial styloid process, nondisplaced fracture not entirely excluded. Correlation with site of injury and overlying point tenderness recommended.   Multifocal productive/degenerative changes.   MACRO: None.   Signed by: Bhargavi Reese 10/26/2024 11:51 AM Dictation workstation:   [id number]    XR forearm left 2 views    Result Date: 10/26/2024  Interpreted By:  Bhargavi Reese, STUDY: XR FOREARM LEFT 2 VIEWS;  10/26/2024 11:20 am   INDICATION: Signs/Symptoms:fall.     COMPARISON: None.   ACCESSION NUMBER(S): [id number]   ORDERING CLINICIAN: EVER BALES   FINDINGS: Two views of the left radius/ulna obtained.   IV catheter/tubing overlies the proximal forearm. No definite focal disruption of the radial or ulnar contours to suggest acute displaced fracture. Grossly intact alignment at the elbow and wrist joints. Questionable soft tissue swelling in  the distal forearm.       No evidence of acute fracture. Questionable distal forearm soft tissue swelling.   MACRO: None.   Signed by: Bhargavi Reese 10/26/2024 11:49 AM Dictation workstation:   [id number]    XR knee right 4+ views    Result Date: 10/26/2024  Interpreted By:  Bhargavi Reese, STUDY: XR KNEE RIGHT 4+ VIEWS;  10/26/2024 11:20 am   INDICATION: Signs/Symptoms:fall.     COMPARISON: None.   ACCESSION NUMBER(S): [id number]   ORDERING CLINICIAN: EVER BALES   FINDINGS: Four views of the right knee obtained.   Total knee arthroplasty components in anatomic alignment. No abnormal lucency or acute fracture around the hardware. Small faint periarticular densities. No significant suprapatellar effusion. Faint densities along the anterior distal femur/suprapatellar region. Presumed vascular calcifications in the posterior soft tissues. No focal soft tissue swelling is apparent.       No evidence of acute fracture. Status post TKA with intact hardware. Faint periarticular densities.   MACRO: None.   Signed by: Bhargavi Reese 10/26/2024 11:48 AM Dictation workstation:   [id number]    CT maxillofacial bones wo IV contrast    Result Date: 10/26/2024  Interpreted By:  Bhargavi Reese, STUDY: CT FACIAL BONES WO IV CONTRAST; CT 3D RECONSTRUCTION  10/26/2024 11:18 am   INDICATION: Signs/Symptoms:fall, bruising; Signs/Symptoms:trauma     COMPARISON: None.   ACCESSION NUMBER(S): [id number]; [id number]   ORDERING CLINICIAN: EVER BALES   TECHNIQUE: Thin cut axial CT images through the facial bones were obtained and reconstructed in the coronal  and sagittal plane. 3D reconstructions were created on an independent workstation and provided for review.   FINDINGS: Metallic streak artifact from dental hardware in the oral cavity limits assessment of adjacent structures. 17 mm defect in the anterior right maxilla with adjacent punctate calcifications/ossifications and dots of air and marked thinning or absence  of the midline maxilla more posteriorly. No acute displaced facial bone fracture.   The orbital walls are intact. The globes and orbital contents are intact and symmetric.   No mandibular fracture or dislocation at the temporomandibular joints. Bilateral TM joint space narrowing with faint densities and flattening of the mandibular condyles.   Bilateral ethmoid and maxillary sinus mucosal thickening. Under developed and partially opacified right mastoid air cells. Marked right-sided nasal septal deviation. 9 mm left-sided mario bullosa.       Smooth likely remote congenital or surgical defect of the right anterior and midline maxilla. No evidence of acute displaced facial bone fracture.   Bilateral TM joint arthritic/degenerative changes.   Bilateral ethmoid and maxillary sinus mucosal thickening. Partial opacification of right mastoid air cells. Additional variants of sinonasal anatomy as above.   MACRO: None.     Signed by: Bhargavi Reese 10/26/2024 11:46 AM Dictation workstation:   [id number]    CT 3D reconstruction    Result Date: 10/26/2024  Interpreted By:  Bhargavi Reese, STUDY: CT FACIAL BONES WO IV CONTRAST; CT 3D RECONSTRUCTION  10/26/2024 11:18 am   INDICATION: Signs/Symptoms:fall, bruising; Signs/Symptoms:trauma     COMPARISON: None.   ACCESSION NUMBER(S): [id number]; [id number]   ORDERING CLINICIAN: EVER BALES   TECHNIQUE: Thin cut axial CT images through the facial bones were obtained and reconstructed in the coronal  and sagittal plane. 3D reconstructions were created on an independent workstation and provided for review.   FINDINGS: Metallic streak artifact from dental hardware in the oral cavity limits assessment of adjacent structures. 17 mm defect in the anterior right maxilla with adjacent punctate calcifications/ossifications and dots of air and marked thinning or absence of the midline maxilla more posteriorly. No acute displaced facial bone fracture.   The orbital walls are  intact. The globes and orbital contents are intact and symmetric.   No mandibular fracture or dislocation at the temporomandibular joints. Bilateral TM joint space narrowing with faint densities and flattening of the mandibular condyles.   Bilateral ethmoid and maxillary sinus mucosal thickening. Under developed and partially opacified right mastoid air cells. Marked right-sided nasal septal deviation. 9 mm left-sided mario bullosa.       Smooth likely remote congenital or surgical defect of the right anterior and midline maxilla. No evidence of acute displaced facial bone fracture.   Bilateral TM joint arthritic/degenerative changes.   Bilateral ethmoid and maxillary sinus mucosal thickening. Partial opacification of right mastoid air cells. Additional variants of sinonasal anatomy as above.   MACRO: None.     Signed by: Bhargavi Reese 10/26/2024 11:46 AM Dictation workstation:   [id number]    XR chest 1 view    Result Date: 10/26/2024  Interpreted By:  Bhargavi Reese, STUDY: XR CHEST 1 VIEW;  10/26/2024 11:20 am   INDICATION: Signs/Symptoms:Chest Pain.     COMPARISON: 04/12/2024   ACCESSION NUMBER(S): [id number]   ORDERING CLINICIAN: EVER BALES   FINDINGS: Artifact from overlying monitoring leads noted. Limited pulmonary inflation. Left-greater-than-right perihilar infiltrates. No definite pleural effusion. Enlarged cardiomediastinal silhouette with gas density overlying the heart shadow consistent with hiatal hernia. Aortic calcifications.       Left-greater-than-right perihilar infiltrates. Enlarged cardiac silhouette. Hiatal hernia.   MACRO: None.   Signed by: Bhargavi Reese 10/26/2024 11:38 AM Dictation workstation:   [id number]    CT cervical spine wo IV contrast    Result Date: 10/26/2024  Interpreted By:  Bhargavi Reese, STUDY: CT CERVICAL SPINE WO IV CONTRAST;  10/26/2024 11:18 am   INDICATION: Signs/Symptoms:fall.     COMPARISON: 05/15/2023   ACCESSION NUMBER(S): [id number]   ORDERING  CLINICIAN: EVER BALES   TECHNIQUE: CT images were obtained through the cervical spine. Sagittal and coronal reconstructions were generated.   FINDINGS:     ALIGNMENT: Mild levocurvature distally. Straightening of the lower cervical lordosis. No significant spondylolisthesis.   VERTEBRAE/DISC SPACES: No compression deformity or acute displaced fracture.  Multilevel productive/degenerative changes including atlantoaxial joint space narrowing with spurring and faint calcifications around the dens, diffuse intervertebral disc space narrowing with endplate sclerosis, spurring and intervertebral disc calcifications relatively sparing C2-3, multilevel bilateral facet joint narrowing and spurring with at least partial fusion across bilateral C2-3 and C3-4 facet joints. These findings are similar to the previous exam.   ADDITIONAL FINDINGS: No abnormal thickening of the prevertebral soft tissues.  Pulmonary apical bullous/emphysematous changes. Prominent atherosclerotic calcifications in the visualized aorta.       No acute cervical vertebral displacement or acute displaced fracture. Multilevel productive/degenerative changes and straightening of the cervical lordosis similar to 05/15/2023.   MACRO: None.   Signed by: Bhargavi Reese 10/26/2024 11:36 AM Dictation workstation:   [id number]    CT head wo IV contrast    Result Date: 10/26/2024  Interpreted By:  Bhargavi Reese, STUDY: CT HEAD WO IV CONTRAST;  10/26/2024 11:18 am   INDICATION: Signs/Symptoms:fall.     COMPARISON: 11/01/2023   ACCESSION NUMBER(S): [id number]   ORDERING CLINICIAN: EVER BALES   TECHNIQUE: Unenhanced CT images of the head were obtained.   FINDINGS: The ventricles, cisterns and sulci are enlarged, consistent with diffuse volume loss. There are areas of nonspecific white matter hypodensity, which are probably age related or microvascular in nature. These findings are similar to the previous exam. There is no acute intracranial  hemorrhage, mass effect or midline shift. No extraaxial fluid collection.   No acute displaced calvarial fracture.   Visualized paranasal sinuses are clear. Under developed right mastoid air cells.           No acute intracranial hemorrhage or mass-effect.   MACRO: None.   Signed by: Bhargavi Reese 10/26/2024 11:32 AM Dictation workstation:   [id number]    Assessment/Plan   Lakeshia Park is a 89 y.o. female with Pmhx of PVD, anemia, hypothyroididsm, dm type 2, hld, anxiety/depressiom, htn, cad, hernia, gi bleed, gout, ckdstage 3, uterine cancer  presenting to [facility] ED on 10/26 for mechanical fall. Patient will be admitted for mechanical fall and working diagnosis of community-acquired pneumonia.    Principal Problem:    Fall, initial encounter    PLAN:  From [facility]     Community-acquired PNA  -rocephin 2g IV day 2, will go ahead and complete treatment   -azithroymcin day 2/3  -tesslon pearles for cough  -continue home asmanex  -duonebs  -telemetry monitoring  -PT/OT eval and tx, AMPAC 18, will need PT script on discharge   -oxygen, wean as tolerated, no home O2, will need walking pulse ox prior to dc     Possible slight radial styloid fracture  -can FU OP with ortho  -splint placed in ED    CKD  -creatinine 2.12 appears around baseline     Hypothyroidism  -levothyroxine 175mcg po daily     Gout  -allopurinol 100mg po daily     Depression  -wellbutrin 150mg po bid     Essential hypertension  -metoprolol 25mg in am and 50mg in pm    MARISSA  -ferrous sulfate 325mg po daily     Dvt prophylaxis: lovenox subcu   Dispo: anticipate dc tomorrow depending on clinical course    Loretta Prasad, DNP, APRN, AGNP-BC  Hospitalist Nurse Practitioner

## 2024-10-27 NOTE — PROGRESS NOTES
Physical Therapy    Physical Therapy Evaluation    Patient Name: Lakeshia Park  MRN: [id number]  Department: [facility]  Room: [address] [place] [address]  Today's Date: 10/27/2024   Time Calculation  Start Time: 0833  Stop Time: 0858  Time Calculation (min): 25 min    Assessment/Plan   PT Assessment  PT Assessment Results: Decreased strength, Impaired balance, Decreased endurance, Decreased mobility, Pain, Orthopedic restrictions  Rehab Prognosis: Good  End of Session Communication: PCT/NA/CTA (CTA was helping pt back to bed from the bathroom at end of PT eval.)  Assessment Comment: The pt presents with dx of Mechanical Fall, PNA and PT dx of difficulty walking.  Pt has a questionable non-displaced fx of her L radial styloid and is in a wrist splint.  NWB at this time with her L UE until pt follows up with orthopedic MD.  Pt presents with decreased strength, balance, transfers, gait and functional mobility.  The pt would benefit from PT rx while here at the hospital to maximize on these deficits and to maximize safety with mobility.  End of Session Patient Position: Alarm off, not on at start of session, Alarm off, caregiver present  IP OR SWING BED PT PLAN  Inpatient or Swing Bed: Inpatient  PT Plan  Treatment/Interventions: Bed mobility, Transfer training, Gait training, Balance training, Strengthening, Therapeutic exercise, Therapeutic activity, Home exercise program  PT Plan: Ongoing PT  PT Frequency: 4 times per week  PT Discharge Recommendations: Low intensity level of continued care  Equipment Recommended upon Discharge:  (pt could benefit from a malcolm walker if pt is going to be NWB L wrist in the future.  Pt has a FWW and would be safest using this if she can bear wt through her L UE.)    Subjective   The pt reports that she is feeing ok today and agreeable to PT eval.  Pt reports that she falls due to her L leg feeling weak and dragging when she tries to walk and lift it into the van.  Pt denies hx of stroke but reports that  she has hx of LBP and that her low back hurts when her leg feels weak.     General Visit Information:  General  Reason for Referral: Dx:  Mechanical Fall, PNA  Referred By: Loretta Prasad CNP  Past Medical History Relevant to Rehab: PMH:  GI-bleed, PVD, hernia, CAD, HTN, DM, hypothyroidism, hyperlipidemia, anxiety/depression  Patient Position Received:  (Seated EOB finishing breakfast)    Home Living:  Home Living  Type of Home: Assisted living (Pt lives at [facility].  Pt has tub/shower with shower seat and grab bars.  Pt owns a FWW and QC.  Pt reports that she was I with showering, dressing and mobility with use of FWW or QC.  Pt reports that she has assist with laundry and housekeeping.)    Precautions:  Precautions  UE Weight Bearing Status: Left Non-Weight Bearing (L wrist brace.  NWB L wrist until pt sees orthopedic MD)  Precautions Comment: Higher Fall Risk     Vital Signs (Past 2hrs)                 Objective     Pain:  Pain Assessment  Pain Assessment: 0-10  0-10 (Numeric) Pain Score: 3  Pain Type: Chronic pain, Acute pain  Pain Location: Back (Left hand/wrist)  Pain Orientation: Lower    Cognition:  Cognition  Overall Cognitive Status: Within Functional Limits    General Assessments:   Activity Tolerance  Endurance: Tolerates less than 10 min exercise with changes in vital signs    Sensation  Light Touch: No apparent deficits    Strength  Strength Comments: B/L LE strength 4/5 throughout  Static Sitting Balance  Static Sitting-Level of Assistance: Distant supervision  Dynamic Sitting Balance  Dynamic Sitting-Level of Assistance: Distant supervision    Static Standing Balance  Static Standing-Level of Assistance: Contact guard (with use of hemiwalker)  Dynamic Standing Balance  Dynamic Standing-Level of Assistance: Contact guard, Minimum assistance (with use of Jose Elias walker)  Dynamic Standing-Balance: Reaching for weighted objects    Functional Assessments:  Bed Mobility  Bed Mobility:  (SBA with supine to  sit and sit to supine with HOB elevated 45 deg)    Transfers  Transfer:  (CGA with sit to stand and stand to sit with hemiwalker)    Ambulation/Gait Training  Ambulation/Gait Training Performed:  (amb >= 25 ft Min A x 1 with hemiwalker.  Pt amb with slower jessica and smaller steps.  NWB L UE.)    Outcome Measures:  [facility] Basic Mobility  Turning from your back to your side while in a flat bed without using bedrails: None  Moving from lying on your back to sitting on the side of a flat bed without using bedrails: A little  Moving to and from bed to chair (including a wheelchair): A little  Standing up from a chair using your arms (e.g. wheelchair or bedside chair): A little  To walk in hospital room: A little  Climbing 3-5 steps with railing: A lot  Basic Mobility - Total Score: 18    Encounter Problems       Encounter Problems (Active)       PT Problem       PT Goal 1       Start:  10/27/24    Expected End:  11/10/24       STG's:  1)  Pt will be  Mod I with Jose Elias walker NWB L UE  with Sit to stand and bed to chair tranfers with good safety in order to facilitate safe functional mobility.    2)  Pt will be able to amb >= 25  ft  Min A x 1 with jose elias walker NWB L UE safely in order to facilitate safe mobility.      3)   Pt will improve b/l LE strength to >= 4+/5 throughout in order to facilitate safe gait and mobility.      4)  Pt will be I with HEP with use of handouts as needed in order to maximize strength and safety with mobility.                Pain - Adult              Education Documentation  Mobility Training, taught by Bill Rodriguez, PT at 10/27/2024  9:24 AM.  Learner: Patient  Readiness: Acceptance  Method: Demonstration, Explanation  Response: Verbalizes Understanding, Demonstrated Understanding  Comment: PT edu pt on PT POC and course of rx.  Pt cued pt on safe gait and tranfers with jose elias walker.    Education Comments  No comments found.

## 2024-10-28 VITALS
WEIGHT: 194 LBS | TEMPERATURE: 97.5 F | RESPIRATION RATE: 20 BRPM | HEIGHT: 62 IN | HEART RATE: 59 BPM | OXYGEN SATURATION: 93 % | DIASTOLIC BLOOD PRESSURE: 83 MMHG | BODY MASS INDEX: 35.7 KG/M2 | SYSTOLIC BLOOD PRESSURE: 144 MMHG

## 2024-10-28 VITALS
DIASTOLIC BLOOD PRESSURE: 67 MMHG | HEIGHT: 62 IN | WEIGHT: 194 LBS | OXYGEN SATURATION: 94 % | HEART RATE: 66 BPM | TEMPERATURE: 97.7 F | SYSTOLIC BLOOD PRESSURE: 114 MMHG | RESPIRATION RATE: 20 BRPM | BODY MASS INDEX: 35.7 KG/M2
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
## 2024-10-28 LAB
ALBUMIN SERPL BCP-MCNC: 3.6 G/DL (ref 3.4–5)
ALP SERPL-CCNC: 50 U/L (ref 33–136)
ALT SERPL W P-5'-P-CCNC: 10 U/L (ref 7–45)
ANION GAP SERPL CALC-SCNC: 12 MMOL/L (ref 10–20)
AST SERPL W P-5'-P-CCNC: 13 U/L (ref 9–39)
BILIRUB SERPL-MCNC: 0.4 MG/DL (ref 0–1.2)
BUN SERPL-MCNC: 34 MG/DL (ref 6–23)
CALCIUM SERPL-MCNC: 8.4 MG/DL (ref 8.6–10.3)
CHLORIDE SERPL-SCNC: 100 MMOL/L (ref 98–107)
CO2 SERPL-SCNC: 29 MMOL/L (ref 21–32)
CREAT SERPL-MCNC: 2.3 MG/DL (ref 0.5–1.05)
EGFRCR SERPLBLD CKD-EPI 2021: 19 ML/MIN/1.73M*2
ERYTHROCYTE [DISTWIDTH] IN BLOOD BY AUTOMATED COUNT: 13.8 % (ref 11.5–14.5)
GLUCOSE SERPL-MCNC: 142 MG/DL (ref 74–99)
HCT VFR BLD AUTO: 35.9 % (ref 36–46)
HGB BLD-MCNC: 11.4 G/DL (ref 12–16)
MCH RBC QN AUTO: 30.9 PG (ref 26–34)
MCHC RBC AUTO-ENTMCNC: 31.8 G/DL (ref 32–36)
MCV RBC AUTO: 97 FL (ref 80–100)
NRBC BLD-RTO: 0 /100 WBCS (ref 0–0)
PLATELET # BLD AUTO: 238 X10*3/UL (ref 150–450)
POTASSIUM SERPL-SCNC: 4.1 MMOL/L (ref 3.5–5.3)
PROT SERPL-MCNC: 6.2 G/DL (ref 6.4–8.2)
RBC # BLD AUTO: 3.69 X10*6/UL (ref 4–5.2)
SODIUM SERPL-SCNC: 137 MMOL/L (ref 136–145)
WBC # BLD AUTO: 6.2 X10*3/UL (ref 4.4–11.3)

## 2024-10-28 PROCEDURE — 99238 HOSP IP/OBS DSCHRG MGMT 30/<: CPT | Performed by: NURSE PRACTITIONER

## 2024-10-28 PROCEDURE — 2500000004 HC RX 250 GENERAL PHARMACY W/ HCPCS (ALT 636 FOR OP/ED): Performed by: NURSE PRACTITIONER

## 2024-10-28 PROCEDURE — 2500000002 HC RX 250 W HCPCS SELF ADMINISTERED DRUGS (ALT 637 FOR MEDICARE OP, ALT 636 FOR OP/ED): Performed by: NURSE PRACTITIONER

## 2024-10-28 PROCEDURE — 2500000002 HC RX 250 W HCPCS SELF ADMINISTERED DRUGS (ALT 637 FOR MEDICARE OP, ALT 636 FOR OP/ED): Performed by: PHARMACIST

## 2024-10-28 PROCEDURE — 2500000001 HC RX 250 WO HCPCS SELF ADMINISTERED DRUGS (ALT 637 FOR MEDICARE OP): Performed by: NURSE PRACTITIONER

## 2024-10-28 PROCEDURE — 94640 AIRWAY INHALATION TREATMENT: CPT

## 2024-10-28 PROCEDURE — 80053 COMPREHEN METABOLIC PANEL: CPT | Performed by: NURSE PRACTITIONER

## 2024-10-28 PROCEDURE — 36415 COLL VENOUS BLD VENIPUNCTURE: CPT | Performed by: NURSE PRACTITIONER

## 2024-10-28 PROCEDURE — 85027 COMPLETE CBC AUTOMATED: CPT | Performed by: NURSE PRACTITIONER

## 2024-10-28 PROCEDURE — 2500000005 HC RX 250 GENERAL PHARMACY W/O HCPCS: Performed by: STUDENT IN AN ORGANIZED HEALTH CARE EDUCATION/TRAINING PROGRAM

## 2024-10-28 PROCEDURE — 94761 N-INVAS EAR/PLS OXIMETRY MLT: CPT

## 2024-10-28 RX ORDER — AZITHROMYCIN 250 MG/1
500 TABLET, FILM COATED ORAL
Status: DISCONTINUED | OUTPATIENT
Start: 2024-10-28 | End: 2024-10-28 | Stop reason: HOSPADM

## 2024-10-28 RX ORDER — AMOXICILLIN AND CLAVULANATE POTASSIUM 500; 125 MG/1; MG/1
1 TABLET, FILM COATED ORAL 2 TIMES DAILY
Qty: 10 TABLET | Refills: 0 | Status: SHIPPED | OUTPATIENT
Start: 2024-10-28 | End: 2024-11-02

## 2024-10-28 ASSESSMENT — COGNITIVE AND FUNCTIONAL STATUS - GENERAL
DAILY ACTIVITIY SCORE: 22
MOVING TO AND FROM BED TO CHAIR: A LITTLE
MOBILITY SCORE: 20
WALKING IN HOSPITAL ROOM: A LITTLE
DRESSING REGULAR LOWER BODY CLOTHING: A LITTLE
HELP NEEDED FOR BATHING: A LITTLE
CLIMB 3 TO 5 STEPS WITH RAILING: A LOT

## 2024-10-28 ASSESSMENT — PAIN - FUNCTIONAL ASSESSMENT: PAIN_FUNCTIONAL_ASSESSMENT: 0-10

## 2024-10-28 ASSESSMENT — ACTIVITIES OF DAILY LIVING (ADL): LACK_OF_TRANSPORTATION: NO

## 2024-10-28 ASSESSMENT — PAIN SCALES - GENERAL: PAINLEVEL_OUTOF10: 0 - NO PAIN

## 2024-10-28 NOTE — NURSING NOTE
Discharge Note: 10/28/2024 1415 AVS, Discharge Summary, faxed to [facility] [phone]. Abbey WILSON

## 2024-10-28 NOTE — NURSING NOTE
Discharge Note: 10/28/2024 1606 Discharged via stretcher by [facility] to [facility], paperwork packet sent with transporter, personal belongings taken by transporter, no distress noted, no complaints voiced. Abbey WILSON

## 2024-10-28 NOTE — DOCUMENTATION CLARIFICATION NOTE
"    PATIENT:               RODOLFO GRAVES  ACCT #:                  [id number]  MRN:                       [id number]  :                       1930  ADMIT DATE:       10/26/2024 10:04 AM  DISCH DATE:  RESPONDING PROVIDER #:        [id number]          PROVIDER RESPONSE TEXT:    Acute Kidney Injury is ruled out    CDI QUERY TEXT:    Clarification    Instruction:    Based on your assessment of the patient and the clinical information, please provide the requested documentation by clicking on the appropriate radio button and enter any additional information if prompted.    Question: Please clarify the diagnosis of Acute Kidney Injury    When answering this query, please exercise your independent professional judgment. The fact that a question is being asked, does not imply that any particular answer is desired or expected.    The patient's clinical indicators include:  Clinical Information: 89-year-old female who presents to the emergency room with a chief complaint of a fall and found to have PNA    Documented Diagnosis:  H&P notes \" ckd stage 3\". Discharge summary notes \"NUBIA/CKD\" and baseline creatinine of  \"2.2-2.5-currently 2.3\".    Clinical Indicators and Documentation:  -Vital Signs: 10/26/24- 1007- 36.4-70-/82-95 % sat RA  -Baseline Creatinine: 2.2-2.3  -SCr lab values: 10/26- 2.32- 10/27- 2.12- 10/28- 2.30  -Urine Output: unmeasured output  -Electrolyte lab values: WNL  -Nephrology consult: N/A    Treatment: Serial Labs    Risk Factors: CKD, PNA , Fall and contusion  Options provided:  -- Acute Kidney Injury is ruled out  -- Acute Kidney Injury ruled in as evidenced by, Please specify additional information below  -- Other - I will add my own diagnosis  -- Refer to Clinical Documentation Reviewer    Query created by: Cassandra Moeller on 10/28/2024 1:32 PM      Electronically signed by:  RENU KEENE-CNP 10/28/2024 3:13 PM          "

## 2024-10-28 NOTE — PROGRESS NOTES
10/28/24 [id number]   Discharge Planning   Living Arrangements Alone  ([facility])   Support Systems Children;/;Home care staff;Friends/neighbors   Assistance Needed walker   Type of Residence Assisted living   Do you have animals or pets at home? Yes   Type of Animals or Pets 1 cat   Care Facility Name [facility]   Who is requesting discharge planning? Provider   Home or Post Acute Services None   Expected Discharge Disposition Home   Does the patient need discharge transport arranged? Yes   RoundTrip coordination needed? Yes   Has discharge transport been arranged? No   Financial Resource Strain   How hard is it for you to pay for the very basics like food, housing, medical care, and heating? Not hard   Housing Stability   In the last 12 months, was there a time when you were not able to pay the mortgage or rent on time? N   In the past 12 months, how many times have you moved where you were living? 0   At any time in the past 12 months, were you homeless or living in a shelter (including now)? N   Transportation Needs   In the past 12 months, has lack of transportation kept you from medical appointments or from getting medications? no   In the past 12 months, has lack of transportation kept you from meetings, work, or from getting things needed for daily living? No     Care Transitions: Patient reviewed during morning care round meeting and may be medically ready for discharge today. Met with patient in room, sitting up in chair. Role of TCC explained. She has been residing at the [facility] for approximately 2 years. She is receiving [facility] waiver to assist with her rent. Her Passport CM is Yamel Stafford [phone]. PCP was Dr. Pearson @ [facility] however he is no longer there. She is awaiting he new provider that will be joining the [facility] in his place. Dr. Mckinney is listed as PCP however she has not seen him before.  [facility] orders and manages her medication. Denies any difficulty obtaining/affording medications. She goes to the shared dining room for meals using her walker. She has a Front wheeled walker, rollator with seat, shower chair and EMS button. She is able to shower and dress self but home care aids are present during her personal care to assist if needed. The [employer] is her mode of transportation. [facility] 23 per nursing. Medicare IMM reviewed, patient signed, copy provided, original placed in chart. Voiced understanding. Discharge plan is to return home, denies any needs or in home services at this time. Maranda Villalobos RN/TCC    [phone] Spoke to Antonia nurse @ [facility] [place]. Updated on diagnosis and patent medically ready for discharge today. [facility] states [facility] van is currently broke down and patient will need transportation set up to return. Will fax discharge summary and after visit summary to Antonia/Nursing Department @ [phone]. Message left for patient nae Sanders @ [phone] to return call. Transportation  time scheduled for 8966. Maranda Villalobos RN/TCC    [phone] Message left for PP CHARITY CheungYamel Rivera @ [phone]; updated on admit date, dx,and discharge today. No further needs anticipated from care transitions. Care team available upon request. Maranda Villalobos RN/TCC

## 2024-10-28 NOTE — PROGRESS NOTES
Physical Therapy                 Therapy Communication Note    Patient Name: Lakeshia Park  MRN: [id number]  Department: [facility]  Room: [address] [place], [place] [address]  Today's Date: 10/28/2024     Discipline: Physical Therapy    Missed Visit Reason: Missed Visit Reason: Patient sleeping, Other (Comment) (Nurse Ambar in room and states patient is getting discharged around 330 today.)    Missed Time: Attempt    Comment:

## 2024-10-28 NOTE — DISCHARGE SUMMARY
Discharge Diagnosis  Fall, initial encounter    Issues Requiring Follow-Up  NUBIA/CKD-baseline creatinine 2.2-2.5-currently 2.3  Community acquired pneumonia-completed Rocephin and Zithromax x 3 days.    Discharge Meds     Medication List      START taking these medications     amoxicillin-pot clavulanate 500-125 mg tablet; Commonly known as:   Augmentin; Take 1 tablet by mouth 2 times a day for 5 days.     CONTINUE taking these medications     acetaminophen 500 mg tablet; Commonly known as: Tylenol   albuterol 90 mcg/actuation inhaler; INHALE 2 PUFFS Every 4 hours as   needed for shortness of breath or wheezing   allopurinol 100 mg tablet; Commonly known as: Zyloprim   benzonatate 100 mg capsule; Commonly known as: Tessalon; Take 1 capsule   (100 mg) by mouth every 8 hours if needed for cough. Do not crush or chew.   buPROPion  mg 12 hr tablet; Commonly known as: Wellbutrin SR; Take   1 tablet (150 mg) by mouth 2 times a day.   calcium carbonate 200 mg calcium chewable tablet; Commonly known as:   Tums   cyanocobalamin 1,000 mcg tablet; Commonly known as: Vitamin B-12   cyclobenzaprine 7.5 mg tablet; Commonly known as: Fexmid   Depend Underwear For Women Lrg misc; Generic drug:   diaper,brief,adult,disposable; Use 4 times per day   dextromethorphan-guaifenesin  mg/5 mL oral liquid; Commonly known   as: Robitussin DM   docusate sodium 100 mg capsule; Commonly known as: Colace   ferrous sulfate (325 mg ferrous sulfate) tablet   hydrocortisone 2.5 % cream   levothyroxine 175 mcg tablet; Commonly known as: Synthroid, Levoxyl;   Take 1 tablet (175 mcg) by mouth once daily in the morning. Take before   meals.   loperamide 2 mg capsule; Commonly known as: Imodium   lovastatin 20 mg tablet; Commonly known as: Mevacor; TAKE 1 TABLET   Bedtime   lubricating eye drops ophthalmic solution   meclizine 25 mg tablet; Commonly known as: Antivert; TAKE 1 TABLET BY   MOUTH THREE TIMES DAILY AS NEEDED for dizziness    metoprolol tartrate 50 mg tablet; Commonly known as: Lopressor; Take 1/2   tablet in the morning and 1 tablet in the night   Milk of Magnesia 400 mg/5 mL suspension; Generic drug: magnesium   hydroxide   Mylanta Maximum Strength 400-400-40 mg/5 mL suspension; Generic drug:   alum-mag hydroxide-simeth   OneTouch Ultra Test strip; Generic drug: blood sugar diagnostic   Pads For Women pad; Generic drug: incontinence pad, liner, disp; Extra   long pads up to 4 per day   Qvar RediHaler 80 mcg/actuation inhaler; Generic drug: beclomethasone   dipropionate   * Ultra-Light Rollator misc; Generic drug: walker; Use for ambulation   * Ultra-Light Rollator misc; Generic drug: walker; Use with ambulation   to support your balance.   VITAMIN B-6 ORAL  * This list has 2 medication(s) that are the same as other medications   prescribed for you. Read the directions carefully, and ask your doctor or   other care provider to review them with you.     STOP taking these medications     hydrOXYzine HCL 25 mg tablet; Commonly known as: Atarax   indapamide 2.5 mg tablet; Commonly known as: Lozol     ASK your doctor about these medications     pantoprazole 40 mg EC tablet; Commonly known as: ProtoNix; Take 1 tablet   (40 mg) by mouth once daily. Do not crush, chew, or split.   polyethylene glycol 17 gram packet; Commonly known as: Glycolax, Miralax   sennosides 8.6 mg tablet; Commonly known as: Senokot       Test Results Pending At Discharge  Pending Labs       Order Current Status    Blood Culture Preliminary result    Blood Culture Preliminary result            Hospital Course   Patient is a 89-year-old female who presents to the ED for a chief complaint of a fall. Patient states that she fell on Tuesday, she does not know why she fell although she is extremely unsteady on her feet. She denies syncope. Workup is remarkable for bilateral pneumonia on chest x-ray. She does report a cough, states it is chronic though, maybe slightly worse.  Pulse ox has dropped down to 88-91%% and goes down to 85% while sleeping. She was placed on 2L oxygen and is 94-95%. Tx with rocephin and azithromycin. Magnesium is 1.40, IV magnesium given. She also has a suspected radial styloid fracture. She was placed in a prefabricated thumb spica splint which was applied by the nurse. Follow up with orthopedics is recommended. Neurovascularly intact. Patient is recommended for admission.   Patient was able to wean off oxygen and SpO2 was 92 to 93% on room air.  Patient completed 3 days of both Rocephin and Zithromax.  She we will discharge her home with renal dose of Augmentin for 5 days.  She has Asmanex at home and should continue this every 4 to 6 hours as needed for cough and shortness of breath.  Patient did have an x-ray of her left arm status post fall.  Her left forearm x-ray revealed Faint lucency in the radial styloid process, nondisplaced fracture  not entirely excluded.  She was placed in a splint.  Physical therapy was consulted.  Recommend outpatient PT OT due to generalized weakness status post pneumonia, falls, and left arm in splint due to possible fracture.  Patient is also supposed to follow-up with Dr. Deleon outpatient in about 2 weeks.  Patient's labs have been followed.  She has remained afebrile and hemodynamically stable.  Patient lives at [facility] and feels well enough to be discharged home today.              Pertinent Physical Exam At Time of Discharge  Physical Exam  General Appearance: AAO x 3, not in acute distress  Skin: skin color, texture, turgor normal; no suspicious rashes or lesions  Eyes : PERRL, EOM's intact, conjunctiva pink  ENT: no oral thrush, no pharyngeal erythema or exudates  Neck: no JVD, no lymphadenopathy  Respiratory: lungs CTA, no rhonchi, wheezing, or crackles, 1LNC in place  Heart: RRR without murmur, gallop, or rubs, no ectopy  Abdomen: Nondistended, positive bowel sounds, soft,  nontender  Extremities: no edema, ROM x 4  extremities  Peripheral pulses: normal and present x 4 extremities  Neuro: alert, coherent and conversant, no focal motor deficits  Extensive bruising to face in various stages of healing  Bruising noted to left arm and right knee,  Also bruising noted on right chest wall  Outpatient Follow-Up  Future Appointments   Date Time Provider Department Center   11/12/2024 11:00 AM JASSI Allen-CNP, DNP [id number] [facility]   12/4/2024  8:00 AM Marybeth Westbrook MD [id number] None         JASSI Looney-CNP

## 2024-10-28 NOTE — CARE PLAN
Problem: Fall/Injury  Goal: Not fall by end of shift  Outcome: Progressing  Goal: Be free from injury by end of the shift  Outcome: Progressing     Problem: Pain - Adult  Goal: Verbalizes/displays adequate comfort level or baseline comfort level  Outcome: Progressing     Problem: Safety - Adult  Goal: Free from fall injury  Outcome: Progressing     Problem: Discharge Planning  Goal: Discharge to home or other facility with appropriate resources  Outcome: Progressing     Problem: Chronic Conditions and Co-morbidities  Goal: Patient's chronic conditions and co-morbidity symptoms are monitored and maintained or improved  Outcome: Progressing   The patient's goals for the shift include use the call light    The clinical goals for the shift include no falls    Over the shift, the patient did not make progress toward the following goals. Barriers to progression include . Recommendations to address these barriers include .

## 2024-10-29 ENCOUNTER — PATIENT OUTREACH (OUTPATIENT)
Age: 89
End: 2024-10-29
Payer: MEDICARE

## 2024-10-31 ENCOUNTER — OFFICE VISIT (OUTPATIENT)
Dept: PRIMARY CARE | Facility: CLINIC | Age: 89
End: 2024-10-31
Payer: MEDICARE

## 2024-10-31 VITALS
HEART RATE: 68 BPM | SYSTOLIC BLOOD PRESSURE: 134 MMHG | WEIGHT: 195.9 LBS | HEIGHT: 62 IN | DIASTOLIC BLOOD PRESSURE: 90 MMHG | OXYGEN SATURATION: 90 % | BODY MASS INDEX: 36.05 KG/M2

## 2024-10-31 DIAGNOSIS — J18.9 PNEUMONIA OF LEFT LOWER LOBE DUE TO INFECTIOUS ORGANISM: Primary | ICD-10-CM

## 2024-10-31 DIAGNOSIS — N18.4 STAGE 4 CHRONIC KIDNEY DISEASE (MULTI): ICD-10-CM

## 2024-10-31 DIAGNOSIS — S52.515D CLOSED NONDISPLACED FRACTURE OF STYLOID PROCESS OF LEFT RADIUS WITH ROUTINE HEALING, SUBSEQUENT ENCOUNTER: ICD-10-CM

## 2024-10-31 LAB
BACTERIA BLD CULT: NORMAL
BACTERIA BLD CULT: NORMAL

## 2024-10-31 PROCEDURE — 1159F MED LIST DOCD IN RCRD: CPT

## 2024-10-31 PROCEDURE — 1036F TOBACCO NON-USER: CPT

## 2024-10-31 PROCEDURE — 3075F SYST BP GE 130 - 139MM HG: CPT

## 2024-10-31 PROCEDURE — 99495 TRANSJ CARE MGMT MOD F2F 14D: CPT

## 2024-10-31 PROCEDURE — 1160F RVW MEDS BY RX/DR IN RCRD: CPT

## 2024-10-31 PROCEDURE — 1111F DSCHRG MED/CURRENT MED MERGE: CPT

## 2024-10-31 PROCEDURE — 3080F DIAST BP >= 90 MM HG: CPT

## 2024-10-31 PROCEDURE — 1157F ADVNC CARE PLAN IN RCRD: CPT

## 2024-10-31 ASSESSMENT — ENCOUNTER SYMPTOMS
GASTROINTESTINAL NEGATIVE: 1
PSYCHIATRIC NEGATIVE: 1
ENDOCRINE NEGATIVE: 1
CARDIOVASCULAR NEGATIVE: 1
NEUROLOGICAL NEGATIVE: 1
CONSTITUTIONAL NEGATIVE: 1
JOINT SWELLING: 0
EYES NEGATIVE: 1
HEMATOLOGIC/LYMPHATIC NEGATIVE: 1
RESPIRATORY NEGATIVE: 1
MYALGIAS: 1
ALLERGIC/IMMUNOLOGIC NEGATIVE: 1

## 2024-11-01 LAB
ATRIAL RATE: 68 BPM
P AXIS: 76 DEGREES
P OFFSET: 149 MS
P ONSET: 102 MS
PR INTERVAL: 240 MS
Q ONSET: 222 MS
QRS COUNT: 11 BEATS
QRS DURATION: 82 MS
QT INTERVAL: 436 MS
QTC CALCULATION(BAZETT): 463 MS
QTC FREDERICIA: 454 MS
R AXIS: -37 DEGREES
T AXIS: 80 DEGREES
T OFFSET: 440 MS
VENTRICULAR RATE: 68 BPM

## 2024-11-07 ENCOUNTER — TELEPHONE (OUTPATIENT)
Dept: ORTHOPEDIC SURGERY | Facility: CLINIC | Age: 89
End: 2024-11-07

## 2024-11-07 ENCOUNTER — LAB (OUTPATIENT)
Dept: LAB | Facility: LAB | Age: 89
End: 2024-11-07
Payer: MEDICARE

## 2024-11-07 ENCOUNTER — HOSPITAL ENCOUNTER (OUTPATIENT)
Dept: RADIOLOGY | Facility: EXTERNAL LOCATION | Age: 89
Discharge: HOME | End: 2024-11-07

## 2024-11-07 ENCOUNTER — APPOINTMENT (OUTPATIENT)
Dept: ORTHOPEDIC SURGERY | Facility: CLINIC | Age: 89
End: 2024-11-07
Payer: MEDICARE

## 2024-11-07 DIAGNOSIS — E79.0 HYPERURICEMIA: ICD-10-CM

## 2024-11-07 DIAGNOSIS — N18.4 STAGE 4 CHRONIC KIDNEY DISEASE (MULTI): ICD-10-CM

## 2024-11-07 DIAGNOSIS — M25.532 LEFT WRIST PAIN: ICD-10-CM

## 2024-11-07 LAB
ANION GAP SERPL CALC-SCNC: 13 MMOL/L (ref 10–20)
BUN SERPL-MCNC: 40 MG/DL (ref 6–23)
CALCIUM SERPL-MCNC: 9.2 MG/DL (ref 8.6–10.3)
CHLORIDE SERPL-SCNC: 104 MMOL/L (ref 98–107)
CO2 SERPL-SCNC: 30 MMOL/L (ref 21–32)
CREAT SERPL-MCNC: 2.27 MG/DL (ref 0.5–1.05)
EGFRCR SERPLBLD CKD-EPI 2021: 20 ML/MIN/1.73M*2
GLUCOSE SERPL-MCNC: 132 MG/DL (ref 74–99)
POTASSIUM SERPL-SCNC: 4.6 MMOL/L (ref 3.5–5.3)
SODIUM SERPL-SCNC: 142 MMOL/L (ref 136–145)
URATE SERPL-MCNC: 6.1 MG/DL (ref 2.3–6.7)

## 2024-11-07 PROCEDURE — 1111F DSCHRG MED/CURRENT MED MERGE: CPT | Performed by: SPECIALIST

## 2024-11-07 PROCEDURE — 1157F ADVNC CARE PLAN IN RCRD: CPT | Performed by: SPECIALIST

## 2024-11-07 PROCEDURE — 80048 BASIC METABOLIC PNL TOTAL CA: CPT

## 2024-11-07 PROCEDURE — 36415 COLL VENOUS BLD VENIPUNCTURE: CPT

## 2024-11-07 PROCEDURE — 84550 ASSAY OF BLOOD/URIC ACID: CPT

## 2024-11-07 NOTE — TELEPHONE ENCOUNTER
11-7-24 ATTEMPTED TO CALL PATIENT TO SEE IF SHE COULD COME BACK IN TODAY OR TOMORROW FOR HER LEFT RADIAL STYLOID FUV FRACTURE APPT.  I CALLED [phone]. THERE WAS NO ANSWER AND THE VOICEMAIL IS FULL. I WAS UNABLE TO LEAVE A MESSAGE.

## 2024-11-07 NOTE — PROGRESS NOTES
Pt was sent to radiology.  She got confused and went to the lab and then went home.  We have calls out to her and family to reschedule her.    I did not see her and she did not obtain any radiographs.

## 2024-11-12 ENCOUNTER — APPOINTMENT (OUTPATIENT)
Dept: NEPHROLOGY | Facility: CLINIC | Age: 89
End: 2024-11-12
Payer: MEDICARE

## 2024-11-12 VITALS
HEART RATE: 68 BPM | HEIGHT: 62 IN | WEIGHT: 202.4 LBS | BODY MASS INDEX: 37.25 KG/M2 | SYSTOLIC BLOOD PRESSURE: 144 MMHG | DIASTOLIC BLOOD PRESSURE: 76 MMHG

## 2024-11-12 DIAGNOSIS — I10 PRIMARY HYPERTENSION: ICD-10-CM

## 2024-11-12 DIAGNOSIS — E11.22 TYPE 2 DIABETES MELLITUS WITH STAGE 4 CHRONIC KIDNEY DISEASE, WITHOUT LONG-TERM CURRENT USE OF INSULIN (MULTI): ICD-10-CM

## 2024-11-12 DIAGNOSIS — N18.4 STAGE 4 CHRONIC KIDNEY DISEASE (MULTI): Primary | ICD-10-CM

## 2024-11-12 DIAGNOSIS — E79.0 HYPERURICEMIA: ICD-10-CM

## 2024-11-12 DIAGNOSIS — N18.4 TYPE 2 DIABETES MELLITUS WITH STAGE 4 CHRONIC KIDNEY DISEASE, WITHOUT LONG-TERM CURRENT USE OF INSULIN (MULTI): ICD-10-CM

## 2024-11-12 PROCEDURE — 1111F DSCHRG MED/CURRENT MED MERGE: CPT | Performed by: CLINICAL NURSE SPECIALIST

## 2024-11-12 PROCEDURE — 1160F RVW MEDS BY RX/DR IN RCRD: CPT | Performed by: CLINICAL NURSE SPECIALIST

## 2024-11-12 PROCEDURE — 3078F DIAST BP <80 MM HG: CPT | Performed by: CLINICAL NURSE SPECIALIST

## 2024-11-12 PROCEDURE — 1159F MED LIST DOCD IN RCRD: CPT | Performed by: CLINICAL NURSE SPECIALIST

## 2024-11-12 PROCEDURE — 99213 OFFICE O/P EST LOW 20 MIN: CPT | Performed by: CLINICAL NURSE SPECIALIST

## 2024-11-12 PROCEDURE — 1157F ADVNC CARE PLAN IN RCRD: CPT | Performed by: CLINICAL NURSE SPECIALIST

## 2024-11-12 PROCEDURE — 3077F SYST BP >= 140 MM HG: CPT | Performed by: CLINICAL NURSE SPECIALIST

## 2024-11-12 PROCEDURE — 1036F TOBACCO NON-USER: CPT | Performed by: CLINICAL NURSE SPECIALIST

## 2024-11-12 ASSESSMENT — ENCOUNTER SYMPTOMS
GASTROINTESTINAL NEGATIVE: 1
CARDIOVASCULAR NEGATIVE: 1
PSYCHIATRIC NEGATIVE: 1
RESPIRATORY NEGATIVE: 1
CONSTITUTIONAL NEGATIVE: 1
ENDOCRINE NEGATIVE: 1
MUSCULOSKELETAL NEGATIVE: 1
NEUROLOGICAL NEGATIVE: 1

## 2024-11-12 NOTE — ASSESSMENT & PLAN NOTE
Renal function is stable with creatinine of 2.27, she is staying within her baseline, blood pressure is well-controlled, not hypotensive, not on any diabetic medications at this time, not using nephrotoxic medications

## 2024-11-12 NOTE — PROGRESS NOTES
Subjective   Patient ID: Lakeshia Park is a 89 y.o. female who presents for Follow-up (3 month ck/Review labs ).  Patient being seen in follow-up for chronic kidney disease stage IIIb/IV with history of diabetes mellitus type 2 and hypertension    Labs reviewed  Glucose 132  Sodium 142, potassium 4.6, chloride 104, bicarb 30  Renal function with a BUN of 40 and creatinine of 2.27, GFR is 20  Calcium 9.2  Uric acid 6.1  Last H&H 11.4 and 35.9    She is doing well  She did have a fall the end of October, she denies any lightheadedness or dizziness with this  She is not sure what caused the fall  Her blood pressure is stable at this time  She is voiding without difficulties          Review of Systems   Constitutional: Negative.    Respiratory: Negative.     Cardiovascular: Negative.    Gastrointestinal: Negative.    Endocrine: Negative.    Genitourinary: Negative.    Musculoskeletal: Negative.    Skin: Negative.    Neurological: Negative.    Psychiatric/Behavioral: Negative.         Objective   Physical Exam  Vitals reviewed.   Constitutional:       Appearance: Normal appearance.   HENT:      Head: Normocephalic.   Cardiovascular:      Rate and Rhythm: Normal rate and regular rhythm.   Pulmonary:      Effort: Pulmonary effort is normal.      Breath sounds: Normal breath sounds.   Abdominal:      Palpations: Abdomen is soft.   Musculoskeletal:      Comments: Walks with walker   Skin:     General: Skin is warm and dry.   Neurological:      Mental Status: She is alert and oriented to person, place, and time.   Psychiatric:         Mood and Affect: Mood normal.         Behavior: Behavior normal.         Assessment/Plan   Problem List Items Addressed This Visit             ICD-10-CM    DM2 (diabetes mellitus, type 2) (Multi) E11.9    Relevant Orders    Hemoglobin A1c    HTN (hypertension) I10     Blood pressure is well-controlled on metoprolol         Hyperuricemia E79.0    Stage 4 chronic kidney disease (Multi) -  Primary N18.4     Renal function is stable with creatinine of 2.27, she is staying within her baseline, blood pressure is well-controlled, not hypotensive, not on any diabetic medications at this time, not using nephrotoxic medications         Relevant Orders    Basic metabolic panel    Urinalysis with Reflex Microscopic    Albumin-Creatinine Ratio, Urine Random    Follow Up In Nephrology    Hemoglobin A1c    CBC     Chronic kidney disease stage IIIb/IV with baseline creatinine 2-2.4  Diabetic Nephropathy and HTN  Diabetes mellitus type 2  Chronic diarrhea  History of gout on allopurinol  Hyperuricemia  Hypertension BP is good now.  Hyperlipidemia  Bilateral renal cysts  Positive REJI screen with slightly high RNP  Depression  Hypokalemia       Tracy Garcia, JASSI-LU, DNP 11/12/24 11:08 AM

## 2024-11-13 ENCOUNTER — PATIENT OUTREACH (OUTPATIENT)
Dept: PRIMARY CARE | Facility: CLINIC | Age: 89
End: 2024-11-13
Payer: MEDICARE

## 2024-11-19 ENCOUNTER — HOSPITAL ENCOUNTER (OUTPATIENT)
Dept: RADIOLOGY | Facility: EXTERNAL LOCATION | Age: 89
Discharge: HOME | End: 2024-11-19

## 2024-11-19 ENCOUNTER — HOSPITAL ENCOUNTER (OUTPATIENT)
Dept: RADIOLOGY | Facility: CLINIC | Age: 89
Discharge: HOME | End: 2024-11-19
Payer: MEDICARE

## 2024-11-19 ENCOUNTER — APPOINTMENT (OUTPATIENT)
Dept: ORTHOPEDIC SURGERY | Facility: CLINIC | Age: 89
End: 2024-11-19
Payer: MEDICARE

## 2024-11-19 DIAGNOSIS — M25.532 LEFT WRIST PAIN: ICD-10-CM

## 2024-11-19 PROCEDURE — 1160F RVW MEDS BY RX/DR IN RCRD: CPT | Performed by: SPECIALIST

## 2024-11-19 PROCEDURE — 99204 OFFICE O/P NEW MOD 45 MIN: CPT | Performed by: SPECIALIST

## 2024-11-19 PROCEDURE — 73110 X-RAY EXAM OF WRIST: CPT | Mod: LEFT SIDE | Performed by: RADIOLOGY

## 2024-11-19 PROCEDURE — L3908 WHO COCK-UP NONMOLDE PRE OTS: HCPCS | Performed by: SPECIALIST

## 2024-11-19 PROCEDURE — 1036F TOBACCO NON-USER: CPT | Performed by: SPECIALIST

## 2024-11-19 PROCEDURE — 73110 X-RAY EXAM OF WRIST: CPT | Mod: LT

## 2024-11-19 PROCEDURE — 1157F ADVNC CARE PLAN IN RCRD: CPT | Performed by: SPECIALIST

## 2024-11-19 PROCEDURE — 1111F DSCHRG MED/CURRENT MED MERGE: CPT | Performed by: SPECIALIST

## 2024-11-19 PROCEDURE — 1159F MED LIST DOCD IN RCRD: CPT | Performed by: SPECIALIST

## 2024-11-19 ASSESSMENT — PAIN - FUNCTIONAL ASSESSMENT: PAIN_FUNCTIONAL_ASSESSMENT: 0-10

## 2024-11-19 ASSESSMENT — PAIN DESCRIPTION - DESCRIPTORS: DESCRIPTORS: ACHING

## 2024-11-19 NOTE — PROGRESS NOTES
Assessment/Plan   Encounter Diagnoses:  Left wrist pain  Left wrist pain  Assessment: Minimally displaced tip of the radial styloid fracture.    Plan:  Thumb spica splint  Follow-up in 3 weeks for reevaluation with new x-rays.  Tylenol for pain       Subjective    Patient ID: Laksehia Park is a 89 y.o. female.    Chief Complaint: Follow-up of the Left Wrist     Last Surgery: No surgery found  Last Surgery Date: No surgery found    HPI  89-year-old who is 3 weeks status post a minimally displaced tip of the distal styloid radial styloid fracture.    OBJECTIVE: ORTHO EXAM  Left wrist/hand:  Skin healthy and intact  Minimal swelling over the radial styloid area.  Tender to palpation over the tip of the radial styloid.  Range of motion of her thumb aggravated this somewhat.    Volar flexion, dorsiflexion, pronation/supination without limitation  Radial and Ulnar Deviation full motion and no pain.  Mild tenderness to palpation over distal radius at the radial styloid tip.  No tenderness to palpation over distal ulna or TFCC  No tenderness to palpation over the scaphoid  Negative piano key sign  Negative Finkelstein test  Negative Billy's test  ---  Negative Tinels Median Nerve  Negative Durkan Median Nerve  ---  Negative Tinel Ulnar Nerve at [place]  Negative Phalens Ulnar Nerve at [place]     Neurovascular exam normal distally    IMAGE RESULTS:  Point of Care Ultrasound  These images are not reportable by radiology and will not be interpreted   by  Radiologists.      ULTRASOUND  None    Procedures     Orders Placed This Encounter    Point of Care Ultrasound

## 2024-11-19 NOTE — ASSESSMENT & PLAN NOTE
Assessment: Minimally displaced tip of the radial styloid fracture.    Plan:  Thumb spica splint  Follow-up in 3 weeks for reevaluation with new x-rays.  Tylenol for pain

## 2024-11-22 ENCOUNTER — APPOINTMENT (OUTPATIENT)
Dept: PRIMARY CARE | Facility: CLINIC | Age: 89
End: 2024-11-22
Payer: MEDICARE

## 2024-11-22 VITALS
HEART RATE: 59 BPM | DIASTOLIC BLOOD PRESSURE: 90 MMHG | WEIGHT: 202.2 LBS | BODY MASS INDEX: 37.21 KG/M2 | SYSTOLIC BLOOD PRESSURE: 136 MMHG | HEIGHT: 62 IN | OXYGEN SATURATION: 95 %

## 2024-11-22 DIAGNOSIS — F33.1 DEPRESSION, MAJOR, RECURRENT, MODERATE: Primary | ICD-10-CM

## 2024-11-22 PROCEDURE — 1160F RVW MEDS BY RX/DR IN RCRD: CPT

## 2024-11-22 PROCEDURE — 3080F DIAST BP >= 90 MM HG: CPT

## 2024-11-22 PROCEDURE — 1159F MED LIST DOCD IN RCRD: CPT

## 2024-11-22 PROCEDURE — 3075F SYST BP GE 130 - 139MM HG: CPT

## 2024-11-22 PROCEDURE — 99214 OFFICE O/P EST MOD 30 MIN: CPT

## 2024-11-22 PROCEDURE — 1036F TOBACCO NON-USER: CPT

## 2024-11-22 PROCEDURE — 1111F DSCHRG MED/CURRENT MED MERGE: CPT

## 2024-11-22 PROCEDURE — 1157F ADVNC CARE PLAN IN RCRD: CPT

## 2024-11-22 RX ORDER — PYRIDOXINE HCL (VITAMIN B6) 100 MG
TABLET ORAL
COMMUNITY
Start: 2024-11-21

## 2024-11-22 RX ORDER — METOPROLOL TARTRATE 25 MG/1
TABLET, FILM COATED ORAL
COMMUNITY
Start: 2024-11-21

## 2024-11-22 ASSESSMENT — ENCOUNTER SYMPTOMS
MYALGIAS: 0
HEADACHES: 0
DYSPHORIC MOOD: 0
ARTHRALGIAS: 0
FREQUENCY: 0
RECTAL PAIN: 0
CONSTIPATION: 0
POLYDIPSIA: 0
HALLUCINATIONS: 0
FATIGUE: 0
CHEST TIGHTNESS: 0
ABDOMINAL PAIN: 0
UNEXPECTED WEIGHT CHANGE: 0
NUMBNESS: 0
CONFUSION: 0
NERVOUS/ANXIOUS: 0
SHORTNESS OF BREATH: 0
WEAKNESS: 0
WOUND: 0
TROUBLE SWALLOWING: 0
POLYPHAGIA: 0
NAUSEA: 0
DIAPHORESIS: 0
CHILLS: 0
PALPITATIONS: 0
DIARRHEA: 0
DIFFICULTY URINATING: 0

## 2024-11-22 NOTE — PROGRESS NOTES
Subjective   Patient ID: Lakeshia Park is a 89 y.o. female who presents for Follow-up (Pt is here today for 3 week FUV. ).    Patient presents today for follow-up for depression and anxiety.     Depression/anxiety: Patient is still struggling with transition to assisted living.  She is having some increased depression due to her family not allowing her to come to their house for the holidays, due to her fall risks.  She is already on a maximum dose of Wellbutrin.  Has discontinued use of Atarax as she is not having much anxiety.  She does have continued complaints that all of her medications throughout the course the day make her sleepy at night and she does not like feeling sleepy.  We had a long discussion regarding options for treatment and that any additional medications would continue to add to her sleepiness.  She additionally stated that she has not been very social and misses social interaction.  A staff member from [facility] was present for this discussion and did outline multiple social programs and people within the facility that she could socialize with that she has not.  She is also participating in a [employer] program outside facility.  She does speak with a counselor weekly via telephone for her depression.  At this time I do not think she would benefit from additional medications and that additional therapy and socialization be more beneficial to her current mental state.  The majority of her stated complaints have more to do with her decreasing independence and with loss of friends and loved ones due to old age. She does agree with this plan and does not want a further medication at this time.  We will follow-up after the holidays and see if her mood has improved and if she has been able to socialize more.  She does deny any thoughts of self-harm.              Review of Systems   Constitutional:  Negative for chills, diaphoresis, fatigue and unexpected weight change.   HENT:  Negative  "for dental problem, tinnitus and trouble swallowing.    Eyes:  Negative for visual disturbance.   Respiratory:  Negative for chest tightness and shortness of breath.    Cardiovascular:  Negative for chest pain, palpitations and leg swelling.   Gastrointestinal:  Negative for abdominal pain, constipation, diarrhea, nausea and rectal pain.   Endocrine: Negative for polydipsia, polyphagia and polyuria.   Genitourinary:  Negative for difficulty urinating, frequency and urgency.   Musculoskeletal:  Negative for arthralgias and myalgias.   Skin:  Negative for pallor and wound.   Neurological:  Negative for syncope, weakness, numbness and headaches.   Psychiatric/Behavioral:  Negative for confusion, dysphoric mood, hallucinations, self-injury and suicidal ideas. The patient is not nervous/anxious.        Objective   /90   Pulse 59   Ht 1.575 m (5' 2\")   Wt 91.7 kg (202 lb 3.2 oz)   SpO2 95%   BMI 36.98 kg/m²     Physical Exam  Vitals and nursing note reviewed.   Constitutional:       General: She is not in acute distress.     Appearance: Normal appearance. She is obese.   HENT:      Head: Normocephalic.      Nose: Nose normal.      Mouth/Throat:      Mouth: Mucous membranes are moist.      Pharynx: Oropharynx is clear.   Eyes:      Pupils: Pupils are equal, round, and reactive to light.   Cardiovascular:      Rate and Rhythm: Normal rate and regular rhythm.      Pulses: Normal pulses.      Heart sounds: Normal heart sounds.   Pulmonary:      Effort: Pulmonary effort is normal.      Breath sounds: Normal breath sounds.   Abdominal:      General: Bowel sounds are normal.      Palpations: Abdomen is soft.   Musculoskeletal:         General: Normal range of motion.      Cervical back: Normal range of motion.   Skin:     General: Skin is warm and dry.      Capillary Refill: Capillary refill takes less than 2 seconds.   Neurological:      General: No focal deficit present.      Mental Status: She is alert and oriented " to person, place, and time. Mental status is at baseline.   Psychiatric:         Mood and Affect: Mood normal.         Behavior: Behavior normal.         Thought Content: Thought content normal.         Judgment: Judgment normal.         Assessment/Plan   Diagnoses and all orders for this visit:  Depression, major, recurrent, moderate

## 2024-12-04 ENCOUNTER — APPOINTMENT (OUTPATIENT)
Age: 89
End: 2024-12-04
Payer: MEDICARE

## 2024-12-04 ENCOUNTER — DOCUMENTATION (OUTPATIENT)
Dept: PRIMARY CARE | Facility: CLINIC | Age: 89
End: 2024-12-04

## 2024-12-04 NOTE — PROGRESS NOTES
Contacted by [facility] assisted [facility] for new order for change of incontinence pads to XL incontinence briefs.    Patient is appropriate to use incontinence briefs (XL) at this time.  The staff may provide them to her as needed.

## 2024-12-11 ENCOUNTER — HOSPITAL ENCOUNTER (OUTPATIENT)
Dept: RADIOLOGY | Facility: EXTERNAL LOCATION | Age: 89
Discharge: HOME | End: 2024-12-11

## 2024-12-11 ENCOUNTER — APPOINTMENT (OUTPATIENT)
Dept: ORTHOPEDIC SURGERY | Facility: CLINIC | Age: 89
End: 2024-12-11
Payer: MEDICARE

## 2024-12-11 ENCOUNTER — APPOINTMENT (OUTPATIENT)
Dept: LAB | Facility: LAB | Age: 89
End: 2024-12-11
Payer: MEDICARE

## 2024-12-11 ENCOUNTER — HOSPITAL ENCOUNTER (OUTPATIENT)
Dept: RADIOLOGY | Facility: CLINIC | Age: 89
Discharge: HOME | End: 2024-12-11
Payer: MEDICARE

## 2024-12-11 DIAGNOSIS — S62.102A LEFT WRIST FRACTURE, CLOSED, INITIAL ENCOUNTER: ICD-10-CM

## 2024-12-11 DIAGNOSIS — M25.532 LEFT WRIST PAIN: ICD-10-CM

## 2024-12-11 PROCEDURE — 1160F RVW MEDS BY RX/DR IN RCRD: CPT | Performed by: SPECIALIST

## 2024-12-11 PROCEDURE — 99214 OFFICE O/P EST MOD 30 MIN: CPT | Performed by: SPECIALIST

## 2024-12-11 PROCEDURE — 73110 X-RAY EXAM OF WRIST: CPT | Mod: LEFT SIDE | Performed by: RADIOLOGY

## 2024-12-11 PROCEDURE — 1159F MED LIST DOCD IN RCRD: CPT | Performed by: SPECIALIST

## 2024-12-11 PROCEDURE — 1036F TOBACCO NON-USER: CPT | Performed by: SPECIALIST

## 2024-12-11 PROCEDURE — 73110 X-RAY EXAM OF WRIST: CPT | Mod: LT

## 2024-12-11 PROCEDURE — 1157F ADVNC CARE PLAN IN RCRD: CPT | Performed by: SPECIALIST

## 2024-12-11 ASSESSMENT — PAIN SCALES - GENERAL: PAINLEVEL_OUTOF10: 5 - MODERATE PAIN

## 2024-12-11 ASSESSMENT — PAIN - FUNCTIONAL ASSESSMENT: PAIN_FUNCTIONAL_ASSESSMENT: 0-10

## 2024-12-11 ASSESSMENT — PAIN DESCRIPTION - DESCRIPTORS: DESCRIPTORS: THROBBING;ACHING

## 2024-12-11 NOTE — ASSESSMENT & PLAN NOTE
Assessment: Approximately 6 weeks status post a left minimally to nondisplaced fracture of the radial styloid.  Patient is making good progress.  Range of motion of her thumb is minimally tender at the fracture site.    Plan:  She can continue to use the brace for at risk activities such as when she is using her walker.  For low risk activities such as sitting quietly or showering or eating she can take the brace off.  Follow-up in 6 weeks for reevaluation with new x-rays.  Continue to work on range of motion and strengthening of the wrist and hand.  Within her pain tolerance.

## 2024-12-11 NOTE — PROGRESS NOTES
Assessment/Plan   Encounter Diagnoses:  Left wrist pain    Left wrist fracture, closed, initial encounter  Closed nondisplaced fracture of styloid process of left radius with routine healing  Assessment: Approximately 6 weeks status post a left minimally to nondisplaced fracture of the radial styloid.  Patient is making good progress.  Range of motion of her thumb is minimally tender at the fracture site.    Plan:  She can continue to use the brace for at risk activities such as when she is using her walker.  For low risk activities such as sitting quietly or showering or eating she can take the brace off.  Follow-up in 6 weeks for reevaluation with new x-rays.  Continue to work on range of motion and strengthening of the wrist and hand.  Within her pain tolerance.       Subjective    Patient ID: Lakeshia Park is a 89 y.o. female.    Chief Complaint: Follow-up of the Left Wrist (LEFT WRIST FX/DOI 10-26-24/PAIN IN MIDDLE INDEX AND RING FINGERS ACHING DOWN INTO HER ARM/X-RAYS 12-11-24)     Last Surgery: No surgery found  Last Surgery Date: No surgery found    HPI  89-year-old who 6 weeks status post a 10/26/2024 fracture of the radial styloid.  She comes in today moving her thumb and her hand and wrist with minimal pain out of the brace.    OBJECTIVE: ORTHO EXAM  Left wrist  She demonstrates thumb range of motion with minimal pain.  The swelling is diminished.  She is neurovascularly intact distally.  She is minimally to nontender over the radial styloid.  She demonstrates full pronation and supination.  She has dorsiflexion to 30 degrees and palmar flexion to 25 degrees.  She has some stiffness in the fingers especially the middle and ring finger.  She makes about 50% of a fist.    X-rays show further consolidation and healing at the fracture.  However the fracture lines are still visible.    IMAGE RESULTS:  Point of Care Ultrasound  These images are not reportable by radiology and will not be interpreted   by   Radiologists.      ULTRASOUND  None    Procedures     Orders Placed This Encounter    XR wrist left 3+ views    Point of Care Ultrasound    XR wrist left 3+ views

## 2024-12-12 ENCOUNTER — PATIENT OUTREACH (OUTPATIENT)
Dept: PRIMARY CARE | Facility: CLINIC | Age: 89
End: 2024-12-12

## 2024-12-12 ENCOUNTER — APPOINTMENT (OUTPATIENT)
Dept: ORTHOPEDIC SURGERY | Facility: CLINIC | Age: 89
End: 2024-12-12
Payer: MEDICARE

## 2024-12-16 ENCOUNTER — APPOINTMENT (OUTPATIENT)
Dept: RADIOLOGY | Facility: HOSPITAL | Age: 89
End: 2024-12-16
Payer: MEDICARE

## 2024-12-16 ENCOUNTER — HOSPITAL ENCOUNTER (EMERGENCY)
Facility: HOSPITAL | Age: 89
Discharge: HOME | End: 2024-12-16
Payer: MEDICARE

## 2024-12-16 VITALS
RESPIRATION RATE: 16 BRPM | WEIGHT: 192 LBS | DIASTOLIC BLOOD PRESSURE: 80 MMHG | HEART RATE: 71 BPM | HEIGHT: 64 IN | SYSTOLIC BLOOD PRESSURE: 154 MMHG | OXYGEN SATURATION: 95 % | TEMPERATURE: 98.1 F | BODY MASS INDEX: 32.78 KG/M2
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
## 2024-12-16 DIAGNOSIS — S09.90XA INJURY OF HEAD, INITIAL ENCOUNTER: Primary | ICD-10-CM

## 2024-12-16 DIAGNOSIS — S01.01XA LACERATION OF SCALP, INITIAL ENCOUNTER: ICD-10-CM

## 2024-12-16 LAB
ALBUMIN SERPL BCP-MCNC: 4.2 G/DL (ref 3.4–5)
ALP SERPL-CCNC: 57 U/L (ref 33–136)
ALT SERPL W P-5'-P-CCNC: 13 U/L (ref 7–45)
ANION GAP SERPL CALC-SCNC: 14 MMOL/L (ref 10–20)
APPEARANCE UR: CLEAR
AST SERPL W P-5'-P-CCNC: 17 U/L (ref 9–39)
BACTERIA #/AREA URNS AUTO: ABNORMAL /HPF
BILIRUB SERPL-MCNC: 0.6 MG/DL (ref 0–1.2)
BILIRUB UR STRIP.AUTO-MCNC: NEGATIVE MG/DL
BUN SERPL-MCNC: 37 MG/DL (ref 6–23)
CALCIUM SERPL-MCNC: 8.8 MG/DL (ref 8.6–10.3)
CARDIAC TROPONIN I PNL SERPL HS: 7 NG/L (ref 0–13)
CHLORIDE SERPL-SCNC: 104 MMOL/L (ref 98–107)
CO2 SERPL-SCNC: 27 MMOL/L (ref 21–32)
COLOR UR: ABNORMAL
CREAT SERPL-MCNC: 2.33 MG/DL (ref 0.5–1.05)
EGFRCR SERPLBLD CKD-EPI 2021: 19 ML/MIN/1.73M*2
ERYTHROCYTE [DISTWIDTH] IN BLOOD BY AUTOMATED COUNT: 13.5 % (ref 11.5–14.5)
GLUCOSE SERPL-MCNC: 116 MG/DL (ref 74–99)
GLUCOSE UR STRIP.AUTO-MCNC: NORMAL MG/DL
GRAN CASTS #/AREA UR COMP ASSIST: ABNORMAL /LPF
HCT VFR BLD AUTO: 38.4 % (ref 36–46)
HGB BLD-MCNC: 12.2 G/DL (ref 12–16)
HOLD SPECIMEN: NORMAL
HYALINE CASTS #/AREA URNS AUTO: ABNORMAL /LPF
KETONES UR STRIP.AUTO-MCNC: NEGATIVE MG/DL
LEUKOCYTE ESTERASE UR QL STRIP.AUTO: NEGATIVE
MCH RBC QN AUTO: 31.1 PG (ref 26–34)
MCHC RBC AUTO-ENTMCNC: 31.8 G/DL (ref 32–36)
MCV RBC AUTO: 98 FL (ref 80–100)
MUCOUS THREADS #/AREA URNS AUTO: ABNORMAL /LPF
NITRITE UR QL STRIP.AUTO: NEGATIVE
NRBC BLD-RTO: 0 /100 WBCS (ref 0–0)
PH UR STRIP.AUTO: 5.5 [PH]
PLATELET # BLD AUTO: 237 X10*3/UL (ref 150–450)
POTASSIUM SERPL-SCNC: 4.5 MMOL/L (ref 3.5–5.3)
PROT SERPL-MCNC: 7.1 G/DL (ref 6.4–8.2)
PROT UR STRIP.AUTO-MCNC: ABNORMAL MG/DL
RBC # BLD AUTO: 3.92 X10*6/UL (ref 4–5.2)
RBC # UR STRIP.AUTO: NEGATIVE /UL
RBC #/AREA URNS AUTO: ABNORMAL /HPF
SODIUM SERPL-SCNC: 140 MMOL/L (ref 136–145)
SP GR UR STRIP.AUTO: 1.02
SQUAMOUS #/AREA URNS AUTO: ABNORMAL /HPF
UROBILINOGEN UR STRIP.AUTO-MCNC: NORMAL MG/DL
WBC # BLD AUTO: 6.6 X10*3/UL (ref 4.4–11.3)
WBC #/AREA URNS AUTO: ABNORMAL /HPF

## 2024-12-16 PROCEDURE — 84075 ASSAY ALKALINE PHOSPHATASE: CPT | Performed by: PHYSICIAN ASSISTANT

## 2024-12-16 PROCEDURE — 84484 ASSAY OF TROPONIN QUANT: CPT | Performed by: PHYSICIAN ASSISTANT

## 2024-12-16 PROCEDURE — 12002 RPR S/N/AX/GEN/TRNK2.6-7.5CM: CPT | Performed by: PHYSICIAN ASSISTANT

## 2024-12-16 PROCEDURE — 36415 COLL VENOUS BLD VENIPUNCTURE: CPT | Performed by: PHYSICIAN ASSISTANT

## 2024-12-16 PROCEDURE — 70450 CT HEAD/BRAIN W/O DYE: CPT | Performed by: RADIOLOGY

## 2024-12-16 PROCEDURE — 81001 URINALYSIS AUTO W/SCOPE: CPT | Performed by: PHYSICIAN ASSISTANT

## 2024-12-16 PROCEDURE — 71045 X-RAY EXAM CHEST 1 VIEW: CPT | Performed by: RADIOLOGY

## 2024-12-16 PROCEDURE — 85027 COMPLETE CBC AUTOMATED: CPT | Performed by: PHYSICIAN ASSISTANT

## 2024-12-16 PROCEDURE — 2500000004 HC RX 250 GENERAL PHARMACY W/ HCPCS (ALT 636 FOR OP/ED): Performed by: PHYSICIAN ASSISTANT

## 2024-12-16 PROCEDURE — 99285 EMERGENCY DEPT VISIT HI MDM: CPT | Mod: 25

## 2024-12-16 PROCEDURE — 90471 IMMUNIZATION ADMIN: CPT | Performed by: PHYSICIAN ASSISTANT

## 2024-12-16 PROCEDURE — 71045 X-RAY EXAM CHEST 1 VIEW: CPT

## 2024-12-16 PROCEDURE — 70450 CT HEAD/BRAIN W/O DYE: CPT

## 2024-12-16 PROCEDURE — 90715 TDAP VACCINE 7 YRS/> IM: CPT | Performed by: PHYSICIAN ASSISTANT

## 2024-12-16 RX ORDER — LIDOCAINE HYDROCHLORIDE AND EPINEPHRINE 10; 10 UG/ML; MG/ML
10 INJECTION, SOLUTION INFILTRATION; PERINEURAL ONCE
Status: COMPLETED | OUTPATIENT
Start: 2024-12-16 | End: 2024-12-16

## 2024-12-16 RX ORDER — OLOPATADINE HYDROCHLORIDE 1 MG/ML
1 SOLUTION/ DROPS OPHTHALMIC DAILY
COMMUNITY

## 2024-12-16 ASSESSMENT — COLUMBIA-SUICIDE SEVERITY RATING SCALE - C-SSRS
1. IN THE PAST MONTH, HAVE YOU WISHED YOU WERE DEAD OR WISHED YOU COULD GO TO SLEEP AND NOT WAKE UP?: NO
6. HAVE YOU EVER DONE ANYTHING, STARTED TO DO ANYTHING, OR PREPARED TO DO ANYTHING TO END YOUR LIFE?: NO
2. HAVE YOU ACTUALLY HAD ANY THOUGHTS OF KILLING YOURSELF?: NO

## 2024-12-16 NOTE — ED PROVIDER NOTES
HPI   Chief Complaint   Patient presents with    Head Laceration     Patient brought by [facility] from the [facility], states she was walking in the hallway when she fell and hit her head, laceration to back of scalp. Denies LOC       Presents for evaluation after fall.  States that she was using her walker when she suddenly fell backwards.  Striking her head on the ground.  Denies any loss of consciousness.  Denies headache.  No nausea or vomiting.  Denies any other injury.  Patient states she lives in a assisted living facility and is policy that when there are falls patient be sent to the ER for further evaluation and treatment.  Patient states she has no complaints and otherwise feels well.      History provided by:  Patient          Patient History   Past Medical History:   Diagnosis Date    Arthritis     CHF (congestive heart failure)     COPD (chronic obstructive pulmonary disease) (Multi)     Diabetes mellitus (Multi)     Hypertension      Past Surgical History:   Procedure Laterality Date    ESOPHAGOGASTRODUODENOSCOPY  04/29/2024    OTHER SURGICAL HISTORY  09/13/2019    Facial surgery    OTHER SURGICAL HISTORY  09/13/2019    Gallbladder surgery    OTHER SURGICAL HISTORY  09/13/2019    Appendectomy    OTHER SURGICAL HISTORY  09/13/2019    Hysterectomy    OTHER SURGICAL HISTORY  09/13/2019    Colon surgery    OTHER SURGICAL HISTORY  10/24/2019    Thyroid surgery     Family History   Problem Relation Name Age of Onset    Diabetes Mother      Brain cancer Mother      Brain cancer Brother       Social History     Tobacco Use    Smoking status: Never    Smokeless tobacco: Never   Vaping Use    Vaping status: Never Used   Substance Use Topics    Alcohol use: Not Currently    Drug use: Never       Physical Exam   ED Triage Vitals [12/16/24 1342]   Temperature Heart Rate Respirations BP   36.7 °C (98.1 °F) 71 17 176/89      Pulse Ox Temp Source Heart Rate Source Patient Position   (!) 93 % Oral Monitor --      BP  Location FiO2 (%)     -- --       Physical Exam  Vitals and nursing note reviewed.   Constitutional:       General: She is not in acute distress.     Appearance: Normal appearance. She is well-developed, well-groomed and normal weight. She is not ill-appearing or toxic-appearing.   HENT:      Head: Normocephalic.        Right Ear: External ear normal.      Left Ear: External ear normal.      Nose: Nose normal.      Mouth/Throat:      Lips: Pink. No lesions.      Mouth: Mucous membranes are moist.   Eyes:      General: No scleral icterus.     Conjunctiva/sclera: Conjunctivae normal.   Cardiovascular:      Rate and Rhythm: Normal rate and regular rhythm.      Heart sounds: Normal heart sounds.   Pulmonary:      Effort: Pulmonary effort is normal.      Breath sounds: Normal breath sounds and air entry.   Chest:      Chest wall: No tenderness.   Abdominal:      General: Bowel sounds are normal. There is no distension.      Palpations: Abdomen is soft.      Tenderness: There is no abdominal tenderness. There is no right CVA tenderness, left CVA tenderness or guarding.   Musculoskeletal:      Right lower leg: No edema.      Left lower leg: No edema.      Comments: Palpating upper and lower extremities does not elicit any areas of tenderness.  No midline axial spine noticed on exam.  No step-off deformity   Skin:     General: Skin is warm.      Capillary Refill: Capillary refill takes less than 2 seconds.   Neurological:      General: No focal deficit present.      Mental Status: She is alert and oriented to person, place, and time.      GCS: GCS eye subscore is 4. GCS verbal subscore is 5. GCS motor subscore is 6.      Cranial Nerves: No cranial nerve deficit or facial asymmetry.      Sensory: No sensory deficit.      Motor: No weakness.      Gait: Gait normal.   Psychiatric:         Attention and Perception: Attention and perception normal.         Mood and Affect: Mood and affect normal.         Speech: Speech normal.          Behavior: Behavior normal. Behavior is cooperative.         Thought Content: Thought content normal.         Cognition and Memory: Cognition and memory normal.         Judgment: Judgment normal.           ED Course & MDM   Diagnoses as of 12/16/24 1658   Injury of head, initial encounter   Laceration of scalp, initial encounter   Fall, initial encounter                 No data recorded     Terrence Coma Scale Score: 15 (12/16/24 1344 : Avril Soliman, STEVE)                           Medical Decision Making  Presents for evaluation after fall.  States that she was using her walker when she suddenly fell backwards.  Striking her head on the ground.  Denies any loss of consciousness.  Denies headache.  No nausea or vomiting.  Denies any other injury.  Patient states she lives in a assisted living facility and is policy that when there are falls patient be sent to the ER for further evaluation and treatment.  Patient states she has no complaints and otherwise feels well.    Ddx: Fall, trauma, skull fracture, intracranial bleeding, fracture, contusion, anemia, syncope, cardiac, pulmonary, other    Will obtain basic workup for patient's fall today.  Including CT of the brain due to head injury  No acute findings were noted today on any labs or radiographic studies  Patient's laceration was repaired per procedure note  Tetanus was updated  Patient discharged back to assisted living in improved stable condition.  Encouraged follow-up with family care provider in the next few days.    Problems Addressed:  Fall, initial encounter: undiagnosed new problem with uncertain prognosis  Injury of head, initial encounter: acute illness or injury  Laceration of scalp, initial encounter: acute illness or injury    Amount and/or Complexity of Data Reviewed  Labs: ordered. Decision-making details documented in ED Course.  Radiology: ordered and independent interpretation performed. Decision-making details documented in ED  Course.        Procedure  Laceration Repair    Performed by: Mraia R Suresh PA-C  Authorized by: Raymon Olsen DO    Consent:     Consent obtained:  Verbal    Consent given by:  Patient    Risks, benefits, and alternatives were discussed: yes      Risks discussed:  Infection, need for additional repair, nerve damage, poor wound healing, pain, poor cosmetic result, retained foreign body, tendon damage and vascular damage    Alternatives discussed:  No treatment, delayed treatment, observation and referral  Universal protocol:     Procedure explained and questions answered to patient or proxy's satisfaction: yes      Patient identity confirmed:  Verbally with patient  Anesthesia:     Anesthesia method:  Local infiltration    Local anesthetic:  Lidocaine 1% WITH epi  Laceration details:     Location:  Scalp    Scalp location:  L parietal    Length (cm):  4    Depth (mm):  3  Pre-procedure details:     Preparation:  Patient was prepped and draped in usual sterile fashion  Exploration:     Limited defect created (wound extended): no      Hemostasis achieved with:  Direct pressure    Wound exploration: wound explored through full range of motion and entire depth of wound visualized      Wound extent: no fascia violation noted, no foreign bodies/material noted, no muscle damage noted, no nerve damage noted, no tendon damage noted, no underlying fracture noted and no vascular damage noted      Contaminated: no    Treatment:     Area cleansed with:  Povidone-iodine    Amount of cleaning:  Standard    Irrigation solution:  Sterile saline    Irrigation volume:  15    Visualized foreign bodies/material removed: no      Debridement:  None    Undermining:  None  Skin repair:     Repair method:  Staples    Number of staples:  5  Approximation:     Approximation:  Close  Repair type:     Repair type:  Simple  Post-procedure details:     Dressing:  Open (no dressing)    Procedure completion:  Tolerated well, no immediate  complications       Maria R Suresh PA-C  12/16/24 0026

## 2024-12-17 LAB — HOLD SPECIMEN: NORMAL

## 2024-12-20 ENCOUNTER — OFFICE VISIT (OUTPATIENT)
Dept: PRIMARY CARE | Facility: CLINIC | Age: 89
End: 2024-12-20
Payer: MEDICARE

## 2024-12-20 ENCOUNTER — TELEPHONE (OUTPATIENT)
Dept: HOME HEALTH SERVICES | Facility: HOME HEALTH | Age: 89
End: 2024-12-20

## 2024-12-20 VITALS
SYSTOLIC BLOOD PRESSURE: 130 MMHG | WEIGHT: 198.1 LBS | HEART RATE: 68 BPM | OXYGEN SATURATION: 98 % | DIASTOLIC BLOOD PRESSURE: 88 MMHG | BODY MASS INDEX: 34 KG/M2

## 2024-12-20 DIAGNOSIS — R53.1 GENERALIZED WEAKNESS: ICD-10-CM

## 2024-12-20 DIAGNOSIS — R29.6 FREQUENT FALLS: Primary | ICD-10-CM

## 2024-12-20 PROCEDURE — 1036F TOBACCO NON-USER: CPT

## 2024-12-20 PROCEDURE — 1157F ADVNC CARE PLAN IN RCRD: CPT

## 2024-12-20 PROCEDURE — 99213 OFFICE O/P EST LOW 20 MIN: CPT

## 2024-12-20 PROCEDURE — 3075F SYST BP GE 130 - 139MM HG: CPT

## 2024-12-20 PROCEDURE — 1159F MED LIST DOCD IN RCRD: CPT

## 2024-12-20 PROCEDURE — 3079F DIAST BP 80-89 MM HG: CPT

## 2024-12-20 PROCEDURE — 1160F RVW MEDS BY RX/DR IN RCRD: CPT

## 2024-12-20 ASSESSMENT — ENCOUNTER SYMPTOMS
WEAKNESS: 1
PALPITATIONS: 0
CONSTIPATION: 0
WOUND: 0
DIAPHORESIS: 0
FREQUENCY: 0
RECTAL PAIN: 0
DIFFICULTY URINATING: 0
POLYPHAGIA: 0
NAUSEA: 0
NERVOUS/ANXIOUS: 0
CHILLS: 0
HEADACHES: 0
MYALGIAS: 0
NUMBNESS: 0
ARTHRALGIAS: 0
SHORTNESS OF BREATH: 0
CHEST TIGHTNESS: 0
UNEXPECTED WEIGHT CHANGE: 0
DIARRHEA: 0
FATIGUE: 0
TROUBLE SWALLOWING: 0
ABDOMINAL PAIN: 0
POLYDIPSIA: 0

## 2024-12-20 NOTE — PROGRESS NOTES
Subjective   Patient ID: Lakeshia Park is a 89 y.o. female who presents for Multiple falls (Per [facility] staff, patient has been falling a lot and has low O2).    Patient presents today to discuss leg weakness and multiple falls.  She has had increased incidence of falling at her assisted living.  Most recently she was walking down the hallway and turned her walker lost balance and hit the floor.  She was evaluated in the emergency department with a negative CT, negative chest x-ray negative EKG.  Labs are within normal values for her, near her baseline.  She has had no other falls since this 1.  She does state that she has tried physical therapy in the past for better coordination and has not followed through.  She states that she is willing to follow through with full physical therapy course at this time, if the can consult on the appropriate type and weight of walker for her as she states that sometimes her current one let her go too fast.  Her and her goal is to be able to be strong enough to participate in [employer] [facility] program, they have previously denied her due to being too weak.  We did have a discussion about other potential tests and diagnostics that could be performed however due to her DNR status and age a shared decision was made that these tests would not have any outcomes that would be followed through on by the patient.  She is denying any cardiovascular or neurological symptoms at this time.    Sutures: Not scheduled for removal at this time a new appointment has been made to schedule suture removal, wound appears to be intact and in appropriate stage of healing.                Review of Systems   Constitutional:  Negative for chills, diaphoresis, fatigue and unexpected weight change.   HENT:  Negative for dental problem, tinnitus and trouble swallowing.    Eyes:  Negative for visual disturbance.   Respiratory:  Negative for chest tightness and shortness of breath.     Cardiovascular:  Negative for chest pain, palpitations and leg swelling.   Gastrointestinal:  Negative for abdominal pain, constipation, diarrhea, nausea and rectal pain.   Endocrine: Negative for polydipsia, polyphagia and polyuria.   Genitourinary:  Negative for difficulty urinating, frequency and urgency.   Musculoskeletal:  Negative for arthralgias and myalgias.   Skin:  Negative for pallor and wound.   Neurological:  Positive for weakness. Negative for syncope, numbness and headaches.   Psychiatric/Behavioral:  Negative for suicidal ideas. The patient is not nervous/anxious.        Objective   /88 (BP Location: Right arm, Patient Position: Sitting)   Pulse 68   Wt 89.9 kg (198 lb 1.6 oz)   SpO2 98%   BMI 34.00 kg/m²     Physical Exam  Vitals and nursing note reviewed.   Constitutional:       General: She is not in acute distress.     Appearance: Normal appearance. She is obese.   HENT:      Head: Normocephalic.      Nose: Nose normal.      Mouth/Throat:      Mouth: Mucous membranes are moist.      Pharynx: Oropharynx is clear.   Eyes:      Pupils: Pupils are equal, round, and reactive to light.   Cardiovascular:      Rate and Rhythm: Normal rate and regular rhythm.      Pulses: Normal pulses.      Heart sounds: Normal heart sounds.   Pulmonary:      Effort: Pulmonary effort is normal.      Breath sounds: Normal breath sounds.   Abdominal:      General: Bowel sounds are normal.      Palpations: Abdomen is soft.   Musculoskeletal:      Cervical back: Normal range of motion.   Skin:     General: Skin is warm and dry.      Capillary Refill: Capillary refill takes less than 2 seconds.   Neurological:      General: No focal deficit present.      Mental Status: She is alert and oriented to person, place, and time. Mental status is at baseline.      Cranial Nerves: No cranial nerve deficit.      Sensory: No sensory deficit.      Motor: Weakness (Bilateral legs, extension and flexion) present.       Coordination: Coordination normal.      Gait: Gait normal.      Deep Tendon Reflexes: Reflexes normal.   Psychiatric:         Mood and Affect: Mood normal.         Behavior: Behavior normal.         Thought Content: Thought content normal.         Judgment: Judgment normal.         Assessment/Plan   Diagnoses and all orders for this visit:  Frequent falls  -     Referral to Physical Therapy; Future

## 2024-12-20 NOTE — TELEPHONE ENCOUNTER
Hello,  Home Care received a PT referral for this pt. Unfortunately Frequent falls is not a diagnosis billable for home care. Please update the visit note and referral with a diagnosis that may be causing the falls. We will wait for updates, if no updates made by 12/23 referral will be closed due to Insufficient documentation. Thank you.

## 2024-12-20 NOTE — TELEPHONE ENCOUNTER
I changed the associated diagnosis.  If it is not adequate please reach out to me and I will adjust accordingly.

## 2024-12-23 ENCOUNTER — DOCUMENTATION (OUTPATIENT)
Dept: HOME HEALTH SERVICES | Facility: HOME HEALTH | Age: 89
End: 2024-12-23
Payer: MEDICARE

## 2024-12-23 NOTE — HH CARE COORDINATION
This referral has been made a Non Admit with  Home Care due to Insufficient Documentation. If you have further questions, feel free to reach out to our office at [phone]. Thank you, [facility] Intake.

## 2024-12-27 ENCOUNTER — APPOINTMENT (OUTPATIENT)
Dept: PRIMARY CARE | Facility: CLINIC | Age: 89
End: 2024-12-27
Payer: MEDICARE

## 2024-12-27 VITALS
DIASTOLIC BLOOD PRESSURE: 88 MMHG | WEIGHT: 202.2 LBS | OXYGEN SATURATION: 93 % | HEIGHT: 64 IN | SYSTOLIC BLOOD PRESSURE: 120 MMHG | BODY MASS INDEX: 34.52 KG/M2 | HEART RATE: 67 BPM

## 2024-12-27 DIAGNOSIS — R09.02 HYPOXIA: Primary | ICD-10-CM

## 2024-12-27 DIAGNOSIS — S01.01XD LACERATION OF SCALP, SUBSEQUENT ENCOUNTER: ICD-10-CM

## 2024-12-27 PROCEDURE — 15854 REMOVAL SUTR&STAPL XREQ ANES: CPT

## 2024-12-27 PROCEDURE — 3079F DIAST BP 80-89 MM HG: CPT

## 2024-12-27 PROCEDURE — 99213 OFFICE O/P EST LOW 20 MIN: CPT

## 2024-12-27 PROCEDURE — 1159F MED LIST DOCD IN RCRD: CPT

## 2024-12-27 PROCEDURE — 1036F TOBACCO NON-USER: CPT

## 2024-12-27 PROCEDURE — 1157F ADVNC CARE PLAN IN RCRD: CPT

## 2024-12-27 PROCEDURE — 3074F SYST BP LT 130 MM HG: CPT

## 2024-12-27 ASSESSMENT — ENCOUNTER SYMPTOMS
POLYPHAGIA: 0
NERVOUS/ANXIOUS: 0
ARTHRALGIAS: 0
TROUBLE SWALLOWING: 0
POLYDIPSIA: 0
CHEST TIGHTNESS: 0
CONSTIPATION: 0
DIFFICULTY URINATING: 0
FREQUENCY: 0
PALPITATIONS: 0
FATIGUE: 0
RECTAL PAIN: 0
NAUSEA: 0
UNEXPECTED WEIGHT CHANGE: 0
SHORTNESS OF BREATH: 0
WEAKNESS: 0
DIARRHEA: 0
DIAPHORESIS: 0
ABDOMINAL PAIN: 0
NUMBNESS: 0
WOUND: 1
CHILLS: 0
HEADACHES: 0
MYALGIAS: 0

## 2024-12-27 NOTE — PROGRESS NOTES
Subjective   Patient ID: Lakeshia Park is a 89 y.o. female who presents for Suture / Staple Removal (Pt is here today to get some staples removed following a fall on 12/16/24. Was seen in ER for head injury, and laceration of scalp. Nursing at her nursing home thinks her o2 maybe dropping and wants that checked. Reports she is having a lot of falls and not slowing down and thinks this is due to low o2. ).    Patient presents today for staple removal.  Staples placed on posterior scalp from previous fall.    Stable removal: 5 staples in place, wound closed and healed.  5 staples removed.  No complications, patient tolerated well.    Falls: Staff is with patient, reports multiple falls at facility.  They feel that her oxygen levels are dropping when she ambulates long distances, patient has 93% SpO2 on evaluation in office.  Will order 6-minute walk test for possible home oxygen.    Staff will limit her length of ambulation and assist her until test completed.     Suture / Staple Removal         Review of Systems   Constitutional:  Negative for chills, diaphoresis, fatigue and unexpected weight change.   HENT:  Negative for dental problem, tinnitus and trouble swallowing.    Eyes:  Negative for visual disturbance.   Respiratory:  Negative for chest tightness and shortness of breath.    Cardiovascular:  Negative for chest pain, palpitations and leg swelling.   Gastrointestinal:  Negative for abdominal pain, constipation, diarrhea, nausea and rectal pain.   Endocrine: Negative for polydipsia, polyphagia and polyuria.   Genitourinary:  Negative for difficulty urinating, frequency and urgency.   Musculoskeletal:  Negative for arthralgias and myalgias.   Skin:  Positive for wound (Posterior scalp). Negative for pallor.   Neurological:  Negative for syncope, weakness, numbness and headaches.   Psychiatric/Behavioral:  Negative for suicidal ideas. The patient is not nervous/anxious.        Objective   /88   Pulse  "67   Ht 1.626 m (5' 4\")   Wt 91.7 kg (202 lb 3.2 oz)   SpO2 93%   BMI 34.71 kg/m²     Physical Exam  Vitals and nursing note reviewed.   Constitutional:       General: She is not in acute distress.     Appearance: Normal appearance. She is obese.   HENT:      Head: Normocephalic.      Nose: Nose normal.      Mouth/Throat:      Mouth: Mucous membranes are moist.      Pharynx: Oropharynx is clear.   Eyes:      Pupils: Pupils are equal, round, and reactive to light.   Cardiovascular:      Rate and Rhythm: Normal rate and regular rhythm.      Pulses: Normal pulses.      Heart sounds: Normal heart sounds.   Pulmonary:      Effort: Pulmonary effort is normal.      Breath sounds: Normal breath sounds.   Abdominal:      General: Bowel sounds are normal.      Palpations: Abdomen is soft.   Musculoskeletal:         General: Normal range of motion.      Cervical back: Normal range of motion.   Skin:     General: Skin is warm and dry.      Capillary Refill: Capillary refill takes less than 2 seconds.   Neurological:      General: No focal deficit present.      Mental Status: She is alert and oriented to person, place, and time. Mental status is at baseline.      Motor: Weakness (Bilateral legs) present.   Psychiatric:         Mood and Affect: Mood normal.         Behavior: Behavior normal.         Thought Content: Thought content normal.         Judgment: Judgment normal.     Patient ID: Lakeshia Park is a 89 y.o. female.    Suture Removal    Date/Time: 12/27/2024 10:07 AM    Performed by: DUSTY Holm  Authorized by: DUSTY Holm    Consent:     Consent obtained:  Verbal    Consent given by:  Patient    Risks, benefits, and alternatives were discussed: yes      Risks discussed:  Wound separation  Universal protocol:     Procedure explained and questions answered to patient or proxy's satisfaction: yes      Relevant documents present and verified: yes      Site/side marked: yes      Immediately " prior to procedure, a time out was called: yes      Patient identity confirmed:  Verbally with patient  Location:     Location:  Head/neck    Head/neck location:  Scalp  Procedure details:     Wound appearance:  No signs of infection and good wound healing    Number of staples removed:  5  Post-procedure details:     Post-removal:  No dressing applied    Procedure completion:  Tolerated      Assessment/Plan   Diagnoses and all orders for this visit:  Hypoxia  -     Pulmonary Stress Test (6 Min. Walk); Future  -     Pulmonary Stress Test (6 Min. Walk); Future  Other orders  -     Suture Removal

## 2024-12-30 ENCOUNTER — HOSPITAL ENCOUNTER (OUTPATIENT)
Dept: RESPIRATORY THERAPY | Facility: HOSPITAL | Age: 89
Discharge: HOME | End: 2024-12-30
Payer: MEDICARE

## 2024-12-30 DIAGNOSIS — R09.02 HYPOXIA: ICD-10-CM

## 2024-12-30 PROCEDURE — 94618 PULMONARY STRESS TESTING: CPT

## 2025-01-10 ENCOUNTER — PATIENT OUTREACH (OUTPATIENT)
Dept: PRIMARY CARE | Facility: CLINIC | Age: OVER 89
End: 2025-01-10
Payer: MEDICARE

## 2025-01-15 ENCOUNTER — HOSPITAL ENCOUNTER (OUTPATIENT)
Dept: RADIOLOGY | Facility: CLINIC | Age: OVER 89
Discharge: HOME | End: 2025-01-15
Payer: MEDICARE

## 2025-01-15 ENCOUNTER — TELEPHONE (OUTPATIENT)
Dept: PRIMARY CARE | Facility: CLINIC | Age: OVER 89
End: 2025-01-15

## 2025-01-15 ENCOUNTER — APPOINTMENT (OUTPATIENT)
Dept: PRIMARY CARE | Facility: CLINIC | Age: OVER 89
End: 2025-01-15
Payer: MEDICARE

## 2025-01-15 VITALS
HEIGHT: 64 IN | DIASTOLIC BLOOD PRESSURE: 100 MMHG | SYSTOLIC BLOOD PRESSURE: 150 MMHG | OXYGEN SATURATION: 94 % | HEART RATE: 91 BPM | BODY MASS INDEX: 33.9 KG/M2 | WEIGHT: 198.6 LBS

## 2025-01-15 DIAGNOSIS — M54.50 LUMBOSACRAL PAIN: Primary | ICD-10-CM

## 2025-01-15 DIAGNOSIS — M54.50 LUMBOSACRAL PAIN: ICD-10-CM

## 2025-01-15 PROCEDURE — 1157F ADVNC CARE PLAN IN RCRD: CPT

## 2025-01-15 PROCEDURE — 1160F RVW MEDS BY RX/DR IN RCRD: CPT

## 2025-01-15 PROCEDURE — 3077F SYST BP >= 140 MM HG: CPT

## 2025-01-15 PROCEDURE — 1159F MED LIST DOCD IN RCRD: CPT

## 2025-01-15 PROCEDURE — 99213 OFFICE O/P EST LOW 20 MIN: CPT

## 2025-01-15 PROCEDURE — 3080F DIAST BP >= 90 MM HG: CPT

## 2025-01-15 PROCEDURE — 72100 X-RAY EXAM L-S SPINE 2/3 VWS: CPT

## 2025-01-15 PROCEDURE — 72100 X-RAY EXAM L-S SPINE 2/3 VWS: CPT | Performed by: RADIOLOGY

## 2025-01-15 PROCEDURE — 1036F TOBACCO NON-USER: CPT

## 2025-01-15 RX ORDER — CYCLOBENZAPRINE HCL 5 MG
5 TABLET ORAL NIGHTLY PRN
Qty: 14 TABLET | Refills: 0 | Status: SHIPPED | OUTPATIENT
Start: 2025-01-15

## 2025-01-15 ASSESSMENT — ENCOUNTER SYMPTOMS
TROUBLE SWALLOWING: 0
CONSTIPATION: 0
HEADACHES: 0
DIFFICULTY URINATING: 0
BACK PAIN: 1
RECTAL PAIN: 0
CHILLS: 0
MYALGIAS: 0
SHORTNESS OF BREATH: 0
NAUSEA: 0
NUMBNESS: 0
UNEXPECTED WEIGHT CHANGE: 0
FATIGUE: 0
DIAPHORESIS: 0
WEAKNESS: 0
ARTHRALGIAS: 0
WOUND: 0
ABDOMINAL PAIN: 0
DIARRHEA: 0
FREQUENCY: 0
POLYDIPSIA: 0
PALPITATIONS: 0
NERVOUS/ANXIOUS: 0
POLYPHAGIA: 0
CHEST TIGHTNESS: 0

## 2025-01-15 NOTE — TELEPHONE ENCOUNTER
Nurse that we spoke to this morning wants to know if she can get an order for some PT. Please advise.

## 2025-01-15 NOTE — PROGRESS NOTES
"Subjective   Patient ID: Lakeshia Park is a 89 y.o. female who presents for Back Pain (Pt is here today for back pain that goes down both legs and has been going on for 4 days. ).    Lakeshia presents for middle back pain.    Lumbar strain: Bilateral middle back muscle spasms.  Patient has not been using her prescribed Flexeril as she says she is out.  Will write new prescription for 5 mg nightly.  She denies injury to the back.  No recent imaging.  Will obtain x-ray lumbar spine.    Addendum: As she was ambulating to the exit and checking out she had a mechanical fall.  Slid to the ground landing on her buttock.  Denies any pain, no syncope no head injury.  X-ray of lumbar spine should evaluate a potential injury.  Report called to assisted living to report fall.  They will observe patient for any progression of injury.  The nursing staff at the office did fill out an incident report.          Review of Systems   Constitutional:  Negative for chills, diaphoresis, fatigue and unexpected weight change.   HENT:  Negative for dental problem, tinnitus and trouble swallowing.    Eyes:  Negative for visual disturbance.   Respiratory:  Negative for chest tightness and shortness of breath.    Cardiovascular:  Negative for chest pain, palpitations and leg swelling.   Gastrointestinal:  Negative for abdominal pain, constipation, diarrhea, nausea and rectal pain.   Endocrine: Negative for polydipsia, polyphagia and polyuria.   Genitourinary:  Negative for difficulty urinating, frequency and urgency.   Musculoskeletal:  Positive for back pain. Negative for arthralgias and myalgias.   Skin:  Negative for pallor and wound.   Neurological:  Negative for syncope, weakness, numbness and headaches.   Psychiatric/Behavioral:  Negative for suicidal ideas. The patient is not nervous/anxious.        Objective   BP (!) 150/100 Comment: has not had medication yet  Pulse 91   Ht 1.626 m (5' 4\")   Wt 90.1 kg (198 lb 9.6 oz)   SpO2 94%  "  BMI 34.09 kg/m²     Physical Exam  Vitals and nursing note reviewed.   Constitutional:       General: She is not in acute distress.     Appearance: Normal appearance. She is normal weight.   HENT:      Head: Normocephalic.      Nose: Nose normal.      Mouth/Throat:      Mouth: Mucous membranes are moist.      Pharynx: Oropharynx is clear.   Eyes:      Pupils: Pupils are equal, round, and reactive to light.   Cardiovascular:      Rate and Rhythm: Normal rate and regular rhythm.      Pulses: Normal pulses.      Heart sounds: Normal heart sounds.   Pulmonary:      Effort: Pulmonary effort is normal.      Breath sounds: Normal breath sounds.   Abdominal:      General: Bowel sounds are normal.      Palpations: Abdomen is soft.   Musculoskeletal:         General: Swelling (Bilateral middle back) and tenderness present. Normal range of motion.      Cervical back: Normal range of motion.   Skin:     General: Skin is warm and dry.      Capillary Refill: Capillary refill takes less than 2 seconds.   Neurological:      General: No focal deficit present.      Mental Status: She is alert and oriented to person, place, and time. Mental status is at baseline.   Psychiatric:         Mood and Affect: Mood normal.         Behavior: Behavior normal.         Thought Content: Thought content normal.         Judgment: Judgment normal.         Assessment/Plan   Diagnoses and all orders for this visit:  Lumbosacral pain  -     cyclobenzaprine (Flexeril) 5 mg tablet; Take 1 tablet (5 mg) by mouth as needed at bedtime for muscle spasms for up to 14 doses.  -     XR lumbar spine 2-3 views; Future

## 2025-01-15 NOTE — TELEPHONE ENCOUNTER
PT was walking up to the check out window after her appointment, when she got up to the window, the  thought she was going to sit down on her rollator but instead she fell to the ground,  went around front to help pt, and other clinical staff came up, pt reported she fell on landed on her right elbow and hip, when we got to her she was propped up using her right elbow and laying on her right side. She stated she did not hit her head and thinks her legs give out which caused her to fall. PT pcp was then alerted and he came up and checked pt out, and wanted her to go get the back x-ray done to make sure she was ok. Helped pt up and to a chair, where she sat for a little bit, then walked to x-ray. I called [facility] and spoke to her nurse Antonia and let her know what had happened and what we did. Alonso (PT PCP) then spoke to pt nurse as well. Nurse called back later and was asking for a PT referral to be placed, PCP placed referral. I tried to reach her, but was unable so a message was left with information.

## 2025-01-22 ENCOUNTER — TELEPHONE (OUTPATIENT)
Dept: PRIMARY CARE | Facility: CLINIC | Age: OVER 89
End: 2025-01-22
Payer: MEDICARE

## 2025-01-22 ENCOUNTER — DOCUMENTATION (OUTPATIENT)
Dept: PHYSICAL THERAPY | Facility: CLINIC | Age: OVER 89
End: 2025-01-22
Payer: MEDICARE

## 2025-01-22 ENCOUNTER — DOCUMENTATION (OUTPATIENT)
Dept: PRIMARY CARE | Facility: CLINIC | Age: OVER 89
End: 2025-01-22
Payer: MEDICARE

## 2025-01-22 ENCOUNTER — APPOINTMENT (OUTPATIENT)
Dept: PHYSICAL THERAPY | Facility: CLINIC | Age: OVER 89
End: 2025-01-22
Payer: MEDICARE

## 2025-01-22 DIAGNOSIS — M54.50 LUMBOSACRAL PAIN: Primary | ICD-10-CM

## 2025-01-22 NOTE — PROGRESS NOTES
Physical Therapy                 Therapy Communication Note    Patient Name: Lakeshia Park  MRN: [id number]  Department:   Room: Room/bed info not found  Today's Date: 1/22/2025     Discipline: Physical Therapy          Missed Visit Reason:  No Show initial eval    Missed Time: No Show    Comment:

## 2025-01-22 NOTE — TELEPHONE ENCOUNTER
Nurse from the nursing home called and wanted to know who the referral to PT was sent to so they can set this up for the patient.

## 2025-01-23 ENCOUNTER — HOSPITAL ENCOUNTER (OUTPATIENT)
Dept: RADIOLOGY | Facility: EXTERNAL LOCATION | Age: OVER 89
Discharge: HOME | End: 2025-01-23

## 2025-01-23 ENCOUNTER — APPOINTMENT (OUTPATIENT)
Dept: ORTHOPEDIC SURGERY | Facility: CLINIC | Age: OVER 89
End: 2025-01-23
Payer: MEDICARE

## 2025-01-29 ENCOUNTER — APPOINTMENT (OUTPATIENT)
Dept: RADIOLOGY | Facility: HOSPITAL | Age: OVER 89
End: 2025-01-29
Payer: MEDICARE

## 2025-01-29 ENCOUNTER — HOSPITAL ENCOUNTER (EMERGENCY)
Facility: HOSPITAL | Age: OVER 89
Discharge: HOME | End: 2025-01-29
Attending: EMERGENCY MEDICINE
Payer: MEDICARE

## 2025-01-29 VITALS
SYSTOLIC BLOOD PRESSURE: 161 MMHG | HEART RATE: 64 BPM | BODY MASS INDEX: 34.73 KG/M2 | TEMPERATURE: 97.6 F | WEIGHT: 196 LBS | DIASTOLIC BLOOD PRESSURE: 86 MMHG | OXYGEN SATURATION: 95 % | RESPIRATION RATE: 18 BRPM | HEIGHT: 63 IN

## 2025-01-29 DIAGNOSIS — S09.90XA HEAD INJURY, INITIAL ENCOUNTER: Primary | ICD-10-CM
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
## 2025-01-29 PROCEDURE — 99284 EMERGENCY DEPT VISIT MOD MDM: CPT | Mod: 25 | Performed by: EMERGENCY MEDICINE

## 2025-01-29 PROCEDURE — 70450 CT HEAD/BRAIN W/O DYE: CPT | Performed by: RADIOLOGY

## 2025-01-29 PROCEDURE — 72125 CT NECK SPINE W/O DYE: CPT

## 2025-01-29 PROCEDURE — 72125 CT NECK SPINE W/O DYE: CPT | Performed by: RADIOLOGY

## 2025-01-29 PROCEDURE — 70450 CT HEAD/BRAIN W/O DYE: CPT

## 2025-01-29 ASSESSMENT — PAIN SCALES - GENERAL: PAINLEVEL_OUTOF10: 0 - NO PAIN

## 2025-01-29 ASSESSMENT — PAIN - FUNCTIONAL ASSESSMENT: PAIN_FUNCTIONAL_ASSESSMENT: 0-10

## 2025-01-29 NOTE — ED PROVIDER NOTES
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 Denies LOC, pain, dizziness, lightheaded or being on blood thinners.        Patient presents to the emergency department to be evaluated after a fall.  The patient went to sit on her wheeled walker and missed and fell backwards striking the right side of her head.  She denies loss of consciousness and states her pain is negligible.  Reports no other injuries.      History provided by:  Patient, EMS personnel and nursing home   used: No            Patient History   Past Medical History:   Diagnosis Date    Arthritis     CHF (congestive heart failure)     COPD (chronic obstructive pulmonary disease) (Multi)     Diabetes mellitus (Multi)     Hypertension      Past Surgical History:   Procedure Laterality Date    ESOPHAGOGASTRODUODENOSCOPY  04/29/2024    OTHER SURGICAL HISTORY  09/13/2019    Facial surgery    OTHER SURGICAL HISTORY  09/13/2019    Gallbladder surgery    OTHER SURGICAL HISTORY  09/13/2019    Appendectomy    OTHER SURGICAL HISTORY  09/13/2019    Hysterectomy    OTHER SURGICAL HISTORY  09/13/2019    Colon surgery    OTHER SURGICAL HISTORY  10/24/2019    Thyroid surgery     Family History   Problem Relation Name Age of Onset    Diabetes Mother      Brain cancer Mother      Brain cancer Brother       Social History     Tobacco Use    Smoking status: Never    Smokeless tobacco: Never   Vaping Use    Vaping status: Never Used   Substance Use Topics    Alcohol use: Not Currently    Drug use: Never       Physical Exam   ED Triage Vitals [01/29/25 0940]   Temperature Heart Rate Respirations BP   36.4 °C (97.6 °F) 68 18 161/74      Pulse Ox Temp Source Heart Rate Source Patient Position   95 % Oral Monitor --      BP Location FiO2 (%)     Right arm --       Physical Exam  Vitals and  nursing note reviewed.   Constitutional:       General: She is not in acute distress.     Appearance: Normal appearance. She is obese. She is not ill-appearing, toxic-appearing or diaphoretic.   HENT:      Head: Normocephalic.      Comments: Patient has a 3 cm circumferential scalp hematoma to the right temporal scalp without skull depression.     Nose: Nose normal. No rhinorrhea.   Neck:      Comments: Trachea is midline  Cardiovascular:      Rate and Rhythm: Normal rate and regular rhythm.      Heart sounds: No murmur heard.  Pulmonary:      Effort: Pulmonary effort is normal.      Breath sounds: Normal breath sounds. No wheezing.   Abdominal:      General: Abdomen is flat. Bowel sounds are normal. There is no distension.      Palpations: Abdomen is soft.      Tenderness: There is no abdominal tenderness.   Musculoskeletal:         General: No swelling, tenderness, deformity or signs of injury. Normal range of motion.      Cervical back: Normal range of motion.   Skin:     General: Skin is warm and dry.      Findings: No rash.   Neurological:      General: No focal deficit present.      Mental Status: She is alert and oriented to person, place, and time. Mental status is at baseline.   Psychiatric:         Mood and Affect: Mood normal.         Behavior: Behavior normal.         Thought Content: Thought content normal.         Judgment: Judgment normal.           ED Course & MDM                  No data recorded     Terrence Coma Scale Score: 15 (01/29/25 0943 : Gretta Hillman RN)                           Medical Decision Making      Procedure  Procedures

## 2025-01-30 ENCOUNTER — EVALUATION (OUTPATIENT)
Dept: PHYSICAL THERAPY | Facility: CLINIC | Age: OVER 89
End: 2025-01-30
Payer: MEDICARE

## 2025-01-30 DIAGNOSIS — M54.50 LUMBOSACRAL PAIN: ICD-10-CM

## 2025-01-30 DIAGNOSIS — R26.89 DECREASED FUNCTIONAL MOBILITY: Primary | ICD-10-CM

## 2025-01-30 PROCEDURE — 97161 PT EVAL LOW COMPLEX 20 MIN: CPT | Mod: GP

## 2025-01-30 PROCEDURE — 97110 THERAPEUTIC EXERCISES: CPT | Mod: GP

## 2025-01-30 ASSESSMENT — PAIN DESCRIPTION - DESCRIPTORS: DESCRIPTORS: ACHING;SORE;TIGHTNESS

## 2025-01-30 ASSESSMENT — ENCOUNTER SYMPTOMS
OCCASIONAL FEELINGS OF UNSTEADINESS: 1
DEPRESSION: 1
LOSS OF SENSATION IN FEET: 0

## 2025-01-30 ASSESSMENT — PAIN - FUNCTIONAL ASSESSMENT: PAIN_FUNCTIONAL_ASSESSMENT: 0-10

## 2025-01-30 ASSESSMENT — PAIN SCALES - GENERAL: PAINLEVEL_OUTOF10: 0 - NO PAIN

## 2025-01-30 NOTE — PROGRESS NOTES
Physical Therapy    Physical Therapy Evaluation    Patient Name: Lakeshia Park  MRN: [id number]  : 1930  Referring Physician: Alonso Westbrook  Today's Date: 2025  Time Calculation  Start Time: 1245  Stop Time: 1328  Time Calculation (min): 43 min  PT Evaluation Time Entry  PT Evaluation (Low) Time Entry: 31  PT Therapeutic Procedures Time Entry  Therapeutic Exercise Time Entry: 12           General  Reason for Referral: Low back Pain  Referred By: Alonso Westbrook  General Comment: Visit     Current Problem  Problem List Items Addressed This Visit             ICD-10-CM       Musculoskeletal and Injuries    Lumbosacral pain M54.50    Relevant Orders    Follow Up In Physical Therapy       Symptoms and Signs    Decreased functional mobility - Primary R26.89    Relevant Orders    Follow Up In Physical Therapy          SUBJECTIVE  Subjective   Patient reports low back pain that began about 2 weeks ago insidiously . This is leading to difficulty with Walking, Sitting, Standing, Bending, Lifting, Twisting, Squatting, and ADLs .  Pain is reported to range 0-9/10 with daily activities.  Patient states that their goal is to get motion back with physical therapy intervention.    Prior level of function: Assistance with household chores     Patient's name and  were confirmed this date.    X-ray showed Severe multilevel degenerative changes as above with interval mild progression, Moderate levoscoliosis, Unchanged grade 1 L3-4 and L4-5 anterolisthesis, and Severe right hip joint degenerative changes. Pt lives in assisted living. Pt with fall yesterday and went to ED but was released. Pt says that back pain feels central and just above her waist started insidiously and had affected ability to stand and walk but has been feeling better recently. Pt says that R arm pain started about the same time as her back pain. Pt says she has also been getting some pain down her R leg intermittently.      Precautions  Precautions  STEADI Fall Risk Score (The score of 4 or more indicates an increased risk of falling): 10  Precautions Comment: Fall risk, HTN, DM, CKD, prior CA       Pain  Pain Assessment: 0-10  0-10 (Numeric) Pain Score: 0 - No pain (At worst an 9)  Pain Type: Chronic pain  Pain Location: Back  Pain Orientation: Right, Left  Pain Radiating Towards: Down RLE  Pain Descriptors: Aching, Sore, Tightness  Pain Frequency: Constant/continuous    Barriers to Participation:Transportation     OBJECTIVE:    Lower Extremity Strength:  LE strength not listed below is WNL  MMT 5/5 max  LEFT RIGHT   Hip Flexion 4 4   Hip Extension     Hip Abduction Tested in sitting 4+ 4+   Hip Adduction 4+ 4+   Knee Extension 4+ 4+   Knee Flexion 4 4+   Ankle DF 4- 4-   Ankle PF     Ankle INV     Ankle EV         Muscle Flexibility: Nt    Posture: Forward head, rounded shoulders     Joint mobility: Nt    Gait mechanics: Rolator, flexed posture, rolator away from body, decreased terminal hip ext, step through pattern     Palpation: TTP along lateral and posterior aspect of R humerus, T9-T12, Increased lumbar paraspinal tissue tension     Special tests:      4 stage: NBOS 15s    Other Measures  Oswestry Disablity Index (CRISTIN): 26       TREATMENT:     Seated marching with orange band x10  Seated Hip ABD with orange band x10  Seated LAQ 2s hold x10    PATIENT EDUCATION:  Access Code: [email or web address]  URL: [email or web address]  Date: 01/30/2025  Prepared by: Saul Woo    Exercises  - Seated March with Resistance  - 1 x daily - 7 x weekly - 3 sets - 10 reps  - Seated Hip Abduction with Resistance  - 1 x daily - 7 x weekly - 3 sets - 10 reps  - Seated Long Arc Quad  - 1 x daily - 7 x weekly - 3 sets - 10 reps - 2s hold    ASSESSEMENT  Pt is a 89 y.o.  referred for physical therapy by Alonso Westbrook APRN-C*  for Lumbosacral pain with signs and symptoms consistent with Lumbar OA. Pt presents with Pain, Pain with  Activity, Decreased ROM, Decreased Strength, Increased Tissue tension, Increased Tissue tenderness, Decreased functional mobility , Decreased Balance, and Gait abnormalities  that is affecting Walking, Sitting, Standing, Bending, Lifting, Twisting, Squatting, and ADLs . This patient would benefit from a therapy program to restore prior level of function, decrease pain, increase AROM, increase strength and improve posture. Pt tolerated all treatment at this time.     Rehab potential: Fair    Plan for next visit: Review HEP, LE strengthening, Core strengthening, Balance     PLAN  Treatment/Interventions: Aquatic therapy, Biofeedback, Blood flow restriction therapy, Canalith repositioning, Cryotherapy, Dry needling, Education/ Instruction, Electrical stimulation, Gait training, Hot pack, Laser, Manual therapy, Mechanical traction, Neuromuscular re-education, Taping techniques, Therapeutic activities, Therapeutic exercises, Ultrasound, Vasopneumatic device  PT Plan: Skilled PT  PT Frequency: 1 time per week  Duration: 6 weeks or 6 visits  Onset Date: 12/31/24  Certification Period Start Date: 01/30/25  Certification Period End Date: 04/30/25  Rehab Potential: Fair  Plan of Care Agreement: Patient    Pt. Is in agreement with PT plan.  Goals:  Active       PT Problem       PT Low back Goals       Start:  01/30/25    Expected End:  04/10/25       1. Pt will adhere to and complete HEP in order to improve functionality outside of the clinic. (2 weeks)    2.   Pt will report 0/10 pain when performing Walking, Sitting, Standing, Bending, Lifting, Twisting, Squatting, and ADLs in order to improve quality of life, functional mobility, and maintain functional independence. (5-6 weeks)    3.   Pt will improve postural awareness in order to reduce reoccurrence of impairments. (2-3 weeks)    4.   Pt will increase strength of BLE to  5/5 in order to improve Walking, Standing, Bending, Lifting, Twisting, Squatting, and ADLs. (4-5  weeks)    5.   Pt will improve NBOS balance to 30s in order to decrease fall risk and promote mobility. (3-4 weeks)    6.  Pt goal: Have better use of arms and legs    7. Pt will improve OWI by 7 points (MCID) to show reduction in disability and improvement in functionality. (5-6 weeks)

## 2025-02-05 ENCOUNTER — APPOINTMENT (OUTPATIENT)
Dept: PRIMARY CARE | Facility: CLINIC | Age: OVER 89
End: 2025-02-05
Payer: MEDICARE

## 2025-02-05 ENCOUNTER — APPOINTMENT (OUTPATIENT)
Dept: PHYSICAL THERAPY | Facility: CLINIC | Age: OVER 89
End: 2025-02-05
Payer: MEDICARE

## 2025-02-05 VITALS
WEIGHT: 201.8 LBS | SYSTOLIC BLOOD PRESSURE: 130 MMHG | HEART RATE: 63 BPM | OXYGEN SATURATION: 93 % | HEIGHT: 63 IN | DIASTOLIC BLOOD PRESSURE: 90 MMHG | BODY MASS INDEX: 35.75 KG/M2

## 2025-02-05 DIAGNOSIS — M54.50 LUMBOSACRAL PAIN: ICD-10-CM

## 2025-02-05 DIAGNOSIS — R26.89 DECREASED FUNCTIONAL MOBILITY: Primary | ICD-10-CM

## 2025-02-05 DIAGNOSIS — T14.8XXA HEMATOMA: Primary | ICD-10-CM

## 2025-02-05 PROCEDURE — 3075F SYST BP GE 130 - 139MM HG: CPT

## 2025-02-05 PROCEDURE — 3080F DIAST BP >= 90 MM HG: CPT

## 2025-02-05 PROCEDURE — 1036F TOBACCO NON-USER: CPT

## 2025-02-05 PROCEDURE — 1160F RVW MEDS BY RX/DR IN RCRD: CPT

## 2025-02-05 PROCEDURE — 1159F MED LIST DOCD IN RCRD: CPT

## 2025-02-05 PROCEDURE — 1157F ADVNC CARE PLAN IN RCRD: CPT

## 2025-02-05 PROCEDURE — 99213 OFFICE O/P EST LOW 20 MIN: CPT

## 2025-02-05 RX ORDER — METOPROLOL TARTRATE 25 MG/1
TABLET, FILM COATED ORAL
COMMUNITY
Start: 2025-01-16

## 2025-02-05 ASSESSMENT — ENCOUNTER SYMPTOMS
ABDOMINAL PAIN: 0
CHEST TIGHTNESS: 0
UNEXPECTED WEIGHT CHANGE: 0
PALPITATIONS: 0
WEAKNESS: 1
HEADACHES: 0
DIAPHORESIS: 0
DIFFICULTY URINATING: 0
NUMBNESS: 0
RECTAL PAIN: 0
MYALGIAS: 0
NAUSEA: 0
SHORTNESS OF BREATH: 0
POLYPHAGIA: 0
ARTHRALGIAS: 0
CHILLS: 0
FATIGUE: 0
FREQUENCY: 0
WOUND: 0
CONSTIPATION: 0
DIARRHEA: 0
NERVOUS/ANXIOUS: 0
POLYDIPSIA: 0
TROUBLE SWALLOWING: 0

## 2025-02-05 NOTE — PROGRESS NOTES
Subjective   Patient ID: Lakeshia Park is a 89 y.o. female who presents for Hopsital follow up  (Pt is here today for a hospital follow up from a fall on 01/29/25. Reports she did not lock her walker, got wobbly and went to sit on walker and it moved. She did hit her head. Also brought in a note from the facility requesting an order for someone to be able to accompany her to her medical appointments and sit in the room with her. ).    Patient presents today for posthospital follow-up.    Hematoma: Accidental fall 6 days ago.  She was at the [facility] and they were having a fire drill, she was standing for too long her legs began to get weak.  She tried to sit down and slipped and struck the right temporal area of her scalp.  There is a ecchymotic area, healing appropriately, no hematoma remains.  She has had no neurological changes.  Continues to participate in physical therapy.  Feels she is making progress.    Memory issues: States she is becoming forgetful and does not remind staff of upcoming appointments.  The [facility] has requested an order that allows the staff to sit up with her during her appointment so no elements are missed.  The patient is agreeable to this and consents to them being at her appointments and hearing her medical information.         Review of Systems   Constitutional:  Negative for chills, diaphoresis, fatigue and unexpected weight change.   HENT:  Negative for dental problem, tinnitus and trouble swallowing.    Eyes:  Negative for visual disturbance.   Respiratory:  Negative for chest tightness and shortness of breath.    Cardiovascular:  Negative for chest pain, palpitations and leg swelling.   Gastrointestinal:  Negative for abdominal pain, constipation, diarrhea, nausea and rectal pain.   Endocrine: Negative for polydipsia, polyphagia and polyuria.   Genitourinary:  Negative for difficulty urinating, frequency and urgency.   Musculoskeletal:  Negative for arthralgias and  "myalgias.   Skin:  Negative for pallor and wound.   Neurological:  Positive for weakness (Bilateral legs). Negative for syncope, numbness and headaches.   Psychiatric/Behavioral:  Negative for suicidal ideas. The patient is not nervous/anxious.        Objective   /90   Pulse 63   Ht 1.6 m (5' 3\")   Wt 91.5 kg (201 lb 12.8 oz)   SpO2 93%   BMI 35.75 kg/m²     Physical Exam  Vitals and nursing note reviewed.   Constitutional:       General: She is not in acute distress.     Appearance: Normal appearance. She is obese.   HENT:      Head: Normocephalic.      Nose: Nose normal.      Mouth/Throat:      Mouth: Mucous membranes are moist.      Pharynx: Oropharynx is clear.   Eyes:      Pupils: Pupils are equal, round, and reactive to light.   Cardiovascular:      Rate and Rhythm: Normal rate and regular rhythm.      Pulses: Normal pulses.      Heart sounds: Normal heart sounds.   Pulmonary:      Effort: Pulmonary effort is normal.      Breath sounds: Normal breath sounds.   Abdominal:      General: Bowel sounds are normal.      Palpations: Abdomen is soft.   Musculoskeletal:         General: Normal range of motion.      Cervical back: Normal range of motion.   Skin:     General: Skin is warm and dry.      Capillary Refill: Capillary refill takes less than 2 seconds.      Findings: Bruising (Right scalp) present.   Neurological:      General: No focal deficit present.      Mental Status: She is alert and oriented to person, place, and time. Mental status is at baseline.   Psychiatric:         Mood and Affect: Mood normal.         Behavior: Behavior normal.         Thought Content: Thought content normal.         Judgment: Judgment normal.         Assessment/Plan   Diagnoses and all orders for this visit:  Hematoma         "

## 2025-02-06 ENCOUNTER — TREATMENT (OUTPATIENT)
Dept: PHYSICAL THERAPY | Facility: CLINIC | Age: OVER 89
End: 2025-02-06
Payer: MEDICARE

## 2025-02-06 DIAGNOSIS — R26.89 DECREASED FUNCTIONAL MOBILITY: Primary | ICD-10-CM

## 2025-02-06 DIAGNOSIS — M54.50 LUMBOSACRAL PAIN: ICD-10-CM

## 2025-02-06 PROCEDURE — 97112 NEUROMUSCULAR REEDUCATION: CPT | Mod: GP,CQ

## 2025-02-06 PROCEDURE — 97110 THERAPEUTIC EXERCISES: CPT | Mod: GP,CQ

## 2025-02-06 ASSESSMENT — PAIN DESCRIPTION - DESCRIPTORS: DESCRIPTORS: ACHING;SORE

## 2025-02-06 ASSESSMENT — PAIN SCALES - GENERAL: PAINLEVEL_OUTOF10: 8

## 2025-02-06 ASSESSMENT — PAIN - FUNCTIONAL ASSESSMENT: PAIN_FUNCTIONAL_ASSESSMENT: 0-10

## 2025-02-06 NOTE — PROGRESS NOTES
"Physical Therapy Treatment    Patient Name: Lakeshia Park  MRN: [id number]  Today's Date: 2/6/2025  Time Calculation  Start Time: 1130  Stop Time: 1217  Time Calculation (min): 47 min    PT Therapeutic Procedures Time Entry  Neuromuscular Re-Education Time Entry: 8  Therapeutic Exercise Time Entry: 32       Assessment: Patient identified by name and date of birth. Patient had mild difficulty with TrA contraction, moderate cuing both verbally and tactile to engage without breath compensation. Patient requires moderate focus to perform LE exercises with TrA contraction. Patient tolerated addition of new LE exercises well, emphasis on TrA contraction and natural breathing.          Plan: Plan to continue with core and LE/UE strengthening to allow for improved ease and safety with ambulation.    OP PT Plan  Treatment/Interventions: Aquatic therapy, Biofeedback, Blood flow restriction therapy, Canalith repositioning, Cryotherapy, Dry needling, Education/ Instruction, Electrical stimulation, Gait training, Hot pack, Laser, Manual therapy, Mechanical traction, Neuromuscular re-education, Taping techniques, Therapeutic activities, Therapeutic exercises, Ultrasound, Vasopneumatic device  PT Plan: Skilled PT  PT Frequency: 1 time per week  Duration: 6 weeks or 6 visits  Onset Date: 12/31/24  Certification Period Start Date: 01/30/25  Certification Period End Date: 04/30/25  Rehab Potential: Fair  Plan of Care Agreement: Patient      Subjective  Patient reports  compliance with HEP and expressed good understanding. Patient reports 8/10 when she sits on her \"sit bones\". States that she no pain in that area with standing.           Precautions  Precautions  Precautions Comment: Fall risk, HTN, DM, CKD, prior CA  Pain  Pain Assessment: 0-10  0-10 (Numeric) Pain Score: 8  Pain Type: Chronic pain  Pain Location: Back  Pain Orientation: Right, Left  Pain Radiating Towards: Down RLE  Pain Descriptors: Aching, Sore          Current " "Problem  1. Decreased functional mobility  Follow Up In Physical Therapy      2. Lumbosacral pain  Follow Up In Physical Therapy          Reason for Referral: Low back Pain  Referred By: Alonso Westbrook  General Comment: Visit 2/7  Objective:   Trendelenburg gait noted with ambulation in // bars  Treatments:     Seated marching with orange band x15 P  Seated Hip ABD with orange band x15 P  Seated LAQ 2s hold x15 P  TrA contraction 10 x 5\" N  Hip add w/playball w/TrA x10 3\" holds N  Seated HR/TR w/TrA x10 N    Neuro  Fwd/bwd ambulation 2 laps N  Tandem ambulation 20\" x 3 N   Tandem stance 2 x 30\" N    Plan for next visit: Review HEP, LE strengthening, Core strengthening, Balance     PATIENT EDUCATION:  Access Code: [email or web address]  URL: [email or web address]  Date: 01/30/2025  Prepared by: Saul Woo    Exercises  - Seated March with Resistance  - 1 x daily - 7 x weekly - 3 sets - 10 reps  - Seated Hip Abduction with Resistance  - 1 x daily - 7 x weekly - 3 sets - 10 reps  - Seated Long Arc Quad  - 1 x daily - 7 x weekly - 3 sets - 10 reps - 2s hold  Access Code: [email or web address]  URL: [email or web address]  Date: 02/06/2025  Prepared by: Rama Bryant    Exercises  - Supine Transversus Abdominis Bracing - Hands on Stomach  - 3-5 x daily - 7 x weekly - 1 sets - 10 reps - 5\" hold  - Seated Transversus Abdominis Bracing  - 3-5 x daily - 7 x weekly - 1 sets - 10 reps - 5\" hold  - Seated Heel Raise  - 1 x daily - 7 x weekly - 3 sets - 10 reps  - Seated Toe Raise  - 1 x daily - 7 x weekly - 1 sets - 10 reps  - Seated Hip Adduction Squeeze with Ball  - 1 x daily - 7 x weekly - 1-3 sets - 10 reps - 3\" hold       Goals:  Active       PT Problem       PT Low back Goals       Start:  01/30/25    Expected End:  04/10/25       1. Pt will adhere to and complete HEP in order to improve functionality outside of the clinic. (2 weeks)    2.   Pt will report 0/10 pain when performing Walking, Sitting, " Standing, Bending, Lifting, Twisting, Squatting, and ADLs in order to improve quality of life, functional mobility, and maintain functional independence. (5-6 weeks)    3.   Pt will improve postural awareness in order to reduce reoccurrence of impairments. (2-3 weeks)    4.   Pt will increase strength of BLE to  5/5 in order to improve Walking, Standing, Bending, Lifting, Twisting, Squatting, and ADLs. (4-5 weeks)    5.   Pt will improve NBOS balance to 30s in order to decrease fall risk and promote mobility. (3-4 weeks)    6.  Pt goal: Have better use of arms and legs    7. Pt will improve OWI by 7 points (MCID) to show reduction in disability and improvement in functionality. (5-6 weeks)

## 2025-02-11 ENCOUNTER — HOSPITAL ENCOUNTER (OUTPATIENT)
Dept: RADIOLOGY | Facility: EXTERNAL LOCATION | Age: OVER 89
Discharge: HOME | End: 2025-02-11

## 2025-02-11 ENCOUNTER — HOSPITAL ENCOUNTER (OUTPATIENT)
Dept: RADIOLOGY | Facility: CLINIC | Age: OVER 89
Discharge: HOME | End: 2025-02-11
Payer: MEDICARE

## 2025-02-11 ENCOUNTER — APPOINTMENT (OUTPATIENT)
Dept: ORTHOPEDIC SURGERY | Facility: CLINIC | Age: OVER 89
End: 2025-02-11
Payer: MEDICARE

## 2025-02-11 DIAGNOSIS — M25.511 CHRONIC RIGHT SHOULDER PAIN: ICD-10-CM

## 2025-02-11 DIAGNOSIS — S62.102A LEFT WRIST FRACTURE, CLOSED, INITIAL ENCOUNTER: ICD-10-CM

## 2025-02-11 DIAGNOSIS — G89.29 CHRONIC RIGHT SHOULDER PAIN: ICD-10-CM

## 2025-02-11 PROCEDURE — 73030 X-RAY EXAM OF SHOULDER: CPT | Mod: RT

## 2025-02-11 PROCEDURE — 1125F AMNT PAIN NOTED PAIN PRSNT: CPT | Performed by: SPECIALIST

## 2025-02-11 PROCEDURE — 73110 X-RAY EXAM OF WRIST: CPT | Mod: LT

## 2025-02-11 PROCEDURE — 1159F MED LIST DOCD IN RCRD: CPT | Performed by: SPECIALIST

## 2025-02-11 PROCEDURE — 1160F RVW MEDS BY RX/DR IN RCRD: CPT | Performed by: SPECIALIST

## 2025-02-11 PROCEDURE — 1157F ADVNC CARE PLAN IN RCRD: CPT | Performed by: SPECIALIST

## 2025-02-11 PROCEDURE — 73110 X-RAY EXAM OF WRIST: CPT | Mod: LEFT SIDE | Performed by: RADIOLOGY

## 2025-02-11 PROCEDURE — 99214 OFFICE O/P EST MOD 30 MIN: CPT | Performed by: SPECIALIST

## 2025-02-11 PROCEDURE — 1036F TOBACCO NON-USER: CPT | Performed by: SPECIALIST

## 2025-02-11 ASSESSMENT — PAIN - FUNCTIONAL ASSESSMENT: PAIN_FUNCTIONAL_ASSESSMENT: 0-10

## 2025-02-11 ASSESSMENT — PAIN SCALES - GENERAL: PAINLEVEL_OUTOF10: 3

## 2025-02-11 ASSESSMENT — PAIN DESCRIPTION - DESCRIPTORS: DESCRIPTORS: DULL

## 2025-02-11 NOTE — ASSESSMENT & PLAN NOTE
Assessment: Approximately 3-1/2 months status post a left minimally to nondisplaced fracture of the radial styloid.  Patient is making good progress.  Range of motion of her thumb is minimally tender at the fracture site.  She states that she does not have any disability directly referable to the left wrist.  She does have some arthritis in the left CMC joint.  She also has arthritis in her right shoulder.    Plan:  She can continue to use the brace for at risk activities such as when she is using her walker.  This is as needed  Continue to work on range of motion and strengthening of the wrist and hand.  Within her pain tolerance.

## 2025-02-11 NOTE — PROGRESS NOTES
Assessment/Plan   Encounter Diagnoses:  Left wrist fracture, closed, initial encounter  Closed nondisplaced fracture of styloid process of left radius with routine healing  Assessment: Approximately 3-1/2 months status post a left minimally to nondisplaced fracture of the radial styloid.  Patient is making good progress.  Range of motion of her thumb is minimally tender at the fracture site.  She states that she does not have any disability directly referable to the left wrist.  She does have some arthritis in the left CMC joint.  She also has arthritis in her right shoulder.    Plan:  She can continue to use the brace for at risk activities such as when she is using her walker.  This is as needed  Continue to work on range of motion and strengthening of the wrist and hand.  Within her pain tolerance.       Subjective    Patient ID: Lakeshia Park is a 89 y.o. female.    Chief Complaint: Follow-up of the Left Wrist (LEFT WRIST FX/DOI 10-26-24//X-RAYS 2-11-25)     Last Surgery: No surgery found  Last Surgery Date: No surgery found    HPI  89-year-old who 6 weeks status post a 10/26/2024 fracture of the radial styloid.  She comes in today moving her thumb and her hand and wrist with minimal pain.  She states that she has no disability directly related to the left wrist fracture.  She does have some arthritis in the left thumb CMC joint and this interferes with her quilting somewhat.  She also has decreased range of motion and pain in the right shoulder that she attributes to arthritis.      OBJECTIVE: ORTHO EXAM  Left wrist  She demonstrates thumb range of motion with minimal to mild pain.  The swelling is resolved.    Direct palpation over the radial styloid was nontender.  Finkelstein's test is negative.  She is neurovascularly intact distally.  She is nontender over the radial styloid.  She demonstrates full pronation and supination.  She has dorsiflexion to 45 degrees and palmar flexion to 45 degrees.  She makes  full fist with minimal pain and good strength.    X-rays show further consolidation and healing at the fracture.  However the fracture lines are still visible.    IMAGE RESULTS:  Point of Care Ultrasound  These images are not reportable by radiology and will not be interpreted   by  Radiologists.      ULTRASOUND  Wrist Ultrasound    DIAGNOSTIC ULTRASOUND REPORT FINAL: Left HAND AND WRIST  Sonographer: Cristopher Deleon MD  Procedure: Ultrasound, extremity, nonvascular, real-time, COMPLETE, anatomic specific.  Indication: Wrist Pain  Technique: B-Mode Ultrasound Examination performed using 8-13 MHz linear transducer with [employer]  STUDY TYPE:  1. ULTRASOUND EXTREMITY  2. REAL TIME WITH IMAGE DOCUMENTATION  3. NON-VASCULAR  4. COMPLETE STUDY  Site: LEFT HAND AND WRIST  Live ultrasound was performed with the patient's HAND AND WRIST and PERMANENTLY documented. COMPLETE and FINAL ULTRASOUND REPORT of the patient's  HAND AND WRIST. . I personally performed the ultrasound and reviewed the findings. These show:    Live ultrasound was performed of the patient's HAND AND WRIST that shows intact Extensor digitorum communis, Extensor digiti minimi, Extensor indicis proprius, Extensor Pollicus Longus, Flexor Pollicus Longus, Flexor digitorum profundus, Flexor digitorum superficialis tendon with the THENAR and HYPOTHENAR muscle fibers showing normal striations. NO FLUID NOTED WITHIN THE CARPAL TUNNEL, THE MEDIAN NERVE SHOWS NORMAL PATTERN OF ECHOGENICITY. The median n. remains swollen.  The tendons appear normal in appearance and size as they pass the styloid process. No fracture is appreciated. Nonsterile gloves were used for this procedure  gauze pads were then used to clean the area after the procedure was compete. The patient tolerated the procedure well.  The radial styloid appears to be well-healed.  There is no abnormal fluid in the first dorsal compartment.  The tendons move appropriately.  The CMC joint has moderate  to severe arthritis.  Procedures     Orders Placed This Encounter    Point of Care Ultrasound    XR shoulder right 2+ views

## 2025-02-12 ENCOUNTER — TREATMENT (OUTPATIENT)
Dept: PHYSICAL THERAPY | Facility: CLINIC | Age: OVER 89
End: 2025-02-12
Payer: MEDICARE

## 2025-02-12 ENCOUNTER — APPOINTMENT (OUTPATIENT)
Dept: NEPHROLOGY | Facility: CLINIC | Age: OVER 89
End: 2025-02-12
Payer: MEDICARE

## 2025-02-12 VITALS
HEIGHT: 63 IN | WEIGHT: 202.6 LBS | BODY MASS INDEX: 35.9 KG/M2 | HEART RATE: 60 BPM | SYSTOLIC BLOOD PRESSURE: 128 MMHG | DIASTOLIC BLOOD PRESSURE: 70 MMHG

## 2025-02-12 DIAGNOSIS — N18.4 TYPE 2 DIABETES MELLITUS WITH STAGE 4 CHRONIC KIDNEY DISEASE, WITHOUT LONG-TERM CURRENT USE OF INSULIN (MULTI): ICD-10-CM

## 2025-02-12 DIAGNOSIS — M54.50 LUMBOSACRAL PAIN: ICD-10-CM

## 2025-02-12 DIAGNOSIS — E11.22 TYPE 2 DIABETES MELLITUS WITH STAGE 4 CHRONIC KIDNEY DISEASE, WITHOUT LONG-TERM CURRENT USE OF INSULIN (MULTI): ICD-10-CM

## 2025-02-12 DIAGNOSIS — R26.89 DECREASED FUNCTIONAL MOBILITY: Primary | ICD-10-CM

## 2025-02-12 DIAGNOSIS — N18.4 STAGE 4 CHRONIC KIDNEY DISEASE (MULTI): Primary | ICD-10-CM

## 2025-02-12 DIAGNOSIS — I10 PRIMARY HYPERTENSION: ICD-10-CM

## 2025-02-12 PROCEDURE — 3074F SYST BP LT 130 MM HG: CPT | Performed by: CLINICAL NURSE SPECIALIST

## 2025-02-12 PROCEDURE — 1036F TOBACCO NON-USER: CPT | Performed by: CLINICAL NURSE SPECIALIST

## 2025-02-12 PROCEDURE — 1157F ADVNC CARE PLAN IN RCRD: CPT | Performed by: CLINICAL NURSE SPECIALIST

## 2025-02-12 PROCEDURE — 1159F MED LIST DOCD IN RCRD: CPT | Performed by: CLINICAL NURSE SPECIALIST

## 2025-02-12 PROCEDURE — 3078F DIAST BP <80 MM HG: CPT | Performed by: CLINICAL NURSE SPECIALIST

## 2025-02-12 PROCEDURE — 99213 OFFICE O/P EST LOW 20 MIN: CPT | Performed by: CLINICAL NURSE SPECIALIST

## 2025-02-12 PROCEDURE — 1160F RVW MEDS BY RX/DR IN RCRD: CPT | Performed by: CLINICAL NURSE SPECIALIST

## 2025-02-12 PROCEDURE — 97110 THERAPEUTIC EXERCISES: CPT | Mod: GP

## 2025-02-12 ASSESSMENT — ENCOUNTER SYMPTOMS
NEUROLOGICAL NEGATIVE: 1
MUSCULOSKELETAL NEGATIVE: 1
RESPIRATORY NEGATIVE: 1
CARDIOVASCULAR NEGATIVE: 1
PSYCHIATRIC NEGATIVE: 1
ENDOCRINE NEGATIVE: 1
CONSTITUTIONAL NEGATIVE: 1
GASTROINTESTINAL NEGATIVE: 1

## 2025-02-12 ASSESSMENT — PAIN - FUNCTIONAL ASSESSMENT: PAIN_FUNCTIONAL_ASSESSMENT: 0-10

## 2025-02-12 ASSESSMENT — PAIN SCALES - GENERAL: PAINLEVEL_OUTOF10: 6

## 2025-02-12 NOTE — ASSESSMENT & PLAN NOTE
Renal function stable with creatinine of 2.33, she is staying within her baseline, does not have any symptoms, electrolyte is stable, blood pressure is well-controlled, no benefit to SGLT2 at this time

## 2025-02-12 NOTE — PROGRESS NOTES
"Physical Therapy Treatment    Patient Name: Lakeshia Park  MRN: [id number]  : 1930   Alonso Westbrook  Today's Date: 2025  Time Calculation  Start Time: 925  Stop Time:   Time Calculation (min): 42 min  PT Therapeutic Procedures Time Entry  Therapeutic Exercise Time Entry: 39           General  Reason for Referral: Low back Pain  Referred By: Alonso Westbrook  General Comment: Visit 3/7  Visit #3     Current Problem  Problem List Items Addressed This Visit             ICD-10-CM       Musculoskeletal and Injuries    Lumbosacral pain M54.50       Symptoms and Signs    Decreased functional mobility - Primary R26.89            Subjective   Pt feels like she is getting weaker and weaker but that her pain is not terrible today. Pt says she has been able to complete HEP without any issue.      Pt. Reports compliance with HEP.    Precautions  Precautions  Precautions Comment: Fall risk, HTN, DM, CKD, prior CA    Pain  Pain Assessment: 0-10  0-10 (Numeric) Pain Score: 6  Pain Type: Chronic pain  Pain Location: Back  Pain Orientation: Right, Left    Objective      Pt with Mod UE assist during NBOS.         Last Session:  Seated marching with orange band x15 P  Seated Hip ABD with orange band x15 P  Seated LAQ 2s hold x15 P  TrA contraction 10 x 5\" N  Hip add w/playball w/TrA x10 3\" holds N  Seated HR/TR w/TrA x10 N    Neuro  Fwd/bwd ambulation 2 laps N  Tandem ambulation 20\" x 3 N   Tandem stance 2 x 30\" N  Treatments:    Therapeutic exercise  Seated marching with orange band 2x15 (p reps)  Seated hip ABD with orange band 2x15 (p reps)  Seated LAQ 2x15 (p reps)  Hip ADD isometric with ball squeeze and TrA 2x10 3\" hold (p reps)  Seated Heel and toe raises 2x10 (p reps)  Seated HS curl with orange band 2x10 (n)  Seated mid rows with lime band 2x10 (n)  Alt small cone marching 2x10 (n)  NBOS 3x30\" (n)  4 way Ankle with orange band x10 (n)      Plan for next visit: LE strengthening, Core strengthening, Balance, " "gait, functional mobility     Current HEP:   Access Code: [email or web address]  URL: [email or web address]  Date: 01/30/2025  Prepared by: Saul Woo    Exercises  - Seated March with Resistance  - 1 x daily - 7 x weekly - 3 sets - 10 reps  - Seated Hip Abduction with Resistance  - 1 x daily - 7 x weekly - 3 sets - 10 reps  - Seated Long Arc Quad  - 1 x daily - 7 x weekly - 3 sets - 10 reps - 2s hold  Access Code: [email or web address]  URL: [email or web address]  Date: 02/06/2025  Prepared by: Rama Bryant    Exercises  - Supine Transversus Abdominis Bracing - Hands on Stomach  - 3-5 x daily - 7 x weekly - 1 sets - 10 reps - 5\" hold  - Seated Transversus Abdominis Bracing  - 3-5 x daily - 7 x weekly - 1 sets - 10 reps - 5\" hold  - Seated Heel Raise  - 1 x daily - 7 x weekly - 3 sets - 10 reps  - Seated Toe Raise  - 1 x daily - 7 x weekly - 1 sets - 10 reps  - Seated Hip Adduction Squeeze with Ball  - 1 x daily - 7 x weekly - 1-3 sets - 10 reps - 3\" hold    Assessment:     Pt tolerated session well without any increase in sx with progressed exercises. Pt with general fatigue with increased reps and difficulty with quad control during cone marching. Pt will benefit from continued treatment.     Plan:  OP PT Plan  Treatment/Interventions: Aquatic therapy, Biofeedback, Blood flow restriction therapy, Canalith repositioning, Cryotherapy, Dry needling, Education/ Instruction, Electrical stimulation, Gait training, Hot pack, Laser, Manual therapy, Mechanical traction, Neuromuscular re-education, Taping techniques, Therapeutic activities, Therapeutic exercises, Ultrasound, Vasopneumatic device  PT Plan: Skilled PT  PT Frequency: 1 time per week  Duration: 6 weeks or 6 visits  Onset Date: 12/31/24  Certification Period Start Date: 01/30/25  Certification Period End Date: 04/30/25  Rehab Potential: Fair  Plan of Care Agreement: Patient    Goals:  Active       PT Problem       PT Low back Goals  "      Start:  01/30/25    Expected End:  04/10/25       1. Pt will adhere to and complete HEP in order to improve functionality outside of the clinic. (2 weeks)    2.   Pt will report 0/10 pain when performing Walking, Sitting, Standing, Bending, Lifting, Twisting, Squatting, and ADLs in order to improve quality of life, functional mobility, and maintain functional independence. (5-6 weeks)    3.   Pt will improve postural awareness in order to reduce reoccurrence of impairments. (2-3 weeks)    4.   Pt will increase strength of BLE to  5/5 in order to improve Walking, Standing, Bending, Lifting, Twisting, Squatting, and ADLs. (4-5 weeks)    5.   Pt will improve NBOS balance to 30s in order to decrease fall risk and promote mobility. (3-4 weeks)    6.  Pt goal: Have better use of arms and legs    7. Pt will improve OWI by 7 points (MCID) to show reduction in disability and improvement in functionality. (5-6 weeks)

## 2025-02-12 NOTE — PROGRESS NOTES
Subjective   Patient ID: Lakeshia Park is a 89 y.o. female who presents for Follow-up (3 months/).  Patient being seen in follow-up for chronic kidney disease stage IIIb/IV with history of diabetes and hypertension    Labs reviewed  Urinalysis positive for 1+ protein, microscopic showed 1+ bacteria  CBC with H&H of 12.2 and 38.4  Glucose 116  Sodium 140, potassium 4.5, chloride 104, bicarb 27  Renal function with a BUN of 37 creatinine of 2.33, GFR is 19  Uric acid 6.1  Of note labs were completed in November and December 2024    She has had some recent falls, sounds like she tries to walk too fast with her walker and has these falls, she is seeing physical therapy at this time  She is voiding without difficulties  She does not have any edema  She also complains of feeling lonely as she does not see her children often, 1 has cancer and the other 1 lives in [facility]  She denies any lightheadedness or dizziness, blood pressure is well-controlled        Review of Systems   Constitutional: Negative.    Respiratory: Negative.     Cardiovascular: Negative.    Gastrointestinal: Negative.    Endocrine: Negative.    Genitourinary: Negative.    Musculoskeletal: Negative.    Skin: Negative.    Neurological: Negative.    Psychiatric/Behavioral: Negative.         Objective   Physical Exam  Vitals reviewed.   Constitutional:       Appearance: Normal appearance.   HENT:      Head: Normocephalic.   Cardiovascular:      Rate and Rhythm: Normal rate and regular rhythm.   Pulmonary:      Effort: Pulmonary effort is normal.      Breath sounds: Normal breath sounds.   Abdominal:      Palpations: Abdomen is soft.   Musculoskeletal:         General: Normal range of motion.   Skin:     General: Skin is warm and dry.   Neurological:      Mental Status: She is alert and oriented to person, place, and time.   Psychiatric:         Mood and Affect: Mood normal.         Behavior: Behavior normal.         Assessment/Plan   Problem List  Items Addressed This Visit             ICD-10-CM    DM2 (diabetes mellitus, type 2) (Multi) E11.9    HTN (hypertension) I10     Blood pressure is well-controlled on metoprolol         Stage 4 chronic kidney disease (Multi) - Primary N18.4     Renal function stable with creatinine of 2.33, she is staying within her baseline, does not have any symptoms, electrolyte is stable, blood pressure is well-controlled, no benefit to SGLT2 at this time         Relevant Orders    Comprehensive metabolic panel    Urinalysis with Reflex Microscopic    Albumin-Creatinine Ratio, Urine Random    CBC    Follow Up In Nephrology     Chronic kidney disease stage IIIb/IV with baseline creatinine 2-2.4  Diabetic Nephropathy and HTN  Diabetes mellitus type 2  Chronic diarrhea  History of gout on allopurinol  Hyperuricemia  Hypertension BP is good now.  Hyperlipidemia  Bilateral renal cysts  Positive REJI screen with slightly high RNP  Depression  Hypokalemia       JASSI Allen-LU, DNP 02/12/25 11:15 AM

## 2025-02-13 ENCOUNTER — DOCUMENTATION (OUTPATIENT)
Dept: ORTHOPEDIC SURGERY | Facility: CLINIC | Age: OVER 89
End: 2025-02-13
Payer: MEDICARE

## 2025-02-13 ENCOUNTER — TELEPHONE (OUTPATIENT)
Dept: PRIMARY CARE | Facility: CLINIC | Age: OVER 89
End: 2025-02-13
Payer: MEDICARE

## 2025-02-13 NOTE — PROGRESS NOTES
The patient was seen recently by me.  An x-ray of the right shoulder was obtained.  The right lung field was included in the x-ray.  This was noted to have some right lower lobe atelectasis.  I called the [facility] where the patient resides.  I spoke with Paula who is an LPN.  I explained the finding to her.  She will check on the patient and listen to her lungs and if necessary act on this finding and obtain follow-up chest x-ray.

## 2025-02-13 NOTE — TELEPHONE ENCOUNTER
Nurse from [facility] said Dr. Hurtado office did an x-ray of her shoulder and noticed there was something in her left lung. They wanted them to reach out to her PCP and see if they wanted her seen. Stated it looked like scar tissue, or start of pneumonia and wanted to know if we wanted to order a follow up chest x-ray, she is not having any distress or other sx. Number to reach Paula the nurse is [phone]

## 2025-02-13 NOTE — TELEPHONE ENCOUNTER
I called the nurse, they are going to get a 2 view chest x-ray and send us orders to sign in the morning

## 2025-02-14 ENCOUNTER — TELEPHONE (OUTPATIENT)
Dept: PRIMARY CARE | Facility: CLINIC | Age: OVER 89
End: 2025-02-14
Payer: MEDICARE

## 2025-02-14 NOTE — TELEPHONE ENCOUNTER
Chelan back from insurance and they approved her medication for the muscle relaxant. Copy of the approval was faxed over to [facility].

## 2025-02-19 ENCOUNTER — TREATMENT (OUTPATIENT)
Dept: PHYSICAL THERAPY | Facility: CLINIC | Age: OVER 89
End: 2025-02-19
Payer: MEDICARE

## 2025-02-19 DIAGNOSIS — R26.89 DECREASED FUNCTIONAL MOBILITY: Primary | ICD-10-CM

## 2025-02-19 DIAGNOSIS — M54.50 LUMBOSACRAL PAIN: ICD-10-CM

## 2025-02-19 PROCEDURE — 97110 THERAPEUTIC EXERCISES: CPT | Mod: GP,CQ

## 2025-02-19 ASSESSMENT — PAIN - FUNCTIONAL ASSESSMENT: PAIN_FUNCTIONAL_ASSESSMENT: 0-10

## 2025-02-19 ASSESSMENT — PAIN SCALES - GENERAL: PAINLEVEL_OUTOF10: 6

## 2025-02-19 NOTE — PROGRESS NOTES
Physical Therapy Treatment    Patient Name: Lakeshia Park  MRN: [id number]  : 1930  Alonso Westbrook  Today's Date: 2025               Assessment:   Patient identified by name & .  Treatment consisted of ex's for core strengthening and B LE strengthening. Patient with difficulty maintaining TrA isometric with moderate verbal cues given. Denied increased buttock pain, but did report her knees were tired after treatment.   Patient was received from [facility] on time, however she had to use the restroom on the way back to the treatment, making treatment start 7' late.  Able to get full treatment time with patient done despite this.     Plan:  Progress NMR and B strengthening ex's for improved functional mobility. -MA   OP PT Plan  Treatment/Interventions: Aquatic therapy, Biofeedback, Blood flow restriction therapy, Canalith repositioning, Cryotherapy, Dry needling, Education/ Instruction, Electrical stimulation, Gait training, Hot pack, Laser, Manual therapy, Mechanical traction, Neuromuscular re-education, Taping techniques, Therapeutic activities, Therapeutic exercises, Ultrasound, Vasopneumatic device  PT Plan: Skilled PT  PT Frequency: 1 time per week  Duration: 6 weeks or 6 visits  Onset Date: 24  Certification Period Start Date: 25  Certification Period End Date: 25  Rehab Potential: Fair  Plan of Care Agreement: Patient    Current Problem  Problem List Items Addressed This Visit             ICD-10-CM    Lumbosacral pain M54.50    Decreased functional mobility - Primary R26.89       Subjective   States that she had a pinched nerve in her back and it hurts to sit.  Pain in   Legs feel weak  General  Reason for Referral: Low back Pain  Referred By: Alonso Westbrook  General Comment: Visit   Visit #4  Precautions  Precautions  Precautions Comment: Fall risk, HTN, DM, CKD, prior CA    Pain  Pain Assessment: 0-10  0-10 (Numeric) Pain Score: 6  Pain Location: Buttocks  Pain Orientation:  "Right, Left    Objective:   Ambulates with NBOS and will Rolator out in front of her.   Treatments:  Therapeutic exercise x38'   Seated marching with orange band 2x15   Seated hip ABD with orange band 2x15   Seated LAQ 2x15   Hip ADD isometric with ball squeeze and TrA 2x10 3\" hold   Seated Heel and toe raises 2x10   Seated HS curl with orange band 2x10   Seated mid rows with lime band 2x10   Alt small cone marching 2x10   NBOS 3x30\"   4 way Ankle with orange band x10 (n)  TrA contraction 10 x 5\" (X cues with other ex's)   Neuro : X not today  Fwd/bwd ambulation 2 laps N  Tandem ambulation 20\" x 3 N   Tandem stance 2 x 30\" N    Plan for next visit: LE strengthening, Core strengthening, Balance, gait, functional mobility     Current HEP:   Access Code: [email or web address]  URL: [email or web address]  Date: 01/30/2025  Prepared by: Saul Woo    Exercises  - Seated March with Resistance  - 1 x daily - 7 x weekly - 3 sets - 10 reps  - Seated Hip Abduction with Resistance  - 1 x daily - 7 x weekly - 3 sets - 10 reps  - Seated Long Arc Quad  - 1 x daily - 7 x weekly - 3 sets - 10 reps - 2s hold  Access Code: [email or web address]  URL: [email or web address]  Date: 02/06/2025  Prepared by: Rama Bryant    Exercises  - Supine Transversus Abdominis Bracing - Hands on Stomach  - 3-5 x daily - 7 x weekly - 1 sets - 10 reps - 5\" hold  - Seated Transversus Abdominis Bracing  - 3-5 x daily - 7 x weekly - 1 sets - 10 reps - 5\" hold  - Seated Heel Raise  - 1 x daily - 7 x weekly - 3 sets - 10 reps  - Seated Toe Raise  - 1 x daily - 7 x weekly - 1 sets - 10 reps  - Seated Hip Adduction Squeeze with Ball  - 1 x daily - 7 x weekly - 1-3 sets - 10 reps - 3\" hold       Goals:  Active       PT Problem       PT Low back Goals       Start:  01/30/25    Expected End:  04/10/25       1. Pt will adhere to and complete HEP in order to improve functionality outside of the clinic. (2 weeks)    2.   Pt will report " 0/10 pain when performing Walking, Sitting, Standing, Bending, Lifting, Twisting, Squatting, and ADLs in order to improve quality of life, functional mobility, and maintain functional independence. (5-6 weeks)    3.   Pt will improve postural awareness in order to reduce reoccurrence of impairments. (2-3 weeks)    4.   Pt will increase strength of BLE to  5/5 in order to improve Walking, Standing, Bending, Lifting, Twisting, Squatting, and ADLs. (4-5 weeks)    5.   Pt will improve NBOS balance to 30s in order to decrease fall risk and promote mobility. (3-4 weeks)    6.  Pt goal: Have better use of arms and legs    7. Pt will improve OWI by 7 points (MCID) to show reduction in disability and improvement in functionality. (5-6 weeks)

## 2025-02-26 ENCOUNTER — TREATMENT (OUTPATIENT)
Dept: PHYSICAL THERAPY | Facility: CLINIC | Age: OVER 89
End: 2025-02-26
Payer: MEDICARE

## 2025-02-26 DIAGNOSIS — M54.50 LUMBOSACRAL PAIN: ICD-10-CM

## 2025-02-26 DIAGNOSIS — R26.89 DECREASED FUNCTIONAL MOBILITY: Primary | ICD-10-CM

## 2025-02-26 PROCEDURE — 97110 THERAPEUTIC EXERCISES: CPT | Mod: GP

## 2025-02-26 ASSESSMENT — PAIN SCALES - GENERAL: PAINLEVEL_OUTOF10: 7

## 2025-02-26 ASSESSMENT — PAIN - FUNCTIONAL ASSESSMENT: PAIN_FUNCTIONAL_ASSESSMENT: 0-10

## 2025-02-26 NOTE — PROGRESS NOTES
"Physical Therapy Treatment    Patient Name: Lakeshia Park  MRN: [id number]  : 1930   Alonso Westbrook  Today's Date: 2025  Time Calculation  Start Time: 955  Stop Time:   Time Calculation (min): 42 min  PT Therapeutic Procedures Time Entry  Therapeutic Exercise Time Entry: 40           General  Reason for Referral: Low back Pain  Referred By: Alonso Westbrook  General Comment: Visit   Visit #5     Current Problem  Problem List Items Addressed This Visit             ICD-10-CM       Musculoskeletal and Injuries    Lumbosacral pain M54.50       Symptoms and Signs    Decreased functional mobility - Primary R26.89            Subjective   Pt reports that she is feeling pretty weak today and that the pain in her hips is about a 7/10 when she is sitting. Pt says that she is able to complete her HEP but that it wears her out.      Pt. Reports compliance with HEP.    Precautions  Precautions  Precautions Comment: Fall risk, HTN, DM, CKD, prior CA    Pain  Pain Assessment: 0-10  0-10 (Numeric) Pain Score: 7  Pain Type: Chronic pain  Pain Location: Buttocks  Pain Orientation: Right, Left    Objective      SUSANNE UE assist throughout NBOS      Last Session:  Seated marching with orange band 2x15   Seated hip ABD with orange band 2x15   Seated LAQ 2x15   Hip ADD isometric with ball squeeze and TrA 2x10 3\" hold   Seated Heel and toe raises 2x10   Seated HS curl with orange band 2x10   Seated mid rows with lime band 2x10   Alt small cone marching 2x10   NBOS 3x30\"   4 way Ankle with orange band x10 (n)  TrA contraction 10 x 5\" (X cues with other ex's)   Neuro : X not today  Fwd/bwd ambulation 2 laps N  Tandem ambulation 20\" x 3 N   Tandem stance 2 x 30\" N  Treatments:    Therapeutic exercise  Seated marching with orange band 2x10  Seated Hip ABD with orange band 2x10  Seated Laq 2x15  Hip ADD isometric with ball squeeze 3\" hold 2x10  Seated heel and toe raises 2x15  Seated HS curl with orange band 2x10  Seated mid " "rows with lime band 2x15  4 way ankle with orange band x10  NBOS 3x45\"          Current HEP:   Access Code: [email or web address]  URL: [email or web address]  Date: 01/30/2025  Prepared by: Saul Woo    Exercises  - Seated March with Resistance  - 1 x daily - 7 x weekly - 3 sets - 10 reps  - Seated Hip Abduction with Resistance  - 1 x daily - 7 x weekly - 3 sets - 10 reps  - Seated Long Arc Quad  - 1 x daily - 7 x weekly - 3 sets - 10 reps - 2s hold  Access Code: [email or web address]  URL: [email or web address]  Date: 02/06/2025  Prepared by: Rama Bryant    Exercises  - Supine Transversus Abdominis Bracing - Hands on Stomach  - 3-5 x daily - 7 x weekly - 1 sets - 10 reps - 5\" hold  - Seated Transversus Abdominis Bracing  - 3-5 x daily - 7 x weekly - 1 sets - 10 reps - 5\" hold  - Seated Heel Raise  - 1 x daily - 7 x weekly - 3 sets - 10 reps  - Seated Toe Raise  - 1 x daily - 7 x weekly - 1 sets - 10 reps  - Seated Hip Adduction Squeeze with Ball  - 1 x daily - 7 x weekly - 1-3 sets - 10 reps - 3\" hold    Assessment:     Pt tolerated treatment well with just fatigue noted after session. Progression of some exercises held today due to increased fatigue prior to start of session. Pt will benefit from continued treatment.     Plan:  OP PT Plan  Treatment/Interventions: Aquatic therapy, Biofeedback, Blood flow restriction therapy, Canalith repositioning, Cryotherapy, Dry needling, Education/ Instruction, Electrical stimulation, Gait training, Hot pack, Laser, Manual therapy, Mechanical traction, Neuromuscular re-education, Taping techniques, Therapeutic activities, Therapeutic exercises, Ultrasound, Vasopneumatic device  PT Plan: Skilled PT  PT Frequency: 1 time per week  Duration: 6 weeks or 6 visits  Onset Date: 12/31/24  Certification Period Start Date: 01/30/25  Certification Period End Date: 04/30/25  Rehab Potential: Fair  Plan of Care Agreement: Patient    Goals:  Active       PT " Problem       PT Low back Goals       Start:  01/30/25    Expected End:  04/10/25       1. Pt will adhere to and complete HEP in order to improve functionality outside of the clinic. (2 weeks)    2.   Pt will report 0/10 pain when performing Walking, Sitting, Standing, Bending, Lifting, Twisting, Squatting, and ADLs in order to improve quality of life, functional mobility, and maintain functional independence. (5-6 weeks)    3.   Pt will improve postural awareness in order to reduce reoccurrence of impairments. (2-3 weeks)    4.   Pt will increase strength of BLE to  5/5 in order to improve Walking, Standing, Bending, Lifting, Twisting, Squatting, and ADLs. (4-5 weeks)    5.   Pt will improve NBOS balance to 30s in order to decrease fall risk and promote mobility. (3-4 weeks)    6.  Pt goal: Have better use of arms and legs    7. Pt will improve OWI by 7 points (MCID) to show reduction in disability and improvement in functionality. (5-6 weeks)

## 2025-03-05 ENCOUNTER — DOCUMENTATION (OUTPATIENT)
Dept: PHYSICAL THERAPY | Facility: CLINIC | Age: OVER 89
End: 2025-03-05
Payer: MEDICARE

## 2025-03-05 ENCOUNTER — APPOINTMENT (OUTPATIENT)
Dept: PHYSICAL THERAPY | Facility: CLINIC | Age: OVER 89
End: 2025-03-05
Payer: MEDICARE

## 2025-03-05 DIAGNOSIS — M54.50 LUMBOSACRAL PAIN: ICD-10-CM

## 2025-03-05 DIAGNOSIS — R26.89 DECREASED FUNCTIONAL MOBILITY: Primary | ICD-10-CM

## 2025-03-05 NOTE — PROGRESS NOTES
Physical Therapy                 Therapy Communication Note    Patient Name: Lakeshia Park  MRN: [id number]  Department:   Room: Room/bed info not found  Today's Date: 3/5/2025     Discipline: Physical Therapy          Missed Visit Reason:  Patient ill.     Missed Time: Cancel    Comment:

## 2025-03-06 ENCOUNTER — APPOINTMENT (OUTPATIENT)
Dept: ORTHOPEDIC SURGERY | Facility: CLINIC | Age: OVER 89
End: 2025-03-06
Payer: MEDICARE

## 2025-03-06 ENCOUNTER — OFFICE VISIT (OUTPATIENT)
Dept: ORTHOPEDIC SURGERY | Facility: CLINIC | Age: OVER 89
End: 2025-03-06
Payer: MEDICARE

## 2025-03-06 ENCOUNTER — HOSPITAL ENCOUNTER (OUTPATIENT)
Dept: RADIOLOGY | Facility: EXTERNAL LOCATION | Age: OVER 89
Discharge: HOME | End: 2025-03-06

## 2025-03-06 DIAGNOSIS — G89.29 CHRONIC RIGHT SHOULDER PAIN: ICD-10-CM

## 2025-03-06 DIAGNOSIS — M25.511 CHRONIC RIGHT SHOULDER PAIN: ICD-10-CM

## 2025-03-06 DIAGNOSIS — R06.02 SOB (SHORTNESS OF BREATH): Primary | ICD-10-CM

## 2025-03-06 DIAGNOSIS — M25.511 RIGHT SHOULDER PAIN, UNSPECIFIED CHRONICITY: ICD-10-CM

## 2025-03-06 PROCEDURE — 1157F ADVNC CARE PLAN IN RCRD: CPT | Performed by: SPECIALIST

## 2025-03-06 PROCEDURE — 1160F RVW MEDS BY RX/DR IN RCRD: CPT | Performed by: SPECIALIST

## 2025-03-06 PROCEDURE — 1036F TOBACCO NON-USER: CPT | Performed by: SPECIALIST

## 2025-03-06 PROCEDURE — 1159F MED LIST DOCD IN RCRD: CPT | Performed by: SPECIALIST

## 2025-03-06 PROCEDURE — 1125F AMNT PAIN NOTED PAIN PRSNT: CPT | Performed by: SPECIALIST

## 2025-03-06 PROCEDURE — 99214 OFFICE O/P EST MOD 30 MIN: CPT | Performed by: SPECIALIST

## 2025-03-06 RX ORDER — DICLOFENAC SODIUM 10 MG/G
4 GEL TOPICAL 4 TIMES DAILY PRN
Qty: 100 G | Refills: 1 | Status: SHIPPED | OUTPATIENT
Start: 2025-03-06

## 2025-03-06 ASSESSMENT — PAIN SCALES - GENERAL: PAINLEVEL_OUTOF10: 3

## 2025-03-06 ASSESSMENT — PAIN DESCRIPTION - DESCRIPTORS: DESCRIPTORS: SHARP;DULL

## 2025-03-06 ASSESSMENT — PAIN - FUNCTIONAL ASSESSMENT: PAIN_FUNCTIONAL_ASSESSMENT: 0-10

## 2025-03-06 NOTE — PROGRESS NOTES
Assessment/Plan   Encounter Diagnoses:  Right shoulder pain, unspecified chronicity  Right shoulder pain  Assessment: Right shoulder arthritis    Plan:  Voltaren gel use as directed.  Physical therapy for range of motion and strengthening.  Follow-up in 6 to 8 weeks for reevaluation.  There were some changes on the x-rays that shows some opacities in the lung field that need to be evaluated.  We called the nurse at the facility, we told the transport assistant and placed a note in the chart to have the medical doctors evaluate her for this.       Subjective    Patient ID: Lakeshia Park is a 89 y.o. female.    Chief Complaint: Pain of the Right Shoulder (NEW PROBLEM)     Last Surgery: No surgery found  Last Surgery Date: No surgery found    HPI  89-year-old who is complaining of some right shoulder pain.  She is a poor historian, but it seems as if this hurts her with activity especially overhead.  She was not able to comment whether she is able to dress herself or r do other activities of daily living.      OBJECTIVE: ORTHO EXAM  Right shoulder:  Inspection:  Skin healthy to gross inspection  No ecchymosis, no edema, no gross atrophy    Palpation:  Acromioclavicular joint tenderness   Biceps tendon/ groove minimal tenderness  Anterior Acromial Bursal Area mild tenderness  Cervical spine no tenderness     ROM:  Forward Flexion 90 and then pain to 160 with some guarding   External Rotation 60  degrees at 90 degrees abduction  Internal Rotation 60 degrees at 90 degrees abduction    Strength:  4/5 Supraspinatus isolation- resisted elevation  4/5 Infraspinatus isolation- ER  4+/5 Subscapularis- IR     Negative lift off test   Negative Spurling´s test  Positive Neer and Hawking´s test  Negative Speed's test  Negative Inferior Sulcus  Negative Anterior Apprehension    Full unrestricted motion at Elbow/Wrist/Hand  Neurovascular exam normal distally        IMAGE RESULTS:  Point of Care Ultrasound  These images are not  reportable by radiology and will not be interpreted   by  Radiologists.      ULTRASOUND  None    Procedures     No orders of the defined types were placed in this encounter.

## 2025-03-06 NOTE — ASSESSMENT & PLAN NOTE
Assessment: Right shoulder arthritis    Plan:  Voltaren gel use as directed.  Physical therapy for range of motion and strengthening.  Follow-up in 6 to 8 weeks for reevaluation.  There were some changes on the x-rays that shows some opacities in the lung field that need to be evaluated.  We called the nurse at the facility, we told the transport assistant and placed a note in the chart to have the medical doctors evaluate her for this.

## 2025-03-07 ENCOUNTER — TELEPHONE (OUTPATIENT)
Dept: PRIMARY CARE | Facility: CLINIC | Age: OVER 89
End: 2025-03-07
Payer: MEDICARE

## 2025-03-07 ENCOUNTER — HOSPITAL ENCOUNTER (OUTPATIENT)
Dept: CARDIOLOGY | Facility: HOSPITAL | Age: OVER 89
Discharge: HOME | End: 2025-03-07
Payer: MEDICARE

## 2025-03-07 DIAGNOSIS — R06.00 DYSPNEA, UNSPECIFIED TYPE: ICD-10-CM

## 2025-03-07 DIAGNOSIS — I50.20 UNSPECIFIED SYSTOLIC (CONGESTIVE) HEART FAILURE: ICD-10-CM

## 2025-03-07 DIAGNOSIS — R93.89 ABNORMAL CHEST X-RAY: ICD-10-CM

## 2025-03-07 DIAGNOSIS — I50.9 CONGESTIVE HEART FAILURE, UNSPECIFIED HF CHRONICITY, UNSPECIFIED HEART FAILURE TYPE: Primary | ICD-10-CM

## 2025-03-07 DIAGNOSIS — R06.00 DYSPNEA, UNSPECIFIED TYPE: Primary | ICD-10-CM

## 2025-03-07 DIAGNOSIS — N18.4 STAGE 4 CHRONIC KIDNEY DISEASE (MULTI): ICD-10-CM

## 2025-03-07 LAB
AORTIC VALVE MEAN GRADIENT: 5 MMHG
AORTIC VALVE PEAK VELOCITY: 1.46 M/S
AV PEAK GRADIENT: 9 MMHG
AVA (PEAK VEL): 1.03 CM2
AVA (VTI): 1.03 CM2
EJECTION FRACTION APICAL 4 CHAMBER: 46
EJECTION FRACTION: 48 %
LEFT ATRIUM VOLUME AREA LENGTH INDEX BSA: 16.9 ML/M2
LEFT VENTRICLE INTERNAL DIMENSION DIASTOLE: 4.85 CM (ref 3.5–6)
LEFT VENTRICULAR OUTFLOW TRACT DIAMETER: 1.6 CM
LV EJECTION FRACTION BIPLANE: 50 %
MITRAL VALVE E/A RATIO: 1.09

## 2025-03-07 PROCEDURE — 93306 TTE W/DOPPLER COMPLETE: CPT | Performed by: STUDENT IN AN ORGANIZED HEALTH CARE EDUCATION/TRAINING PROGRAM

## 2025-03-07 PROCEDURE — 93306 TTE W/DOPPLER COMPLETE: CPT

## 2025-03-07 RX ORDER — FUROSEMIDE 40 MG/1
40 TABLET ORAL DAILY
Qty: 30 TABLET | Refills: 0 | Status: SHIPPED | OUTPATIENT
Start: 2025-03-07 | End: 2025-04-06

## 2025-03-07 NOTE — TELEPHONE ENCOUNTER
Paula returned call regarding allergy alert from pharmacy for furosemide- pt is allergic to Celebrex which has diuretic , pharmacist just making sure nurse at [facility] watches pt when she starts the furosemide

## 2025-03-11 DIAGNOSIS — N18.4 STAGE 4 CHRONIC KIDNEY DISEASE (MULTI): ICD-10-CM

## 2025-03-12 ENCOUNTER — TREATMENT (OUTPATIENT)
Dept: PHYSICAL THERAPY | Facility: CLINIC | Age: OVER 89
End: 2025-03-12
Payer: MEDICARE

## 2025-03-12 DIAGNOSIS — M54.50 LUMBOSACRAL PAIN: ICD-10-CM

## 2025-03-12 DIAGNOSIS — R26.89 DECREASED FUNCTIONAL MOBILITY: Primary | ICD-10-CM

## 2025-03-12 LAB
ANION GAP SERPL CALCULATED.4IONS-SCNC: 16 MMOL/L (CALC) (ref 7–17)
BUN SERPL-MCNC: 43 MG/DL (ref 7–25)
BUN/CREAT SERPL: 16 (CALC) (ref 6–22)
CALCIUM SERPL-MCNC: 8.6 MG/DL (ref 8.6–10.4)
CHLORIDE SERPL-SCNC: 101 MMOL/L (ref 98–110)
CO2 SERPL-SCNC: 26 MMOL/L (ref 20–32)
CREAT SERPL-MCNC: 2.68 MG/DL (ref 0.6–0.95)
EGFRCR SERPLBLD CKD-EPI 2021: 16 ML/MIN/1.73M2
GLUCOSE SERPL-MCNC: 156 MG/DL (ref 65–99)
POTASSIUM SERPL-SCNC: 4.3 MMOL/L (ref 3.5–5.3)
SODIUM SERPL-SCNC: 143 MMOL/L (ref 135–146)

## 2025-03-12 PROCEDURE — 97110 THERAPEUTIC EXERCISES: CPT | Mod: GP

## 2025-03-12 ASSESSMENT — PAIN SCALES - GENERAL: PAINLEVEL_OUTOF10: 4

## 2025-03-12 ASSESSMENT — PAIN - FUNCTIONAL ASSESSMENT: PAIN_FUNCTIONAL_ASSESSMENT: 0-10

## 2025-03-12 NOTE — PROGRESS NOTES
Physical Therapy Treatment    Patient Name: Lakeshia Park  MRN: [id number]  : 1930   Alonso Westbrook  Today's Date: 3/12/2025  Time Calculation  Start Time:   Stop Time: 1109  Time Calculation (min): 31 min  PT Therapeutic Procedures Time Entry  Therapeutic Exercise Time Entry: 29           General  Reason for Referral: Low back Pain  Referred By: Alonso Westbrook  General Comment: Visit   Visit #6     Current Problem  Problem List Items Addressed This Visit             ICD-10-CM       Musculoskeletal and Injuries    Lumbosacral pain M54.50       Symptoms and Signs    Decreased functional mobility - Primary R26.89            Subjective   Pt reports that she is feeling about a 4/10 R sided low back pain but only when walking on R leg. Pt has been able to complete HEP without any issues at this time.      Pt. Reports compliance with HEP.    Precautions  Precautions  Precautions Comment: Fall risk, HTN, DM, CKD, prior CA    Pain  Pain Assessment: 0-10  0-10 (Numeric) Pain Score: 4  Pain Type: Chronic pain  Pain Location: Buttocks  Pain Orientation: Right, Left    Objective   MMT 5/5 max  LEFT RIGHT   Hip Flexion 4+ 4+   Hip Extension     Hip Abduction Tested in sitting 5 5   Hip Adduction 5 5   Knee Extension 5 5   Knee Flexion 5 5   Ankle DF 4- 4-   Ankle PF     Ankle INV     Ankle EV       4 stage: NBOS 30s       Outcome Measures:  Other Measures  Oswestry Disablity Index (CRISTIN): 15        Treatments:    Therapeutic exercise  Recheck       Current HEP:   Access Code: [email or web address]  URL: [email or web address]  Date: 2025  Prepared by: Saul Woo    Exercises  - Seated March with Resistance  - 1 x daily - 7 x weekly - 3 sets - 10 reps  - Seated Hip Abduction with Resistance  - 1 x daily - 7 x weekly - 3 sets - 10 reps  - Seated Long Arc Quad  - 1 x daily - 7 x weekly - 3 sets - 10 reps - 2s hold  Access Code: [email or web address]  URL: [email or web address]  Date:  "02/06/2025  Prepared by: Rama Bryant    Exercises  - Supine Transversus Abdominis Bracing - Hands on Stomach  - 3-5 x daily - 7 x weekly - 1 sets - 10 reps - 5\" hold  - Seated Transversus Abdominis Bracing  - 3-5 x daily - 7 x weekly - 1 sets - 10 reps - 5\" hold  - Seated Heel Raise  - 1 x daily - 7 x weekly - 3 sets - 10 reps  - Seated Toe Raise  - 1 x daily - 7 x weekly - 1 sets - 10 reps  - Seated Hip Adduction Squeeze with Ball  - 1 x daily - 7 x weekly - 1-3 sets - 10 reps - 3\" hold    Assessment:     Pt has made improvements in pain, LE strength, balance, and overall functional mobility. Pt has Met or partially met goals at this time. Pt educated on importance of continuing HEP to maintain gains made during therapy and to contact if any questions or concerns arise, Pt verbalizes understanding at this time. This note will serve a discharge note due to accomplishment of goals, pt will follow up with MD as needed, pt in agreement.       Plan:  OP PT Plan  PT Plan: No Additional PT interventions required at this time  Onset Date: 12/31/24  Certification Period Start Date: 01/30/25  Certification Period End Date: 04/30/25  Rehab Potential: Fair  Plan of Care Agreement: Patient    Goals:  Active       PT Problem       PT Low back Goals       Start:  01/30/25    Expected End:  04/10/25       1. Pt will adhere to and complete HEP in order to improve functionality outside of the clinic. (2 weeks) 3/12: Goal met    2.   Pt will report 0/10 pain when performing Walking, Sitting, Standing, Bending, Lifting, Twisting, Squatting, and ADLs in order to improve quality of life, functional mobility, and maintain functional independence. (5-6 weeks) 3/12: Goal partially met, pt with 4/10 pain with intermittent walking but not much with other mobility     3.   Pt will improve postural awareness in order to reduce reoccurrence of impairments. (2-3 weeks) 3/12: Goal met    4.   Pt will increase strength of BLE to  5/5 in order " to improve Walking, Standing, Bending, Lifting, Twisting, Squatting, and ADLs. (4-5 weeks) 3/12: Goal partially met, pt still lacking ankle DF SUSANNE    5.   Pt will improve NBOS balance to 30s in order to decrease fall risk and promote mobility. (3-4 weeks) 3/12: Goal met, pt able to complete NBOS for 30s    6.  Pt goal: Have better use of arms and legs    7. Pt will improve OWI by 7 points (MCID) to show reduction in disability and improvement in functionality. (5-6 weeks) 3/12: Goal met, pt with score of 15 from 26

## 2025-03-17 NOTE — PROGRESS NOTES
[facility]  Cardiology Clinic Visit Note    History of present illness:  This is a 89 y.o. female with a history of hypertension, mixed hyperlipidemia, type 2 diabetes mellitus, chronic kidney disease stage IIIb/IV, iron deficiency anemia, depression, confusion and frequent falls who presents to the clinic for by PCP Brent Arredondo for congestive heart failure.    She lives at the [facility] living.     PMHx/PSHx: As above  Tobacco Denies, Alcohol Denies, Caffeine use  Denies, Drug use  Denies  FamHx: denies    Subjective:  She denies chest pain, shortness of breath at rest, palpitations, leg edema, fever, chills, orthopnea, paroxysmal nocturnal dyspnea or syncope.  She goes on regular walks and she sleeps laying flat on her side.    Cardiac Testing  Echo (March 2025): mildly decreased with estimated LVEF 45 to 50% with global hypokinesis. Grade 2 LV diastolic dysfunction, low normal RV systolic function    Assessment and Plan  ACC stage C combined heart failure, likely chronic  Chronic kidney disease, stage IIIa/IV  -NYHA Class II  -She is DNR CCA therefore not a candidate for invasive ischemic assessment  -Euvolemic on exam today.  She was placed on 40 mg of p.o. Lasix but I am unsure why. I do not see an office visit that described any fluid overload. She has underlying renal insufficiency with an increase in her serum creatinine so I will stop her Lasix for now and we will repeat a basic metabolic panel in 2 weeks to see if her renal function improves.  -Heart failure is likely nonischemic and secondary to her advanced age.  -I will message her nephrologist Tracy Garcia about her renal function.  To cut down on office visits for her I think it would be safe for her to continue seeing nephrology and again monitor for signs of fluid overload.  -Continue metoprolol tartrate 25 mg twice a day.     Return to Care:  As needed    Objective  VITALS  Vitals:    03/18/25 1134   BP: (!) 169/94    Pulse: 68       Weight  Vitals:    03/18/25 1134   Weight: 90.9 kg (200 lb 4.8 oz)       Past Medical History  Past Medical History:   Diagnosis Date    Arthritis     CHF (congestive heart failure)     COPD (chronic obstructive pulmonary disease) (Multi)     Diabetes mellitus (Multi)     Hypertension        Past Surgical History  Past Surgical History:   Procedure Laterality Date    ESOPHAGOGASTRODUODENOSCOPY  04/29/2024    OTHER SURGICAL HISTORY  09/13/2019    Facial surgery    OTHER SURGICAL HISTORY  09/13/2019    Gallbladder surgery    OTHER SURGICAL HISTORY  09/13/2019    Appendectomy    OTHER SURGICAL HISTORY  09/13/2019    Hysterectomy    OTHER SURGICAL HISTORY  09/13/2019    Colon surgery    OTHER SURGICAL HISTORY  10/24/2019    Thyroid surgery       Medications  Current Outpatient Medications   Medication Instructions    acetaminophen (TYLENOL) 500 mg, Every 6 hours PRN    albuterol 90 mcg/actuation inhaler INHALE 2 PUFFS Every 4 hours as needed for shortness of breath or wheezing    allopurinol (Zyloprim) 100 mg tablet 1 tablet, Daily    alum-mag hydroxide-simeth (Mylanta Maximum Strength) 400-400-40 mg/5 mL suspension 15 mL, Every 8 hours PRN    beclomethasone dipropionate (Qvar RediHaler) 80 mcg/actuation inhaler 2 puffs, 2 times daily    buPROPion SR (WELLBUTRIN SR) 150 mg, oral, 2 times daily    calcium carbonate (Tums) 200 mg calcium chewable tablet 1 tablet, 4 times daily PRN    cyanocobalamin (Vitamin B-12) 1,000 mcg tablet 1 tablet, Daily    cyclobenzaprine (FLEXERIL) 5 mg, oral, Nightly PRN    dextromethorphan-guaifenesin (Robitussin DM)  mg/5 mL oral liquid 15 mL, Every 4 hours PRN    diaper,brief,adult,disposable (Depend Underwear For Women Lrg) misc Use 4 times per day    diclofenac sodium (VOLTAREN) 4 g, Topical, 4 times daily PRN    docusate sodium (COLACE) 100 mg, 2 times daily PRN    ferrous sulfate 325 (65 Fe) MG tablet 1 tablet, Daily    furosemide (LASIX) 40 mg, oral, Daily     hydrocortisone 2.5 % cream Apply topically. Sparingly to affected area(s) 2 to 3 times daily    incontinence pad, liner, disp (Pads For Women) pad Extra long pads up to 4 per day    levothyroxine (SYNTHROID, LEVOXYL) 175 mcg, oral, Daily before breakfast    loperamide (IMODIUM) 4 mg, 3 times daily PRN    lovastatin (MEVACOR) 20 mg, oral, Nightly    lubricating eye drops ophthalmic solution 1 drop, As needed    magnesium hydroxide (Milk of Magnesia) 400 mg/5 mL suspension 15 mL, Every 12 hours PRN    meclizine (ANTIVERT) 25 mg, oral, 3 times daily PRN    metoprolol tartrate (Lopressor) 25 mg tablet     metoprolol tartrate (Lopressor) 50 mg tablet Take 1/2 tablet in the morning and 1 tablet in the night    olopatadine (Patanol) 0.1 % ophthalmic solution 1 drop, Daily    OneTouch Ultra Test strip Check blood sugar once a day    pantoprazole (PROTONIX) 40 mg, oral, Daily, Do not crush, chew, or split.    polyethylene glycol (GLYCOLAX, MIRALAX) 17 g, 2 times daily PRN    pyridoxine (Vitamin B-6) 100 mg tablet Take 1 tablet (100 mg) by mouth once daily.    sennosides (Senokot) 8.6 mg tablet 1 tablet, 2 times daily PRN    walker (Ultra-Light Rollator) misc Use for ambulation    walker (Ultra-Light Rollator) misc Use with ambulation to support your balance.       Allergies  Allergies   Allergen Reactions    Celecoxib Other     Abdominal pain     Ciprofloxacin Unknown    Diazepam Hallucinations    Esomeprazole Other     Stomach pain    Ibuprofen Other     Kidney problems    Naproxen Unknown       Social History  Social History     Tobacco Use    Smoking status: Never    Smokeless tobacco: Never   Vaping Use    Vaping status: Never Used   Substance Use Topics    Alcohol use: Not Currently    Drug use: Never       Family History  Family History   Problem Relation Name Age of Onset    Diabetes Mother      Brain cancer Mother      Brain cancer Brother             PHYSICAL EXAM  Physical Exam  Vitals and nursing note reviewed.    Constitutional:       General: She is not in acute distress.     Appearance: She is obese.   HENT:      Head: Normocephalic and atraumatic.      Mouth/Throat:      Mouth: Mucous membranes are moist.      Pharynx: Oropharynx is clear.   Eyes:      General: No scleral icterus.     Pupils: Pupils are equal, round, and reactive to light.   Cardiovascular:      Rate and Rhythm: Normal rate and regular rhythm.      Pulses: Normal pulses.      Heart sounds: Normal heart sounds, S1 normal and S2 normal. No murmur heard.     No friction rub.   Pulmonary:      Effort: Pulmonary effort is normal.      Breath sounds: Normal breath sounds.   Abdominal:      General: Bowel sounds are normal. There is no distension.      Palpations: Abdomen is soft.      Tenderness: There is no abdominal tenderness.   Musculoskeletal:         General: Normal range of motion.      Cervical back: Normal range of motion and neck supple.      Right lower leg: No edema.      Left lower leg: No edema.   Skin:     General: Skin is warm and dry.      Capillary Refill: Capillary refill takes less than 2 seconds.      Findings: No rash.   Neurological:      General: No focal deficit present.      Mental Status: She is alert.   Psychiatric:         Mood and Affect: Mood normal.         Behavior: Behavior normal.         Cardiovascular Labs  Lab Results   Component Value Date    HGB 12.2 12/16/2024    HGB 11.4 (L) 10/28/2024    HGB 11.1 (L) 10/27/2024     12/16/2024    WBC 6.6 12/16/2024     03/11/2025    K 4.3 03/11/2025    CREATININE 2.68 (H) 03/11/2025    CREATININE 2.33 (H) 12/16/2024    CREATININE 2.27 (H) 11/07/2024    CREATININE 2.30 (H) 10/28/2024    BUN 43 (H) 03/11/2025    CALCIUM 8.6 03/11/2025    INR 1.0 10/26/2024    TROPHS 7 12/16/2024    TROPHS 7 10/26/2024    TROPHS 5 04/27/2024    LDLF 93 03/09/2022       Echocardiogram  Results for orders placed during the hospital encounter of 03/07/25    Transthoracic Echo (TTE)  Complete    [place] [address]  Phone [phone], Fax [phone]    TRANSTHORACIC ECHOCARDIOGRAM REPORT    Patient Name:       RODOLFO GRAVES    Reading Physician:    [id number] Willy Rivero MD  Study Date:         3/7/2025            Ordering Provider:    [id number] MARCOS CANALES  MRN/PID:            [id number]            Fellow:  Accession#:         [id number]        Nurse:  Date of Birth/Age:  11/25/1930 / 89     Sonographer:          Bill Mathews RDCS  years  Gender Assigned at  F                   Additional Staff:  Birth:  Height:             165.10 cm           Admit Date:  Weight:             91.63 kg            Admission Status:     Outpatient  BSA / BMI:          1.99 m2 / 33.61     Department Location:  [facility]  kg/m2  Blood Pressure: 182 /76 mmHg    Study Type:    TRANSTHORACIC ECHO (TTE) COMPLETE  Diagnosis/ICD: Unspecified systolic (congestive) heart failure (CHF)-I50.20  CPT Codes:     Echo Complete w Full Doppler-[id number]  Study Detail: The following Echo studies were performed: 2D, M-Mode, Doppler and  color flow.      PHYSICIAN INTERPRETATION:  Left Ventricle: Left ventricular ejection fraction is mildly decreased, by visual estimate at 45-50%. There is mild concentric left ventricular hypertrophy. There is global hypokinesis of the left ventricle with minor regional variations. The left ventricular cavity size is upper limits of normal. There is mild increased septal and mildly increased posterior left ventricular wall thickness. Spectral Doppler shows a Grade II (pseudonormal pattern) of left ventricular diastolic filling with an elevated left atrial pressure.  Left Atrium: The left atrial size is normal.  Right Ventricle: The right ventricle is normal in size. There is low normal right ventricular systolic function.  Right Atrium: The right atrial size is normal.  Aortic Valve: The aortic valve is trileaflet. The aortic valve  dimensionless index is 0.51. There is trace aortic valve regurgitation. The peak instantaneous gradient of the aortic valve is 9 mmHg. The mean gradient of the aortic valve is 5 mmHg.  Mitral Valve: The mitral valve is mildly thickened. There is mild mitral valve regurgitation.  Tricuspid Valve: The tricuspid valve is structurally normal. There is trace tricuspid regurgitation.  Pulmonic Valve: The pulmonic valve is structurally normal. There is physiologic pulmonic valve regurgitation.  Pericardium: Trivial pericardial effusion.  Aorta: The aortic root is normal.  In comparison to the previous echocardiogram(s): Compared with study dated 1/21/2022, LVEF decreased 45-50%.      CONCLUSIONS:  1. Left ventricular ejection fraction is mildly decreased, by visual estimate at 45-50%.  2. There is global hypokinesis of the left ventricle with minor regional variations.  3. Spectral Doppler shows a Grade II (pseudonormal pattern) of left ventricular diastolic filling with an elevated left atrial pressure.  4. There is low normal right ventricular systolic function.    QUANTITATIVE DATA SUMMARY:    2D MEASUREMENTS:             Normal Ranges:  Ao Root d:       3.00 cm     (2.0-3.7cm)  LAs:             3.50 cm     (2.7-4.0cm)  IVSd:            1.20 cm     (0.6-1.1cm)  LVPWd:           1.09 cm     (0.6-1.1cm)  LVIDd:           4.85 cm     (3.9-5.9cm)  LVIDs:           3.16 cm  LV Mass Index:   105.0 g/m2  LVEDV Index:     43.86 ml/m2  LV % FS          34.8 %      LEFT ATRIUM:                  Normal Ranges:  LA Vol A4C:        26.4 ml    (22+/-6mL/m2)  LA Vol A2C:        42.6 ml  LA Vol BP:         33.6 ml  LA Vol Index A4C:  13.3ml/m2  LA Vol Index A2C:  21.4 ml/m2  LA Vol Index BP:   16.9 ml/m2  LA Area A4C:       13.2 cm2  LA Area A2C:       16.7 cm2  LA Major Axis A4C: 5.6 cm  LA Major Axis A2C: 5.6 cm  LA Volume Index:   13.1 ml/m2  LA Vol A4C:        26.1 ml  LA Vol A2C:        42.6 ml  LA Vol Index BSA:  17.3  ml/m2      LV SYSTOLIC FUNCTION:  Normal Ranges:  EF-A4C View:    46 % (>=55%)  EF-A2C View:    53 %  EF-Biplane:     50 %  EF-Visual:      48 %  LV EF Reported: 48 %      LV DIASTOLIC FUNCTION:          Normal Ranges:  MV Peak E:             0.91 m/s (0.7-1.2 m/s)  MV Peak A:             0.83 m/s (0.42-0.7 m/s)  E/A Ratio:             1.09     (1.0-2.2)      MITRAL VALVE:          Normal Ranges:  MV DT:        151 msec (150-240msec)      AORTIC VALVE:                     Normal Ranges:  AoV Vmax:                1.46 m/s (<=1.7m/s)  AoV Peak P.5 mmHg (<20mmHg)  AoV Mean P.0 mmHg (1.7-11.5mmHg)  LVOT Max Mukesh:            0.75 m/s (<=1.1m/s)  AoV VTI:                 33.50 cm (18-25cm)  LVOT VTI:                17.20 cm  LVOT Diameter:           1.60 cm  (1.8-2.4cm)  AoV Area, VTI:           1.03 cm2 (2.5-5.5cm2)  AoV Area,Vmax:           1.03 cm2 (2.5-4.5cm2)  AoV Dimensionless Index: 0.51      RIGHT VENTRICLE:  RV Basal 3.84 cm  RV Mid   2.45 cm  RV Major 7.4 cm      [id number] Willy Rivero MD  Electronically signed on 3/7/2025 at 2:46:14 PM        ** Final **       The ASCVD Risk score (Magdalena DK, et al., 2019) failed to calculate for the following reasons:    The 2019 ASCVD risk score is only valid for ages 40 to 79  Low Risk: <5%  Borderline Risk: 5%-7.4%  Intermediate Risk: 7.5% - 19.9%  High Risk: >20%    If your symptoms worsen or progress please go directory to your nearest emergency department for evaluation.     Thank you for this interesting clinical case and allowing me to participate in the care of this patient. Please reach me out if you have any questions or if you need any clarifications regarding this patient's care.    **Disclaimer: This note was dictated by speech recognition, and every effort has been made to prevent any error in transcription, however minor errors may be present**  ___________________________________________________  Franklin Coleman, EFE, LU, ACNPC,  CCRN  Advanced Practice Provider, Nurse Practitioner  Division of Cardiovascular Medicine  [facility]  [facility]

## 2025-03-18 ENCOUNTER — APPOINTMENT (OUTPATIENT)
Dept: CARDIOLOGY | Facility: CLINIC | Age: OVER 89
End: 2025-03-18
Payer: MEDICARE

## 2025-03-18 ENCOUNTER — TELEPHONE (OUTPATIENT)
Dept: PRIMARY CARE | Facility: CLINIC | Age: OVER 89
End: 2025-03-18

## 2025-03-18 VITALS
BODY MASS INDEX: 35.49 KG/M2 | SYSTOLIC BLOOD PRESSURE: 169 MMHG | HEART RATE: 68 BPM | WEIGHT: 200.3 LBS | HEIGHT: 63 IN | DIASTOLIC BLOOD PRESSURE: 94 MMHG

## 2025-03-18 DIAGNOSIS — I50.42 CHRONIC COMBINED SYSTOLIC AND DIASTOLIC HEART FAILURE: Primary | ICD-10-CM

## 2025-03-18 DIAGNOSIS — N18.4 STAGE 4 CHRONIC KIDNEY DISEASE (MULTI): ICD-10-CM

## 2025-03-18 DIAGNOSIS — I50.9 CONGESTIVE HEART FAILURE, UNSPECIFIED HF CHRONICITY, UNSPECIFIED HEART FAILURE TYPE: ICD-10-CM

## 2025-03-18 PROCEDURE — 3077F SYST BP >= 140 MM HG: CPT

## 2025-03-18 PROCEDURE — 1159F MED LIST DOCD IN RCRD: CPT

## 2025-03-18 PROCEDURE — 1157F ADVNC CARE PLAN IN RCRD: CPT

## 2025-03-18 PROCEDURE — 99214 OFFICE O/P EST MOD 30 MIN: CPT

## 2025-03-18 PROCEDURE — 93000 ELECTROCARDIOGRAM COMPLETE: CPT

## 2025-03-18 PROCEDURE — 3080F DIAST BP >= 90 MM HG: CPT

## 2025-03-18 RX ORDER — BENZONATATE 100 MG/1
100 CAPSULE ORAL EVERY 8 HOURS PRN
COMMUNITY

## 2025-03-18 NOTE — TELEPHONE ENCOUNTER
Pc to Paula at [facility] that per Alonso Westbrook, Dr. Juarez note stated that he wanted the Lasix stopped and patient is to follow  up with Tracy Garcia.

## 2025-03-18 NOTE — TELEPHONE ENCOUNTER
Jared's note states he wants the Lasix stopped.  Wants her to follow-up with Tracy Garcia.  Could you please let the [facility] know.

## 2025-03-18 NOTE — Clinical Note
Domingo Molina.  I saw this nice lady today is a heart failure referral.  Her EF is a little bit down on echo but I'm not sure why they checked it anyway, clinically she doesn't have any CHF symptoms. I am not surprised her EF is little low given her age. Brent Weller put her on Lasix but not sure why, I do not see anything that describes her as being volume overloaded.  Serum creatinine increased slightly so I went ahead and stopped the Lasix because I do not think she was really benefiting from it anyway. I also ordered repeat labs in 2 weeks to see if her creat comes back down. She's dry on exam today. She can probably just keep seeing you to monitor for volume overload and see us as needed. Thanks!

## 2025-03-18 NOTE — TELEPHONE ENCOUNTER
Paula nurse from the [facility] [phone]    Brent started Nasra on lasix a few weeks ago- she saw Dr Coleman today and he did not say whether to continue taking or to discontinue. Since you started her on it , what would you like them to do?

## 2025-03-26 ENCOUNTER — APPOINTMENT (OUTPATIENT)
Dept: CARDIOLOGY | Facility: HOSPITAL | Age: OVER 89
End: 2025-03-26
Payer: MEDICARE

## 2025-04-01 ENCOUNTER — APPOINTMENT (OUTPATIENT)
Dept: ORTHOPEDIC SURGERY | Facility: CLINIC | Age: OVER 89
End: 2025-04-01
Payer: MEDICARE

## 2025-04-01 DIAGNOSIS — N18.4 STAGE 4 CHRONIC KIDNEY DISEASE (MULTI): ICD-10-CM

## 2025-04-01 DIAGNOSIS — I50.42 CHRONIC COMBINED SYSTOLIC AND DIASTOLIC HEART FAILURE: ICD-10-CM

## 2025-04-01 LAB
ANION GAP SERPL CALCULATED.4IONS-SCNC: 10 MMOL/L (CALC) (ref 7–17)
BUN SERPL-MCNC: 34 MG/DL (ref 7–25)
BUN/CREAT SERPL: 16 (CALC) (ref 6–22)
CALCIUM SERPL-MCNC: 8.7 MG/DL (ref 8.6–10.4)
CHLORIDE SERPL-SCNC: 102 MMOL/L (ref 98–110)
CO2 SERPL-SCNC: 28 MMOL/L (ref 20–32)
CREAT SERPL-MCNC: 2.07 MG/DL (ref 0.6–0.95)
EGFRCR SERPLBLD CKD-EPI 2021: 22 ML/MIN/1.73M2
GLUCOSE SERPL-MCNC: 148 MG/DL (ref 65–99)
POTASSIUM SERPL-SCNC: 4.9 MMOL/L (ref 3.5–5.3)
SODIUM SERPL-SCNC: 140 MMOL/L (ref 135–146)

## 2025-04-02 ENCOUNTER — APPOINTMENT (OUTPATIENT)
Dept: NEPHROLOGY | Facility: CLINIC | Age: OVER 89
End: 2025-04-02
Payer: MEDICARE

## 2025-04-02 VITALS
HEIGHT: 63 IN | HEART RATE: 60 BPM | SYSTOLIC BLOOD PRESSURE: 124 MMHG | DIASTOLIC BLOOD PRESSURE: 66 MMHG | WEIGHT: 203.6 LBS | BODY MASS INDEX: 36.07 KG/M2

## 2025-04-02 DIAGNOSIS — N18.4 STAGE 4 CHRONIC KIDNEY DISEASE (MULTI): Primary | ICD-10-CM

## 2025-04-02 DIAGNOSIS — N18.4 TYPE 2 DIABETES MELLITUS WITH STAGE 4 CHRONIC KIDNEY DISEASE, WITHOUT LONG-TERM CURRENT USE OF INSULIN (MULTI): ICD-10-CM

## 2025-04-02 DIAGNOSIS — E11.22 TYPE 2 DIABETES MELLITUS WITH STAGE 4 CHRONIC KIDNEY DISEASE, WITHOUT LONG-TERM CURRENT USE OF INSULIN (MULTI): ICD-10-CM

## 2025-04-02 DIAGNOSIS — I10 PRIMARY HYPERTENSION: ICD-10-CM

## 2025-04-02 PROCEDURE — 1159F MED LIST DOCD IN RCRD: CPT | Performed by: CLINICAL NURSE SPECIALIST

## 2025-04-02 PROCEDURE — 3074F SYST BP LT 130 MM HG: CPT | Performed by: CLINICAL NURSE SPECIALIST

## 2025-04-02 PROCEDURE — 3078F DIAST BP <80 MM HG: CPT | Performed by: CLINICAL NURSE SPECIALIST

## 2025-04-02 PROCEDURE — 99213 OFFICE O/P EST LOW 20 MIN: CPT | Performed by: CLINICAL NURSE SPECIALIST

## 2025-04-02 PROCEDURE — 1036F TOBACCO NON-USER: CPT | Performed by: CLINICAL NURSE SPECIALIST

## 2025-04-02 PROCEDURE — 1157F ADVNC CARE PLAN IN RCRD: CPT | Performed by: CLINICAL NURSE SPECIALIST

## 2025-04-02 PROCEDURE — 1160F RVW MEDS BY RX/DR IN RCRD: CPT | Performed by: CLINICAL NURSE SPECIALIST

## 2025-04-02 ASSESSMENT — ENCOUNTER SYMPTOMS
CONSTITUTIONAL NEGATIVE: 1
ENDOCRINE NEGATIVE: 1
PSYCHIATRIC NEGATIVE: 1
GASTROINTESTINAL NEGATIVE: 1
RESPIRATORY NEGATIVE: 1
NEUROLOGICAL NEGATIVE: 1
MUSCULOSKELETAL NEGATIVE: 1

## 2025-04-02 NOTE — PROGRESS NOTES
"Subjective   Patient ID: Lakeshia Park \"Nasra\" is a 89 y.o. female who presents for Follow-up (Review labs ).  Patient being seen in follow-up for chronic kidney disease stage IV with recent history of fluid volume overload with use of Lasix and slight worsening of her creatinine with this    Labs reviewed  Glucose 148  Renal function with a BUN of 34 and creatinine of 2.07, GFR is 22  Sodium 140, potassium 4.9, chloride 102, bicarb 28  Calcium 8.7    Spoke with her about this swelling that was apparently present when Lasix was started, she does not remember this complaint.  At this time she has some mild lower extremity swelling along with some increase swelling of her arms, she does state that she has noticed that she has had this and does not feel that it has changed  She denies any complaints of increasing shortness of breath  Blood pressure is well-controlled  She has no lightheadedness or dizziness  She is voiding without difficulties        Review of Systems   Constitutional: Negative.    Respiratory: Negative.     Cardiovascular:  Positive for leg swelling.   Gastrointestinal: Negative.    Endocrine: Negative.    Genitourinary: Negative.    Musculoskeletal: Negative.    Skin: Negative.    Neurological: Negative.    Psychiatric/Behavioral: Negative.         Objective   Physical Exam  Vitals reviewed.   Constitutional:       Appearance: Normal appearance.   HENT:      Head: Normocephalic.   Cardiovascular:      Rate and Rhythm: Normal rate and regular rhythm.   Pulmonary:      Effort: Pulmonary effort is normal.      Breath sounds: Normal breath sounds.   Abdominal:      Palpations: Abdomen is soft.   Musculoskeletal:         General: Normal range of motion.      Right lower leg: Edema (Trace) present.      Left lower leg: Edema (Trace) present.   Skin:     General: Skin is warm and dry.   Neurological:      Mental Status: She is alert and oriented to person, place, and time.   Psychiatric:         Mood " and Affect: Mood normal.         Behavior: Behavior normal.         Assessment/Plan   Problem List Items Addressed This Visit             ICD-10-CM    DM2 (diabetes mellitus, type 2) (Multi) E11.9    HTN (hypertension) I10    Stage 4 chronic kidney disease (Multi) - Primary N18.4    Relevant Orders    Comprehensive metabolic panel    Follow Up In Nephrology    CBC    Urinalysis with Reflex Microscopic    Albumin-Creatinine Ratio, Urine Random     At this time her renal function has returned to its baseline, will recommend that we do not use diuretics as it does worsen her renal function  I have given her prescription for Ace wraps or compression stockings if she does notice some increase in swelling that she can use as needed  She is also trying to increase some of her proteins and is adding 1 protein drink per day  Will continue with current medications  Follow-up in the office in 3 months    Chronic kidney disease stage IIIb/IV with baseline creatinine 2-2.4  Diabetic Nephropathy and HTN  Diabetes mellitus type 2  Chronic diarrhea  History of gout on allopurinol  Hyperuricemia  Hypertension BP is good now.  Hyperlipidemia  Bilateral renal cysts  Positive REJI screen with slightly high RNP  Depression  Hypokalemia       JASSI Allen-LU, DNP 04/02/25 12:58 PM

## 2025-04-16 ENCOUNTER — APPOINTMENT (OUTPATIENT)
Dept: ORTHOPEDIC SURGERY | Facility: CLINIC | Age: OVER 89
End: 2025-04-16
Payer: MEDICARE

## 2025-04-16 ENCOUNTER — HOSPITAL ENCOUNTER (OUTPATIENT)
Dept: RADIOLOGY | Facility: EXTERNAL LOCATION | Age: OVER 89
Discharge: HOME | End: 2025-04-16

## 2025-04-16 DIAGNOSIS — G89.29 CHRONIC RIGHT SHOULDER PAIN: ICD-10-CM

## 2025-04-16 DIAGNOSIS — M25.511 CHRONIC RIGHT SHOULDER PAIN: ICD-10-CM

## 2025-04-16 PROCEDURE — 1159F MED LIST DOCD IN RCRD: CPT | Performed by: SPECIALIST

## 2025-04-16 PROCEDURE — 1157F ADVNC CARE PLAN IN RCRD: CPT | Performed by: SPECIALIST

## 2025-04-16 PROCEDURE — 1036F TOBACCO NON-USER: CPT | Performed by: SPECIALIST

## 2025-04-16 PROCEDURE — 76882 US LMTD JT/FCL EVL NVASC XTR: CPT | Performed by: SPECIALIST

## 2025-04-16 PROCEDURE — 1160F RVW MEDS BY RX/DR IN RCRD: CPT | Performed by: SPECIALIST

## 2025-04-16 PROCEDURE — 99214 OFFICE O/P EST MOD 30 MIN: CPT | Performed by: SPECIALIST

## 2025-04-16 ASSESSMENT — PAIN - FUNCTIONAL ASSESSMENT: PAIN_FUNCTIONAL_ASSESSMENT: 0-10

## 2025-04-16 ASSESSMENT — PAIN SCALES - GENERAL: PAINLEVEL_OUTOF10: 5 - MODERATE PAIN

## 2025-04-16 ASSESSMENT — PAIN DESCRIPTION - DESCRIPTORS: DESCRIPTORS: ACHING

## 2025-04-16 NOTE — PROGRESS NOTES
"Assessment/Plan   Encounter Diagnoses:  Chronic right shoulder pain  Right shoulder pain  Assessment: Right shoulder arthritis    Plan:  Voltaren gel use as directed.  Physical therapy for range of motion and strengthening.  Follow-up as needed       Subjective    Patient ID: Lakeshia Park \"Mynor" is a 89 y.o. female.    Chief Complaint: Pain and Follow-up of the Right Shoulder (X-RAYS 2-11-25/)     Last Surgery: No surgery found  Last Surgery Date: No surgery found    [facility]  4/16/2025-she comes in today stating that her right shoulder is much improved.  She is back to every day activities.  She is seeing Dr. Westbrook for her buttock.  I can see that he ordered an chest x-ray.    OBJECTIVE: ORTHO EXAM  Right shoulder:  Inspection:  Skin healthy to gross inspection  No ecchymosis, no edema, no gross atrophy    Palpation:  Acromioclavicular joint no tenderness   Biceps tendon/ groove scant tenderness  Anterior Acromial Bursal Area minimal tenderness  Cervical spine no tenderness     ROM:  Forward Flexion 160 degrees active  External Rotation 55 degrees at 90 degrees abduction  Internal Rotation 70 degrees at 90 degrees abduction    Strength:  5 -/5 Supraspinatus isolation- resisted elevation  5 -/5 Infraspinatus isolation- ER  5/5 Subscapularis- IR     Negative lift off test   Negative Spurling´s test  Negative Neer and Hawking´s test  Negative Speed's test  Negative Inferior Sulcus  Negative Anterior Apprehension    Full unrestricted motion at Elbow/Wrist/Hand  Neurovascular exam normal distally        IMAGE RESULTS:  Point of Care Ultrasound  These images are not reportable by radiology and will not be interpreted   by  Radiologists.      ULTRASOUND  DIAGNOSTIC ULTRASOUND FINAL REPORT: Right SHOULDER  Provider: Cristopher Deleon MD  Date of Exam: Today  Procedure: Ultrasound, extremity, nonvascular, real-time, COMPLETE, anatomic specific.  Site:   SHOULDER  Indication:  SHOULDER PAIN  Technique: B-Mode Ultrasound " Examination performed using 8-13 MHz linear transducer with [email or web address] Software  STUDY TYPE:   1. ULTRASOUND EXTREMITY INCLUDING BUT NOT LIMITED TO SHOULDER MUSCLE, TENDONS, LIGAMENTS, FATTY TISSUES, SUBCUTANEOUS TISSUES AND OTHER SOFT TISSUE STRUCTURES SUCH AS ABSCESSES OR FREE FLUID ACCUMULATION WITHIN THE PRIMARY JOINT AS WELL AS ADJACENT JOINTS.  2. REAL TIME WITH IMAGE DOCUMENTATION  3. NON-VASCULAR  4. COMPLETE STUDY WHICH INCLUDES A THOROUGH EVALUATION OF THE SHOULDER MUSCLE, TENDONS, LIGAMENTS, FATTY TISSUES, SUBCUTANEOUS TISSUES AND OTHER SOFT TISSUE STRUCTURES SUCH AS ABSCESSES OR FREE FLUID ACCUMULATION WITHIN THE PRIMARY JOINT AS WELL AS ADJACENT JOINTS.  Live ultrasound was performed of the patient´s  SHOULDER and PERMANENTLY documented.  This is a thorough and complete evaluation of a specific anatomic region specifically the  SHOULDER.  PERMANENT Image documentation was performed.  This is the complete and final ultrasound report of the patient's  SHOULDER.  The patient was positioned in order to optimize the ultrasound evaluation of the  SHOULDER.  Ultrasound gel was used as a conductive medium in order to both transmit and receive ultrasonic signals that characterize the soft tissues. . I personally performed the ultrasound and reviewed the findings. These show:  Findings:  SHOULDER  Gillian-articular evaluation: Live ultrasound was performed of the patient's  SHOULDER that shows full-thickness tearing with retraction Of the supraspinatus and infraspinatus tendons with the deltoid muscle fibers showing normal striations.  There was no sub acromial effusion.  Evaluation of the subscapularis with the arm in external rotation showed an intact and normal appearing subscapularis tendon.    Joint Evaluation: The biceps tendon was visualized within the bicipital groove.  Arthritic changes are noted in the glenohumeral joint.    Procedures     Orders Placed This Encounter    Point of Care Ultrasound

## 2025-04-16 NOTE — ASSESSMENT & PLAN NOTE
Assessment: Right shoulder arthritis    Plan:  Voltaren gel use as directed.  Physical therapy for range of motion and strengthening.  Follow-up as needed

## 2025-04-27 ENCOUNTER — HOSPITAL ENCOUNTER (EMERGENCY)
Facility: HOSPITAL | Age: OVER 89
Discharge: HOME | End: 2025-04-28
Attending: EMERGENCY MEDICINE
Payer: MEDICARE

## 2025-04-27 ENCOUNTER — APPOINTMENT (OUTPATIENT)
Dept: RADIOLOGY | Facility: HOSPITAL | Age: OVER 89
End: 2025-04-27
Payer: MEDICARE

## 2025-04-27 DIAGNOSIS — M54.42 CHRONIC BILATERAL LOW BACK PAIN WITH BILATERAL SCIATICA: ICD-10-CM

## 2025-04-27 DIAGNOSIS — M54.41 CHRONIC BILATERAL LOW BACK PAIN WITH BILATERAL SCIATICA: ICD-10-CM

## 2025-04-27 DIAGNOSIS — G89.29 CHRONIC BILATERAL LOW BACK PAIN WITH BILATERAL SCIATICA: ICD-10-CM

## 2025-04-27 DIAGNOSIS — S20.211A CONTUSION OF RIB ON RIGHT SIDE, INITIAL ENCOUNTER: Primary | ICD-10-CM

## 2025-04-27 PROCEDURE — 2500000004 HC RX 250 GENERAL PHARMACY W/ HCPCS (ALT 636 FOR OP/ED): Performed by: EMERGENCY MEDICINE

## 2025-04-27 PROCEDURE — 99284 EMERGENCY DEPT VISIT MOD MDM: CPT | Performed by: EMERGENCY MEDICINE

## 2025-04-27 PROCEDURE — 71100 X-RAY EXAM RIBS UNI 2 VIEWS: CPT | Mod: RT

## 2025-04-27 PROCEDURE — 96372 THER/PROPH/DIAG INJ SC/IM: CPT | Performed by: EMERGENCY MEDICINE

## 2025-04-27 PROCEDURE — 71100 X-RAY EXAM RIBS UNI 2 VIEWS: CPT | Mod: RIGHT SIDE | Performed by: STUDENT IN AN ORGANIZED HEALTH CARE EDUCATION/TRAINING PROGRAM

## 2025-04-27 PROCEDURE — 2500000005 HC RX 250 GENERAL PHARMACY W/O HCPCS: Performed by: EMERGENCY MEDICINE

## 2025-04-27 RX ORDER — MORPHINE SULFATE 4 MG/ML
4 INJECTION, SOLUTION INTRAMUSCULAR; INTRAVENOUS ONCE
Status: COMPLETED | OUTPATIENT
Start: 2025-04-27 | End: 2025-04-27

## 2025-04-27 RX ORDER — ONDANSETRON 4 MG/1
4 TABLET, ORALLY DISINTEGRATING ORAL ONCE
Status: COMPLETED | OUTPATIENT
Start: 2025-04-27 | End: 2025-04-27

## 2025-04-27 RX ORDER — HYDROCODONE BITARTRATE AND ACETAMINOPHEN 5; 325 MG/1; MG/1
1 TABLET ORAL EVERY 6 HOURS PRN
Qty: 12 TABLET | Refills: 0 | Status: ON HOLD | OUTPATIENT
Start: 2025-04-27 | End: 2025-04-30

## 2025-04-27 RX ADMIN — Medication 2 L/MIN: at 21:31

## 2025-04-27 RX ADMIN — MORPHINE SULFATE 4 MG: 4 INJECTION, SOLUTION INTRAMUSCULAR; INTRAVENOUS at 20:47

## 2025-04-27 RX ADMIN — ONDANSETRON 4 MG: 4 TABLET, ORALLY DISINTEGRATING ORAL at 20:47

## 2025-04-27 ASSESSMENT — PAIN DESCRIPTION - PAIN TYPE
TYPE: ACUTE PAIN
TYPE: ACUTE PAIN

## 2025-04-27 ASSESSMENT — PAIN - FUNCTIONAL ASSESSMENT
PAIN_FUNCTIONAL_ASSESSMENT: 0-10

## 2025-04-27 ASSESSMENT — PAIN SCALES - GENERAL
PAINLEVEL_OUTOF10: 9
PAINLEVEL_OUTOF10: 3
PAINLEVEL_OUTOF10: 10 - WORST POSSIBLE PAIN

## 2025-04-27 ASSESSMENT — COLUMBIA-SUICIDE SEVERITY RATING SCALE - C-SSRS
6. HAVE YOU EVER DONE ANYTHING, STARTED TO DO ANYTHING, OR PREPARED TO DO ANYTHING TO END YOUR LIFE?: NO
1. IN THE PAST MONTH, HAVE YOU WISHED YOU WERE DEAD OR WISHED YOU COULD GO TO SLEEP AND NOT WAKE UP?: NO
2. HAVE YOU ACTUALLY HAD ANY THOUGHTS OF KILLING YOURSELF?: NO

## 2025-04-27 ASSESSMENT — PAIN DESCRIPTION - FREQUENCY: FREQUENCY: CONSTANT/CONTINUOUS

## 2025-04-27 ASSESSMENT — PAIN DESCRIPTION - PROGRESSION
CLINICAL_PROGRESSION: GRADUALLY IMPROVING
CLINICAL_PROGRESSION: NOT CHANGED

## 2025-04-27 ASSESSMENT — PAIN DESCRIPTION - LOCATION
LOCATION: BUTTOCKS

## 2025-04-27 ASSESSMENT — PAIN DESCRIPTION - DESCRIPTORS
DESCRIPTORS: THROBBING
DESCRIPTORS: DULL;THROBBING;RADIATING

## 2025-04-27 ASSESSMENT — PAIN DESCRIPTION - ONSET: ONSET: ONGOING

## 2025-04-28 VITALS
RESPIRATION RATE: 20 BRPM | HEIGHT: 63 IN | TEMPERATURE: 97.6 F | SYSTOLIC BLOOD PRESSURE: 126 MMHG | BODY MASS INDEX: 35.97 KG/M2 | DIASTOLIC BLOOD PRESSURE: 55 MMHG | HEART RATE: 56 BPM | OXYGEN SATURATION: 94 % | WEIGHT: 203 LBS

## 2025-04-28 NOTE — ED PROVIDER NOTES
"89-year-old female presents with a chief complaint of bilateral buttock pain and right rib pain.  She states the \"sciatic\" pain has been constant.  The right rib pain is new.  She did fall yesterday and reports twisting her ribs and she thinks that when the rib pain began.  She did get a Flexeril just prior to arrival.  She did not strike her head or lose consciousness.         Review of Systems     Physical Exam  Vitals and nursing note reviewed.   Constitutional:       General: She is not in acute distress.     Appearance: She is well-developed.   HENT:      Head: Normocephalic and atraumatic.   Eyes:      Conjunctiva/sclera: Conjunctivae normal.   Cardiovascular:      Rate and Rhythm: Normal rate and regular rhythm.      Heart sounds: No murmur heard.  Pulmonary:      Effort: Pulmonary effort is normal. No respiratory distress.      Breath sounds: Normal breath sounds.   Chest:          Comments: Focal point tenderness with no crepitus or signs of trauma.  Abdominal:      Palpations: Abdomen is soft.      Tenderness: There is no abdominal tenderness.   Musculoskeletal:         General: No swelling.      Cervical back: Neck supple.   Skin:     General: Skin is warm and dry.      Capillary Refill: Capillary refill takes less than 2 seconds.   Neurological:      Mental Status: She is alert.   Psychiatric:         Mood and Affect: Mood normal.          Labs Reviewed - No data to display     XR ribs right 2 views   Final Result   No evidence of acute right rib fracture.             MACRO:   None        Signed by: Willy Monson 4/27/2025 9:40 PM   Dictation workstation:   [id number]           Procedures     Medical Decision Making  89-year-old female presents with a chief complaint of bilateral buttock pain and right rib pain.  She states the \"sciatic\" pain has been constant.  The right rib pain is new.  She did fall yesterday and reports twisting her ribs and she thinks that when the rib pain began.  She did get " a Flexeril just prior to arrival.  She did not strike her head or lose consciousness.  She was treated with 4 mg Zofran ODT and 4 mg IM morphine.  She feels better and is resting comfortably.  X-rays of the ribs are negative for acute fracture.  Will discharge the patient on a short course of Norco.  She does not require admission.           Diagnoses as of 04/27/25 2216   Chronic bilateral low back pain with bilateral sciatica   Contusion of rib on right side, initial encounter                    Yaya Nichols MD  04/27/25 [id number]

## 2025-04-30 ENCOUNTER — APPOINTMENT (OUTPATIENT)
Dept: PRIMARY CARE | Facility: CLINIC | Age: OVER 89
End: 2025-04-30
Payer: MEDICARE

## 2025-04-30 ENCOUNTER — TELEPHONE (OUTPATIENT)
Dept: PRIMARY CARE | Facility: CLINIC | Age: OVER 89
End: 2025-04-30

## 2025-04-30 VITALS
SYSTOLIC BLOOD PRESSURE: 144 MMHG | BODY MASS INDEX: 37.54 KG/M2 | WEIGHT: 204 LBS | HEART RATE: 82 BPM | DIASTOLIC BLOOD PRESSURE: 92 MMHG | OXYGEN SATURATION: 92 % | HEIGHT: 62 IN

## 2025-04-30 DIAGNOSIS — R26.89 DECREASED FUNCTIONAL MOBILITY: ICD-10-CM

## 2025-04-30 DIAGNOSIS — M54.50 LUMBOSACRAL PAIN: Primary | ICD-10-CM

## 2025-04-30 PROCEDURE — 99215 OFFICE O/P EST HI 40 MIN: CPT

## 2025-04-30 PROCEDURE — 3080F DIAST BP >= 90 MM HG: CPT

## 2025-04-30 PROCEDURE — 1036F TOBACCO NON-USER: CPT

## 2025-04-30 PROCEDURE — 1159F MED LIST DOCD IN RCRD: CPT

## 2025-04-30 PROCEDURE — 1157F ADVNC CARE PLAN IN RCRD: CPT

## 2025-04-30 PROCEDURE — 3077F SYST BP >= 140 MM HG: CPT

## 2025-04-30 PROCEDURE — 1160F RVW MEDS BY RX/DR IN RCRD: CPT

## 2025-04-30 ASSESSMENT — ENCOUNTER SYMPTOMS
BACK PAIN: 1
PALPITATIONS: 0
SHORTNESS OF BREATH: 0
ARTHRALGIAS: 1
NAUSEA: 0
NUMBNESS: 0
POLYPHAGIA: 0
TROUBLE SWALLOWING: 0
HEADACHES: 0
CONSTIPATION: 0
POLYDIPSIA: 0
CHEST TIGHTNESS: 0
CHILLS: 0
UNEXPECTED WEIGHT CHANGE: 0
DIARRHEA: 0
FREQUENCY: 0
ABDOMINAL PAIN: 0
WOUND: 0
RECTAL PAIN: 0
NERVOUS/ANXIOUS: 0
DIFFICULTY URINATING: 0
MYALGIAS: 1
WEAKNESS: 0
FATIGUE: 0
DIAPHORESIS: 0

## 2025-04-30 NOTE — PROGRESS NOTES
Subjective   Patient ID: Nasra Park is a 89 y.o. female who presents for Back Pain (Pt is here today for pain in her lower back/sciatic pain. Almost fell coming back to the room twice. ).    Patient presents today for checkup.  She has brought a friend with her (Roxanne James) that is acting as her advocate/additional historian.  She did consent to her friend being involved in medical discussions    Frequent falls: Was recently seen in the emergency department after a fall for rib pain.  Had negative chest x-ray for fracture.  She has had multiple falls over the last few months almost all of these falls involve a lack of stamina.  She is had near falls multiple times in the office.  Has been through physical therapy without improvement.  She is fixated on her past as a  and still feels she could return to having enough strength to swim daily.  Based off all the evaluations she is received in her current state this is both unlikely and unsafe.  She has been denied access to pool by assisted living staff due to safety.  We did have a long discussion with her and her advocate, we discussed her mobility and the likelihood of her continued deterioration versus improvement.  We came to a shared decision that we have asked her to start using a wheelchair unless it is for very short distances in a closed environment that is safe.  Calls were placed to the assisted living so they are aware of this change.  She also left our facility in a wheelchair after coming with a walker.    Polypharmacy: Her advocate has made a list of all of her medications and questioning the necessity of her medications.  I did provide education to both the patient and her advocate that many of her medications are as needed.  Of those that are prescribed daily we did outline the ones that are necessary due to chronic medical problems.  We did review multiple medications that could possibly be discontinued if the patient desired to.  I  "reviewed the risks of discontinuation of all the medications with her and with her advocate and they collectively decided for her to stop using Protonix and lovastatin daily.    She is scheduled with follow-up appointments at orthopedics, nephrology and pain management.  Pain management will be evaluating her for injections to manage sciatic pain at the recommendation of orthopedics.    Back Pain  Pertinent negatives include no abdominal pain, chest pain, headaches, numbness or weakness.        Review of Systems   Constitutional:  Negative for chills, diaphoresis, fatigue and unexpected weight change.   HENT:  Positive for hearing loss. Negative for dental problem, tinnitus and trouble swallowing.    Eyes:  Negative for visual disturbance.   Respiratory:  Negative for chest tightness and shortness of breath.    Cardiovascular:  Negative for chest pain, palpitations and leg swelling.   Gastrointestinal:  Negative for abdominal pain, constipation, diarrhea, nausea and rectal pain.   Endocrine: Negative for polydipsia, polyphagia and polyuria.   Genitourinary:  Negative for difficulty urinating, frequency and urgency.   Musculoskeletal:  Positive for arthralgias, back pain, gait problem and myalgias.   Skin:  Negative for pallor and wound.   Neurological:  Negative for syncope, weakness, numbness and headaches.   Psychiatric/Behavioral:  Negative for suicidal ideas. The patient is not nervous/anxious.        Objective   BP (!) 144/92   Pulse 82   Ht 1.575 m (5' 2\")   Wt 92.5 kg (204 lb)   SpO2 92%   BMI 37.31 kg/m²     Physical Exam  Vitals and nursing note reviewed.   Constitutional:       General: She is not in acute distress.     Appearance: Normal appearance. She is obese.   HENT:      Head: Normocephalic.      Nose: Nose normal.      Mouth/Throat:      Mouth: Mucous membranes are moist.      Pharynx: Oropharynx is clear.   Eyes:      General: No scleral icterus.     Pupils: Pupils are equal, round, and " reactive to light.   Neck:      Vascular: No carotid bruit.   Cardiovascular:      Rate and Rhythm: Normal rate and regular rhythm.      Pulses: Normal pulses.      Heart sounds: Normal heart sounds.   Pulmonary:      Effort: Pulmonary effort is normal. No respiratory distress.      Breath sounds: Normal breath sounds. No stridor. No wheezing, rhonchi or rales.   Abdominal:      General: Bowel sounds are normal. There is no distension.      Palpations: Abdomen is soft.      Tenderness: There is no abdominal tenderness. There is no right CVA tenderness or left CVA tenderness.   Musculoskeletal:         General: No swelling. Normal range of motion.      Cervical back: Normal range of motion.      Right lower leg: No edema.      Left lower leg: No edema.   Skin:     General: Skin is warm and dry.      Capillary Refill: Capillary refill takes less than 2 seconds.   Neurological:      General: No focal deficit present.      Mental Status: She is alert and oriented to person, place, and time. Mental status is at baseline.      Sensory: No sensory deficit.      Motor: Weakness (Bilateral leg weakness) present.   Psychiatric:         Mood and Affect: Mood normal.         Behavior: Behavior normal.         Thought Content: Thought content normal.         Judgment: Judgment normal.         Assessment/Plan   Diagnoses and all orders for this visit:  Lumbosacral pain  -     Wheelchair Lightweight  Decreased functional mobility  -     Wheelchair Lightweight

## 2025-04-30 NOTE — TELEPHONE ENCOUNTER
PT came in for an appointment today and was accompanied by friend and using her rollator, when she attempted to get up on the scale, she almost fell and staff caught her and placed her on her rollator behind her, she almost fell again being lowered to her rollator, went and got a wheelchair and placed it next to her, she stood with help from staff and pivoted to wheelchair where she again almost fell. I let PCP know this who wanted me to call [facility] and find out what exactly the order needed to say for a wheelchair and for staff to accompany her to her appointments. I called and spoke to Antonia pt's nurse at [facility] and let her know pt almost fell 3 different times before her appointment started she said that pt almost fell this morning as well and staff had to accompany her to the car, and told me that pt said a friend was coming with her and did not need staff to attend. She told me the order needed to say that pt was to be escorted to all appointments by staff from [facility] with wheelchair, I also let nurse know when pt would be leaving so staff was aware and could meet her outside so she could get help getting back into the building safely. She expressed an understanding and would let staff know to be watching for her.

## 2025-05-01 ENCOUNTER — TELEPHONE (OUTPATIENT)
Dept: PRIMARY CARE | Facility: CLINIC | Age: OVER 89
End: 2025-05-01
Payer: MEDICARE

## 2025-05-01 NOTE — TELEPHONE ENCOUNTER
Spoke to nurse and let her know what Alonso said. She expressed a verbal understanding and also let me know that they found the order from us in [place] about staff having to be with her when she comes to appointments and made sure to put it at the front of her chart.

## 2025-05-01 NOTE — TELEPHONE ENCOUNTER
Let the nurse know that I the told the patient I was not refilling the hydrocodone due to her frequent falls and that it is not changing her pain level at all.  I do not feel it is appropriate for her to be on narcotic. She saw orthopedics and per her and her caregiver yesterday they had done a referral to pain management for treatment of her pain.  It was not done by our office so she should reach out to the orthopod that she saw.  If they are unable to get into the referred pain management doctor I will refer them to Dr. Westbrook here in town.

## 2025-05-01 NOTE — TELEPHONE ENCOUNTER
Message from Antonia, nurse at [facility]  Patient was seen yesterday and they are wanting to know if she was referred anywhere for her sciatic pain.  The only orders they received were for a wheelchair and discontinuing the Lovastatin and Protonix.  Don't see where pain was really addressed.  Also, her Hydrocodone order from when she was in ER on 25 will  today.  Please call Antonia at [phone]

## 2025-05-02 ENCOUNTER — TELEPHONE (OUTPATIENT)
Dept: PRIMARY CARE | Facility: CLINIC | Age: OVER 89
End: 2025-05-02
Payer: MEDICARE

## 2025-05-02 DIAGNOSIS — M54.50 LUMBOSACRAL PAIN: Primary | ICD-10-CM

## 2025-05-02 DIAGNOSIS — M54.50 LUMBOSACRAL PAIN: ICD-10-CM

## 2025-05-02 DIAGNOSIS — R26.89 DECREASED FUNCTIONAL MOBILITY: ICD-10-CM

## 2025-05-02 RX ORDER — TRAMADOL HYDROCHLORIDE 50 MG/1
50 TABLET ORAL EVERY 6 HOURS PRN
Qty: 21 TABLET | Refills: 0 | Status: ON HOLD | OUTPATIENT
Start: 2025-05-02 | End: 2025-05-09

## 2025-05-02 RX ORDER — CYCLOBENZAPRINE HCL 5 MG
10 TABLET ORAL NIGHTLY PRN
Qty: 30 TABLET | Refills: 0 | Status: ON HOLD | OUTPATIENT
Start: 2025-05-02 | End: 2025-06-01

## 2025-05-02 NOTE — TELEPHONE ENCOUNTER
Let Antonia know I will increase the dose to 10 mg.  I will also place a pain management referral to Dr. Westbrook to see if they can get her in faster.

## 2025-05-02 NOTE — PROGRESS NOTES
Spoke with director of nursing at patient's assisted living facility.  The patient is a MRT of pain and unable to tolerate much movement.  The the patient is requesting medication for pain.  She was treated for pain with Norco in the emergency department.  We discussed the risks of NSAIDs due to kidney function and other medications due to the sedative effect and increased risk of falls.  Patient has fallen frequently.  She is now using wheelchair at the facility.  They are currently scheduled to follow-up with pain management.  The director of nursing assures me that the patient will be limited in her ambulation without a wheelchair and will be positioned to comfort whenever possible.  They are also assisting the patient with all ambulation.  Will prescribe tramadol for pain control for 1 week and consult palliative care for continued pain management.  The facility is also seeking escalation to skilled nursing for the patient.

## 2025-05-02 NOTE — TELEPHONE ENCOUNTER
Message from Antonia, nurse at [facility].  Spoke with you about her yesterday and they aren't able to get her into pain management anytime soon.  She does have Flexeril that seems to help her. Currently takes 5mg at bedtime and wondering if that could be increased since it's the only thing that seems to help.  (Also has order for Flexeril 5mg 1 q24 hours)  They are helping her up and she is being compliant.  Please call the nurse back at [phone]

## 2025-05-04 ENCOUNTER — APPOINTMENT (OUTPATIENT)
Dept: RADIOLOGY | Facility: HOSPITAL | Age: OVER 89
DRG: 189 | End: 2025-05-04
Payer: MEDICARE

## 2025-05-04 ENCOUNTER — HOSPITAL ENCOUNTER (INPATIENT)
Facility: HOSPITAL | Age: OVER 89
End: 2025-05-04
Attending: EMERGENCY MEDICINE | Admitting: HOSPITALIST
Payer: MEDICARE

## 2025-05-04 ENCOUNTER — APPOINTMENT (OUTPATIENT)
Dept: CARDIOLOGY | Facility: HOSPITAL | Age: OVER 89
DRG: 189 | End: 2025-05-04
Payer: MEDICARE

## 2025-05-04 DIAGNOSIS — R26.89 DECREASED FUNCTIONAL MOBILITY: ICD-10-CM

## 2025-05-04 DIAGNOSIS — I50.42 CHRONIC COMBINED SYSTOLIC AND DIASTOLIC HEART FAILURE: ICD-10-CM

## 2025-05-04 DIAGNOSIS — J44.1 COPD EXACERBATION (MULTI): ICD-10-CM

## 2025-05-04 DIAGNOSIS — M54.50 ACUTE EXACERBATION OF CHRONIC LOW BACK PAIN: Primary | ICD-10-CM

## 2025-05-04 DIAGNOSIS — M54.50 LUMBOSACRAL PAIN: ICD-10-CM

## 2025-05-04 DIAGNOSIS — G89.29 ACUTE EXACERBATION OF CHRONIC LOW BACK PAIN: Primary | ICD-10-CM

## 2025-05-04 DIAGNOSIS — J96.01 ACUTE HYPOXEMIC RESPIRATORY FAILURE: ICD-10-CM

## 2025-05-04 LAB
ALBUMIN SERPL BCP-MCNC: 3.9 G/DL (ref 3.4–5)
ALP SERPL-CCNC: 52 U/L (ref 33–136)
ALT SERPL W P-5'-P-CCNC: 12 U/L (ref 7–45)
AMORPH CRY #/AREA UR COMP ASSIST: ABNORMAL /HPF
ANION GAP SERPL CALC-SCNC: 12 MMOL/L (ref 10–20)
APPEARANCE UR: CLEAR
AST SERPL W P-5'-P-CCNC: 13 U/L (ref 9–39)
BASOPHILS # BLD AUTO: 0.04 X10*3/UL (ref 0–0.1)
BASOPHILS NFR BLD AUTO: 0.6 %
BILIRUB SERPL-MCNC: 0.5 MG/DL (ref 0–1.2)
BILIRUB UR STRIP.AUTO-MCNC: NEGATIVE MG/DL
BNP SERPL-MCNC: 548 PG/ML (ref 0–99)
BUN SERPL-MCNC: 33 MG/DL (ref 6–23)
CALCIUM SERPL-MCNC: 8.5 MG/DL (ref 8.6–10.3)
CARDIAC TROPONIN I PNL SERPL HS: 9 NG/L (ref 0–13)
CHLORIDE SERPL-SCNC: 105 MMOL/L (ref 98–107)
CO2 SERPL-SCNC: 27 MMOL/L (ref 21–32)
COLOR UR: ABNORMAL
CREAT SERPL-MCNC: 2.14 MG/DL (ref 0.5–1.05)
EGFRCR SERPLBLD CKD-EPI 2021: 21 ML/MIN/1.73M*2
EOSINOPHIL # BLD AUTO: 0.18 X10*3/UL (ref 0–0.4)
EOSINOPHIL NFR BLD AUTO: 2.9 %
ERYTHROCYTE [DISTWIDTH] IN BLOOD BY AUTOMATED COUNT: 13.9 % (ref 11.5–14.5)
EST. AVERAGE GLUCOSE BLD GHB EST-MCNC: 160 MG/DL
FLUAV RNA RESP QL NAA+PROBE: NOT DETECTED
FLUBV RNA RESP QL NAA+PROBE: NOT DETECTED
GLUCOSE BLD MANUAL STRIP-MCNC: 250 MG/DL (ref 74–99)
GLUCOSE SERPL-MCNC: 130 MG/DL (ref 74–99)
GLUCOSE UR STRIP.AUTO-MCNC: NORMAL MG/DL
HBA1C MFR BLD: 7.2 % (ref ?–5.7)
HCT VFR BLD AUTO: 38.9 % (ref 36–46)
HGB BLD-MCNC: 12.3 G/DL (ref 12–16)
HOLD SPECIMEN: NORMAL
HYALINE CASTS #/AREA URNS AUTO: ABNORMAL /LPF
IMM GRANULOCYTES # BLD AUTO: 0.04 X10*3/UL (ref 0–0.5)
IMM GRANULOCYTES NFR BLD AUTO: 0.6 % (ref 0–0.9)
KETONES UR STRIP.AUTO-MCNC: NEGATIVE MG/DL
LEUKOCYTE ESTERASE UR QL STRIP.AUTO: NEGATIVE
LYMPHOCYTES # BLD AUTO: 1.23 X10*3/UL (ref 0.8–3)
LYMPHOCYTES NFR BLD AUTO: 19.6 %
MAGNESIUM SERPL-MCNC: 1.61 MG/DL (ref 1.6–2.4)
MCH RBC QN AUTO: 31.1 PG (ref 26–34)
MCHC RBC AUTO-ENTMCNC: 31.6 G/DL (ref 32–36)
MCV RBC AUTO: 99 FL (ref 80–100)
MONOCYTES # BLD AUTO: 0.62 X10*3/UL (ref 0.05–0.8)
MONOCYTES NFR BLD AUTO: 9.9 %
MUCOUS THREADS #/AREA URNS AUTO: ABNORMAL /LPF
NEUTROPHILS # BLD AUTO: 4.16 X10*3/UL (ref 1.6–5.5)
NEUTROPHILS NFR BLD AUTO: 66.4 %
NITRITE UR QL STRIP.AUTO: NEGATIVE
NRBC BLD-RTO: 0 /100 WBCS (ref 0–0)
PH UR STRIP.AUTO: 6 [PH]
PLATELET # BLD AUTO: 279 X10*3/UL (ref 150–450)
POTASSIUM SERPL-SCNC: 4.2 MMOL/L (ref 3.5–5.3)
PROT SERPL-MCNC: 6.7 G/DL (ref 6.4–8.2)
PROT UR STRIP.AUTO-MCNC: ABNORMAL MG/DL
RBC # BLD AUTO: 3.95 X10*6/UL (ref 4–5.2)
RBC # UR STRIP.AUTO: NEGATIVE MG/DL
RBC #/AREA URNS AUTO: ABNORMAL /HPF
SARS-COV-2 RNA RESP QL NAA+PROBE: NOT DETECTED
SODIUM SERPL-SCNC: 140 MMOL/L (ref 136–145)
SP GR UR STRIP.AUTO: 1.02
SQUAMOUS #/AREA URNS AUTO: ABNORMAL /HPF
TSH SERPL-ACNC: 5.57 MIU/L (ref 0.44–3.98)
UROBILINOGEN UR STRIP.AUTO-MCNC: NORMAL MG/DL
WBC # BLD AUTO: 6.3 X10*3/UL (ref 4.4–11.3)
WBC #/AREA URNS AUTO: ABNORMAL /HPF

## 2025-05-04 PROCEDURE — 84484 ASSAY OF TROPONIN QUANT: CPT | Performed by: EMERGENCY MEDICINE

## 2025-05-04 PROCEDURE — 2500000002 HC RX 250 W HCPCS SELF ADMINISTERED DRUGS (ALT 637 FOR MEDICARE OP, ALT 636 FOR OP/ED): Performed by: EMERGENCY MEDICINE

## 2025-05-04 PROCEDURE — 94640 AIRWAY INHALATION TREATMENT: CPT

## 2025-05-04 PROCEDURE — 83735 ASSAY OF MAGNESIUM: CPT | Performed by: EMERGENCY MEDICINE

## 2025-05-04 PROCEDURE — 2500000004 HC RX 250 GENERAL PHARMACY W/ HCPCS (ALT 636 FOR OP/ED): Mod: JZ | Performed by: EMERGENCY MEDICINE

## 2025-05-04 PROCEDURE — 72131 CT LUMBAR SPINE W/O DYE: CPT | Performed by: STUDENT IN AN ORGANIZED HEALTH CARE EDUCATION/TRAINING PROGRAM

## 2025-05-04 PROCEDURE — 83036 HEMOGLOBIN GLYCOSYLATED A1C: CPT | Mod: SAMLAB | Performed by: HOSPITALIST

## 2025-05-04 PROCEDURE — 84443 ASSAY THYROID STIM HORMONE: CPT | Performed by: HOSPITALIST

## 2025-05-04 PROCEDURE — 96372 THER/PROPH/DIAG INJ SC/IM: CPT

## 2025-05-04 PROCEDURE — 2500000004 HC RX 250 GENERAL PHARMACY W/ HCPCS (ALT 636 FOR OP/ED): Mod: JZ | Performed by: HOSPITALIST

## 2025-05-04 PROCEDURE — 85025 COMPLETE CBC W/AUTO DIFF WBC: CPT | Performed by: EMERGENCY MEDICINE

## 2025-05-04 PROCEDURE — 2500000002 HC RX 250 W HCPCS SELF ADMINISTERED DRUGS (ALT 637 FOR MEDICARE OP, ALT 636 FOR OP/ED): Performed by: HOSPITALIST

## 2025-05-04 PROCEDURE — 2500000001 HC RX 250 WO HCPCS SELF ADMINISTERED DRUGS (ALT 637 FOR MEDICARE OP): Performed by: EMERGENCY MEDICINE

## 2025-05-04 PROCEDURE — 94761 N-INVAS EAR/PLS OXIMETRY MLT: CPT

## 2025-05-04 PROCEDURE — 2500000004 HC RX 250 GENERAL PHARMACY W/ HCPCS (ALT 636 FOR OP/ED): Mod: JZ

## 2025-05-04 PROCEDURE — 1200000002 HC GENERAL ROOM WITH TELEMETRY DAILY

## 2025-05-04 PROCEDURE — 2500000005 HC RX 250 GENERAL PHARMACY W/O HCPCS: Performed by: HOSPITALIST

## 2025-05-04 PROCEDURE — 9420000001 HC RT PATIENT EDUCATION 5 MIN

## 2025-05-04 PROCEDURE — 82947 ASSAY GLUCOSE BLOOD QUANT: CPT

## 2025-05-04 PROCEDURE — 99285 EMERGENCY DEPT VISIT HI MDM: CPT | Mod: 25 | Performed by: EMERGENCY MEDICINE

## 2025-05-04 PROCEDURE — 80053 COMPREHEN METABOLIC PANEL: CPT | Performed by: EMERGENCY MEDICINE

## 2025-05-04 PROCEDURE — 71045 X-RAY EXAM CHEST 1 VIEW: CPT | Mod: FOREIGN READ | Performed by: RADIOLOGY

## 2025-05-04 PROCEDURE — 2500000001 HC RX 250 WO HCPCS SELF ADMINISTERED DRUGS (ALT 637 FOR MEDICARE OP): Performed by: HOSPITALIST

## 2025-05-04 PROCEDURE — 93005 ELECTROCARDIOGRAM TRACING: CPT

## 2025-05-04 PROCEDURE — 96374 THER/PROPH/DIAG INJ IV PUSH: CPT | Mod: 59

## 2025-05-04 PROCEDURE — 96372 THER/PROPH/DIAG INJ SC/IM: CPT | Performed by: EMERGENCY MEDICINE

## 2025-05-04 PROCEDURE — 94668 MNPJ CHEST WALL SBSQ: CPT

## 2025-05-04 PROCEDURE — 2500000005 HC RX 250 GENERAL PHARMACY W/O HCPCS: Performed by: EMERGENCY MEDICINE

## 2025-05-04 PROCEDURE — 94664 DEMO&/EVAL PT USE INHALER: CPT

## 2025-05-04 PROCEDURE — 87636 SARSCOV2 & INF A&B AMP PRB: CPT | Performed by: EMERGENCY MEDICINE

## 2025-05-04 PROCEDURE — 36415 COLL VENOUS BLD VENIPUNCTURE: CPT | Performed by: EMERGENCY MEDICINE

## 2025-05-04 PROCEDURE — 94667 MNPJ CHEST WALL 1ST: CPT

## 2025-05-04 PROCEDURE — 99223 1ST HOSP IP/OBS HIGH 75: CPT | Performed by: HOSPITALIST

## 2025-05-04 PROCEDURE — 71045 X-RAY EXAM CHEST 1 VIEW: CPT

## 2025-05-04 PROCEDURE — 72131 CT LUMBAR SPINE W/O DYE: CPT

## 2025-05-04 PROCEDURE — 83880 ASSAY OF NATRIURETIC PEPTIDE: CPT | Performed by: EMERGENCY MEDICINE

## 2025-05-04 PROCEDURE — 81001 URINALYSIS AUTO W/SCOPE: CPT | Performed by: EMERGENCY MEDICINE

## 2025-05-04 RX ORDER — ADHESIVE BANDAGE
30 BANDAGE TOPICAL DAILY PRN
Status: ACTIVE | OUTPATIENT
Start: 2025-05-04

## 2025-05-04 RX ORDER — HEPARIN SODIUM 5000 [USP'U]/ML
5000 INJECTION, SOLUTION INTRAVENOUS; SUBCUTANEOUS EVERY 8 HOURS
Status: DISPENSED | OUTPATIENT
Start: 2025-05-04

## 2025-05-04 RX ORDER — ALUMINUM HYDROXIDE, MAGNESIUM HYDROXIDE, AND SIMETHICONE 2400; 240; 2400 MG/30ML; MG/30ML; MG/30ML
15 SUSPENSION ORAL EVERY 8 HOURS PRN
Status: DISCONTINUED | OUTPATIENT
Start: 2025-05-04 | End: 2025-05-04

## 2025-05-04 RX ORDER — METOPROLOL TARTRATE 50 MG/1
50 TABLET ORAL NIGHTLY
Status: DISPENSED | OUTPATIENT
Start: 2025-05-04

## 2025-05-04 RX ORDER — FERROUS SULFATE 325(65) MG
1 TABLET ORAL DAILY
Status: DISPENSED | OUTPATIENT
Start: 2025-05-04

## 2025-05-04 RX ORDER — PREDNISONE 20 MG/1
40 TABLET ORAL DAILY
Qty: 10 TABLET | Refills: 0 | Status: SHIPPED | OUTPATIENT
Start: 2025-05-04 | End: 2025-05-09

## 2025-05-04 RX ORDER — OXYCODONE HYDROCHLORIDE 5 MG/1
10 TABLET ORAL EVERY 6 HOURS PRN
Status: ACTIVE | OUTPATIENT
Start: 2025-05-04

## 2025-05-04 RX ORDER — ACETAMINOPHEN 325 MG/1
975 TABLET ORAL ONCE
Status: COMPLETED | OUTPATIENT
Start: 2025-05-04 | End: 2025-05-04

## 2025-05-04 RX ORDER — TALC
3 POWDER (GRAM) TOPICAL NIGHTLY PRN
Status: DISPENSED | OUTPATIENT
Start: 2025-05-04

## 2025-05-04 RX ORDER — ACETAMINOPHEN 325 MG/1
500 TABLET ORAL EVERY 6 HOURS PRN
Status: DISCONTINUED | OUTPATIENT
Start: 2025-05-04 | End: 2025-05-04

## 2025-05-04 RX ORDER — PREDNISONE 20 MG/1
40 TABLET ORAL DAILY
Status: ACTIVE | OUTPATIENT
Start: 2025-05-05

## 2025-05-04 RX ORDER — ALBUTEROL SULFATE 0.83 MG/ML
2.5 SOLUTION RESPIRATORY (INHALATION) EVERY 6 HOURS PRN
Status: ACTIVE | OUTPATIENT
Start: 2025-05-04

## 2025-05-04 RX ORDER — TORSEMIDE 20 MG/1
20 TABLET ORAL DAILY
Status: ACTIVE | OUTPATIENT
Start: 2025-05-05

## 2025-05-04 RX ORDER — BUPROPION HYDROCHLORIDE 300 MG/1
300 TABLET ORAL DAILY
Status: DISPENSED | OUTPATIENT
Start: 2025-05-04

## 2025-05-04 RX ORDER — TRAMADOL HYDROCHLORIDE 50 MG/1
50 TABLET ORAL EVERY 6 HOURS PRN
Status: DISCONTINUED | OUTPATIENT
Start: 2025-05-04 | End: 2025-05-04

## 2025-05-04 RX ORDER — MORPHINE SULFATE 2 MG/ML
2 INJECTION, SOLUTION INTRAMUSCULAR; INTRAVENOUS ONCE
Status: COMPLETED | OUTPATIENT
Start: 2025-05-04 | End: 2025-05-04

## 2025-05-04 RX ORDER — GUAIFENESIN/DEXTROMETHORPHAN 100-10MG/5
15 SYRUP ORAL EVERY 4 HOURS PRN
Status: ACTIVE | OUTPATIENT
Start: 2025-05-04

## 2025-05-04 RX ORDER — ALBUTEROL SULFATE 0.83 MG/ML
2.5 SOLUTION RESPIRATORY (INHALATION) ONCE
Status: COMPLETED | OUTPATIENT
Start: 2025-05-04 | End: 2025-05-04

## 2025-05-04 RX ORDER — FUROSEMIDE 10 MG/ML
40 INJECTION INTRAMUSCULAR; INTRAVENOUS ONCE
Status: COMPLETED | OUTPATIENT
Start: 2025-05-04 | End: 2025-05-04

## 2025-05-04 RX ORDER — ONDANSETRON HYDROCHLORIDE 2 MG/ML
4 INJECTION, SOLUTION INTRAVENOUS EVERY 6 HOURS PRN
Status: ACTIVE | OUTPATIENT
Start: 2025-05-04

## 2025-05-04 RX ORDER — INSULIN LISPRO 100 [IU]/ML
0-10 INJECTION, SOLUTION INTRAVENOUS; SUBCUTANEOUS
Status: DISPENSED | OUTPATIENT
Start: 2025-05-04

## 2025-05-04 RX ORDER — MORPHINE SULFATE 2 MG/ML
INJECTION, SOLUTION INTRAMUSCULAR; INTRAVENOUS
Status: COMPLETED
Start: 2025-05-04 | End: 2025-05-04

## 2025-05-04 RX ORDER — CALCIUM CARBONATE 200(500)MG
1 TABLET,CHEWABLE ORAL 4 TIMES DAILY PRN
Status: DISCONTINUED | OUTPATIENT
Start: 2025-05-04 | End: 2025-05-04

## 2025-05-04 RX ORDER — DOCUSATE SODIUM 100 MG/1
100 CAPSULE, LIQUID FILLED ORAL 2 TIMES DAILY PRN
Status: DISCONTINUED | OUTPATIENT
Start: 2025-05-04 | End: 2025-05-04

## 2025-05-04 RX ORDER — HYDROCORTISONE 25 MG/G
CREAM TOPICAL 3 TIMES DAILY PRN
Status: ON HOLD | COMMUNITY

## 2025-05-04 RX ORDER — IPRATROPIUM BROMIDE AND ALBUTEROL SULFATE 2.5; .5 MG/3ML; MG/3ML
3 SOLUTION RESPIRATORY (INHALATION)
Status: DISPENSED | OUTPATIENT
Start: 2025-05-04

## 2025-05-04 RX ORDER — PANTOPRAZOLE SODIUM 40 MG/1
1 TABLET, DELAYED RELEASE ORAL
COMMUNITY
End: 2025-05-04 | Stop reason: ENTERED-IN-ERROR

## 2025-05-04 RX ORDER — ACETAMINOPHEN 325 MG/1
650 TABLET ORAL EVERY 4 HOURS PRN
Status: ACTIVE | OUTPATIENT
Start: 2025-05-04

## 2025-05-04 RX ORDER — TORSEMIDE 20 MG/1
20 TABLET ORAL DAILY
Status: DISCONTINUED | OUTPATIENT
Start: 2025-05-04 | End: 2025-05-04

## 2025-05-04 RX ORDER — PANTOPRAZOLE SODIUM 40 MG/1
40 TABLET, DELAYED RELEASE ORAL
Status: ACTIVE | OUTPATIENT
Start: 2025-05-05

## 2025-05-04 RX ORDER — LANOLIN ALCOHOL/MO/W.PET/CERES
100 CREAM (GRAM) TOPICAL DAILY
Status: DISPENSED | OUTPATIENT
Start: 2025-05-04

## 2025-05-04 RX ORDER — SENNOSIDES 8.6 MG/1
2 TABLET ORAL 2 TIMES DAILY
Status: DISPENSED | OUTPATIENT
Start: 2025-05-04

## 2025-05-04 RX ORDER — LOVASTATIN 20 MG/1
20 TABLET ORAL NIGHTLY
Status: ON HOLD | COMMUNITY

## 2025-05-04 RX ORDER — METOPROLOL TARTRATE 25 MG/1
25 TABLET, FILM COATED ORAL DAILY
Status: DISPENSED | OUTPATIENT
Start: 2025-05-04

## 2025-05-04 RX ORDER — HYDRALAZINE HYDROCHLORIDE 20 MG/ML
10 INJECTION INTRAMUSCULAR; INTRAVENOUS EVERY 6 HOURS PRN
Status: DISPENSED | OUTPATIENT
Start: 2025-05-04

## 2025-05-04 RX ORDER — PRAVASTATIN SODIUM 20 MG/1
20 TABLET ORAL NIGHTLY
Status: DISPENSED | OUTPATIENT
Start: 2025-05-04

## 2025-05-04 RX ORDER — BUPROPION HYDROCHLORIDE 150 MG/1
150 TABLET, EXTENDED RELEASE ORAL 2 TIMES DAILY
Status: DISCONTINUED | OUTPATIENT
Start: 2025-05-04 | End: 2025-05-04 | Stop reason: CLARIF

## 2025-05-04 RX ORDER — MORPHINE SULFATE 2 MG/ML
2 INJECTION, SOLUTION INTRAMUSCULAR; INTRAVENOUS ONCE
Status: DISCONTINUED | OUTPATIENT
Start: 2025-05-04 | End: 2025-05-04

## 2025-05-04 RX ORDER — OXYCODONE HYDROCHLORIDE 5 MG/1
5 TABLET ORAL EVERY 6 HOURS PRN
Status: ACTIVE | OUTPATIENT
Start: 2025-05-04

## 2025-05-04 RX ORDER — MORPHINE SULFATE 2 MG/ML
2 INJECTION, SOLUTION INTRAMUSCULAR; INTRAVENOUS EVERY 4 HOURS PRN
Status: ACTIVE | OUTPATIENT
Start: 2025-05-04

## 2025-05-04 RX ORDER — ALLOPURINOL 100 MG/1
100 TABLET ORAL DAILY
Status: DISPENSED | OUTPATIENT
Start: 2025-05-04

## 2025-05-04 RX ADMIN — BUPROPION HYDROCHLORIDE 300 MG: 300 TABLET, EXTENDED RELEASE ORAL at 14:46

## 2025-05-04 RX ADMIN — HEPARIN SODIUM 5000 UNITS: 5000 INJECTION INTRAVENOUS; SUBCUTANEOUS at 14:43

## 2025-05-04 RX ADMIN — ALLOPURINOL 100 MG: 100 TABLET ORAL at 14:46

## 2025-05-04 RX ADMIN — MORPHINE SULFATE 2 MG: 2 INJECTION, SOLUTION INTRAMUSCULAR; INTRAVENOUS at 06:58

## 2025-05-04 RX ADMIN — SENNOSIDES 17.2 MG: 8.6 TABLET, FILM COATED ORAL at 20:39

## 2025-05-04 RX ADMIN — FUROSEMIDE 40 MG: 10 INJECTION, SOLUTION INTRAVENOUS at 14:43

## 2025-05-04 RX ADMIN — Medication 2 L/MIN: at 20:04

## 2025-05-04 RX ADMIN — ALBUTEROL SULFATE 2.5 MG: 2.5 SOLUTION RESPIRATORY (INHALATION) at 09:07

## 2025-05-04 RX ADMIN — INSULIN LISPRO 4 UNITS: 100 INJECTION, SOLUTION INTRAVENOUS; SUBCUTANEOUS at 16:57

## 2025-05-04 RX ADMIN — MORPHINE SULFATE 2 MG: 2 INJECTION, SOLUTION INTRAMUSCULAR; INTRAVENOUS at 05:23

## 2025-05-04 RX ADMIN — PRAVASTATIN SODIUM 20 MG: 20 TABLET ORAL at 20:39

## 2025-05-04 RX ADMIN — Medication 2 L/MIN: at 11:41

## 2025-05-04 RX ADMIN — ACETAMINOPHEN 975 MG: 325 TABLET ORAL at 10:15

## 2025-05-04 RX ADMIN — METOPROLOL TARTRATE 25 MG: 25 TABLET, FILM COATED ORAL at 14:48

## 2025-05-04 RX ADMIN — Medication 2 L/MIN: at 09:42

## 2025-05-04 RX ADMIN — IPRATROPIUM BROMIDE AND ALBUTEROL SULFATE 3 ML: 2.5; .5 SOLUTION RESPIRATORY (INHALATION) at 14:33

## 2025-05-04 RX ADMIN — FERROUS SULFATE TAB 325 MG (65 MG ELEMENTAL FE) 325 MG: 325 (65 FE) TAB at 14:46

## 2025-05-04 RX ADMIN — Medication 2 L/MIN: at 14:33

## 2025-05-04 RX ADMIN — METHYLPREDNISOLONE SODIUM SUCCINATE 125 MG: 125 INJECTION, POWDER, FOR SOLUTION INTRAMUSCULAR; INTRAVENOUS at 11:00

## 2025-05-04 RX ADMIN — Medication 3 MG: at 20:39

## 2025-05-04 RX ADMIN — METOPROLOL TARTRATE 50 MG: 50 TABLET, FILM COATED ORAL at 20:39

## 2025-05-04 RX ADMIN — HEPARIN SODIUM 5000 UNITS: 5000 INJECTION INTRAVENOUS; SUBCUTANEOUS at 22:57

## 2025-05-04 RX ADMIN — IPRATROPIUM BROMIDE AND ALBUTEROL SULFATE 3 ML: 2.5; .5 SOLUTION RESPIRATORY (INHALATION) at 20:04

## 2025-05-04 SDOH — ECONOMIC STABILITY: INCOME INSECURITY: IN THE PAST 12 MONTHS HAS THE ELECTRIC, GAS, OIL, OR WATER COMPANY THREATENED TO SHUT OFF SERVICES IN YOUR HOME?: NO

## 2025-05-04 SDOH — SOCIAL STABILITY: SOCIAL INSECURITY: WITHIN THE LAST YEAR, HAVE YOU BEEN HUMILIATED OR EMOTIONALLY ABUSED IN OTHER WAYS BY YOUR PARTNER OR EX-PARTNER?: NO

## 2025-05-04 SDOH — SOCIAL STABILITY: SOCIAL INSECURITY: WITHIN THE LAST YEAR, HAVE YOU BEEN AFRAID OF YOUR PARTNER OR EX-PARTNER?: NO

## 2025-05-04 SDOH — ECONOMIC STABILITY: FOOD INSECURITY: WITHIN THE PAST 12 MONTHS, YOU WORRIED THAT YOUR FOOD WOULD RUN OUT BEFORE YOU GOT THE MONEY TO BUY MORE.: NEVER TRUE

## 2025-05-04 SDOH — SOCIAL STABILITY: SOCIAL INSECURITY: DOES ANYONE TRY TO KEEP YOU FROM HAVING/CONTACTING OTHER FRIENDS OR DOING THINGS OUTSIDE YOUR HOME?: NO

## 2025-05-04 SDOH — SOCIAL STABILITY: SOCIAL INSECURITY: WERE YOU ABLE TO COMPLETE ALL THE BEHAVIORAL HEALTH SCREENINGS?: YES

## 2025-05-04 SDOH — SOCIAL STABILITY: SOCIAL INSECURITY: ARE THERE ANY APPARENT SIGNS OF INJURIES/BEHAVIORS THAT COULD BE RELATED TO ABUSE/NEGLECT?: NO

## 2025-05-04 SDOH — ECONOMIC STABILITY: FOOD INSECURITY: WITHIN THE PAST 12 MONTHS, THE FOOD YOU BOUGHT JUST DIDN'T LAST AND YOU DIDN'T HAVE MONEY TO GET MORE.: NEVER TRUE

## 2025-05-04 SDOH — SOCIAL STABILITY: SOCIAL INSECURITY: DO YOU FEEL UNSAFE GOING BACK TO THE PLACE WHERE YOU ARE LIVING?: NO

## 2025-05-04 SDOH — SOCIAL STABILITY: SOCIAL INSECURITY: ABUSE: ADULT

## 2025-05-04 SDOH — SOCIAL STABILITY: SOCIAL INSECURITY: HAVE YOU HAD ANY THOUGHTS OF HARMING ANYONE ELSE?: NO

## 2025-05-04 SDOH — SOCIAL STABILITY: SOCIAL INSECURITY: DO YOU FEEL ANYONE HAS EXPLOITED OR TAKEN ADVANTAGE OF YOU FINANCIALLY OR OF YOUR PERSONAL PROPERTY?: NO

## 2025-05-04 SDOH — SOCIAL STABILITY: SOCIAL INSECURITY: ARE YOU OR HAVE YOU BEEN THREATENED OR ABUSED PHYSICALLY, EMOTIONALLY, OR SEXUALLY BY ANYONE?: NO

## 2025-05-04 SDOH — SOCIAL STABILITY: SOCIAL INSECURITY: HAS ANYONE EVER THREATENED TO HURT YOUR FAMILY OR YOUR PETS?: NO

## 2025-05-04 SDOH — SOCIAL STABILITY: SOCIAL INSECURITY: HAVE YOU HAD THOUGHTS OF HARMING ANYONE ELSE?: NO

## 2025-05-04 ASSESSMENT — COGNITIVE AND FUNCTIONAL STATUS - GENERAL
MOVING TO AND FROM BED TO CHAIR: A LITTLE
DRESSING REGULAR UPPER BODY CLOTHING: A LITTLE
DRESSING REGULAR LOWER BODY CLOTHING: A LITTLE
CLIMB 3 TO 5 STEPS WITH RAILING: A LOT
DAILY ACTIVITIY SCORE: 20
MOVING FROM LYING ON BACK TO SITTING ON SIDE OF FLAT BED WITH BEDRAILS: A LITTLE
WALKING IN HOSPITAL ROOM: A LITTLE
TOILETING: A LITTLE
HELP NEEDED FOR BATHING: A LITTLE
TURNING FROM BACK TO SIDE WHILE IN FLAT BAD: A LITTLE
MOBILITY SCORE: 17
STANDING UP FROM CHAIR USING ARMS: A LITTLE
PATIENT BASELINE BEDBOUND: NO

## 2025-05-04 ASSESSMENT — ACTIVITIES OF DAILY LIVING (ADL)
PATIENT'S MEMORY ADEQUATE TO SAFELY COMPLETE DAILY ACTIVITIES?: YES
JUDGMENT_ADEQUATE_SAFELY_COMPLETE_DAILY_ACTIVITIES: YES
FEEDING YOURSELF: INDEPENDENT
DRESSING YOURSELF: INDEPENDENT
BATHING: NEEDS ASSISTANCE
TOILETING: INDEPENDENT
ADEQUATE_TO_COMPLETE_ADL: YES
LACK_OF_TRANSPORTATION: NO
GROOMING: INDEPENDENT
HEARING - LEFT EAR: DIFFICULTY WITH NOISE
HEARING - RIGHT EAR: DIFFICULTY WITH NOISE
WALKS IN HOME: INDEPENDENT
ASSISTIVE_DEVICE: WALKER

## 2025-05-04 ASSESSMENT — LIFESTYLE VARIABLES
HOW OFTEN DO YOU HAVE A DRINK CONTAINING ALCOHOL: NEVER
AUDIT-C TOTAL SCORE: 0
HOW MANY STANDARD DRINKS CONTAINING ALCOHOL DO YOU HAVE ON A TYPICAL DAY: PATIENT DOES NOT DRINK
SKIP TO QUESTIONS 9-10: 1
HOW OFTEN DO YOU HAVE 6 OR MORE DRINKS ON ONE OCCASION: NEVER
AUDIT-C TOTAL SCORE: 0

## 2025-05-04 ASSESSMENT — PAIN - FUNCTIONAL ASSESSMENT
PAIN_FUNCTIONAL_ASSESSMENT: 0-10

## 2025-05-04 ASSESSMENT — PAIN SCALES - GENERAL
PAINLEVEL_OUTOF10: 10 - WORST POSSIBLE PAIN
PAINLEVEL_OUTOF10: 0 - NO PAIN
PAINLEVEL_OUTOF10: 10 - WORST POSSIBLE PAIN
PAINLEVEL_OUTOF10: 0 - NO PAIN
PAINLEVEL_OUTOF10: 10 - WORST POSSIBLE PAIN

## 2025-05-04 ASSESSMENT — PAIN DESCRIPTION - LOCATION
LOCATION: BACK
LOCATION: BACK

## 2025-05-04 ASSESSMENT — PAIN DESCRIPTION - PAIN TYPE: TYPE: ACUTE PAIN

## 2025-05-04 ASSESSMENT — PAIN DESCRIPTION - ORIENTATION
ORIENTATION: LOWER
ORIENTATION: LOWER

## 2025-05-04 ASSESSMENT — PATIENT HEALTH QUESTIONNAIRE - PHQ9
SUM OF ALL RESPONSES TO PHQ9 QUESTIONS 1 & 2: 2
1. LITTLE INTEREST OR PLEASURE IN DOING THINGS: SEVERAL DAYS
2. FEELING DOWN, DEPRESSED OR HOPELESS: SEVERAL DAYS

## 2025-05-04 NOTE — DISCHARGE INSTRUCTIONS
Currently you are taking tramadol as well as muscle relaxers as well as Tylenol as well as topical Voltaren gel.  Prednisone will be sent for short course to your pharmacy with the hopes this will decrease localized inflammation. Please monitor glucose closely while on this medication. May want to discuss pain management with your primary care physician.

## 2025-05-04 NOTE — H&P
HISTORY AND PHYSICAL EXAMINATION    Name of the patient: Lakeshia Park  YOB: 1930  Age: 89 y.o.  Medical record number: [id number]    Date of service: 05/04/25  Time: 11:03 AM    CHIEF COMPLAINT: Intractable low back pain, hypoxia.    HISTORY OF PRESENT ILLNESS:  This is a 89 years old female patient with past medical history as mentioned below who lives at the assisted living presented to the emergency room because of intractable low back pain.  Her symptoms started last night with sharp low back pain, sudden onset, severe pain, more than 10 out of 10 in severity, goes down to both legs, aggravated by any type of movement and no relieving factors.  Patient denied fall or trauma.  She denied tingling, numbness or paresthesia on both legs.  She denied stool or urine incontinence.  She had CT scan of the lumbar spine without contrast done earlier this morning in the ED that showed no acute fractures or dislocations, showed thoracolumbar spine scoliotic deformity with advanced multilevel spondylosis and degenerative changes.  Patient received multiple doses of IV morphine with no significant improvement.  While she is in the emergency department, her pulse ox dropped and reportedly by our physician, her pulse ox was down to 70s on room air.  Currently, she is on 2 L by nasal cannula.  Patient reported mild shortness of breath and wheezing.  She reported dry cough as well.  She mentioned that her legs has been swollen lately.  She denied fever or chills.  She does have COPD but never been on home oxygen.  According to her chart, she does have a history of congestive heart failure.  In the emergency department, she was afebrile, heart rate stable, blood pressure was elevated but improved.  Routine blood work was remarkable for BUN of 33, creatinine is 2.14.  EKG revealed normal sinus rhythm with first-degree AV block which is chronic, no acute changes.  Troponin was negative.  BNP was 548.  Chest x-ray  showed minimal basilar pulmonary vascular congestion, no significant change compared to previous chest x-rays.  She is being admitted for intractable low back pain, hypoxia likely due to combination of mild COPD exacerbation and mild acute on chronic systolic CHF exacerbation in addition to respiratory depression that can be caused by morphine.    Medical History[1]  Surgical History[2]  Family History[3]  Social History     Socioeconomic History    Marital status:      Spouse name: Not on file    Number of children: Not on file    Years of education: Not on file    Highest education level: Not on file   Occupational History    Not on file   Tobacco Use    Smoking status: Never    Smokeless tobacco: Never   Vaping Use    Vaping status: Never Used   Substance and Sexual Activity    Alcohol use: Not Currently    Drug use: Never    Sexual activity: Defer   Other Topics Concern    Not on file   Social History Narrative    Not on file     Social Drivers of Health     Financial Resource Strain: Low Risk  (10/28/2024)    Overall Financial Resource Strain (CARDIA)     Difficulty of Paying Living Expenses: Not hard at all   Food Insecurity: No Food Insecurity (10/26/2024)    Hunger Vital Sign     Worried About Running Out of Food in the Last Year: Never true     Ran Out of Food in the Last Year: Never true   Transportation Needs: No Transportation Needs (10/28/2024)    PRAPARE - Transportation     Lack of Transportation (Medical): No     Lack of Transportation (Non-Medical): No   Recent Concern: Transportation Needs - Unmet Transportation Needs (10/26/2024)    PRAPARE - Transportation     Lack of Transportation (Medical): Yes     Lack of Transportation (Non-Medical): Yes   Physical Activity: Not on file   Stress: Not on file   Social Connections: Not on file   Intimate Partner Violence: Not At Risk (10/26/2024)    Humiliation, Afraid, Rape, and Kick questionnaire     Fear of Current or Ex-Partner: No     Emotionally  Abused: No     Physically Abused: No     Sexually Abused: No   Housing Stability: Low Risk  (10/28/2024)    Housing Stability Vital Sign     Unable to Pay for Housing in the Last Year: No     Number of Times Moved in the Last Year: 0     Homeless in the Last Year: No     Current Home medications:      Review of systems:  Constitutional:  Negative for chills, diaphoresis, fatigue and fever.   HENT:  Negative for congestion, ear discharge, ear pain, hearing loss, rhinorrhea and sneezing.    Eyes:  Negative for pain, discharge and itching.   Respiratory: Reported mild shortness of breath and wheezing, negative for cough, chest tightness,     Cardiovascular:  Negative for chest pain, palpitations and leg swelling.   Gastrointestinal:  Negative for abdominal distention, abdominal pain, blood in stool, constipation, diarrhea and nausea.   Endocrine: Negative for cold intolerance, heat intolerance and polydipsia.   Genitourinary:  Negative for difficulty urinating, dysuria, flank pain, frequency and hematuria.   Musculoskeletal: Reported low back pain, negative for arthralgias and gait problem.   Neurological:  Negative for dizziness, seizures, syncope, facial asymmetry, speech difficulty, weakness and headaches.   Psychiatric/Behavioral:  Negative for behavioral problems, confusion and hallucinations.     Vital signs:  Visit Vitals  /87   Pulse 72   Temp 36.1 °C (97 °F) (Temporal)   Resp 18     Physical examination:  General: Awake, alert, oriented x3, no significant distress, cooperative.  HEENT: EOM intact, PERRLA.  Neck: Supple, no JVD, no masses, no lymphadenopathy.  Chest: Decreased breath sounds bilateral at the bases, otherwise clear, no wheezes, no crackles, no rhonchi.  Heart: Regular rate and rhythm, S1-S2 normal, no murmur, no gallops.  Abdomen: Soft, nontender, no organomegaly, no ascites, no guarding or rigidity, morbidly obese.  Neurological: Alert and oriented x3, cranial nerves are grossly intact,  normal power and tone of 4 limbs.  Skin: No lesions, no skin rash.  Warm and dry.  Musculoskeletal: Normal, atraumatic, no obvious deformities.  Legs: Trace leg edema, no clubbing or cyanosis.  Psych: Appropriate mood and behavior.    LABS:  Lab Results   Component Value Date    WBC 6.3 05/04/2025    HGB 12.3 05/04/2025    HCT 38.9 05/04/2025    MCV 99 05/04/2025     05/04/2025     Lab Results   Component Value Date    GLUCOSE 130 (H) 05/04/2025    CALCIUM 8.5 (L) 05/04/2025     05/04/2025    K 4.2 05/04/2025    CO2 27 05/04/2025     05/04/2025    BUN 33 (H) 05/04/2025    CREATININE 2.14 (H) 05/04/2025     Lab Results   Component Value Date    ALT 12 05/04/2025    AST 13 05/04/2025    ALKPHOS 52 05/04/2025    BILITOT 0.5 05/04/2025     Imaging:  Procedure Component Value Units Date/Time   XR chest 1 view [[id number]] Collected: 05/04/25 0959   Order Status: Completed Updated: 05/04/25 1014   Narrative:     STUDY:  Chest Radiograph;  5/4/2025 9:58 AM  INDICATION:  Dyspnea.  COMPARISON:  5/15/2023 XR Chest.  ACCESSION NUMBER(S):  [id number]  ORDERING CLINICIAN:  FORTUNATO SHIRLEY  TECHNIQUE:  Frontal chest was obtained at 0950 hours.  FINDINGS:  Moderately sized hiatal hernia.  Cardiac silhouette remains enlarged.  Mild pulmonary vascular congestion.  No significant pleural effusion.  No visible pneumothorax.   Impression:     *Moderately sized hiatal hernia.  *Cardiac silhouette remains enlarged. Mild pulmonary vascular  congestion.  Signed by Garret Diggs MD   CT lumbar spine wo IV contrast [[id number]] Collected: 05/04/25 0700   Order Status: Completed Updated: 05/04/25 0700   Narrative:     Interpreted By:  Markos Calvo,  STUDY:  CT LUMBAR SPINE WO IV CONTRAST  5/4/2025 6:02 am      INDICATION:  Signs/Symptoms:pain          COMPARISON:  None.      ACCESSION NUMBER(S):  [id number]      ORDERING CLINICIAN:  ROLF MARSH      TECHNIQUE:  Axial CT images of the lumbar spine are obtained. Axial,  coronal and  sagittal reconstructions are provided for review.      FINDINGS:  Alignment: Thoracolumbar spine scoliotic deformity.      Vertebrae/Disc Spaces: Osteopenia. Multilevel thoracic spine  degenerative changes with disc space narrowing.      Lower Thoracic Spine:  There is no significant central canal stenosis  in the included lower thoracic region.      T12-L1: Mild disc protrusion with osteophyte complex compressing the  thecal sac and mildly narrowing both exit foramina.      L1-2:  Mild left disc protrusion with a apparent osteophyte complex  and mild facet arthropathy causing mild exit neural foramina  narrowing.      L2-3: Posterior disc osteophyte complex mildly narrowing the thecal  sac and both exit foramina.      L3-4:  Posterior disc protrusion with osteophyte complex along with  facet arthropathy causing moderate thecal sac and  right-greater-than-left exit foramina narrowing.      L4-5: Diffuse disc bulge with osteophyte complex causing moderate  thecal sac narrowing as bilateral facet arthropathy causing neural  foramina narrowing.      L5-S1: Mild posterior disc osteophyte complex minimally narrowing  thecal sac.      Prevertebral/Paraspinal Soft Tissues: Aneurysmal dilatation of the  abdominal aorta measuring 3.5 cm with multifocal calcifications.       Impression:     1. No acute vertebral displaced fracture or major malalignment.  2. Thoracolumbar spine scoliotic deformity with advanced multilevel  spondylosis/degenerative changes as detailed above. Findings could be  further assessed by dedicated lumbosacral spine MRI.  3. Aneurysmal dilatation of the abdominal aorta measuring 3.5 cm.      MACRO:  None      Signed by: Markos Calvo 5/4/2025 6:59 AM  Dictation workstation:   [id number]     Assessment/Plan   Assessment & Plan    #1 intractable acute on chronic low back pain: CT lumbar spine reviewed as above, showed advanced multilevel spondylosis and degenerative changes as well as  scoliotic deformity.  Plan: Admit to general floor, ambulate as tolerated, oxycodone as needed and IV morphine as needed for pain, start prednisone, continue tramadol as needed for pain, bowel regimen, repeat CBC and BMP tomorrow morning, PT OT evaluation and treatment, patient will likely need placement to skilled nursing facility instead of assisted living.    #2 acute hypoxic respiratory insufficiency/hypoxia: Patient does have a history of COPD, CHF as well but never been on oxygen.  She uses albuterol nebulizer at the assisted living.  Chest x-ray reviewed, possible mild pulm vascular congestion.  Plan: Bronchodilators, diuresis with oral diuretics, start prednisone as above but this is mainly for back pain.    #3  mild acute on chronic systolic CHF: This is based on symptoms of shortness of breath, mild lip edema and chest x-ray findings.  Patient had 2D echocardiogram on March 2025, showed mildly decreased LV function, EF was 45 to 50%, global hypokinesis of the left ventricle.  Plan: Diuresis with IV Lasix 40 mg x 1 today, start p.o. torsemide tomorrow morning, continue beta-blockers, monitor volume status and kidney function.    #4 probable mild acute COPD exacerbation: Reportedly, patient had wheezing in the ED, received albuterol nebulizer.  Currently, lungs are clear.  She is on centiliters.  Plan for bronchodilators, DuoNeb every 6 hours, albuterol as needed, prednisone taper as above, wean off oxygen as tolerated.    #5 stage IV chronic kidney disease: Baseline creatinine is around 2 to 2.5 mg/dL.  Admission creatinine stable, plan to monitor while on diuresis.    #6 hypertension: Blood pressure was elevated in the ED, lately improved.  Plan to continue metoprolol, start IV hydralazine as needed.    #7 history of type 2 diabetes mellitus: Not currently on treatment.  Most recent hemoglobin A1c was 5.7% on January 2024.  Today's blood glucose is 130 mg/dL.  Plan to do Accu-Cheks, sliding scale, check  hemoglobin A1c.    #8 hypothyroidism: Continue levothyroxine, check TSH.    #9 DVT prophylaxis: Subcu heparin.    #10 CODE STATUS: DNR CCA, confirmed with the patient, she declined CPR, chest compressions, intubation mechanical ventilation.    MDM: High complexity, time spent is 80 minutes.    Mary Carmen Amos MD  05/04/25  1:04 PM         [1]   Past Medical History:  Diagnosis Date    Arthritis     CHF (congestive heart failure)     COPD (chronic obstructive pulmonary disease) (Multi)     Diabetes mellitus (Multi)     Hypertension    [2]   Past Surgical History:  Procedure Laterality Date    ESOPHAGOGASTRODUODENOSCOPY  04/29/2024    OTHER SURGICAL HISTORY  09/13/2019    Facial surgery    OTHER SURGICAL HISTORY  09/13/2019    Gallbladder surgery    OTHER SURGICAL HISTORY  09/13/2019    Appendectomy    OTHER SURGICAL HISTORY  09/13/2019    Hysterectomy    OTHER SURGICAL HISTORY  09/13/2019    Colon surgery    OTHER SURGICAL HISTORY  10/24/2019    Thyroid surgery   [3]   Family History  Problem Relation Name Age of Onset    Diabetes Mother      Brain cancer Mother      Brain cancer Brother

## 2025-05-04 NOTE — CARE PLAN
Problem: Pain - Adult  Goal: Verbalizes/displays adequate comfort level or baseline comfort level  Outcome: Progressing     Problem: Safety - Adult  Goal: Free from fall injury  Outcome: Progressing     Problem: Discharge Planning  Goal: Discharge to home or other facility with appropriate resources  Outcome: Progressing     Problem: Chronic Conditions and Co-morbidities  Goal: Patient's chronic conditions and co-morbidity symptoms are monitored and maintained or improved  Outcome: Progressing     Problem: Nutrition  Goal: Nutrient intake appropriate for maintaining nutritional needs  Outcome: Progressing     Problem: Fall/Injury  Goal: Not fall by end of shift  Outcome: Progressing  Goal: Be free from injury by end of the shift  Outcome: Progressing  Goal: Verbalize understanding of personal risk factors for fall in the hospital  Outcome: Progressing  Goal: Verbalize understanding of risk factor reduction measures to prevent injury from fall in the home  Outcome: Progressing  Goal: Use assistive devices by end of the shift  Outcome: Progressing  Goal: Pace activities to prevent fatigue by end of the shift  Outcome: Progressing     Problem: Diabetes  Goal: Achieve decreasing blood glucose levels by end of shift  Outcome: Progressing  Goal: Increase stability of blood glucose readings by end of shift  Outcome: Progressing  Goal: Decrease in ketones present in urine by end of shift  Outcome: Progressing  Goal: Maintain electrolyte levels within acceptable range throughout shift  Outcome: Progressing  Goal: Maintain glucose levels >70mg/dl to <250mg/dl throughout shift  Outcome: Progressing  Goal: No changes in neurological exam by end of shift  Outcome: Progressing  Goal: Learn about and adhere to nutrition recommendations by end of shift  Outcome: Progressing  Goal: Vital signs within normal range for age by end of shift  Outcome: Progressing  Goal: Increase self care and/or family involovement by end of  shift  Outcome: Progressing  Goal: Receive DSME education by end of shift  Outcome: Progressing     Problem: Pain  Goal: Takes deep breaths with improved pain control throughout the shift  Outcome: Progressing  Goal: Turns in bed with improved pain control throughout the shift  Outcome: Progressing  Goal: Walks with improved pain control throughout the shift  Outcome: Progressing  Goal: Performs ADL's with improved pain control throughout shift  Outcome: Progressing  Goal: Participates in PT with improved pain control throughout the shift  Outcome: Progressing  Goal: Free from opioid side effects throughout the shift  Outcome: Progressing  Goal: Free from acute confusion related to pain meds throughout the shift  Outcome: Progressing

## 2025-05-04 NOTE — PROGRESS NOTES
Emergency Medicine Transition of Care Note.    I received Lakeshia Park in signout from Dr. Monroy.  Please see the previous ED provider note for all HPI, PE and MDM up to the time of signout at 0700. This is in addition to the primary record.    In brief Lakeshia Park is an 89 y.o. female presenting for   Chief Complaint   Patient presents with    Back Pain     Reports hx Sciatica pain that's worsened over the past week not relieved by Tramadol     At the time of signout we were awaiting: Reevaluation    ED Course as of 05/04/25 1143   Sun May 04, 2025   0942 Patient to be discharged with plan for acute on chronic back pain.  Persistent hypoxemia particularly while sleeping.  Patient treated with albuterol inhaler.  No significant improvement.  Would desaturate as low as the mid 70s.  IV placed and workup initiated. [CL]      ED Course User Index  [CL] Paulino Wong DO         Diagnoses as of 05/04/25 1143   Acute exacerbation of chronic low back pain   Acute hypoxemic respiratory failure   COPD exacerbation (Multi)       Medical Decision Making  Patient treated with Solu-Medrol as well as aerosol breathing treatment.  Requiring 2 L by nasal cannula.  Chest x-ray shows possible pulmonary vascular congestion.  No history of CHF.  After evaluation by RT was found that she has COPD.  Elevated BNP.  Case discussed with hospitalist who is agreeable with admission.  Patient admitted in stable condition.    EKG performed at 0941 interpreted by Dr. Paulino Wong at 0943: Sinus rhythm at 72 bpm with first-degree AV block and a ND interval of 246 ms.  QTc 457 ms.  Nonspecific ST changes.    Final diagnoses:   [M54.50, G89.29] Acute exacerbation of chronic low back pain   [J96.01] Acute hypoxemic respiratory failure   [J44.1] COPD exacerbation (Multi)           Procedure  Procedures    Paulino Wong DO

## 2025-05-04 NOTE — ED PROVIDER NOTES
HPI   Chief Complaint   Patient presents with   • Back Pain     Reports hx Sciatica pain that's worsened over the past week not relieved by Tramadol       89-year-old female presents from [facility] with chief complaint of lumbosacral and sciatica type pain.  Patient states present medication she is taking, Ultram is not helping.  Denies any recent falls but has fallen several times over the last 6 months.  No problems voiding or defecating.  Patient was given a shot of morphine 2 mg IM which did help.  Patient went back had CAT scan and return requesting more medication because she was moved around in the scanner.  Patient will be given a second dose of morphine.      History provided by:  Patient and EMS personnel          Patient History   Medical History[1]  Surgical History[2]  Family History[3]  Social History[4]    Physical Exam   ED Triage Vitals [05/04/25 0500]   Temperature Heart Rate Respirations BP   36.1 °C (97 °F) 78 16 (!) 175/107      Pulse Ox Temp Source Heart Rate Source Patient Position   94 % Temporal Monitor --      BP Location FiO2 (%)     -- --       Physical Exam  Vitals and nursing note reviewed.   Constitutional:       Appearance: Normal appearance.   HENT:      Head: Normocephalic and atraumatic.      Right Ear: Tympanic membrane normal.      Left Ear: Tympanic membrane normal.      Nose: Nose normal.      Mouth/Throat:      Mouth: Mucous membranes are moist.   Eyes:      Extraocular Movements: Extraocular movements intact.      Pupils: Pupils are equal, round, and reactive to light.   Cardiovascular:      Rate and Rhythm: Normal rate.   Pulmonary:      Effort: Pulmonary effort is normal.      Breath sounds: Normal breath sounds.   Abdominal:      General: Abdomen is flat.      Palpations: Abdomen is soft.   Musculoskeletal:         General: Tenderness present. Normal range of motion.      Cervical back: Normal range of motion.      Comments: Tenderness in lumbosacral area to palpation.    Skin:     General: Skin is warm and dry.   Neurological:      General: No focal deficit present.      Mental Status: She is alert and oriented to person, place, and time.         ED Course & MDM                  No data recorded     Terrence Coma Scale Score: 15 (05/04/25 0500 : Viraj Luciano RN)                           Medical Decision Making      Procedure  Procedures         [1]  Past Medical History:  Diagnosis Date   • Arthritis    • CHF (congestive heart failure)    • COPD (chronic obstructive pulmonary disease) (Multi)    • Diabetes mellitus (Multi)    • Hypertension    [2]  Past Surgical History:  Procedure Laterality Date   • ESOPHAGOGASTRODUODENOSCOPY  04/29/2024   • OTHER SURGICAL HISTORY  09/13/2019    Facial surgery   • OTHER SURGICAL HISTORY  09/13/2019    Gallbladder surgery   • OTHER SURGICAL HISTORY  09/13/2019    Appendectomy   • OTHER SURGICAL HISTORY  09/13/2019    Hysterectomy   • OTHER SURGICAL HISTORY  09/13/2019    Colon surgery   • OTHER SURGICAL HISTORY  10/24/2019    Thyroid surgery   [3]  Family History  Problem Relation Name Age of Onset   • Diabetes Mother     • Brain cancer Mother     • Brain cancer Brother     [4]  Social History  Tobacco Use   • Smoking status: Never   • Smokeless tobacco: Never   Vaping Use   • Vaping status: Never Used   Substance Use Topics   • Alcohol use: Not Currently   • Drug use: Never

## 2025-05-05 VITALS
DIASTOLIC BLOOD PRESSURE: 87 MMHG | RESPIRATION RATE: 17 BRPM | OXYGEN SATURATION: 95 % | SYSTOLIC BLOOD PRESSURE: 141 MMHG | BODY MASS INDEX: 39.6 KG/M2 | HEIGHT: 60 IN | WEIGHT: 201.72 LBS | HEART RATE: 60 BPM | TEMPERATURE: 97.7 F

## 2025-05-05 LAB
ANION GAP SERPL CALC-SCNC: 15 MMOL/L (ref 10–20)
BUN SERPL-MCNC: 44 MG/DL (ref 6–23)
CALCIUM SERPL-MCNC: 8.4 MG/DL (ref 8.6–10.3)
CHLORIDE SERPL-SCNC: 101 MMOL/L (ref 98–107)
CO2 SERPL-SCNC: 26 MMOL/L (ref 21–32)
CREAT SERPL-MCNC: 2.12 MG/DL (ref 0.5–1.05)
EGFRCR SERPLBLD CKD-EPI 2021: 21 ML/MIN/1.73M*2
ERYTHROCYTE [DISTWIDTH] IN BLOOD BY AUTOMATED COUNT: 13.8 % (ref 11.5–14.5)
GLUCOSE BLD MANUAL STRIP-MCNC: 140 MG/DL (ref 74–99)
GLUCOSE BLD MANUAL STRIP-MCNC: 164 MG/DL (ref 74–99)
GLUCOSE BLD MANUAL STRIP-MCNC: 208 MG/DL (ref 74–99)
GLUCOSE BLD MANUAL STRIP-MCNC: 224 MG/DL (ref 74–99)
GLUCOSE SERPL-MCNC: 193 MG/DL (ref 74–99)
HCT VFR BLD AUTO: 36.7 % (ref 36–46)
HGB BLD-MCNC: 12 G/DL (ref 12–16)
MCH RBC QN AUTO: 32.3 PG (ref 26–34)
MCHC RBC AUTO-ENTMCNC: 32.7 G/DL (ref 32–36)
MCV RBC AUTO: 99 FL (ref 80–100)
NRBC BLD-RTO: 0 /100 WBCS (ref 0–0)
PLATELET # BLD AUTO: 290 X10*3/UL (ref 150–450)
POTASSIUM SERPL-SCNC: 4.8 MMOL/L (ref 3.5–5.3)
RBC # BLD AUTO: 3.72 X10*6/UL (ref 4–5.2)
SODIUM SERPL-SCNC: 137 MMOL/L (ref 136–145)
WBC # BLD AUTO: 4.3 X10*3/UL (ref 4.4–11.3)

## 2025-05-05 PROCEDURE — 99232 SBSQ HOSP IP/OBS MODERATE 35: CPT | Performed by: HOSPITALIST

## 2025-05-05 PROCEDURE — 82374 ASSAY BLOOD CARBON DIOXIDE: CPT | Performed by: HOSPITALIST

## 2025-05-05 PROCEDURE — 94668 MNPJ CHEST WALL SBSQ: CPT

## 2025-05-05 PROCEDURE — 2500000002 HC RX 250 W HCPCS SELF ADMINISTERED DRUGS (ALT 637 FOR MEDICARE OP, ALT 636 FOR OP/ED): Performed by: HOSPITALIST

## 2025-05-05 PROCEDURE — 94761 N-INVAS EAR/PLS OXIMETRY MLT: CPT

## 2025-05-05 PROCEDURE — 2500000005 HC RX 250 GENERAL PHARMACY W/O HCPCS: Performed by: HOSPITALIST

## 2025-05-05 PROCEDURE — 85027 COMPLETE CBC AUTOMATED: CPT | Performed by: HOSPITALIST

## 2025-05-05 PROCEDURE — 97530 THERAPEUTIC ACTIVITIES: CPT | Mod: GP | Performed by: PHYSICAL THERAPIST

## 2025-05-05 PROCEDURE — 36415 COLL VENOUS BLD VENIPUNCTURE: CPT | Performed by: HOSPITALIST

## 2025-05-05 PROCEDURE — 84132 ASSAY OF SERUM POTASSIUM: CPT | Performed by: HOSPITALIST

## 2025-05-05 PROCEDURE — 2500000001 HC RX 250 WO HCPCS SELF ADMINISTERED DRUGS (ALT 637 FOR MEDICARE OP): Performed by: HOSPITALIST

## 2025-05-05 PROCEDURE — 97165 OT EVAL LOW COMPLEX 30 MIN: CPT | Mod: GO

## 2025-05-05 PROCEDURE — 97535 SELF CARE MNGMENT TRAINING: CPT | Mod: GO

## 2025-05-05 PROCEDURE — 97161 PT EVAL LOW COMPLEX 20 MIN: CPT | Mod: GP | Performed by: PHYSICAL THERAPIST

## 2025-05-05 PROCEDURE — 82947 ASSAY GLUCOSE BLOOD QUANT: CPT

## 2025-05-05 PROCEDURE — 1200000002 HC GENERAL ROOM WITH TELEMETRY DAILY

## 2025-05-05 PROCEDURE — 94640 AIRWAY INHALATION TREATMENT: CPT

## 2025-05-05 PROCEDURE — 2500000004 HC RX 250 GENERAL PHARMACY W/ HCPCS (ALT 636 FOR OP/ED): Performed by: HOSPITALIST

## 2025-05-05 RX ADMIN — ALLOPURINOL 100 MG: 100 TABLET ORAL at 08:52

## 2025-05-05 RX ADMIN — LEVOTHYROXINE SODIUM 175 MCG: 0.03 TABLET ORAL at 06:00

## 2025-05-05 RX ADMIN — Medication 2 L/MIN: at 18:15

## 2025-05-05 RX ADMIN — SENNOSIDES 17.2 MG: 8.6 TABLET, FILM COATED ORAL at 08:52

## 2025-05-05 RX ADMIN — BUPROPION HYDROCHLORIDE 300 MG: 300 TABLET, EXTENDED RELEASE ORAL at 08:52

## 2025-05-05 RX ADMIN — PRAVASTATIN SODIUM 20 MG: 20 TABLET ORAL at 21:06

## 2025-05-05 RX ADMIN — HEPARIN SODIUM 5000 UNITS: 5000 INJECTION INTRAVENOUS; SUBCUTANEOUS at 21:05

## 2025-05-05 RX ADMIN — INSULIN LISPRO 4 UNITS: 100 INJECTION, SOLUTION INTRAVENOUS; SUBCUTANEOUS at 11:41

## 2025-05-05 RX ADMIN — IPRATROPIUM BROMIDE AND ALBUTEROL SULFATE 3 ML: 2.5; .5 SOLUTION RESPIRATORY (INHALATION) at 18:14

## 2025-05-05 RX ADMIN — IPRATROPIUM BROMIDE AND ALBUTEROL SULFATE 3 ML: 2.5; .5 SOLUTION RESPIRATORY (INHALATION) at 11:50

## 2025-05-05 RX ADMIN — Medication 2 L/MIN: at 11:50

## 2025-05-05 RX ADMIN — IPRATROPIUM BROMIDE AND ALBUTEROL SULFATE 3 ML: 2.5; .5 SOLUTION RESPIRATORY (INHALATION) at 06:09

## 2025-05-05 RX ADMIN — Medication 100 MG: at 08:52

## 2025-05-05 RX ADMIN — PREDNISONE 40 MG: 20 TABLET ORAL at 08:52

## 2025-05-05 RX ADMIN — INSULIN LISPRO 2 UNITS: 100 INJECTION, SOLUTION INTRAVENOUS; SUBCUTANEOUS at 08:52

## 2025-05-05 RX ADMIN — Medication 2 L/MIN: at 23:39

## 2025-05-05 RX ADMIN — HEPARIN SODIUM 5000 UNITS: 5000 INJECTION INTRAVENOUS; SUBCUTANEOUS at 13:34

## 2025-05-05 RX ADMIN — ACETAMINOPHEN 650 MG: 325 TABLET ORAL at 08:52

## 2025-05-05 RX ADMIN — IPRATROPIUM BROMIDE AND ALBUTEROL SULFATE 3 ML: 2.5; .5 SOLUTION RESPIRATORY (INHALATION) at 00:15

## 2025-05-05 RX ADMIN — TORSEMIDE 20 MG: 20 TABLET ORAL at 10:03

## 2025-05-05 RX ADMIN — HYDRALAZINE HYDROCHLORIDE 10 MG: 20 INJECTION INTRAMUSCULAR; INTRAVENOUS at 04:25

## 2025-05-05 RX ADMIN — METOPROLOL TARTRATE 25 MG: 25 TABLET, FILM COATED ORAL at 08:52

## 2025-05-05 RX ADMIN — FERROUS SULFATE TAB 325 MG (65 MG ELEMENTAL FE) 325 MG: 325 (65 FE) TAB at 08:52

## 2025-05-05 RX ADMIN — PANTOPRAZOLE SODIUM 40 MG: 40 TABLET, DELAYED RELEASE ORAL at 06:00

## 2025-05-05 RX ADMIN — Medication 2 L/MIN: at 00:15

## 2025-05-05 RX ADMIN — Medication 2 L/MIN: at 06:12

## 2025-05-05 RX ADMIN — METOPROLOL TARTRATE 50 MG: 50 TABLET, FILM COATED ORAL at 21:05

## 2025-05-05 RX ADMIN — HEPARIN SODIUM 5000 UNITS: 5000 INJECTION INTRAVENOUS; SUBCUTANEOUS at 06:00

## 2025-05-05 ASSESSMENT — ACTIVITIES OF DAILY LIVING (ADL)
HOME_MANAGEMENT_TIME_ENTRY: 11
BATHING_ASSISTANCE: MINIMAL
LACK_OF_TRANSPORTATION: NO
ADL_ASSISTANCE: NEEDS ASSISTANCE
ADL_ASSISTANCE: NEEDS ASSISTANCE

## 2025-05-05 ASSESSMENT — PAIN SCALES - GENERAL
PAINLEVEL_OUTOF10: 2
PAINLEVEL_OUTOF10: 0 - NO PAIN

## 2025-05-05 ASSESSMENT — COGNITIVE AND FUNCTIONAL STATUS - GENERAL
DRESSING REGULAR UPPER BODY CLOTHING: A LITTLE
WALKING IN HOSPITAL ROOM: A LITTLE
DAILY ACTIVITIY SCORE: 18
HELP NEEDED FOR BATHING: A LITTLE
STANDING UP FROM CHAIR USING ARMS: A LITTLE
MOBILITY SCORE: 14
DAILY ACTIVITIY SCORE: 20
HELP NEEDED FOR BATHING: A LITTLE
MOVING FROM LYING ON BACK TO SITTING ON SIDE OF FLAT BED WITH BEDRAILS: A LOT
TOILETING: A LITTLE
MOVING TO AND FROM BED TO CHAIR: A LITTLE
DRESSING REGULAR LOWER BODY CLOTHING: A LITTLE
EATING MEALS: A LITTLE
WALKING IN HOSPITAL ROOM: A LITTLE
MOBILITY SCORE: 17
MOVING FROM LYING ON BACK TO SITTING ON SIDE OF FLAT BED WITH BEDRAILS: A LITTLE
DRESSING REGULAR UPPER BODY CLOTHING: A LITTLE
CLIMB 3 TO 5 STEPS WITH RAILING: TOTAL
MOVING TO AND FROM BED TO CHAIR: A LITTLE
STANDING UP FROM CHAIR USING ARMS: A LITTLE
PERSONAL GROOMING: A LITTLE
DRESSING REGULAR LOWER BODY CLOTHING: A LITTLE
CLIMB 3 TO 5 STEPS WITH RAILING: A LOT
TOILETING: A LITTLE
TURNING FROM BACK TO SIDE WHILE IN FLAT BAD: A LITTLE
TURNING FROM BACK TO SIDE WHILE IN FLAT BAD: A LOT

## 2025-05-05 ASSESSMENT — PAIN - FUNCTIONAL ASSESSMENT
PAIN_FUNCTIONAL_ASSESSMENT: 0-10

## 2025-05-05 NOTE — PROGRESS NOTES
"Occupational Therapy    Evaluation and Treatment     Patient Name: Lakeshia Park \"Nasra\"  MRN: [id number]  Today's Date: 5/5/2025  Time Calculation  Start Time: 0803  Stop Time: 0829  Time Calculation (min): 26 min  331/331-A    Assessment  IP OT Assessment  OT Assessment: pt is needing increased assist for ADL and mobility compared to baseline due to deficits in strength, balance, activity tolerance, safety and judgement.    AMPAC score does not refect level of impairment- pt scored 64/100 on modified barthel index indicating pt is only 64% independent in functional mobility and ADLs- indicating need for moderate intensity OT at discharge.    Prognosis: Good  Barriers to Discharge Home: Caregiver assistance, Physical needs  Caregiver Assistance: Patient lives alone and/or does not have reliable caregiver assistance  Physical Needs: Intermittent mobility assistance needed, Intermittent ADL assistance needed, High falls risk due to function or environment  Evaluation/Treatment Tolerance: Patient tolerated treatment well  End of Session Communication: Bedside nurse  End of Session Patient Position: Up in chair, Alarm on (call light in reach)    Plan:  Treatment Interventions: ADL retraining, Functional transfer training, UE strengthening/ROM, Endurance training, Patient/family training, Neuromuscular reeducation, Compensatory technique education  OT Frequency: 3 times per week  OT Discharge Recommendations: Moderate intensity level of continued care  Equipment Recommended upon Discharge: Bedside commode  OT Recommended Transfer Status: Assist of 1  OT - OK to Discharge: Yes (once medically stable)    Subjective     Current Problem:  1. Acute exacerbation of chronic low back pain  Referral to Primary Care    predniSONE (Deltasone) 20 mg tablet      2. Acute hypoxemic respiratory failure        3. COPD exacerbation (Multi)            General:  General  Reason for Referral: 89 years old female patient with past medical " history as mentioned below who lives at the assisted living presented to the emergency room because of intractable low back pain.  IMPRESSION:  1. No acute vertebral displaced fracture or major malalignment.  2. Thoracolumbar spine scoliotic deformity with advanced multilevel  spondylosis/degenerative changes as detailed above. Findings could be  further assessed by dedicated lumbosacral spine MRI.  3. Aneurysmal dilatation of the abdominal aorta measuring 3.5 cm.  Referred By: Mary Carmen Amos MD  Past Medical History Relevant to Rehab: Arthritis  CHF (congestive heart failure)  COPD (chronic obstructive pulmonary disease) (Multi)  Diabetes mellitus (Multi)  Hypertension  Family/Caregiver Present: No  Co-Treatment: PT  Co-Treatment Reason: to maximize pt safety  Prior to Session Communication: Bedside nurse  Patient Position Received: Bed, 3 rail up, Alarm on  General Comment: Arthritis  CHF (congestive heart failure)  COPD (chronic obstructive pulmonary disease) (Multi)  Diabetes mellitus (Multi)  Hypertension    Precautions:  Medical Precautions: Fall precautions, Oxygen therapy device and L/min (2 lpm)    Vital Signs:  Vital Signs Comment: no concerns    Pain:  Pain Assessment  Pain Assessment: 0-10  0-10 (Numeric) Pain Score:  (denies pain at rest but then reports up to 10/10 with movement)    Objective     Cognition:  Orientation Level: Disoriented to place, Disoriented to time  Attention: Exceptions to WFL  Selective Attention: Impaired  Sustained Attention: Impaired  Memory: Exceptions to WFL  Short-Term Memory: Impaired  Problem Solving: Exceptions to WFL  Simple Functional Tasks: Impaired  Safety/Judgement: Exceptions to WFL  Novel Situations: Moderate  Routine Tasks: Minimal  Insight: Mild  Impulsive: Mildly             Home Living:  Type of Home: Assisted living ([facility])  Lives With: Alone  Home Adaptive Equipment: Walker rolling or standard  Home Layout: One level  Home Access: Level  "entry  Bathroom Shower/Tub:  (cut out tub)  Bathroom Toilet:  (raised toilet seat but patient reports it doesn't stay on and she has to sit sideways on it because of the \"hole\" and it being uncomfortbable to her and state sshe has fallen off of it)  Bathroom Equipment: Grab bars in shower, Shower chair with back  Home Living Comments: regular bed     Prior Function:  ADL Assistance: Needs assistance (supervision for showers, assist for dressing, assist to comb her hair, toilets herself)  Homemaking Assistance: Needs assistance  Ambulatory Assistance: Independent (MI FWW)      ADL:  Eating Assistance:  (setup)  Grooming Assistance: Minimal  Bathing Assistance: Minimal  UE Dressing Assistance: Stand by  LE Dressing Assistance: Minimal  Toileting Assistance with Device:  (CGA)    Activity Tolerance:  Endurance: Decreased tolerance for upright activites    Bed Mobility/Transfers:   Bed Mobility  Bed Mobility: Yes  Bed Mobility 1  Bed Mobility 1: Supine to sitting  Level of Assistance 1: Moderate verbal cues, Maximum assistance  Transfer 1  Technique 1: Sit to stand, Stand to sit  Transfer Device 1: Gait belt, Walker  Transfer Level of Assistance 1: Contact guard  Trials/Comments 1: instruction and cues for hand placement and safety    Ambulation/Gait Training:  Functional Mobility  Functional Mobility Performed: Yes  Functional Mobility 1  Surface 1: Level tile  Device 1: Rolling walker  Functional Mobility Support Devices: Gait belt  Assistance 1: Contact guard, Minimum assistance  Comments 1: cues for safety        Vision: Vision - Basic Assessment  Current Vision: No visual deficits      Sensation:  Light Touch: No apparent deficits    Strength:  Strength Comments: BUE F+      Coordination:  Movements are Fluid and Coordinated: Yes       Outcome Measures: [facility] Daily Activity  Putting on and taking off regular lower body clothing: A little  Bathing (including washing, rinsing, drying): A little  Putting on and taking " off regular upper body clothing: A little  Toileting, which includes using toilet, bedpan or urinal: A little  Taking care of personal grooming such as brushing teeth: A little  Eating Meals: A little  Daily Activity - Total Score: 18      Modified Barthel Index: 64/100                 EDUCATION:     Education Documentation  Body Mechanics, taught by Prabha Carrillo OT at 5/5/2025 12:00 PM.  Learner: Patient  Readiness: Acceptance  Method: Explanation, Demonstration  Response: Needs Reinforcement  Comment: safety during ADL    Precautions, taught by Prabha Carrillo OT at 5/5/2025 12:00 PM.  Learner: Patient  Readiness: Acceptance  Method: Explanation, Demonstration  Response: Needs Reinforcement  Comment: safety during ADL    ADL Training, taught by Prabha Carrillo OT at 5/5/2025 12:00 PM.  Learner: Patient  Readiness: Acceptance  Method: Explanation, Demonstration  Response: Needs Reinforcement  Comment: safety during ADL    Education Comments  No comments found.        Goals:   Encounter Problems       Encounter Problems (Active)       ADLs       Patient with complete lower body dressing with contact guard assist level of assistance  (Progressing)       Start:  05/05/25    Expected End:  05/19/25            Patient will complete toileting including hygiene clothing management/hygiene with stand by assist level of assistance. (Progressing)       Start:  05/05/25    Expected End:  05/19/25            pt will increase modified barthel index score to 75/100 (Progressing)       Start:  05/05/25    Expected End:  05/19/25               BALANCE       Pt will maintain dynamic standing balance during ADL task with stand by assist level of assistance in order to demonstrate decreased risk of falling and improved postural control. (Progressing)       Start:  05/05/25    Expected End:  05/19/25

## 2025-05-05 NOTE — PROGRESS NOTES
Physical Therapy    Physical Therapy Evaluation & Treatment    Patient Name: Nasra Park  MRN: [id number]  Department: [facility] ICU  Room: 331/331-A  Today's Date: 5/5/2025   Time Calculation  Start Time: 0806  Stop Time: 0833  Time Calculation (min): 27 min    Assessment/Plan   Skilled PT intervention is indicated due to patient presents with deficits in bed mobility, transfers, gait, strength, activity tolerance, and safety awareness.   Patient is typically independent with mobility at baseline and is currently needing assist.  Patient has impaired safety awareness, and is at high risk of falls.  Recommend continued physical therapy intervention at a moderate intensity following hospitalization to improve strength, balance, mobility and reduce fall risk.  Continue ambulation with FWW.    PT Assessment  PT Assessment Results: Decreased endurance, Impaired balance, Decreased mobility, Decreased cognition, Decreased safety awareness, Obesity, Pain  Rehab Prognosis: Good  Barriers to Discharge Home: Caregiver assistance, Cognition needs, Physical needs  Caregiver Assistance: Patient lives alone and/or does not have reliable caregiver assistance  Cognition Needs: 24hr supervision for safety awareness needed, Medication and/or medical management daily assist needed, Recollection or understanding of precautions/restrictions limited, Insight of patient limited regarding functional ability/needs, Cognition-related high falls risk  Physical Needs: Ambulating household distances limited by function/safety, 24hr mobility assistance needed, 24hr ADL assistance needed  Evaluation/Treatment Tolerance: Patient limited by pain  End of Session Communication: Bedside nurse  Assessment Comment: communication on white board; call light in reach end of session  End of Session Patient Position: Up in chair, Alarm on   IP OR SWING BED PT PLAN  Inpatient or Swing Bed: Inpatient  PT Plan  Treatment/Interventions: Bed mobility, Transfer  "training, Gait training, Balance training, Strengthening, Endurance training, Therapeutic exercise, Therapeutic activity, Home exercise program  PT Plan: Ongoing PT  PT Frequency: 4 times per week  PT Discharge Recommendations: Moderate intensity level of continued care  Equipment Recommended upon Discharge: Bedside commode (BSC for over toilet)  PT Recommended Transfer Status: Assist x1  PT - OK to Discharge: Yes (once medically appropriate and safe DC plan in place)      Subjective     General Visit Information:  General  Reason for Referral: impaired mobility; 89 years old female patient with past medical history as mentioned below who lives at the assisted living presented to the emergency room because of intractable low back pain.  Referred By: Mary Carmen Amos MD  Past Medical History Relevant to Rehab: Arthritis      CHF (congestive heart failure)     COPD (chronic obstructive pulmonary disease) (Multi)     Diabetes mellitus (Multi)     Hypertension  Family/Caregiver Present: No  Co-Treatment: OT  Co-Treatment Reason: to maximize patient safety with mobility while addressing discipline-specific goals  Prior to Session Communication: Bedside nurse  Patient Position Received: Bed, 3 rail up, Alarm on  General Comment: patient agreeable to assessment  Home Living:  Home Living  Type of Home: Assisted living ([facility])  Lives With: Alone  Home Adaptive Equipment: Walker rolling or standard  Home Layout: One level  Home Access: Level entry  Bathroom Shower/Tub:  (cut out tub)  Bathroom Toilet:  (raised toilet seat but patient reports it doesn't stay on and she has to sit sideways on it because of the \"hole\" and it being uncomfortbable to her and state sshe has fallen off of it)  Bathroom Equipment: Grab bars in shower  Home Living Comments: sleeps in a regular bed  Prior Level of Function:  Prior Function Per Pt/Caregiver Report  ADL Assistance: Needs assistance (supervision for showers, assist for " dressing, assist to comb her hair, toilets herself)  Homemaking Assistance: Needs assistance  Ambulatory Assistance: Independent (with FWW)  Precautions:  Precautions  Medical Precautions: Fall precautions, Oxygen therapy device and L/min (2L)     Date/Time Vitals Session Patient Position Pulse Resp SpO2 BP MAP (mmHg)    05/05/25 1000 --  --  68  22  90 %  --  --     05/05/25 1100 --  --  59  22  97 %  178/86  112           Vital Signs Comment: no concerns on ICU monitors    Objective   Pain:  Pain Assessment  Pain Assessment: 0-10  0-10 (Numeric) Pain Score:  (denies pain at rest but then reports up to 10/10 with movement)  Cognition:  Cognition  Orientation Level: Disoriented to time, Disoriented to place  Attention: Exceptions to WFL  Selective Attention: Impaired  Sustained Attention: Impaired  Memory: Exceptions to WFL  Short-Term Memory: Impaired  Problem Solving: Exceptions to WFL  Simple Functional Tasks: Impaired  Safety/Judgement: Exceptions to WFL  Novel Situations: Moderate  Routine Tasks: Minimal  Insight: Mild  Impulsive: Mildly    General Assessments:  General Observation  General Observation: pleasant and cooperative but confused at times and difficulty following verbal commands               Activity Tolerance  Endurance: Decreased tolerance for upright activites    Sensation  Light Touch: No apparent deficits    Strength  Strength Comments: LES WFL  Strength  Strength Comments: LES [facility]                Postural Control  Postural Control: Within Functional Limits    Static Sitting Balance  Static Sitting-Balance Support: Feet supported  Static Sitting-Level of Assistance: Independent  Dynamic Sitting Balance  Dynamic Sitting-Balance Support: Feet supported  Dynamic Sitting-Level of Assistance: Close supervision    Static Standing Balance  Static Standing-Balance Support: Bilateral upper extremity supported  Static Standing-Level of Assistance: Close supervision  Static Standing-Comment/Number of  Minutes: FWW  Dynamic Standing Balance  Dynamic Standing-Balance Support: Bilateral upper extremity supported  Dynamic Standing-Level of Assistance: Close supervision  Dynamic Standing-Balance: Turning  Dynamic Standing-Comments: FWW  Functional Assessments:  Bed Mobility  Bed Mobility: Yes  Bed Mobility 1  Bed Mobility 1: Supine to sitting, Side lying left to sit  Level of Assistance 1: Moderate verbal cues, Maximum assistance (max A to bring legs to EOB in L S/L position (patient difficulty understanding verbal instruction and needed manual assist) and cues to push hersel fup and patient with heavy use of rail to push herself up, extra time)    Transfers  Transfer: Yes  Transfer 1  Technique 1: Sit to stand, Stand to sit  Transfer Device 1: Gait belt, Walker  Transfer Level of Assistance 1: Contact guard  Trials/Comments 1: instruction and cues for hand placement and safety    Ambulation/Gait Training  Ambulation/Gait Training Performed: Yes  Ambulation/Gait Training 1  Surface 1: Level tile  Device 1: Rolling walker  Gait Support Devices: Gait belt  Assistance 1: Contact guard, Close supervision, Moderate verbal cues (cues for management of O2 tubing)  Quality of Gait 1: Diminished heel strike, Inconsistent stride length, Wide base of support, Decreased step length, Forward flexed posture  Comments/Distance (ft) 1: 11'x2 in/out of bathroom, toileting herself    Treatments:   Therapeutic activities for transfer and mobility training    Bed Mobility  Bed Mobility: Yes  Bed Mobility 1  Bed Mobility 1: Supine to sitting, Side lying left to sit  Level of Assistance 1: Moderate verbal cues, Maximum assistance (max A to bring legs to EOB in L S/L position (patient difficulty understanding verbal instruction and needed manual assist) and cues to push hersel fup and patient with heavy use of rail to push herself up, extra time)    Ambulation/Gait Training  Ambulation/Gait Training Performed: Yes  Ambulation/Gait Training  1  Surface 1: Level tile  Device 1: Rolling walker  Gait Support Devices: Gait belt  Assistance 1: Contact guard, Close supervision, Moderate verbal cues (cues for management of O2 tubing)  Quality of Gait 1: Diminished heel strike, Inconsistent stride length, Wide base of support, Decreased step length, Forward flexed posture  Comments/Distance (ft) 1: 11'x2 in/out of bathroom, toileting herself  Transfers  Transfer: Yes  Transfer 1  Technique 1: Sit to stand, Stand to sit  Transfer Device 1: Gait belt, Walker  Transfer Level of Assistance 1: Contact guard  Trials/Comments 1: instruction and cues for hand placement and safety  Outcome Measures:  [facility] Basic Mobility  Turning from your back to your side while in a flat bed without using bedrails: A lot  Moving from lying on your back to sitting on the side of a flat bed without using bedrails: A lot  Moving to and from bed to chair (including a wheelchair): A little  Standing up from a chair using your arms (e.g. wheelchair or bedside chair): A little  To walk in hospital room: A little  Climbing 3-5 steps with railing: Total  Basic Mobility - Total Score: 14    FSS-ICU  Ambulation: Walks <50 feet with any assistance x1 or walks any distance with assistance x2 people  Rolling: Maximal assistance (performs 25% - 49% of task)  Sitting: Supervision or set-up only  Transfer Sit-to-Stand: Minimal assistance (performs 75% or more of task)  Transfer Supine-to-Sit: Moderate assistance (performs 50 - 74% of task)  Total Score: 15    Encounter Problems       Encounter Problems (Active)       PT Problem       PT Goal 1       Start:  05/05/25    Expected End:  05/19/25       Lakeshia Park will be independent with bed mobility for supine to and from sitting EOB without use of rail and bed flat           PT Goal 2       Start:  05/05/25    Expected End:  05/19/25       Lakeshia Park will transfer sit to and from stand using least restrictive assistive device mod indep            PT Goal 3       Start:  05/05/25    Expected End:  05/19/25       Lakeshia Park will demonstrate good safety awareness with transfers and mobility and with use of assistive device (proper hand placement).            PT Goal 4       Start:  05/05/25    Expected End:  05/19/25       Lakeshia Park will ambulate 75ft with assistive device , level surface, good balance, steady and negotiate O2 tubing, SBA              Pain - Adult              Education Documentation  Mobility Training, taught by Jennifer Dickens, PT at 5/5/2025 11:16 AM.  Learner: Patient  Readiness: Acceptance  Method: Explanation, Demonstration  Response: Verbalizes Understanding, Demonstrated Understanding, Needs Reinforcement  Comment: safety with mobility, hand placement with transfers, ,use of device, o2 tubing management;  recommended removing RTS and putting BSC over toilet at home to increase safety with toilet transfers    Education Comments  No comments found.

## 2025-05-05 NOTE — CARE PLAN
The clinical goals for the shift include no pain through shift      Problem: Pain - Adult  Goal: Verbalizes/displays adequate comfort level or baseline comfort level  Outcome: Progressing     Problem: Safety - Adult  Goal: Free from fall injury  Outcome: Progressing     Problem: Fall/Injury  Goal: Not fall by end of shift  Outcome: Progressing  Goal: Be free from injury by end of the shift  Outcome: Progressing  Goal: Verbalize understanding of personal risk factors for fall in the hospital  Outcome: Progressing     Problem: Diabetes  Goal: Maintain electrolyte levels within acceptable range throughout shift  Outcome: Progressing  Goal: No changes in neurological exam by end of shift  Outcome: Progressing     Problem: Pain  Goal: Takes deep breaths with improved pain control throughout the shift  Outcome: Progressing  Goal: Turns in bed with improved pain control throughout the shift  Outcome: Progressing  Goal: Walks with improved pain control throughout the shift  Outcome: Progressing

## 2025-05-05 NOTE — PROGRESS NOTES
05/05/25 1551   Discharge Planning   Living Arrangements Alone   Support Systems Home care staff;Children;Friends/neighbors   Assistance Needed Asssit x 1, walker   Type of Residence Assisted living   Do you have animals or pets at home? Yes   Type of Animals or Pets 1 cat   Care Facility Name [facility]   Who is requesting discharge planning? Provider   Home or Post Acute Services Post acute facilities (Rehab/SNF/etc)   Type of Post Acute Facility Services Rehab   Expected Discharge Disposition SNF   Does the patient need discharge transport arranged? Yes   RoundTrip coordination needed? Yes   Has discharge transport been arranged? No   Financial Resource Strain   How hard is it for you to pay for the very basics like food, housing, medical care, and heating? Not hard   Housing Stability   In the last 12 months, was there a time when you were not able to pay the mortgage or rent on time? N   In the past 12 months, how many times have you moved where you were living? 0   At any time in the past 12 months, were you homeless or living in a shelter (including now)? N   Transportation Needs   In the past 12 months, has lack of transportation kept you from medical appointments or from getting medications? no   In the past 12 months, has lack of transportation kept you from meetings, work, or from getting things needed for daily living? No   Patient Choice   Provider Choice list and CMS website (https://medicare.gov/care-compare#search) for post-acute Quality and Resource Measure Data were provided and reviewed with: Patient   Patient / Family choosing to utilize agency / facility established prior to hospitalization No   Stroke Family Assessment   Stroke Family Assessment Needed No   Intensity of Service   Intensity of Service 0-30 min     Care Transitions: Patient reviewed in care round meeting this AM, ADOD 24 - 48 hours. Met with patient in room, sitting up in chair. Patient friend Roxanne also  present. Permission granted to speak with friend at bedside. Demographics and contacts verified. She has been residing at [facility] for approximately [facility]. She has a Passport  and receives the AL waiver. She is unsure who her CM is because it recently changed. The assisted living manages her medications and provides all meals. She has a walker that she uses to go to dining room for meals. States she also has a wheelchair if needed. States she is a stand by assist for personal care and mobility but has had an increase recently with falls. She is unsure if she will be strong enough to return to Assisted living and feels she may need rehab first. Discussed SNF and will send referrals per patient request via Careport. Care team to follow for SNF acceptance and precert. Maranda Villalobos RN/TCC    [phone] Message left for [facility] AL nurse line to return call to verify patient decreased mobility and discuss discharge disposition. Maranda Villalobos RN/TCC    [phone] Received a call from Rama CARVAJAL at [facility].  Verified patient has been requiring more assistance and has had an increase in falls and confusions. Son Tommy is POA but both patient's son's reside out of state. Patient's long time family friend of 40 plus years has been trying to assist patient at the assisted living per son Tommy request since neither son's live close by. Rama agrees patient will need SNF rehab for therapy before returning to them. Maranda Villalobos RN/TCC

## 2025-05-05 NOTE — PROGRESS NOTES
Hospitalist Progress Note    Patient's name: Lakeshia Park  YOB: 1930  Age: 89 y.o.  Medical record number: [id number]    Date of service: 05/05/25  Time: 8:51 AM    Chief Complaint: Follow-up for intractable acute on chronic low back pain, hypoxia, mild acute on chronic systolic CHF, probable COPD exacerbation, debility, weakness, frequent falls.    Subjective:  Patient seen and examined.  She is sitting on the chair eating breakfast.  She mentioned that her back is getting better, able to walk easier.  Denied significant shortness of breath.  She is on oxygen at 2 L.  No other complaints.  She is afebrile, heart rate stable, blood pressure slight elevated but improved.    Medications:  Current Medications[1]    Vital signs in last 24 hours:  Temp:  [36 °C (96.8 °F)-36.6 °C (97.9 °F)] 36.5 °C (97.7 °F)  Heart Rate:  [58-75] 65  Resp:  [13-28] 17  BP: (136-178)/() 148/70    Physical Exam:  General: Awake, alert, oriented x3, no significant distress, cooperative.  HEENT: EOM intact, PERRLA.  Neck: Supple, no JVD, no masses, no lymphadenopathy.  Chest: Decreased breath sounds bilateral at the bases, otherwise clear, no wheezes, no crackles, no rhonchi.  Heart: Regular rate and rhythm, S1-S2 normal, no murmur, no gallops.  Abdomen: Soft, nontender, no organomegaly, no ascites, no guarding or rigidity, morbidly obese.  Neurological: Alert and oriented x3, cranial nerves are grossly intact, normal power and tone of 4 limbs.  Skin: No lesions, no skin rash.  Warm and dry.  Musculoskeletal: Normal, atraumatic, no obvious deformities.  Legs: Trace leg edema, no clubbing or cyanosis.  Psych: Appropriate mood and behavior.    LABS:  Lab Results   Component Value Date    WBC 4.3 (L) 05/05/2025    HGB 12.0 05/05/2025    HCT 36.7 05/05/2025    MCV 99 05/05/2025     05/05/2025     Lab Results   Component Value Date    GLUCOSE 193 (H) 05/05/2025    CALCIUM 8.4 (L) 05/05/2025     05/05/2025    K  4.8 05/05/2025    CO2 26 05/05/2025     05/05/2025    BUN 44 (H) 05/05/2025    CREATININE 2.12 (H) 05/05/2025     Assessment and Plan:    #1 intractable acute on chronic low back pain: CT lumbar spine reviewed as above, showed advanced multilevel spondylosis and degenerative changes as well as scoliotic deformity.  Plan: Admit to general floor, ambulate as tolerated, oxycodone as needed and IV morphine as needed for pain, start prednisone, continue tramadol as needed for pain, bowel regimen, repeat CBC and BMP tomorrow morning, PT OT evaluation and treatment, patient will likely need placement to skilled nursing facility instead of assisted living.     #2 acute hypoxic respiratory insufficiency/hypoxia: Patient does have a history of COPD, CHF as well but never been on oxygen.  She uses albuterol nebulizer at the assisted living.  Chest x-ray reviewed, possible mild pulm vascular congestion.  Plan: Bronchodilators, diuresis with oral diuretics, start prednisone as above but this is mainly for back pain.     #3  mild acute on chronic systolic CHF: This is based on symptoms of shortness of breath, mild lip edema and chest x-ray findings.  Patient had 2D echocardiogram on March 2025, showed mildly decreased LV function, EF was 45 to 50%, global hypokinesis of the left ventricle.  Plan: Diuresis with IV Lasix 40 mg x 1 today, start p.o. torsemide tomorrow morning, continue beta-blockers, monitor volume status and kidney function.     #4 probable mild acute COPD exacerbation: Reportedly, patient had wheezing in the ED, received albuterol nebulizer.  Currently, lungs are clear.  She is on centiliters.  Plan for bronchodilators, DuoNeb every 6 hours, albuterol as needed, prednisone taper as above, wean off oxygen as tolerated.     #5 stage IV chronic kidney disease: Baseline creatinine is around 2 to 2.5 mg/dL.  Admission creatinine stable, plan to monitor while on diuresis.     #6 hypertension: Blood pressure was  elevated in the ED, lately improved.  Plan to continue metoprolol, start IV hydralazine as needed.     #7 history of type 2 diabetes mellitus: Not currently on treatment.  Most recent hemoglobin A1c was 5.7% on January 2024.  Today's blood glucose is 130 mg/dL.  Plan to do Accu-Cheks, sliding scale, check hemoglobin A1c.     #8 hypothyroidism: Continue levothyroxine, check TSH.     #9 DVT prophylaxis: Subcu heparin.     #10 CODE STATUS: DNR CCA, confirmed with the patient, she declined CPR, chest compressions, intubation mechanical ventilation.    Daily progress/update:  5/5/2025:  Afebrile, heart rate stable, blood pressure was elevated but improved, on oxygen at 2 L, back pain is getting better, received IV Lasix yesterday, started on Demadex today, I will try to avoid excessive diuresis because of history of CKD, she is on prednisone, today CBC and BMP reviewed as above, kidney function stable, plan to continue same treatment, wean off oxygen as tolerated, PT OT evaluation and treatment, patient will likely need placement to skilled nursing facility, anticipate discharge in next 24 to 48 hours.     MDM: Moderate complexity, time spent is 47 minutes.    MARY CARMEN RICO MD.    05/05/25  12:07 PM         [1]   Current Facility-Administered Medications:     acetaminophen (Tylenol) tablet 650 mg, 650 mg, oral, q4h PRN, Mary Carmen Amos MD    albuterol 2.5 mg /3 mL (0.083 %) nebulizer solution 2.5 mg, 2.5 mg, nebulization, q6h PRN, Mary Carmen Amos MD    allopurinol (Zyloprim) tablet 100 mg, 100 mg, oral, Daily, Mary Carmen Amos MD, 100 mg at 05/04/25 1446    buPROPion XL (Wellbutrin XL) 24 hr tablet 300 mg, 300 mg, oral, Daily, Mary Carmen Amos MD, 300 mg at 05/04/25 1446    dextromethorphan-guaifenesin (Robitussin DM)  mg/5 mL oral liquid 15 mL, 15 mL, oral, q4h PRN, Mary Carmen Amos MD    ferrous sulfate tablet 325 mg, 1 tablet, oral, Daily, Mary Carmen Amos MD, 325 mg at 05/04/25 1446    heparin  (porcine) injection 5,000 Units, 5,000 Units, subcutaneous, q8h, Mary Carmen Amos MD, 5,000 Units at 05/05/25 0600    hydrALAZINE (Apresoline) injection 10 mg, 10 mg, intravenous, q6h PRN, Mary Carmen Amos MD, 10 mg at 05/05/25 0425    insulin lispro injection 0-10 Units, 0-10 Units, subcutaneous, TID AC, Mary Carmen Amos MD, 4 Units at 05/04/25 1657    ipratropium-albuteroL (Duo-Neb) 0.5-2.5 mg/3 mL nebulizer solution 3 mL, 3 mL, nebulization, q6h, Mary Carmen Amos MD, 3 mL at 05/05/25 0609    levothyroxine (Synthroid, Levoxyl) tablet 175 mcg, 175 mcg, oral, Daily before breakfast, Mary Carmen Amos MD, 175 mcg at 05/05/25 0600    magnesium hydroxide (Milk of Magnesia) 400 mg/5 mL suspension 30 mL, 30 mL, oral, Daily PRN, Mary Carmen Amos MD    melatonin tablet 3 mg, 3 mg, oral, Nightly PRN, Mary Carmen Amos MD, 3 mg at 05/04/25 2039    metoprolol tartrate (Lopressor) tablet 25 mg, 25 mg, oral, Daily, Mary Carmen Amos MD, 25 mg at 05/04/25 1448    metoprolol tartrate (Lopressor) tablet 50 mg, 50 mg, oral, Nightly, Mary Carmen Amos MD, 50 mg at 05/04/25 2039    morphine injection 2 mg, 2 mg, intravenous, q4h PRN, Mary Carmen Amos MD    ondansetron (Zofran) injection 4 mg, 4 mg, intravenous, q6h PRN, Mary Carmen Amos MD    oxyCODONE (Roxicodone) immediate release tablet 10 mg, 10 mg, oral, q6h PRN, Mary Carmen Amos MD    oxyCODONE (Roxicodone) immediate release tablet 5 mg, 5 mg, oral, q6h PRN, Mary Carmen Amos MD    oxygen (O2) therapy, , inhalation, Continuous PRN - O2/gases, Mary Carmen Amos MD, 2 L/min at 05/05/25 0612    pantoprazole (ProtoNix) EC tablet 40 mg, 40 mg, oral, Daily before breakfast, Mary Carmen Amos MD, 40 mg at 05/05/25 0600    pravastatin (Pravachol) tablet 20 mg, 20 mg, oral, Nightly, Mary Carmen Amos MD, 20 mg at 05/04/25 2039    predniSONE (Deltasone) tablet 40 mg, 40 mg, oral, Daily, Mary Carmen Amos MD    pyridoxine (Vitamin B-6) tablet 100 mg, 100 mg, oral,  Daily, Mary Carmen Amos MD    sennosides (Senokot) tablet 17.2 mg, 2 tablet, oral, BID, Mary Carmen Amos MD, 17.2 mg at 05/04/25 2039    torsemide (Demadex) tablet 20 mg, 20 mg, oral, Daily, Mary Carmen Amos MD

## 2025-05-06 LAB
ALBUMIN SERPL BCP-MCNC: 4.1 G/DL (ref 3.4–5)
ANION GAP SERPL CALC-SCNC: 17 MMOL/L (ref 10–20)
BUN SERPL-MCNC: 57 MG/DL (ref 6–23)
CALCIUM SERPL-MCNC: 9 MG/DL (ref 8.6–10.3)
CHLORIDE SERPL-SCNC: 96 MMOL/L (ref 98–107)
CO2 SERPL-SCNC: 29 MMOL/L (ref 21–32)
CREAT SERPL-MCNC: 2.44 MG/DL (ref 0.5–1.05)
EGFRCR SERPLBLD CKD-EPI 2021: 18 ML/MIN/1.73M*2
GLUCOSE BLD MANUAL STRIP-MCNC: 114 MG/DL (ref 74–99)
GLUCOSE BLD MANUAL STRIP-MCNC: 267 MG/DL (ref 74–99)
GLUCOSE BLD MANUAL STRIP-MCNC: 382 MG/DL (ref 74–99)
GLUCOSE BLD MANUAL STRIP-MCNC: 97 MG/DL (ref 74–99)
GLUCOSE SERPL-MCNC: 193 MG/DL (ref 74–99)
HOLD SPECIMEN: NORMAL
MAGNESIUM SERPL-MCNC: 1.79 MG/DL (ref 1.6–2.4)
PHOSPHATE SERPL-MCNC: 4.6 MG/DL (ref 2.5–4.9)
POTASSIUM SERPL-SCNC: 4.5 MMOL/L (ref 3.5–5.3)
SODIUM SERPL-SCNC: 137 MMOL/L (ref 136–145)

## 2025-05-06 PROCEDURE — 9420000001 HC RT PATIENT EDUCATION 5 MIN

## 2025-05-06 PROCEDURE — 99232 SBSQ HOSP IP/OBS MODERATE 35: CPT | Performed by: STUDENT IN AN ORGANIZED HEALTH CARE EDUCATION/TRAINING PROGRAM

## 2025-05-06 PROCEDURE — 94640 AIRWAY INHALATION TREATMENT: CPT

## 2025-05-06 PROCEDURE — 2500000005 HC RX 250 GENERAL PHARMACY W/O HCPCS: Performed by: HOSPITALIST

## 2025-05-06 PROCEDURE — 94762 N-INVAS EAR/PLS OXIMTRY CONT: CPT

## 2025-05-06 PROCEDURE — 83735 ASSAY OF MAGNESIUM: CPT | Performed by: STUDENT IN AN ORGANIZED HEALTH CARE EDUCATION/TRAINING PROGRAM

## 2025-05-06 PROCEDURE — 2500000002 HC RX 250 W HCPCS SELF ADMINISTERED DRUGS (ALT 637 FOR MEDICARE OP, ALT 636 FOR OP/ED): Performed by: HOSPITALIST

## 2025-05-06 PROCEDURE — 97110 THERAPEUTIC EXERCISES: CPT | Mod: GP,CQ

## 2025-05-06 PROCEDURE — 1200000002 HC GENERAL ROOM WITH TELEMETRY DAILY

## 2025-05-06 PROCEDURE — 2500000001 HC RX 250 WO HCPCS SELF ADMINISTERED DRUGS (ALT 637 FOR MEDICARE OP): Performed by: HOSPITALIST

## 2025-05-06 PROCEDURE — 2500000004 HC RX 250 GENERAL PHARMACY W/ HCPCS (ALT 636 FOR OP/ED): Performed by: HOSPITALIST

## 2025-05-06 PROCEDURE — 82947 ASSAY GLUCOSE BLOOD QUANT: CPT

## 2025-05-06 PROCEDURE — 97116 GAIT TRAINING THERAPY: CPT | Mod: GP,CQ

## 2025-05-06 PROCEDURE — 94668 MNPJ CHEST WALL SBSQ: CPT

## 2025-05-06 PROCEDURE — 84100 ASSAY OF PHOSPHORUS: CPT | Performed by: STUDENT IN AN ORGANIZED HEALTH CARE EDUCATION/TRAINING PROGRAM

## 2025-05-06 PROCEDURE — 36415 COLL VENOUS BLD VENIPUNCTURE: CPT | Performed by: STUDENT IN AN ORGANIZED HEALTH CARE EDUCATION/TRAINING PROGRAM

## 2025-05-06 RX ADMIN — Medication 2 L/MIN: at 11:12

## 2025-05-06 RX ADMIN — FERROUS SULFATE TAB 325 MG (65 MG ELEMENTAL FE) 325 MG: 325 (65 FE) TAB at 09:49

## 2025-05-06 RX ADMIN — PANTOPRAZOLE SODIUM 40 MG: 40 TABLET, DELAYED RELEASE ORAL at 04:16

## 2025-05-06 RX ADMIN — HYDRALAZINE HYDROCHLORIDE 10 MG: 20 INJECTION INTRAMUSCULAR; INTRAVENOUS at 04:12

## 2025-05-06 RX ADMIN — PRAVASTATIN SODIUM 20 MG: 20 TABLET ORAL at 20:39

## 2025-05-06 RX ADMIN — HEPARIN SODIUM 5000 UNITS: 5000 INJECTION INTRAVENOUS; SUBCUTANEOUS at 14:37

## 2025-05-06 RX ADMIN — Medication 2 L/MIN: at 06:15

## 2025-05-06 RX ADMIN — PREDNISONE 40 MG: 20 TABLET ORAL at 09:49

## 2025-05-06 RX ADMIN — HEPARIN SODIUM 5000 UNITS: 5000 INJECTION INTRAVENOUS; SUBCUTANEOUS at 22:10

## 2025-05-06 RX ADMIN — ACETAMINOPHEN 650 MG: 325 TABLET ORAL at 09:49

## 2025-05-06 RX ADMIN — IPRATROPIUM BROMIDE AND ALBUTEROL SULFATE 3 ML: 2.5; .5 SOLUTION RESPIRATORY (INHALATION) at 18:32

## 2025-05-06 RX ADMIN — OXYCODONE HYDROCHLORIDE 10 MG: 5 TABLET ORAL at 04:11

## 2025-05-06 RX ADMIN — LEVOTHYROXINE SODIUM 175 MCG: 0.03 TABLET ORAL at 04:16

## 2025-05-06 RX ADMIN — HEPARIN SODIUM 5000 UNITS: 5000 INJECTION INTRAVENOUS; SUBCUTANEOUS at 04:20

## 2025-05-06 RX ADMIN — ALLOPURINOL 100 MG: 100 TABLET ORAL at 09:49

## 2025-05-06 RX ADMIN — INSULIN LISPRO 10 UNITS: 100 INJECTION, SOLUTION INTRAVENOUS; SUBCUTANEOUS at 12:30

## 2025-05-06 RX ADMIN — SENNOSIDES 17.2 MG: 8.6 TABLET, FILM COATED ORAL at 20:39

## 2025-05-06 RX ADMIN — METOPROLOL TARTRATE 25 MG: 25 TABLET, FILM COATED ORAL at 09:49

## 2025-05-06 RX ADMIN — TORSEMIDE 20 MG: 20 TABLET ORAL at 09:49

## 2025-05-06 RX ADMIN — MAGNESIUM HYDROXIDE 30 ML: 400 SUSPENSION ORAL at 09:49

## 2025-05-06 RX ADMIN — SENNOSIDES 17.2 MG: 8.6 TABLET, FILM COATED ORAL at 09:49

## 2025-05-06 RX ADMIN — BUPROPION HYDROCHLORIDE 300 MG: 300 TABLET, EXTENDED RELEASE ORAL at 09:49

## 2025-05-06 RX ADMIN — METOPROLOL TARTRATE 50 MG: 50 TABLET, FILM COATED ORAL at 20:39

## 2025-05-06 RX ADMIN — Medication 2 L/MIN: at 18:33

## 2025-05-06 RX ADMIN — IPRATROPIUM BROMIDE AND ALBUTEROL SULFATE 3 ML: 2.5; .5 SOLUTION RESPIRATORY (INHALATION) at 11:12

## 2025-05-06 RX ADMIN — Medication 100 MG: at 09:49

## 2025-05-06 RX ADMIN — IPRATROPIUM BROMIDE AND ALBUTEROL SULFATE 3 ML: 2.5; .5 SOLUTION RESPIRATORY (INHALATION) at 06:15

## 2025-05-06 ASSESSMENT — PAIN DESCRIPTION - LOCATION
LOCATION: BUTTOCKS
LOCATION: BUTTOCKS

## 2025-05-06 ASSESSMENT — PAIN - FUNCTIONAL ASSESSMENT
PAIN_FUNCTIONAL_ASSESSMENT: 0-10

## 2025-05-06 ASSESSMENT — PAIN SCALES - GENERAL
PAINLEVEL_OUTOF10: 0 - NO PAIN
PAINLEVEL_OUTOF10: 0 - NO PAIN
PAINLEVEL_OUTOF10: 10 - WORST POSSIBLE PAIN
PAINLEVEL_OUTOF10: 3
PAINLEVEL_OUTOF10: 0 - NO PAIN
PAINLEVEL_OUTOF10: 0 - NO PAIN
PAINLEVEL_OUTOF10: 1

## 2025-05-06 ASSESSMENT — PAIN DESCRIPTION - DESCRIPTORS: DESCRIPTORS: ACHING

## 2025-05-06 ASSESSMENT — COGNITIVE AND FUNCTIONAL STATUS - GENERAL
DRESSING REGULAR LOWER BODY CLOTHING: A LITTLE
STANDING UP FROM CHAIR USING ARMS: A LITTLE
MOVING TO AND FROM BED TO CHAIR: A LITTLE
MOVING FROM LYING ON BACK TO SITTING ON SIDE OF FLAT BED WITH BEDRAILS: A LOT
STANDING UP FROM CHAIR USING ARMS: A LITTLE
DRESSING REGULAR UPPER BODY CLOTHING: A LITTLE
CLIMB 3 TO 5 STEPS WITH RAILING: A LOT
MOVING FROM LYING ON BACK TO SITTING ON SIDE OF FLAT BED WITH BEDRAILS: A LITTLE
MOBILITY SCORE: 14
TOILETING: A LITTLE
CLIMB 3 TO 5 STEPS WITH RAILING: TOTAL
WALKING IN HOSPITAL ROOM: A LITTLE
WALKING IN HOSPITAL ROOM: A LITTLE
DAILY ACTIVITIY SCORE: 20
TURNING FROM BACK TO SIDE WHILE IN FLAT BAD: A LOT
TURNING FROM BACK TO SIDE WHILE IN FLAT BAD: A LITTLE
HELP NEEDED FOR BATHING: A LITTLE
MOVING TO AND FROM BED TO CHAIR: A LITTLE
MOBILITY SCORE: 17

## 2025-05-06 ASSESSMENT — PAIN DESCRIPTION - ORIENTATION: ORIENTATION: RIGHT;LEFT

## 2025-05-06 NOTE — PROGRESS NOTES
Care Transitions: Met with patient in room, sitting up in chair. [facility] liaison met with patient at bedside and they are able to accept. Patient agreeable as well to go to [facility] for therapy before returning to [facility].     -1210 Patient reviewed in care round meeting this AM and awaiting placement and insurance auth. Spoke to nae TUTTLE @ [phone] to update [facility] can accept for therapy. Son also agreeable to discharge disposition. Requested precert be submitted per [facility] Auth team. Maranda Villalobos RN/TCC    -[id number] Message left for Jessica Yeung LPN [employer]  assistant @ [phone]. Updated to admitting date, diagnosis, and discharge disposition to SNF for therapy. Received notification from [facility] auth team that insurance auth is Approved. Auth. # [id number] Date: 5/06/2025 - 5/12/2025. Hospital provider and bedside staffing notified. Patient will be staying yet tonight for an overnight pulse ox study per hospital provider. Plan is discharge to facility tomorrow. Care team SW met with patient at bedside @ 0059 for Medicare IMM review. Patient signed, copy provided, original placed in chart. Voiced understanding. Spoke to nae Sanders; notified of insurance auth received and will plan for discharge to [facility] tomorrow. Maranda Villalobos RN/TCC

## 2025-05-06 NOTE — CONSULTS
Reason For Consult:  COPD Consult/ Smoking Assessment    Patient Characteristics:   Patient resting comfortably in bedside chair.  No complaints of SOB and she appears comfortable.        Comorbidities:  Limited mobility.  Shallow breathing.  Age.    Exacerbation last year:  No known admissions for breathing problems.     Spirometry:          Patient has never performed Pulmonary Function Testing.    Uses of Oxygen/CPAP/BIPAP:  Patient does not use Oxygen at home.  She is currently on 2 liter per minute via nasal canula.  Her SPO2 on 2 lpm is 97%.        Smoking status:  Patient states she has smoked 1 cigarette her entire life and hated it.  No further evaluation is required at this time.      Pulmonary physician:  Patient has not seen a physician for her breathing other than family MD.      Pharmacotherapy:  Patient is currently not on breathing medications of any kind at home.  She states she does feel breathings medications done in the hospital has helped.  Current in-hospital medications are DuoNeb QID, Pulmicort BID, Albuterol PRN.       COPD Education  I discussed breathing techniques such as deep breathing, pursed lip breathing and cough techniques with her.  She demonstrated the ability to do Incentive Spirometry correctly and I urged her to continue it upon discharge.       Home Oxygen Evaluation:  If she is unable to wean, evaluation of Oxygen needs will be required before discharge.      Pulmonary Rehabilitation referral:  Patient does not appear to be a candidate for Pulmonary Rehab at this time due to limited mobility and ride availability.      Vaccines:  Influenza: Yes        Pneumococcal: Yes          COVID 19: Yes               Pertussis: Yes    Recommendations:  Wean Oxygen as able.  If she is unable to wean appropriately, reevaluation may be needed in 2 to 3 weeks post discharge.  Pulmonary Function Testing may also be indicated if her Oxygen needs require continued use.  Evaluate need for home  DuoNeb Aerosols.  Pulmonology consult may be beneficial if problems persist.       Jaden Clements, RRT

## 2025-05-06 NOTE — CARE PLAN
Problem: Pain - Adult  Goal: Verbalizes/displays adequate comfort level or baseline comfort level  Outcome: Progressing     Problem: Safety - Adult  Goal: Free from fall injury  Outcome: Progressing     Problem: Discharge Planning  Goal: Discharge to home or other facility with appropriate resources  Outcome: Progressing     Problem: Chronic Conditions and Co-morbidities  Goal: Patient's chronic conditions and co-morbidity symptoms are monitored and maintained or improved  Outcome: Progressing     Problem: Nutrition  Goal: Nutrient intake appropriate for maintaining nutritional needs  Outcome: Progressing     Problem: Fall/Injury  Goal: Not fall by end of shift  Outcome: Progressing  Goal: Be free from injury by end of the shift  Outcome: Progressing  Goal: Verbalize understanding of personal risk factors for fall in the hospital  Outcome: Progressing  Goal: Verbalize understanding of risk factor reduction measures to prevent injury from fall in the home  Outcome: Progressing  Goal: Use assistive devices by end of the shift  Outcome: Progressing  Goal: Pace activities to prevent fatigue by end of the shift  Outcome: Progressing     Problem: Diabetes  Goal: Achieve decreasing blood glucose levels by end of shift  Outcome: Progressing  Goal: Increase stability of blood glucose readings by end of shift  Outcome: Progressing  Goal: Decrease in ketones present in urine by end of shift  Outcome: Progressing  Goal: Maintain electrolyte levels within acceptable range throughout shift  Outcome: Progressing  Goal: Maintain glucose levels >70mg/dl to <250mg/dl throughout shift  Outcome: Progressing  Goal: No changes in neurological exam by end of shift  Outcome: Progressing  Goal: Learn about and adhere to nutrition recommendations by end of shift  Outcome: Progressing  Goal: Vital signs within normal range for age by end of shift  Outcome: Progressing  Goal: Increase self care and/or family involovement by end of  shift  Outcome: Progressing  Goal: Receive DSME education by end of shift  Outcome: Progressing     Problem: Pain  Goal: Takes deep breaths with improved pain control throughout the shift  Outcome: Progressing  Goal: Turns in bed with improved pain control throughout the shift  Outcome: Progressing  Goal: Walks with improved pain control throughout the shift  Outcome: Progressing  Goal: Performs ADL's with improved pain control throughout shift  Outcome: Progressing  Goal: Participates in PT with improved pain control throughout the shift  Outcome: Progressing  Goal: Free from opioid side effects throughout the shift  Outcome: Progressing  Goal: Free from acute confusion related to pain meds throughout the shift  Outcome: Progressing     Problem: Respiratory  Goal: Clear secretions with interventions this shift  Outcome: Progressing  Goal: Minimize anxiety/maximize coping throughout shift  Outcome: Progressing  Goal: Minimal/no exertional discomfort or dyspnea this shift  Outcome: Progressing  Goal: No signs of respiratory distress (eg. Use of accessory muscles. Peds grunting)  Outcome: Progressing  Goal: Patent airway maintained this shift  Outcome: Progressing  Goal: Tolerate mechanical ventilation evidenced by VS/agitation level this shift  Outcome: Progressing  Goal: Tolerate pulmonary toileting this shift  Outcome: Progressing  Goal: Verbalize decreased shortness of breath this shift  Outcome: Progressing  Goal: Wean oxygen to maintain O2 saturation per order/standard this shift  Outcome: Progressing  Goal: Increase self care and/or family involvement in next 24 hours  Outcome: Progressing

## 2025-05-06 NOTE — PROGRESS NOTES
Hospitalist Progress Note    Patient's name: Lakeshia Park  YOB: 1930  Age: 89 y.o.  Medical record number: [id number]    Date of service: 05/06/25  Time: 2:51 PM    Chief Complaint: Follow-up for intractable acute on chronic low back pain, hypoxia, mild acute on chronic systolic CHF, probable COPD exacerbation, debility, weakness, frequent falls.    Subjective:  Patient seen and examined.  She states to be doing well and that her back pain is overall well controlled today.    Medications:  Current Medications[1]    Vital signs in last 24 hours:  Temp:  [36 °C (96.8 °F)-36.6 °C (97.9 °F)] 36 °C (96.8 °F)  Heart Rate:  [59-83] 66  Resp:  [13-25] 20  BP: (122-167)/(67-88) 122/67    Physical Exam:  General: Awake, alert, oriented x3, no significant distress, cooperative.  HEENT: EOM intact, PERRLA.  Neck: Supple, no JVD, no masses, no lymphadenopathy.  Chest: Decreased breath sounds bilateral at the bases, otherwise clear, no wheezes, no crackles, no rhonchi.  Heart: Regular rate and rhythm, S1-S2 normal, no murmur, no gallops.  Abdomen: Soft, nontender, no organomegaly, no ascites, no guarding or rigidity, morbidly obese.  Neurological: Alert and oriented x3, cranial nerves are grossly intact, normal power and tone of 4 limbs.  Skin: No lesions, no skin rash.  Warm and dry.  Musculoskeletal: Normal, atraumatic, no obvious deformities.  Legs: Trace leg edema, no clubbing or cyanosis.  Psych: Appropriate mood and behavior.    LABS:  Lab Results   Component Value Date    WBC 4.3 (L) 05/05/2025    HGB 12.0 05/05/2025    HCT 36.7 05/05/2025    MCV 99 05/05/2025     05/05/2025     Lab Results   Component Value Date    GLUCOSE 193 (H) 05/06/2025    CALCIUM 9.0 05/06/2025     05/06/2025    K 4.5 05/06/2025    CO2 29 05/06/2025    CL 96 (L) 05/06/2025    BUN 57 (H) 05/06/2025    CREATININE 2.44 (H) 05/06/2025     Assessment and Plan:    #1 intractable acute on chronic low back pain: CT lumbar  spine reviewed as above, showed advanced multilevel spondylosis and degenerative changes as well as scoliotic deformity.  Plan: Admit to general floor, ambulate as tolerated, oxycodone as needed and IV morphine as needed for pain, start prednisone, continue tramadol as needed for pain, bowel regimen, repeat CBC and BMP tomorrow morning, PT OT evaluation and treatment, patient will likely need placement to skilled nursing facility instead of assisted living.     #2 acute hypoxic respiratory insufficiency/hypoxia: Patient does have a history of COPD, CHF as well but never been on oxygen.  She uses albuterol nebulizer at the assisted living.  Chest x-ray reviewed, possible mild pulm vascular congestion.  Plan: Bronchodilators, diuresis with oral diuretics, start prednisone as above but this is mainly for back pain.     #3  mild acute on chronic systolic CHF: This is based on symptoms of shortness of breath, mild lip edema and chest x-ray findings.  Patient had 2D echocardiogram on March 2025, showed mildly decreased LV function, EF was 45 to 50%, global hypokinesis of the left ventricle.  Plan: Diuresis with IV Lasix 40 mg x 1 today, start p.o. torsemide tomorrow morning, continue beta-blockers, monitor volume status and kidney function.     #4 probable mild acute COPD exacerbation: Reportedly, patient had wheezing in the ED, received albuterol nebulizer.  Currently, lungs are clear.  She is on centiliters.  Plan for bronchodilators, DuoNeb every 6 hours, albuterol as needed, prednisone taper as above, wean off oxygen as tolerated.     #5 stage IV chronic kidney disease: Baseline creatinine is around 2 to 2.5 mg/dL.  Admission creatinine stable, plan to monitor while on diuresis.     #6 hypertension: Blood pressure was elevated in the ED, lately improved.  Plan to continue metoprolol, start IV hydralazine as needed.     #7 history of type 2 diabetes mellitus: Not currently on treatment.  Most recent hemoglobin A1c was  5.7% on January 2024.  Today's blood glucose is 130 mg/dL.  Plan to do Accu-Cheks, sliding scale, check hemoglobin A1c.     #8 hypothyroidism: Continue levothyroxine, check TSH.     #9 DVT prophylaxis: Subcu heparin.     #10 CODE STATUS: DNR CCA, confirmed with the patient, she declined CPR, chest compressions, intubation mechanical ventilation.    Daily progress/update:  5/5/2025:  On room air, but does have desaturation events while asleep. Will have overnight pulse oximetry tonight to evaluate whether or not she would benefit from nocturnal oxygen for likely sleep apnea. Will need formal outpatient sleep study. back pain is getting better, received IV Lasix yesterday, started on Demadex today, I will try to avoid excessive diuresis because of history of CKD, she is on prednisone, today CBC and BMP reviewed as above, kidney function stable, plan to continue same treatment, wean off oxygen as tolerated, PT OT evaluation and treatment, patient will likely need placement to skilled nursing facility, anticipate discharge in next 24 to 48 hours.       Bill Gu, DO     05/06/25  2:51 PM         [1]   Current Facility-Administered Medications:     acetaminophen (Tylenol) tablet 650 mg, 650 mg, oral, q4h PRN, Mary Carmen Amos MD, 650 mg at 05/06/25 0949    albuterol 2.5 mg /3 mL (0.083 %) nebulizer solution 2.5 mg, 2.5 mg, nebulization, q6h PRN, Mary Carmen Amos MD    allopurinol (Zyloprim) tablet 100 mg, 100 mg, oral, Daily, Mary Carmen Amos MD, 100 mg at 05/06/25 0949    buPROPion XL (Wellbutrin XL) 24 hr tablet 300 mg, 300 mg, oral, Daily, Mary Carmen Amos MD, 300 mg at 05/06/25 0949    dextromethorphan-guaifenesin (Robitussin DM)  mg/5 mL oral liquid 15 mL, 15 mL, oral, q4h PRN, Mary Carmen Amos MD    ferrous sulfate tablet 325 mg, 1 tablet, oral, Daily, Mary Carmen Amos MD, 325 mg at 05/06/25 0949    heparin (porcine) injection 5,000 Units, 5,000 Units, subcutaneous, q8h, Mary Carmen Amos,  MD, 5,000 Units at 05/06/25 1437    hydrALAZINE (Apresoline) injection 10 mg, 10 mg, intravenous, q6h PRN, Mary Carmen Amos MD, 10 mg at 05/06/25 0412    insulin lispro injection 0-10 Units, 0-10 Units, subcutaneous, TID AC, Mary Carmen Amos MD, 10 Units at 05/06/25 1230    ipratropium-albuteroL (Duo-Neb) 0.5-2.5 mg/3 mL nebulizer solution 3 mL, 3 mL, nebulization, q6h, Mary Carmen Amos MD, 3 mL at 05/06/25 1112    levothyroxine (Synthroid, Levoxyl) tablet 175 mcg, 175 mcg, oral, Daily before breakfast, Mary Carmen Amos MD, 175 mcg at 05/06/25 0416    magnesium hydroxide (Milk of Magnesia) 400 mg/5 mL suspension 30 mL, 30 mL, oral, Daily PRN, Mary Carmen Amos MD, 30 mL at 05/06/25 0949    melatonin tablet 3 mg, 3 mg, oral, Nightly PRN, Mary Carmen Amos MD, 3 mg at 05/04/25 2039    metoprolol tartrate (Lopressor) tablet 25 mg, 25 mg, oral, Daily, Mary Carmen Amos MD, 25 mg at 05/06/25 0949    metoprolol tartrate (Lopressor) tablet 50 mg, 50 mg, oral, Nightly, Mary Carmen Amos MD, 50 mg at 05/05/25 2105    morphine injection 2 mg, 2 mg, intravenous, q4h PRN, Mary Carmen Amos MD    ondansetron (Zofran) injection 4 mg, 4 mg, intravenous, q6h PRN, Mary Carmen Amos MD    oxyCODONE (Roxicodone) immediate release tablet 10 mg, 10 mg, oral, q6h PRN, Mary Carmen Amos MD, 10 mg at 05/06/25 0411    oxyCODONE (Roxicodone) immediate release tablet 5 mg, 5 mg, oral, q6h PRN, Mary Carmen Amos MD    oxygen (O2) therapy, , inhalation, Continuous PRN - O2/gases, Mary Carmen Amos MD, 2 L/min at 05/06/25 1112    pantoprazole (ProtoNix) EC tablet 40 mg, 40 mg, oral, Daily before breakfast, Mary Carmen Amos MD, 40 mg at 05/06/25 0416    pravastatin (Pravachol) tablet 20 mg, 20 mg, oral, Nightly, Mary Carmen Amos MD, 20 mg at 05/05/25 2106    predniSONE (Deltasone) tablet 40 mg, 40 mg, oral, Daily, Mary Carmen Amos MD, 40 mg at 05/06/25 0949    pyridoxine (Vitamin B-6) tablet 100 mg, 100 mg, oral, Daily,  Mary Carmen Amos MD, 100 mg at 05/06/25 0949    sennosides (Senokot) tablet 17.2 mg, 2 tablet, oral, BID, Mary Carmen Amos MD, 17.2 mg at 05/06/25 0949    torsemide (Demadex) tablet 20 mg, 20 mg, oral, Daily, Mary Carmen Amos MD, 20 mg at 05/06/25 0949

## 2025-05-06 NOTE — PROGRESS NOTES
"Physical Therapy    Physical Therapy Treatment    Patient Name: Lakeshia Park \"Nasra\"  MRN: [id number]  Today's Date: 5/6/2025  Time Calculation  Start Time: 1258  Stop Time: 1323  Time Calculation (min): 25 min     331/331-A    Assessment/Plan   PT Assessment  PT Assessment Results: Decreased endurance, Impaired balance, Decreased mobility, Decreased cognition, Decreased safety awareness, Obesity, Pain  Rehab Prognosis: Good  Barriers to Discharge Home: Caregiver assistance, Cognition needs, Physical needs  Caregiver Assistance: Patient lives alone and/or does not have reliable caregiver assistance  Cognition Needs: 24hr supervision for safety awareness needed, Medication and/or medical management daily assist needed, Recollection or understanding of precautions/restrictions limited, Insight of patient limited regarding functional ability/needs, Cognition-related high falls risk  Physical Needs: Ambulating household distances limited by function/safety, 24hr mobility assistance needed, 24hr ADL assistance needed  Evaluation/Treatment Tolerance: Patient limited by pain  End of Session Communication: PCT/NA/CTA  Assessment Comment: Pt. states she's feeling better today.  Has no c/o pain.  Verbal cues needed with seated TE which patient tolerated without c/o sx.'s.  Pt. able to walk in room with no LOB noted.  While up patient needed to use the bathroom.  States she may be a while.  End of Session Patient Position: Up in bathroom (call light within reach; PCA notified)  PT Plan  Inpatient/Swing Bed or Outpatient: Inpatient  PT Plan  Treatment/Interventions: Bed mobility, Transfer training, Gait training, Balance training, Strengthening, Endurance training, Therapeutic exercise, Therapeutic activity, Home exercise program  PT Plan: Ongoing PT  PT Frequency: 4 times per week  PT Discharge Recommendations: Moderate intensity level of continued care  Equipment Recommended upon Discharge: Bedside commode (BSC for over " toilet)  PT Recommended Transfer Status: Assist x1  PT - OK to Discharge: Yes (once medically appropriate and safe DC plan in place)    Current Problem:  Problem List[1]    General Visit Information:   PT  Visit  PT Received On: 05/06/25  General  Family/Caregiver Present: No  Prior to Session Communication: Bedside nurse  Patient Position Received: Up in chair, Alarm on  General Comment: Pt. agreeable to therapy.  Subjective     Precautions:  Precautions  Medical Precautions: Fall precautions, Oxygen therapy device and L/min    Vital Signs:  Vital Signs  SpO2: 98 %  Objective     Pain:  Pain Assessment  Pain Assessment: 0-10  0-10 (Numeric) Pain Score: 0 - No pain    Cognition:  Cognition  Orientation Level: Oriented X4    Postural Control:       Extremity/Trunk Assessments:                Activity Tolerance:  Activity Tolerance  Endurance: Tolerates less than 10 min exercise, no significant change in vital signs    Treatments:  Therapeutic Exercise  Therapeutic Exercise Performed: Yes  Therapeutic Exercise Activity 1: B HR's x10  Therapeutic Exercise Activity 2: B TR's x10  Therapeutic Exercise Activity 3: B LAQ's x10  Therapeutic Exercise Activity 4: B seated marches, alt x10  Therapeutic Exercise Activity 5: B hip abduction x10  Therapeutic Exercise Activity 6: B hip adduction x10           Ambulation/Gait Training  Ambulation/Gait Training Performed: Yes  Ambulation/Gait Training 1  Surface 1: Level tile  Device 1: Rolling walker  Gait Support Devices: Gait belt  Assistance 1: Contact guard, Close supervision, Moderate verbal cues  Quality of Gait 1: Diminished heel strike, Inconsistent stride length, Wide base of support, Decreased step length, Forward flexed posture  Comments/Distance (ft) 1: 20ft.  Transfers  Transfer: Yes  Transfer 1  Technique 1: Sit to stand, Stand to sit  Transfer Device 1: Gait belt, Walker  Transfer Level of Assistance 1: Contact guard          Outcome Measures:     [facility] Basic  Mobility  Turning from your back to your side while in a flat bed without using bedrails: A lot  Moving from lying on your back to sitting on the side of a flat bed without using bedrails: A lot  Moving to and from bed to chair (including a wheelchair): A little  Standing up from a chair using your arms (e.g. wheelchair or bedside chair): A little  To walk in hospital room: A little  Climbing 3-5 steps with railing: Total  Basic Mobility - Total Score: 14              FSS-ICU  Ambulation: Walks >/ or equal to 50 feet with any assistance x1  Rolling: Maximal assistance (performs 25% - 49% of task)  Sitting: Supervision or set-up only  Transfer Sit-to-Stand: Minimal assistance (performs 75% or more of task)  Transfer Supine-to-Sit: Maximal assistance (performs 25% - 49% of task)  Total Score: 15                       Education Documentation  Mobility Training, taught by Maranda Kilgore PTA at 5/6/2025  2:02 PM.  Learner: Patient  Readiness: Acceptance  Method: Explanation  Response: Verbalizes Understanding, Needs Reinforcement    Education Comments  No comments found.           EDUCATION:     Encounter Problems       Encounter Problems (Active)       PT Problem       PT Goal 1       Start:  05/05/25    Expected End:  05/19/25       Lakeshia Park will be independent with bed mobility for supine to and from sitting EOB without use of rail and bed flat           PT Goal 2       Start:  05/05/25    Expected End:  05/19/25       Lakeshia Park will transfer sit to and from stand using least restrictive assistive device mod indep           PT Goal 3       Start:  05/05/25    Expected End:  05/19/25       Lakeshia Park will demonstrate good safety awareness with transfers and mobility and with use of assistive device (proper hand placement).            PT Goal 4       Start:  05/05/25    Expected End:  05/19/25       Lakeshia Park will ambulate 75ft with assistive device , level surface, good balance, steady and  negotiate O2 tubing, SBA              Pain - Adult                  [1]   Patient Active Problem List  Diagnosis    Anemia    Asthma    Chronic low back pain    Claudication    Depression, major, recurrent, moderate    DM2 (diabetes mellitus, type 2) (Multi)    Eczema, dyshidrotic    Fecal incontinence    Frequent falls    Hiatal hernia    History of endometrial cancer    HTN (hypertension)    Hyperuricemia    Hypothyroidism    Iron deficiency anemia    Lower GI bleed    Mixed dyslipidemia    Stage 4 chronic kidney disease (Multi)    Tendinopathy of right rotator cuff    Vertigo    Lumbosacral pain    Class 2 severe obesity with serious comorbidity and body mass index (BMI) of 37.0 to 37.9 in adult    Hyperparathyroidism (Multi)    Closed nondisplaced fracture of styloid process of left radius with routine healing    Decreased functional mobility    Chronic combined systolic and diastolic heart failure    Acute exacerbation of chronic low back pain

## 2025-05-06 NOTE — CARE PLAN
The patient's goals for the shift include      The clinical goals for the shift include remain safe, pain less than 2  Pt successful

## 2025-05-07 VITALS
HEIGHT: 60 IN | SYSTOLIC BLOOD PRESSURE: 141 MMHG | OXYGEN SATURATION: 95 % | WEIGHT: 206.13 LBS | BODY MASS INDEX: 40.47 KG/M2 | HEART RATE: 61 BPM | RESPIRATION RATE: 19 BRPM | DIASTOLIC BLOOD PRESSURE: 71 MMHG | TEMPERATURE: 98.1 F

## 2025-05-07 LAB
ALBUMIN SERPL BCP-MCNC: 3.9 G/DL (ref 3.4–5)
ANION GAP SERPL CALC-SCNC: 18 MMOL/L (ref 10–20)
BUN SERPL-MCNC: 64 MG/DL (ref 6–23)
CALCIUM SERPL-MCNC: 9.1 MG/DL (ref 8.6–10.3)
CHLORIDE SERPL-SCNC: 96 MMOL/L (ref 98–107)
CO2 SERPL-SCNC: 28 MMOL/L (ref 21–32)
CREAT SERPL-MCNC: 2.51 MG/DL (ref 0.5–1.05)
EGFRCR SERPLBLD CKD-EPI 2021: 17 ML/MIN/1.73M*2
ERYTHROCYTE [DISTWIDTH] IN BLOOD BY AUTOMATED COUNT: 13.9 % (ref 11.5–14.5)
GLUCOSE BLD MANUAL STRIP-MCNC: 108 MG/DL (ref 74–99)
GLUCOSE BLD MANUAL STRIP-MCNC: 122 MG/DL (ref 74–99)
GLUCOSE BLD MANUAL STRIP-MCNC: 284 MG/DL (ref 74–99)
GLUCOSE SERPL-MCNC: 143 MG/DL (ref 74–99)
HCT VFR BLD AUTO: 33.2 % (ref 36–46)
HGB BLD-MCNC: 10.9 G/DL (ref 12–16)
MAGNESIUM SERPL-MCNC: 1.84 MG/DL (ref 1.6–2.4)
MCH RBC QN AUTO: 31.1 PG (ref 26–34)
MCHC RBC AUTO-ENTMCNC: 32.8 G/DL (ref 32–36)
MCV RBC AUTO: 95 FL (ref 80–100)
NRBC BLD-RTO: 0 /100 WBCS (ref 0–0)
PHOSPHATE SERPL-MCNC: 4.2 MG/DL (ref 2.5–4.9)
PLATELET # BLD AUTO: 274 X10*3/UL (ref 150–450)
POTASSIUM SERPL-SCNC: 4.8 MMOL/L (ref 3.5–5.3)
RBC # BLD AUTO: 3.51 X10*6/UL (ref 4–5.2)
SARS-COV-2 RNA RESP QL NAA+PROBE: NOT DETECTED
SODIUM SERPL-SCNC: 137 MMOL/L (ref 136–145)
WBC # BLD AUTO: 8.1 X10*3/UL (ref 4.4–11.3)

## 2025-05-07 PROCEDURE — 2500000005 HC RX 250 GENERAL PHARMACY W/O HCPCS: Performed by: HOSPITALIST

## 2025-05-07 PROCEDURE — 36415 COLL VENOUS BLD VENIPUNCTURE: CPT | Performed by: STUDENT IN AN ORGANIZED HEALTH CARE EDUCATION/TRAINING PROGRAM

## 2025-05-07 PROCEDURE — 82947 ASSAY GLUCOSE BLOOD QUANT: CPT

## 2025-05-07 PROCEDURE — 87635 SARS-COV-2 COVID-19 AMP PRB: CPT | Performed by: STUDENT IN AN ORGANIZED HEALTH CARE EDUCATION/TRAINING PROGRAM

## 2025-05-07 PROCEDURE — 83735 ASSAY OF MAGNESIUM: CPT | Performed by: STUDENT IN AN ORGANIZED HEALTH CARE EDUCATION/TRAINING PROGRAM

## 2025-05-07 PROCEDURE — 80069 RENAL FUNCTION PANEL: CPT | Performed by: STUDENT IN AN ORGANIZED HEALTH CARE EDUCATION/TRAINING PROGRAM

## 2025-05-07 PROCEDURE — 2500000004 HC RX 250 GENERAL PHARMACY W/ HCPCS (ALT 636 FOR OP/ED): Performed by: HOSPITALIST

## 2025-05-07 PROCEDURE — 94640 AIRWAY INHALATION TREATMENT: CPT

## 2025-05-07 PROCEDURE — 2500000002 HC RX 250 W HCPCS SELF ADMINISTERED DRUGS (ALT 637 FOR MEDICARE OP, ALT 636 FOR OP/ED): Performed by: HOSPITALIST

## 2025-05-07 PROCEDURE — 94761 N-INVAS EAR/PLS OXIMETRY MLT: CPT

## 2025-05-07 PROCEDURE — 94668 MNPJ CHEST WALL SBSQ: CPT

## 2025-05-07 PROCEDURE — 99239 HOSP IP/OBS DSCHRG MGMT >30: CPT | Performed by: STUDENT IN AN ORGANIZED HEALTH CARE EDUCATION/TRAINING PROGRAM

## 2025-05-07 PROCEDURE — 85027 COMPLETE CBC AUTOMATED: CPT | Performed by: STUDENT IN AN ORGANIZED HEALTH CARE EDUCATION/TRAINING PROGRAM

## 2025-05-07 PROCEDURE — 2500000001 HC RX 250 WO HCPCS SELF ADMINISTERED DRUGS (ALT 637 FOR MEDICARE OP): Performed by: HOSPITALIST

## 2025-05-07 RX ORDER — IPRATROPIUM BROMIDE AND ALBUTEROL SULFATE 2.5; .5 MG/3ML; MG/3ML
3 SOLUTION RESPIRATORY (INHALATION)
Qty: 90 ML | Refills: 0 | Status: SHIPPED | OUTPATIENT
Start: 2025-05-07

## 2025-05-07 RX ORDER — TRAMADOL HYDROCHLORIDE 50 MG/1
50 TABLET ORAL EVERY 8 HOURS PRN
Qty: 3 TABLET | Refills: 0 | Status: SHIPPED | OUTPATIENT
Start: 2025-05-07

## 2025-05-07 RX ORDER — TORSEMIDE 20 MG/1
20 TABLET ORAL DAILY
Qty: 30 TABLET | Refills: 0 | Status: SHIPPED | OUTPATIENT
Start: 2025-05-08 | End: 2025-06-07

## 2025-05-07 RX ADMIN — Medication 100 MG: at 09:23

## 2025-05-07 RX ADMIN — METOPROLOL TARTRATE 25 MG: 25 TABLET, FILM COATED ORAL at 09:23

## 2025-05-07 RX ADMIN — TORSEMIDE 20 MG: 20 TABLET ORAL at 09:26

## 2025-05-07 RX ADMIN — Medication 21 PERCENT: at 11:42

## 2025-05-07 RX ADMIN — INSULIN LISPRO 6 UNITS: 100 INJECTION, SOLUTION INTRAVENOUS; SUBCUTANEOUS at 12:12

## 2025-05-07 RX ADMIN — PREDNISONE 40 MG: 20 TABLET ORAL at 09:22

## 2025-05-07 RX ADMIN — PANTOPRAZOLE SODIUM 40 MG: 40 TABLET, DELAYED RELEASE ORAL at 06:36

## 2025-05-07 RX ADMIN — IPRATROPIUM BROMIDE AND ALBUTEROL SULFATE 3 ML: 2.5; .5 SOLUTION RESPIRATORY (INHALATION) at 11:43

## 2025-05-07 RX ADMIN — LEVOTHYROXINE SODIUM 175 MCG: 0.03 TABLET ORAL at 06:34

## 2025-05-07 RX ADMIN — SENNOSIDES 17.2 MG: 8.6 TABLET, FILM COATED ORAL at 09:23

## 2025-05-07 RX ADMIN — Medication 21 PERCENT: at 06:44

## 2025-05-07 RX ADMIN — FERROUS SULFATE TAB 325 MG (65 MG ELEMENTAL FE) 325 MG: 325 (65 FE) TAB at 09:23

## 2025-05-07 RX ADMIN — IPRATROPIUM BROMIDE AND ALBUTEROL SULFATE 3 ML: 2.5; .5 SOLUTION RESPIRATORY (INHALATION) at 00:37

## 2025-05-07 RX ADMIN — HEPARIN SODIUM 5000 UNITS: 5000 INJECTION INTRAVENOUS; SUBCUTANEOUS at 05:36

## 2025-05-07 RX ADMIN — BUPROPION HYDROCHLORIDE 300 MG: 300 TABLET, EXTENDED RELEASE ORAL at 09:25

## 2025-05-07 RX ADMIN — IPRATROPIUM BROMIDE AND ALBUTEROL SULFATE 3 ML: 2.5; .5 SOLUTION RESPIRATORY (INHALATION) at 06:44

## 2025-05-07 RX ADMIN — ACETAMINOPHEN 650 MG: 325 TABLET ORAL at 04:46

## 2025-05-07 RX ADMIN — ALLOPURINOL 100 MG: 100 TABLET ORAL at 09:23

## 2025-05-07 ASSESSMENT — PAIN - FUNCTIONAL ASSESSMENT
PAIN_FUNCTIONAL_ASSESSMENT: 0-10

## 2025-05-07 ASSESSMENT — PAIN DESCRIPTION - LOCATION: LOCATION: BACK

## 2025-05-07 ASSESSMENT — PAIN SCALES - GENERAL
PAINLEVEL_OUTOF10: 1
PAINLEVEL_OUTOF10: 0 - NO PAIN
PAINLEVEL_OUTOF10: 0 - NO PAIN
PAINLEVEL_OUTOF10: 3
PAINLEVEL_OUTOF10: 7

## 2025-05-07 NOTE — DISCHARGE SUMMARY
Discharge Diagnosis  Acute exacerbation of chronic low back pain    Issues Requiring Follow-Up  PT/OT at SNF    Discharge Meds     Medication List      START taking these medications     ipratropium-albuteroL 0.5-2.5 mg/3 mL nebulizer solution; Commonly known   as: Duo-Neb; Take 3 mL by nebulization 3 times a day.   torsemide 20 mg tablet; Commonly known as: Demadex; Take 1 tablet (20   mg) by mouth once daily.; Start taking on: May 8, 2025     CHANGE how you take these medications     traMADol 50 mg tablet; Commonly known as: Ultram; Take 1 tablet (50 mg)   by mouth every 8 hours if needed for severe pain (7 - 10) (For Pain.).;   What changed: when to take this     CONTINUE taking these medications     acetaminophen 500 mg tablet; Commonly known as: Tylenol   albuterol 90 mcg/actuation inhaler; INHALE 2 PUFFS Every 4 hours as   needed for shortness of breath or wheezing   allopurinol 100 mg tablet; Commonly known as: Zyloprim   benzonatate 100 mg capsule; Commonly known as: Tessalon   buPROPion  mg 12 hr tablet; Commonly known as: Wellbutrin SR; Take   1 tablet (150 mg) by mouth 2 times a day.   calcium carbonate 500 mg (200 mg elemental) chewable tablet; Commonly   known as: Tums   cyanocobalamin 1,000 mcg tablet; Commonly known as: Vitamin B-12   cyclobenzaprine 5 mg tablet; Commonly known as: Flexeril; Take 2 tablets   (10 mg) by mouth as needed at bedtime for muscle spasms.   Depend Underwear For Women Lrg misc; Generic drug:   diaper,brief,adult,disposable; Use 4 times per day   dextromethorphan-guaifenesin  mg/5 mL oral liquid; Commonly known   as: Robitussin DM   diclofenac sodium 1 % gel; Commonly known as: Voltaren; Apply 4.5 inches   (4 g) topically 4 times a day as needed (HAVE MEDICAL NURSE OK DUE TO   ALLERGIES).   docusate sodium 100 mg capsule; Commonly known as: Colace   ferrous sulfate 325 mg (65 mg elemental) tablet   hydrocortisone 2.5 % cream   levothyroxine 175 mcg tablet; Commonly  known as: Synthroid, Levoxyl;   Take 1 tablet (175 mcg) by mouth once daily in the morning. Take before   meals.   loperamide 2 mg capsule; Commonly known as: Imodium   lovastatin 20 mg tablet; Commonly known as: Mevacor   lubricating eye drops ophthalmic solution   meclizine 25 mg tablet; Commonly known as: Antivert; TAKE 1 TABLET BY   MOUTH THREE TIMES DAILY AS NEEDED for dizziness   metoprolol tartrate 50 mg tablet; Commonly known as: Lopressor; Take 1/2   tablet in the morning and 1 tablet in the night   Milk of Magnesia 400 mg/5 mL suspension; Generic drug: magnesium   hydroxide   Mylanta Maximum Strength 400-400-40 mg/5 mL suspension; Generic drug:   alum-mag hydroxide-simeth   olopatadine 0.1 % ophthalmic solution; Commonly known as: Patanol   Pads For Women pad; Generic drug: incontinence pad, liner, disp; Extra   long pads up to 4 per day   polyethylene glycol 17 gram packet; Commonly known as: Glycolax, Miralax   pyridoxine 100 mg tablet; Commonly known as: Vitamin B-6   Qvar RediHaler 80 mcg/actuation inhaler; Generic drug: beclomethasone   sennosides 8.6 mg tablet; Commonly known as: Senokot   SYSTANE COMPLETE OPHT   Ultra-Light Rollator misc; Generic drug: walker; Use for ambulation     STOP taking these medications     HYDROcodone-acetaminophen 5-325 mg tablet; Commonly known as: Norco       Test Results Pending At Discharge  Pending Labs       No current pending labs.            Hospital Course  Lakeshia Park is a 89 y.o. female with PMHx significant for who was admitted to [facility] for management of r intractable low back pain, hypoxia likely due to combination of mild COPD exacerbation and mild acute on chronic systolic CHF exacerbation.        CT scan of the lumbar spine without contrast showed no acute fractures or dislocations, showed thoracolumbar spine scoliotic deformity with advanced multilevel spondylosis and degenerative changes.     Routine blood work was remarkable for BUN of 33,  creatinine is 2.14. EKG revealed normal sinus rhythm with first-degree AV block which is chronic, no acute changes. Troponin was negative. BNP was 548. Chest x-ray showed minimal basilar pulmonary vascular congestion, no significant change compared to previous chest x-rays.     #intractable acute on chronic low back pain: Resolved    #mild acute on chronic systolic CHF: This is based on symptoms of shortness of breath, mild lip edema and chest x-ray findings.  Patient had 2D echocardiogram on March 2025, showed mildly decreased LV function, EF was 45 to 50%, global hypokinesis of the left ventricle.  Plan: Continue torsemide, beta-blockers, monitor volume status and kidney function.     #probable mild acute COPD exacerbation: Resolved. Continue Duoneb TID + albuterol rescue inhaler      #stage IV chronic kidney disease: Renal function at baseline. Baseline creatinine is around 2 to 2.5 mg/dL. Continue Torsemide.     #hypothyroidism: Continue levothyroxine    > 30min in discharge coordination of care which includes: discharge planning, medication reconciliation, arranging appropriate follow up and discussion of discharge instructions with the patient.      Pertinent Physical Exam At Time of Discharge  General: Awake, alert, oriented x3, no significant distress, cooperative.  HEENT: EOM intact,   Chest: Decreased breath sounds bilateral at the bases, otherwise clear, no wheezes, no crackles, no rhonchi.  Heart: Regular rate and rhythm, S1-S2 normal, no murmur, no gallops.  Abdomen: Soft, nontender, no organomegaly, no ascites, no guarding or rigidity, morbidly obese.  Neurological: Alert and oriented x3, cranial nerves are grossly intact, normal power and tone of 4 limbs.  Skin: No lesions, no skin rash.  Warm and dry.  Musculoskeletal: Normal, atraumatic, no obvious deformities.  Legs: Trace leg edema, no clubbing or cyanosis.  Psych: Appropriate mood and behavior.    Outpatient Follow-Up  Future Appointments   Date  Time Provider Department [place]   7/2/2025  2:00 PM DUSTY Allen, DNP [id number] [facility]   4/30/2026  9:40 AM DUSTY Holm [id number] [facility]         Bill Gu DO

## 2025-05-07 NOTE — PROGRESS NOTES
Per medical team, patient is medically appropriate for discharge today.  to meet with patient at bedside to review discharge plan; Medicare IM completed yesterday. SW/DSC to send updated notes and final orders to [facility] via [facility] when complete. SANDIE/Melissa to complete HENS document. Nursing to confirm transport.  - 1330: Transport confirmed for 1400. Facility and patient updated.  - 1425: Transport moved to 1600. Facility and patient updated. Plan for patient is to discharge to [facility] at 1600 today. No further Care Transitions needs foreseen. Care Transitions available upon request. GURJIT Valdse

## 2025-05-07 NOTE — CARE PLAN
The patient's goals for the shift include      The clinical goals for the shift include remain safe, pain less than 2

## 2025-05-07 NOTE — CARE PLAN
Problem: Pain - Adult  Goal: Verbalizes/displays adequate comfort level or baseline comfort level  Outcome: Progressing     Problem: Safety - Adult  Goal: Free from fall injury  Outcome: Progressing     Problem: Discharge Planning  Goal: Discharge to home or other facility with appropriate resources  Outcome: Progressing     Problem: Chronic Conditions and Co-morbidities  Goal: Patient's chronic conditions and co-morbidity symptoms are monitored and maintained or improved  Outcome: Progressing     Problem: Nutrition  Goal: Nutrient intake appropriate for maintaining nutritional needs  Outcome: Progressing     Problem: Fall/Injury  Goal: Not fall by end of shift  Outcome: Progressing  Goal: Be free from injury by end of the shift  Outcome: Progressing  Goal: Verbalize understanding of personal risk factors for fall in the hospital  Outcome: Progressing  Goal: Verbalize understanding of risk factor reduction measures to prevent injury from fall in the home  Outcome: Progressing  Goal: Use assistive devices by end of the shift  Outcome: Progressing  Goal: Pace activities to prevent fatigue by end of the shift  Outcome: Progressing     Problem: Diabetes  Goal: Achieve decreasing blood glucose levels by end of shift  Outcome: Progressing  Goal: Increase stability of blood glucose readings by end of shift  Outcome: Progressing  Goal: Decrease in ketones present in urine by end of shift  Outcome: Progressing  Goal: Maintain electrolyte levels within acceptable range throughout shift  Outcome: Progressing  Goal: Maintain glucose levels >70mg/dl to <250mg/dl throughout shift  Outcome: Progressing  Goal: No changes in neurological exam by end of shift  Outcome: Progressing  Goal: Learn about and adhere to nutrition recommendations by end of shift  Outcome: Progressing  Goal: Vital signs within normal range for age by end of shift  Outcome: Progressing  Goal: Increase self care and/or family involovement by end of  shift  Outcome: Progressing  Goal: Receive DSME education by end of shift  Outcome: Progressing     Problem: Pain  Goal: Takes deep breaths with improved pain control throughout the shift  Outcome: Progressing  Goal: Turns in bed with improved pain control throughout the shift  Outcome: Progressing  Goal: Walks with improved pain control throughout the shift  Outcome: Progressing  Goal: Performs ADL's with improved pain control throughout shift  Outcome: Progressing  Goal: Participates in PT with improved pain control throughout the shift  Outcome: Progressing  Goal: Free from opioid side effects throughout the shift  Outcome: Progressing  Goal: Free from acute confusion related to pain meds throughout the shift  Outcome: Progressing     Problem: Respiratory  Goal: Clear secretions with interventions this shift  Outcome: Progressing  Goal: Minimize anxiety/maximize coping throughout shift  Outcome: Progressing  Goal: Minimal/no exertional discomfort or dyspnea this shift  Outcome: Progressing  Goal: No signs of respiratory distress (eg. Use of accessory muscles. Peds grunting)  Outcome: Progressing  Goal: Patent airway maintained this shift  Outcome: Progressing  Goal: Tolerate mechanical ventilation evidenced by VS/agitation level this shift  Outcome: Progressing  Goal: Tolerate pulmonary toileting this shift  Outcome: Progressing  Goal: Verbalize decreased shortness of breath this shift  Outcome: Progressing  Goal: Wean oxygen to maintain O2 saturation per order/standard this shift  Outcome: Progressing  Goal: Increase self care and/or family involvement in next 24 hours  Outcome: Progressing     Problem: Skin  Goal: Decreased wound size/increased tissue granulation at next dressing change  Outcome: Progressing  Flowsheets (Taken 5/7/2025 1025)  Decreased wound size/increased tissue granulation at next dressing change: Promote sleep for wound healing  Goal: Participates in plan/prevention/treatment  measures  Outcome: Progressing  Flowsheets (Taken 5/7/2025 1025)  Participates in plan/prevention/treatment measures:   Elevate heels   Increase activity/out of bed for meals  Goal: Prevent/manage excess moisture  Outcome: Progressing  Flowsheets (Taken 5/7/2025 1025)  Prevent/manage excess moisture: Monitor for/manage infection if present  Goal: Prevent/minimize sheer/friction injuries  Outcome: Progressing  Flowsheets (Taken 5/7/2025 1025)  Prevent/minimize sheer/friction injuries:   HOB 30 degrees or less   Increase activity/out of bed for meals  Goal: Promote/optimize nutrition  Outcome: Progressing  Flowsheets (Taken 5/7/2025 1025)  Promote/optimize nutrition:   Consume > 50% meals/supplements   Offer water/supplements/favorite foods  Goal: Promote skin healing  Outcome: Progressing  Flowsheets (Taken 5/7/2025 1025)  Promote skin healing:   Assess skin/pad under line(s)/device(s)   Ensure correct size (line/device) and apply per  instructions    The clinical goals for the shift include no falls, decrease pain to tolerable level

## 2025-05-07 NOTE — NURSING NOTE
Report called to STEVE Rabago at [facility]. All questions answered at this time.     Discharge Note: 05/07/2025 at 1553  Discharged via stretcher by [facility] to SNF. Paperwork packet sent with transporter and pt personal belongings sent with transporter. No distress noted or complaints voiced at this time.   MADIE ArzolaN, RN

## 2025-05-07 NOTE — CONSULTS
Heart Failure Nurse Navigator    The role of the HF nurse navigator is to (1) characterize risk profiles of patients with heart failure transitioning from [id number] after hospitalization, (2) recommend interventions to improve care and reduce risks of worse post-hospitalization outcomes. Potential recommendations may touch base on patient/family education, additional consults that may bring value, and appointment scheduling.    History    I met with Lakeshia Park at the bedside and provided the following:  - Living With Heart Failure book provided.  - HF signs and symptoms, heart failure zones and when to call cardiologist.   - Controlling Heart Failure at Home: medication adherence, following up with cardiologist at least once yearly, staying healthy and active, limiting sodium and fluid intake as directed by cardiologist.  - Daily Weight Education    Patients Cardiologist(s): Franklin Coleman CNP [facility]    Patients Primary Care Provider: Alonso Westbrook CNP    1. Medical Domain  What is the patient's most recent LVEF? 45-50%  HFrEF (LVEF <= 40%) Quadruple therapy recommended  HFmrEF (LVEF 41-49%) Quadruple therapy recommended  HFpEF (LVEF >= 50%) Minimum recommendations: SGLT2i and MRA  Is the patient on OP GDMT for their condition?   ARNI/ACEI/ARB: No None  BB: Yes Metoprolol tartrate 25 mg BID  MRA: No None  SGLT2i: No None  Is the patient prescribed a diuretic? No      IP Medications:  ARNi/ACEi/ARB: None  BB: Metoprolol tartrate 25 mg BID  MRA: None  SGLT2i: None  Diuretic: Torsemide 20 mg daily        Patient resides at the [facility] but is being discharged to \[facility]\"" for rehab with the plans to return to the [facility]. She recently seen Franklin Coleman CNP at [facility] in March. Reviewed our Heart Failure Clinic at [facility] and instructions given.

## 2025-05-09 LAB
ATRIAL RATE: 72 BPM
P AXIS: 83 DEGREES
P OFFSET: 152 MS
P ONSET: 96 MS
PR INTERVAL: 246 MS
Q ONSET: 219 MS
QRS COUNT: 12 BEATS
QRS DURATION: 92 MS
QT INTERVAL: 418 MS
QTC CALCULATION(BAZETT): 457 MS
QTC FREDERICIA: 444 MS
R AXIS: -46 DEGREES
T AXIS: 70 DEGREES
T OFFSET: 428 MS
VENTRICULAR RATE: 72 BPM

## 2025-05-19 ENCOUNTER — TELEPHONE (OUTPATIENT)
Dept: PRIMARY CARE | Facility: CLINIC | Age: OVER 89
End: 2025-05-19
Payer: MEDICARE

## 2025-05-19 NOTE — TELEPHONE ENCOUNTER
Message from Carey, PT with [facility]  They received orders for PT and OT  The PT eval was done today and they plan to see her 2x/week x4 weeks and reassess on the following week.  OT will be doing an assessment as well.  Please call with verbal ok with plan for PT    Carey [phone]

## 2025-05-19 NOTE — TELEPHONE ENCOUNTER
05/19/25 Patient is requesting topical patches for pain relief until she is scheduled with Pain Management.   Please call her advocate that is on her HIPAA.    Ph: [phone] Roxanne

## 2025-05-20 ENCOUNTER — HOSPITAL ENCOUNTER (EMERGENCY)
Facility: HOSPITAL | Age: OVER 89
Discharge: HOME | End: 2025-05-20
Attending: EMERGENCY MEDICINE
Payer: MEDICARE

## 2025-05-20 ENCOUNTER — APPOINTMENT (OUTPATIENT)
Dept: RADIOLOGY | Facility: HOSPITAL | Age: OVER 89
End: 2025-05-20
Payer: MEDICARE

## 2025-05-20 ENCOUNTER — TELEPHONE (OUTPATIENT)
Dept: PRIMARY CARE | Facility: CLINIC | Age: OVER 89
End: 2025-05-20
Payer: MEDICARE

## 2025-05-20 VITALS
OXYGEN SATURATION: 93 % | DIASTOLIC BLOOD PRESSURE: 57 MMHG | TEMPERATURE: 98.1 F | RESPIRATION RATE: 20 BRPM | SYSTOLIC BLOOD PRESSURE: 148 MMHG | BODY MASS INDEX: 32.81 KG/M2 | HEART RATE: 68 BPM | WEIGHT: 168 LBS

## 2025-05-20 DIAGNOSIS — G89.29 ACUTE EXACERBATION OF CHRONIC LOW BACK PAIN: Primary | ICD-10-CM

## 2025-05-20 DIAGNOSIS — M54.50 ACUTE EXACERBATION OF CHRONIC LOW BACK PAIN: Primary | ICD-10-CM

## 2025-05-20 PROCEDURE — 96376 TX/PRO/DX INJ SAME DRUG ADON: CPT

## 2025-05-20 PROCEDURE — 96374 THER/PROPH/DIAG INJ IV PUSH: CPT

## 2025-05-20 PROCEDURE — 99284 EMERGENCY DEPT VISIT MOD MDM: CPT | Performed by: EMERGENCY MEDICINE

## 2025-05-20 PROCEDURE — 2500000004 HC RX 250 GENERAL PHARMACY W/ HCPCS (ALT 636 FOR OP/ED): Performed by: EMERGENCY MEDICINE

## 2025-05-20 PROCEDURE — 72148 MRI LUMBAR SPINE W/O DYE: CPT | Performed by: RADIOLOGY

## 2025-05-20 PROCEDURE — 96375 TX/PRO/DX INJ NEW DRUG ADDON: CPT

## 2025-05-20 PROCEDURE — 72148 MRI LUMBAR SPINE W/O DYE: CPT

## 2025-05-20 RX ORDER — HYDROMORPHONE HYDROCHLORIDE 1 MG/ML
1 INJECTION, SOLUTION INTRAMUSCULAR; INTRAVENOUS; SUBCUTANEOUS ONCE
Status: COMPLETED | OUTPATIENT
Start: 2025-05-20 | End: 2025-05-20

## 2025-05-20 RX ORDER — ONDANSETRON HYDROCHLORIDE 2 MG/ML
4 INJECTION, SOLUTION INTRAVENOUS ONCE
Status: COMPLETED | OUTPATIENT
Start: 2025-05-20 | End: 2025-05-20

## 2025-05-20 RX ORDER — BUPROPION HYDROCHLORIDE 150 MG/1
150 TABLET, EXTENDED RELEASE ORAL 2 TIMES DAILY
COMMUNITY

## 2025-05-20 RX ORDER — HYDROCODONE BITARTRATE AND ACETAMINOPHEN 5; 325 MG/1; MG/1
1 TABLET ORAL EVERY 6 HOURS PRN
Qty: 12 TABLET | Refills: 0 | Status: SHIPPED | OUTPATIENT
Start: 2025-05-20 | End: 2025-05-23

## 2025-05-20 RX ADMIN — HYDROMORPHONE HYDROCHLORIDE 1 MG: 1 INJECTION, SOLUTION INTRAMUSCULAR; INTRAVENOUS; SUBCUTANEOUS at 11:58

## 2025-05-20 RX ADMIN — ONDANSETRON 4 MG: 2 INJECTION, SOLUTION INTRAMUSCULAR; INTRAVENOUS at 09:39

## 2025-05-20 RX ADMIN — HYDROMORPHONE HYDROCHLORIDE 0.5 MG: 1 INJECTION, SOLUTION INTRAMUSCULAR; INTRAVENOUS; SUBCUTANEOUS at 09:39

## 2025-05-20 ASSESSMENT — PAIN - FUNCTIONAL ASSESSMENT: PAIN_FUNCTIONAL_ASSESSMENT: 0-10

## 2025-05-20 ASSESSMENT — PAIN DESCRIPTION - LOCATION: LOCATION: BACK

## 2025-05-20 ASSESSMENT — COLUMBIA-SUICIDE SEVERITY RATING SCALE - C-SSRS
2. HAVE YOU ACTUALLY HAD ANY THOUGHTS OF KILLING YOURSELF?: NO
1. IN THE PAST MONTH, HAVE YOU WISHED YOU WERE DEAD OR WISHED YOU COULD GO TO SLEEP AND NOT WAKE UP?: NO
6. HAVE YOU EVER DONE ANYTHING, STARTED TO DO ANYTHING, OR PREPARED TO DO ANYTHING TO END YOUR LIFE?: NO

## 2025-05-20 ASSESSMENT — PAIN SCALES - GENERAL
PAINLEVEL_OUTOF10: 5 - MODERATE PAIN
PAINLEVEL_OUTOF10: 10 - WORST POSSIBLE PAIN

## 2025-05-20 NOTE — TELEPHONE ENCOUNTER
Called and LVM for Rosita that PCP will follow orders. Left number to call back with any questions or concerns.

## 2025-05-20 NOTE — TELEPHONE ENCOUNTER
Message from Rosita castillo/[facility].  They got a referral for [facility] and have started care for her.  Need to verify that Alonso will follow her for [facility].  Please call [phone] ext [phone]  ok to leave a message

## 2025-05-20 NOTE — ED PROVIDER NOTES
HPI   Chief Complaint   Patient presents with    Back Pain     Back pain, buttock pain radiated down both legs, Left is worse than right.       Patient presents to the emergency department secondary to lower back pain.  The nursing notes were reviewed and this was noted to contribute directly to patient care.  Nursing notes state that the patient's pain is bilateral but mostly the left.  At the time my exam the patient is complaining of pain to the right buttocks radiating down her right lower extremity.  The medical record was reviewed and this was noted to contribute directly to patient care.  The patient was recently admitted to our facility for similar symptoms.  She had a CAT scan performed which showed degenerative changes.  She is currently taking Ultram which is not helping her symptoms.  States this current flare began at about 6 AM today.  Denies loss of bowel or bladder continence.  Denies saddle anesthesia.  No recent falls.      History provided by:  Patient, EMS personnel and medical records   used: No            Patient History   Medical History[1]  Surgical History[2]  Family History[3]  Social History[4]    Physical Exam   ED Triage Vitals [05/20/25 0833]   Temperature Heart Rate Resp BP   36.7 °C (98.1 °F) 61 -- 146/77      Pulse Ox Temp Source Heart Rate Source Patient Position   96 % Oral -- --      BP Location FiO2 (%)     -- --       Physical Exam  Vitals and nursing note reviewed.   Constitutional:       General: She is not in acute distress.     Appearance: Normal appearance. She is obese. She is not ill-appearing, toxic-appearing or diaphoretic.      Comments: When I walk into the room the patient is lying in the left lateral recumbent position and she is yelling out for help.  Appears uncomfortable.   HENT:      Head: Normocephalic and atraumatic.      Nose: Nose normal. No rhinorrhea.   Neck:      Comments: Trachea is midline  Cardiovascular:      Rate and Rhythm:  Normal rate and regular rhythm.      Pulses: Normal pulses.      Heart sounds: No murmur heard.  Pulmonary:      Effort: Pulmonary effort is normal.      Breath sounds: Normal breath sounds. No wheezing.   Abdominal:      General: Abdomen is flat. Bowel sounds are normal. There is no distension.      Palpations: Abdomen is soft.      Tenderness: There is no abdominal tenderness.   Musculoskeletal:         General: Tenderness present. Normal range of motion.      Cervical back: Normal range of motion.      Comments: Generalized tenderness throughout the paraspinal musculature of the bilateral lumbar spine.  No specific point tenderness over the midline.   Skin:     General: Skin is warm and dry.      Findings: No rash.   Neurological:      General: No focal deficit present.      Mental Status: She is alert and oriented to person, place, and time. Mental status is at baseline.      Sensory: No sensory deficit.      Comments: No appreciated saddle anesthesia.  Full sensation over the bilateral lower extremities dermatomes.   Psychiatric:         Mood and Affect: Mood normal.         Behavior: Behavior normal.         Thought Content: Thought content normal.         Judgment: Judgment normal.           ED Course & MDM   Diagnoses as of 05/20/25 1412   Acute exacerbation of chronic low back pain                 No data recorded     S[place] Coma Scale Score: 15 (05/20/25 0832 : Nadja Carreon, STEVE)                           Medical Decision Making  Given the patient's clinical presentation an MRI was ordered which shows degenerative changes without acute process.  The patient was medicated with IM Dilaudid here with improvement of her symptoms.    I discussed the patient case with Qamar Westbrook the patient's primary care provider and he is in agreement with discharge back to her ECF and requesting the patient's tramadol be discontinued and she be started on Norco.  He will manage her chronic pain needs thereafter.  Patient was  given a hardcopy prescription for Norco given that she is in a nursing home to ensure that this gets addressed/filled.  Return for any other ongoing concerns.        Procedure  Procedures       [1]   Past Medical History:  Diagnosis Date    Arthritis     CHF (congestive heart failure)     COPD (chronic obstructive pulmonary disease) (Multi)     Diabetes mellitus (Multi)     Hypertension    [2]   Past Surgical History:  Procedure Laterality Date    ESOPHAGOGASTRODUODENOSCOPY  04/29/2024    OTHER SURGICAL HISTORY  09/13/2019    Facial surgery    OTHER SURGICAL HISTORY  09/13/2019    Gallbladder surgery    OTHER SURGICAL HISTORY  09/13/2019    Appendectomy    OTHER SURGICAL HISTORY  09/13/2019    Hysterectomy    OTHER SURGICAL HISTORY  09/13/2019    Colon surgery    OTHER SURGICAL HISTORY  10/24/2019    Thyroid surgery   [3]   Family History  Problem Relation Name Age of Onset    Diabetes Mother      Brain cancer Mother      Brain cancer Brother     [4]   Social History  Tobacco Use    Smoking status: Never    Smokeless tobacco: Never   Vaping Use    Vaping status: Never Used   Substance Use Topics    Alcohol use: Not Currently    Drug use: Never        Raymon Olsen DO  05/20/25 9227

## 2025-05-21 ENCOUNTER — PATIENT OUTREACH (OUTPATIENT)
Dept: PRIMARY CARE | Facility: CLINIC | Age: OVER 89
End: 2025-05-21
Payer: MEDICARE

## 2025-05-21 NOTE — PROGRESS NOTES
Discharge Facility: [facility]  Discharge Diagnosis: chronic low back pain  Admission Date:  5/7/2025  Discharge Date: 5/16/2025    PCP Appointment Date: none  Specialist Appointment Date:   -nephrology 7/2/2025    Hospital Encounter and Summary Linked: Yes  ED to Hosp-Admission (Discharged) with Bill Gu DO; Paulino Veronica DO (05/04/2025)     Hospital admission 5/4/2025-5/7/2025 [facility] [place]    Patient lives at [facility]    Two attempts were made to reach patient within two business days after discharge. Left voicemail with contact information for patient to call back with any non-emergent questions or concerns.

## 2025-05-22 ENCOUNTER — OFFICE VISIT (OUTPATIENT)
Dept: PAIN MEDICINE | Facility: CLINIC | Age: OVER 89
End: 2025-05-22
Payer: MEDICARE

## 2025-05-22 ENCOUNTER — TELEPHONE (OUTPATIENT)
Dept: PRIMARY CARE | Facility: CLINIC | Age: OVER 89
End: 2025-05-22

## 2025-05-22 VITALS — HEART RATE: 61 BPM | SYSTOLIC BLOOD PRESSURE: 141 MMHG | RESPIRATION RATE: 16 BRPM | DIASTOLIC BLOOD PRESSURE: 72 MMHG

## 2025-05-22 DIAGNOSIS — M54.41 CHRONIC BILATERAL LOW BACK PAIN WITH BILATERAL SCIATICA: ICD-10-CM

## 2025-05-22 DIAGNOSIS — G89.29 CHRONIC BILATERAL LOW BACK PAIN WITH BILATERAL SCIATICA: ICD-10-CM

## 2025-05-22 DIAGNOSIS — M48.062 SPINAL STENOSIS OF LUMBAR REGION WITH NEUROGENIC CLAUDICATION: ICD-10-CM

## 2025-05-22 DIAGNOSIS — Z74.09 IMMOBILITY: Primary | ICD-10-CM

## 2025-05-22 DIAGNOSIS — M54.50 LUMBOSACRAL PAIN: ICD-10-CM

## 2025-05-22 DIAGNOSIS — M54.16 LUMBAR RADICULOPATHY: Primary | ICD-10-CM

## 2025-05-22 DIAGNOSIS — G89.29 ACUTE EXACERBATION OF CHRONIC LOW BACK PAIN: ICD-10-CM

## 2025-05-22 DIAGNOSIS — M54.50 ACUTE EXACERBATION OF CHRONIC LOW BACK PAIN: ICD-10-CM

## 2025-05-22 DIAGNOSIS — M54.42 CHRONIC BILATERAL LOW BACK PAIN WITH BILATERAL SCIATICA: ICD-10-CM

## 2025-05-22 PROCEDURE — 99214 OFFICE O/P EST MOD 30 MIN: CPT | Performed by: PHYSICIAN ASSISTANT

## 2025-05-22 RX ORDER — LIDOCAINE HYDROCHLORIDE 20 MG/ML
6 INJECTION, SOLUTION EPIDURAL; INFILTRATION; INTRACAUDAL; PERINEURAL ONCE
OUTPATIENT
Start: 2025-05-22 | End: 2025-05-22

## 2025-05-22 RX ORDER — GABAPENTIN 300 MG/1
300 CAPSULE ORAL NIGHTLY
Qty: 30 CAPSULE | Refills: 1 | Status: SHIPPED | OUTPATIENT
Start: 2025-05-22

## 2025-05-22 RX ORDER — METHYLPREDNISOLONE ACETATE 40 MG/ML
40 INJECTION, SUSPENSION INTRA-ARTICULAR; INTRALESIONAL; INTRAMUSCULAR; SOFT TISSUE ONCE
OUTPATIENT
Start: 2025-05-22 | End: 2025-05-22

## 2025-05-22 RX ORDER — SODIUM CHLORIDE 9 MG/ML
4 INJECTION, SOLUTION INTRAMUSCULAR; INTRAVENOUS; SUBCUTANEOUS ONCE
OUTPATIENT
Start: 2025-05-22 | End: 2025-05-22

## 2025-05-22 ASSESSMENT — ENCOUNTER SYMPTOMS
HEMATOLOGIC/LYMPHATIC NEGATIVE: 1
ARTHRALGIAS: 1
PSYCHIATRIC NEGATIVE: 1
ENDOCRINE NEGATIVE: 1
MYALGIAS: 1
WEAKNESS: 1
NUMBNESS: 1
CONSTITUTIONAL NEGATIVE: 1
GASTROINTESTINAL NEGATIVE: 1
EYES NEGATIVE: 1
CARDIOVASCULAR NEGATIVE: 1
BACK PAIN: 1
RESPIRATORY NEGATIVE: 1
ALLERGIC/IMMUNOLOGIC NEGATIVE: 1

## 2025-05-22 NOTE — H&P (VIEW-ONLY)
"Subjective   Patient ID: Lakeshia Park \"Nasra\" is a 89 y.o. female who presents for Back Pain (New visit her for evaluation of back pain she reports having pain in her sacrum and and gluteals and into her bilat legs into her bilat feet pain score  0/10, at worst 10/10 at worst, describes  as aching and stabbing, her bilat feet go numb when her pain is really bad. Her pain started months ago, she did have a previous fall but not sure if that caused it.  Left leg worse than right   She is not sure what makes her pain worse states \"it just hurts. \" )  She has tried Voltaren gel, Flexeril, Tramadol, Norco, repositioning, exercises she was seen in the ER,  she lives at [facility] and was sent to [facility] [place], she had x-rays, CT and MRI, nothing is helping her pain.   CRISTIN score  26%, SOAPP  2,  screenings: depression,  smoking  NEGATIVE falls  [id number] provided    Marva Rivas RN 05/22/25 10:37 AM     Patient is a 89-year-old female.  She presents today with a family member and an aide from the facility that she lives at.  At this time, she has lower back pain with intermittent bilateral radiating leg pain.  Patient has somewhat of a difficult time telling me when this all started but she states that she has been to the ER 3 times in the last 2 months because of this pain.  She has a history of this 20 years ago and was in the hospital for a week and she states that they got better and she did well until March or April when the pain started to return.  She is going to the ER multiple times since and each time, they are able to get the pain better under control and she returns back to the facility she resides at but unfortunately, the pain keeps returning so she is here today to discuss her options to try to get some more significant relief of the pain she is experiencing.  The pain can range from 0/10 if she is sitting to a 10/10 if she is up and active and trying to do anything.  This affects her " quality of life.  This affects her activities.  Affects her ability to do things she wants to do.        Review of Systems   Constitutional: Negative.    HENT: Negative.     Eyes: Negative.    Respiratory: Negative.     Cardiovascular: Negative.    Gastrointestinal: Negative.    Endocrine: Negative.    Genitourinary: Negative.    Musculoskeletal:  Positive for arthralgias, back pain, gait problem and myalgias.   Skin: Negative.    Allergic/Immunologic: Negative.    Neurological:  Positive for weakness and numbness.   Hematological: Negative.    Psychiatric/Behavioral: Negative.         Objective   Physical Exam  Vitals and nursing note reviewed.   Constitutional:       General: She is not in acute distress.     Appearance: Normal appearance. She is not ill-appearing.   HENT:      Head: Normocephalic and atraumatic.      Right Ear: External ear normal.      Left Ear: External ear normal.      Nose: Nose normal.      Mouth/Throat:      Pharynx: Oropharynx is clear.   Eyes:      Conjunctiva/sclera: Conjunctivae normal.   Cardiovascular:      Rate and Rhythm: Normal rate and regular rhythm.      Pulses: Normal pulses.   Pulmonary:      Effort: Pulmonary effort is normal.      Breath sounds: Normal breath sounds.   Musculoskeletal:         General: Normal range of motion.      Cervical back: Normal range of motion.      Comments: 5/5 lower extremity strength other than bilateral hip flexion, ADF and EHL 3-4/5   Skin:     General: Skin is warm and dry.   Neurological:      General: No focal deficit present.      Mental Status: She is alert and oriented to person, place, and time. Mental status is at baseline.   Psychiatric:         Mood and Affect: Mood normal.         Behavior: Behavior normal.         Thought Content: Thought content normal.         Judgment: Judgment normal.         CT lumbar spine wo IV contrast  Status: Final result     PACS Images     Show images for CT lumbar spine wo IV contrast  Signed  by    Signed Time Phone Pager   Markos Calvo MD 5/04/2025 06:59 [phone]      Exam Information    Status Exam Begun Exam Ended   Final 5/04/2025 05:46 5/04/2025 06:02     Study Result    Narrative & Impression   Interpreted By:  Markos Calvo,   STUDY:  CT LUMBAR SPINE WO IV CONTRAST  5/4/2025 6:02 am      INDICATION:  Signs/Symptoms:pain          COMPARISON:  None.      ACCESSION NUMBER(S):  [id number]      ORDERING CLINICIAN:  ROLF MARSH      TECHNIQUE:  Axial CT images of the lumbar spine are obtained. Axial, coronal and  sagittal reconstructions are provided for review.      FINDINGS:  Alignment: Thoracolumbar spine scoliotic deformity.      Vertebrae/Disc Spaces: Osteopenia. Multilevel thoracic spine  degenerative changes with disc space narrowing.      Lower Thoracic Spine:  There is no significant central canal stenosis  in the included lower thoracic region.      T12-L1: Mild disc protrusion with osteophyte complex compressing the  thecal sac and mildly narrowing both exit foramina.      L1-2:  Mild left disc protrusion with a apparent osteophyte complex  and mild facet arthropathy causing mild exit neural foramina  narrowing.      L2-3: Posterior disc osteophyte complex mildly narrowing the thecal  sac and both exit foramina.      L3-4:  Posterior disc protrusion with osteophyte complex along with  facet arthropathy causing moderate thecal sac and  right-greater-than-left exit foramina narrowing.      L4-5: Diffuse disc bulge with osteophyte complex causing moderate  thecal sac narrowing as bilateral facet arthropathy causing neural  foramina narrowing.      L5-S1: Mild posterior disc osteophyte complex minimally narrowing  thecal sac.      Prevertebral/Paraspinal Soft Tissues: Aneurysmal dilatation of the  abdominal aorta measuring 3.5 cm with multifocal calcifications.      IMPRESSION:  1. No acute vertebral displaced fracture or major malalignment.  2. Thoracolumbar spine scoliotic deformity with  advanced multilevel  spondylosis/degenerative changes as detailed above. Findings could be  further assessed by dedicated lumbosacral spine MRI.  3. Aneurysmal dilatation of the abdominal aorta measuring 3.5 cm.      MACRO:  None      Signed by: Markos Calvo 5/4/2025 6:59 AM  Dictation workstation:   [id number]     MR lumbar spine wo IV contrast  Status: Final result     PACS Images     Show images for MR lumbar spine wo IV contrast  Signed by    Signed Time Phone Pager   Damien Tanner MD 5/20/2025 13:44 [phone]      Exam Information    Status Exam Begun Exam Ended   Final 5/20/2025 12:45 5/20/2025 13:34     Study Result    Narrative & Impression   Interpreted By:  Damien Tanner,   STUDY:  MR LUMBAR SPINE WO IV CONTRAST;  5/20/2025 1:34 pm      INDICATION:  Signs/Symptoms:intractable right lower back pain with radiculopathy.          COMPARISON:  None.      ACCESSION NUMBER(S):  [id number]      ORDERING CLINICIAN:  PUJA SANCHEZ      TECHNIQUE:  Sagittal STIR, T1- and T2-weighted as well as axial T1- and T2-  weighted MRI images of the lumbar spine were acquired using a  spondylolysis protocol.  No contrast was administered.      FINDINGS:  Moderate levoscoliosis. Grade 1 anterolisthesis L4 on L5.      Partial osseous fusion across the L5-S1 disc space.      No compression deformity or destructive osseous lesion.      Disc desiccated at multiple levels. Increased fluid signal in the  discs at T12-L1 and L3-L4. Mixed Modic type 1 and type 2 endplate  degenerative changes from T12-L1 through L4-L5.      Infrarenal abdominal aorta mildly aneurysmal 34 mm.      LEVELS:  L5-S1: Endplate spurring and mild facet arthrosis without significant  stenosis. L4-L5: Grade 1 anterolisthesis. Disc bulge and advanced  facet arthrosis with ligamentous thickening. Severe central canal  stenosis. Severe left and moderately severe right foraminal stenosis.  Moderate bilateral foraminal stenosis. L3-L4: Disc bulge  with  moderately advanced facet arthrosis and ligamentous thickening.  Moderate trefoil central canal stenosis. Moderate bilateral lateral  recess stenosis. Moderately severe AP narrowing of the farther right  lateral neural foramen. Mild left foraminal stenosis. L2-L3: Mild  disc bulge. Mild-to-moderate facet arthrosis. No significant central  canal or lateral recess stenosis. Mild bilateral foraminal stenosis.  L1-L2: Disc bulge with mild-to-moderate facet arthrosis. No  significant central canal or lateral recess stenosis. Moderate right  and mild-to-moderate left foraminal stenosis. T12-L1: Disc bulge with  mild-to-moderate facet arthrosis. Mild-to-moderate overall asymmetric  central canal stenosis. Moderate bilateral lateral recess stenosis.  Moderately severe right and moderate left foraminal stenosis.          IMPRESSION:  Moderate levoscoliosis.      Moderate to advanced multilevel degenerative lumbar spondylosis as  described above.      Infrarenal abdominal aorta with mildly aneurysmal to 34 mm.      MACRO:  None      Signed by: Damien Tanner 5/20/2025 1:44 PM  Dictation workstation:   [id number]     Assessment/Plan   Diagnoses and all orders for this visit:  Lumbar radiculopathy  -     Epidural Steroid Injection; Future  -     FL pain management; Future  -     gabapentin (Neurontin) 300 mg capsule; Take 1 capsule (300 mg) by mouth once daily at bedtime.  Spinal stenosis of lumbar region with neurogenic claudication  Chronic bilateral low back pain with bilateral sciatica  -     gabapentin (Neurontin) 300 mg capsule; Take 1 capsule (300 mg) by mouth once daily at bedtime.  Lumbosacral pain  Other orders  -     NPO Diet Except: Sips with meds; Effective now; Standing  -     Height and weight; Standing  -     Insert and maintain peripheral IV; Standing  -     Saline lock IV; Standing  -     POCT Glucose; Standing  -     Type And Screen; Standing  -     Inpatient consult to Respiratory Care; Standing  -      Adult diet Regular; Standing  -     Vital Signs; Standing  -     Notify physician - Standard Parameters; Standing  -     Continue IV fluids ordered pre-procedure; Standing  -     Prior to Discharge O2 Weaning; Standing  -     Pulse oximetry, continuous; Standing  -     Discharge patient; Standing  -     iohexol (OMNIPaque) 300 mg iodine/mL solution 3 mL  -     lidocaine PF (Xylocaine) 20 mg/mL (2 %) injection 120 mg  -     sodium chloride (PF) 0.9% solution 4 mL  -     methylPREDNISolone acetate (DEPO-Medrol) injection 40 mg       Patient is a 89-year-old female with a past medical history significant for the above-mentioned medical diagnoses.  At this time, she presents today with complaints of lower back pain with bilateral radiating leg pain, numbness, tingling and weakness.  This affects her ambulatory status.  This affects her quality life and affects activities and affects her ability to do things comfortably.  Over the last 2 months she has been to the ER multiple times due to the pain.  She also notes leg weakness and she is having a lot of difficulty with ambulation because of the pain.  She wants to know what she can do to prevent herself from having to go back to the ER.  We reviewed her CT scan and her MRI scan.  Based on her imaging findings, her failure to improve with conservative treatments and the significant pain she is experiencing that keeps taking her to the ER I do not feel she would be a good candidate for physical therapy.  I recommended an L5-S1 epidural injection to be done under fluoroscopy for both diagnostic antibeta purposes.  Procedure was discussed.  Risks and benefits were discussed.  Patient is agreeable.  She will follow-up 2 weeks after the injection for reevaluation.  Call clinic sooner if necessary.  In the meantime we will also start gabapentin 300 mg at bedtime.  Potential effects were discussed.  OARRS was reviewed and prescription sent to the pharmacy.

## 2025-05-22 NOTE — PROGRESS NOTES
"Subjective   Patient ID: Lakeshia Park \"Nasra\" is a 89 y.o. female who presents for Back Pain (New visit her for evaluation of back pain she reports having pain in her sacrum and and gluteals and into her bilat legs into her bilat feet pain score  0/10, at worst 10/10 at worst, describes  as aching and stabbing, her bilat feet go numb when her pain is really bad. Her pain started months ago, she did have a previous fall but not sure if that caused it.  Left leg worse than right   She is not sure what makes her pain worse states \"it just hurts. \" )  She has tried Voltaren gel, Flexeril, Tramadol, Norco, repositioning, exercises she was seen in the ER,  she lives at [facility] and was sent to [facility], she had x-rays, CT and MRI, nothing is helping her pain.   CRISTIN score  26%, SOAPP  2,  screenings: depression,  smoking  NEGATIVE falls  [id number] provided    Marva Rivas RN 05/22/25 10:37 AM     Patient is a 89-year-old female.  She presents today with a family member and an aide from the facility that she lives at.  At this time, she has lower back pain with intermittent bilateral radiating leg pain.  Patient has somewhat of a difficult time telling me when this all started but she states that she has been to the ER 3 times in the last 2 months because of this pain.  She has a history of this 20 years ago and was in the hospital for a week and she states that they got better and she did well until March or April when the pain started to return.  She is going to the ER multiple times since and each time, they are able to get the pain better under control and she returns back to the facility she resides at but unfortunately, the pain keeps returning so she is here today to discuss her options to try to get some more significant relief of the pain she is experiencing.  The pain can range from 0/10 if she is sitting to a 10/10 if she is up and active and trying to do anything.  This affects her " quality of life.  This affects her activities.  Affects her ability to do things she wants to do.        Review of Systems   Constitutional: Negative.    HENT: Negative.     Eyes: Negative.    Respiratory: Negative.     Cardiovascular: Negative.    Gastrointestinal: Negative.    Endocrine: Negative.    Genitourinary: Negative.    Musculoskeletal:  Positive for arthralgias, back pain, gait problem and myalgias.   Skin: Negative.    Allergic/Immunologic: Negative.    Neurological:  Positive for weakness and numbness.   Hematological: Negative.    Psychiatric/Behavioral: Negative.         Objective   Physical Exam  Vitals and nursing note reviewed.   Constitutional:       General: She is not in acute distress.     Appearance: Normal appearance. She is not ill-appearing.   HENT:      Head: Normocephalic and atraumatic.      Right Ear: External ear normal.      Left Ear: External ear normal.      Nose: Nose normal.      Mouth/Throat:      Pharynx: Oropharynx is clear.   Eyes:      Conjunctiva/sclera: Conjunctivae normal.   Cardiovascular:      Rate and Rhythm: Normal rate and regular rhythm.      Pulses: Normal pulses.   Pulmonary:      Effort: Pulmonary effort is normal.      Breath sounds: Normal breath sounds.   Musculoskeletal:         General: Normal range of motion.      Cervical back: Normal range of motion.      Comments: 5/5 lower extremity strength other than bilateral hip flexion, ADF and EHL 3-4/5   Skin:     General: Skin is warm and dry.   Neurological:      General: No focal deficit present.      Mental Status: She is alert and oriented to person, place, and time. Mental status is at baseline.   Psychiatric:         Mood and Affect: Mood normal.         Behavior: Behavior normal.         Thought Content: Thought content normal.         Judgment: Judgment normal.         CT lumbar spine wo IV contrast  Status: Final result     PACS Images     Show images for CT lumbar spine wo IV contrast  Signed  by    Signed Time Phone Pager   Markos Calvo MD 5/04/2025 06:59 [phone]      Exam Information    Status Exam Begun Exam Ended   Final 5/04/2025 05:46 5/04/2025 06:02     Study Result    Narrative & Impression   Interpreted By:  Markos Calvo,   STUDY:  CT LUMBAR SPINE WO IV CONTRAST  5/4/2025 6:02 am      INDICATION:  Signs/Symptoms:pain          COMPARISON:  None.      ACCESSION NUMBER(S):  [id number]      ORDERING CLINICIAN:  ROLF MARSH      TECHNIQUE:  Axial CT images of the lumbar spine are obtained. Axial, coronal and  sagittal reconstructions are provided for review.      FINDINGS:  Alignment: Thoracolumbar spine scoliotic deformity.      Vertebrae/Disc Spaces: Osteopenia. Multilevel thoracic spine  degenerative changes with disc space narrowing.      Lower Thoracic Spine:  There is no significant central canal stenosis  in the included lower thoracic region.      T12-L1: Mild disc protrusion with osteophyte complex compressing the  thecal sac and mildly narrowing both exit foramina.      L1-2:  Mild left disc protrusion with a apparent osteophyte complex  and mild facet arthropathy causing mild exit neural foramina  narrowing.      L2-3: Posterior disc osteophyte complex mildly narrowing the thecal  sac and both exit foramina.      L3-4:  Posterior disc protrusion with osteophyte complex along with  facet arthropathy causing moderate thecal sac and  right-greater-than-left exit foramina narrowing.      L4-5: Diffuse disc bulge with osteophyte complex causing moderate  thecal sac narrowing as bilateral facet arthropathy causing neural  foramina narrowing.      L5-S1: Mild posterior disc osteophyte complex minimally narrowing  thecal sac.      Prevertebral/Paraspinal Soft Tissues: Aneurysmal dilatation of the  abdominal aorta measuring 3.5 cm with multifocal calcifications.      IMPRESSION:  1. No acute vertebral displaced fracture or major malalignment.  2. Thoracolumbar spine scoliotic deformity with  advanced multilevel  spondylosis/degenerative changes as detailed above. Findings could be  further assessed by dedicated lumbosacral spine MRI.  3. Aneurysmal dilatation of the abdominal aorta measuring 3.5 cm.      MACRO:  None      Signed by: Markos Calvo 5/4/2025 6:59 AM  Dictation workstation:   [id number]     MR lumbar spine wo IV contrast  Status: Final result     PACS Images     Show images for MR lumbar spine wo IV contrast  Signed by    Signed Time Phone Pager   Damien Tanner MD 5/20/2025 13:44 [phone]      Exam Information    Status Exam Begun Exam Ended   Final 5/20/2025 12:45 5/20/2025 13:34     Study Result    Narrative & Impression   Interpreted By:  Damien Tanner,   STUDY:  MR LUMBAR SPINE WO IV CONTRAST;  5/20/2025 1:34 pm      INDICATION:  Signs/Symptoms:intractable right lower back pain with radiculopathy.          COMPARISON:  None.      ACCESSION NUMBER(S):  [id number]      ORDERING CLINICIAN:  PUJA SANCHEZ      TECHNIQUE:  Sagittal STIR, T1- and T2-weighted as well as axial T1- and T2-  weighted MRI images of the lumbar spine were acquired using a  spondylolysis protocol.  No contrast was administered.      FINDINGS:  Moderate levoscoliosis. Grade 1 anterolisthesis L4 on L5.      Partial osseous fusion across the L5-S1 disc space.      No compression deformity or destructive osseous lesion.      Disc desiccated at multiple levels. Increased fluid signal in the  discs at T12-L1 and L3-L4. Mixed Modic type 1 and type 2 endplate  degenerative changes from T12-L1 through L4-L5.      Infrarenal abdominal aorta mildly aneurysmal 34 mm.      LEVELS:  L5-S1: Endplate spurring and mild facet arthrosis without significant  stenosis. L4-L5: Grade 1 anterolisthesis. Disc bulge and advanced  facet arthrosis with ligamentous thickening. Severe central canal  stenosis. Severe left and moderately severe right foraminal stenosis.  Moderate bilateral foraminal stenosis. L3-L4: Disc bulge  with  moderately advanced facet arthrosis and ligamentous thickening.  Moderate trefoil central canal stenosis. Moderate bilateral lateral  recess stenosis. Moderately severe AP narrowing of the farther right  lateral neural foramen. Mild left foraminal stenosis. L2-L3: Mild  disc bulge. Mild-to-moderate facet arthrosis. No significant central  canal or lateral recess stenosis. Mild bilateral foraminal stenosis.  L1-L2: Disc bulge with mild-to-moderate facet arthrosis. No  significant central canal or lateral recess stenosis. Moderate right  and mild-to-moderate left foraminal stenosis. T12-L1: Disc bulge with  mild-to-moderate facet arthrosis. Mild-to-moderate overall asymmetric  central canal stenosis. Moderate bilateral lateral recess stenosis.  Moderately severe right and moderate left foraminal stenosis.          IMPRESSION:  Moderate levoscoliosis.      Moderate to advanced multilevel degenerative lumbar spondylosis as  described above.      Infrarenal abdominal aorta with mildly aneurysmal to 34 mm.      MACRO:  None      Signed by: Damien Tanner 5/20/2025 1:44 PM  Dictation workstation:   [id number]     Assessment/Plan   Diagnoses and all orders for this visit:  Lumbar radiculopathy  -     Epidural Steroid Injection; Future  -     FL pain management; Future  -     gabapentin (Neurontin) 300 mg capsule; Take 1 capsule (300 mg) by mouth once daily at bedtime.  Spinal stenosis of lumbar region with neurogenic claudication  Chronic bilateral low back pain with bilateral sciatica  -     gabapentin (Neurontin) 300 mg capsule; Take 1 capsule (300 mg) by mouth once daily at bedtime.  Lumbosacral pain  Other orders  -     NPO Diet Except: Sips with meds; Effective now; Standing  -     Height and weight; Standing  -     Insert and maintain peripheral IV; Standing  -     Saline lock IV; Standing  -     POCT Glucose; Standing  -     Type And Screen; Standing  -     Inpatient consult to Respiratory Care; Standing  -      Adult diet Regular; Standing  -     Vital Signs; Standing  -     Notify physician - Standard Parameters; Standing  -     Continue IV fluids ordered pre-procedure; Standing  -     Prior to Discharge O2 Weaning; Standing  -     Pulse oximetry, continuous; Standing  -     Discharge patient; Standing  -     iohexol (OMNIPaque) 300 mg iodine/mL solution 3 mL  -     lidocaine PF (Xylocaine) 20 mg/mL (2 %) injection 120 mg  -     sodium chloride (PF) 0.9% solution 4 mL  -     methylPREDNISolone acetate (DEPO-Medrol) injection 40 mg       Patient is a 89-year-old female with a past medical history significant for the above-mentioned medical diagnoses.  At this time, she presents today with complaints of lower back pain with bilateral radiating leg pain, numbness, tingling and weakness.  This affects her ambulatory status.  This affects her quality life and affects activities and affects her ability to do things comfortably.  Over the last 2 months she has been to the ER multiple times due to the pain.  She also notes leg weakness and she is having a lot of difficulty with ambulation because of the pain.  She wants to know what she can do to prevent herself from having to go back to the ER.  We reviewed her CT scan and her MRI scan.  Based on her imaging findings, her failure to improve with conservative treatments and the significant pain she is experiencing that keeps taking her to the ER I do not feel she would be a good candidate for physical therapy.  I recommended an L5-S1 epidural injection to be done under fluoroscopy for both diagnostic antibeta purposes.  Procedure was discussed.  Risks and benefits were discussed.  Patient is agreeable.  She will follow-up 2 weeks after the injection for reevaluation.  Call clinic sooner if necessary.  In the meantime we will also start gabapentin 300 mg at bedtime.  Potential effects were discussed.  OARRS was reviewed and prescription sent to the pharmacy.

## 2025-05-22 NOTE — TELEPHONE ENCOUNTER
THIS IS JUST AN FYI: Alexandra Occupational Therapist, went to patients home this morning to initial evaluation. This had to be cancelled do to patient having an appointment with Anila Calles in Pain Med. Alexandra will get patient rescheduled for evaluation.

## 2025-05-23 ENCOUNTER — TELEPHONE (OUTPATIENT)
Dept: PAIN MEDICINE | Facility: CLINIC | Age: OVER 89
End: 2025-05-23
Payer: MEDICARE

## 2025-05-27 ENCOUNTER — APPOINTMENT (OUTPATIENT)
Dept: PRIMARY CARE | Facility: CLINIC | Age: OVER 89
End: 2025-05-27
Payer: MEDICARE

## 2025-05-28 RX ORDER — HYDROCODONE BITARTRATE AND ACETAMINOPHEN 5; 325 MG/1; MG/1
1 TABLET ORAL EVERY 6 HOURS PRN
Qty: 28 TABLET | Refills: 0 | Status: SHIPPED | OUTPATIENT
Start: 2025-05-28 | End: 2025-06-04

## 2025-05-28 NOTE — PROGRESS NOTES
We were contacted by patient's SNF that her pain is not controlled at this time.  She is scheduled for pain management follow-up on Ashley 3.  She had previous been given as needed Norco by emergency department doctor, Raymon Olsen.  The pain was adequately controlled with this medication with no side effects.  I did message her pain management provider, she felt that the Norco would not interfere with any current treatments they are performing for her.  Spoke to the nursing staff and gave verbal order for medication and then placed a prescription for 7 days to cover her through her pain management appointment.  Staff will accompany the patient to her pain management appointment.

## 2025-05-30 ENCOUNTER — APPOINTMENT (OUTPATIENT)
Dept: PRIMARY CARE | Facility: CLINIC | Age: OVER 89
End: 2025-05-30
Payer: MEDICARE

## 2025-05-30 ENCOUNTER — PATIENT OUTREACH (OUTPATIENT)
Dept: PRIMARY CARE | Facility: CLINIC | Age: OVER 89
End: 2025-05-30

## 2025-05-30 NOTE — PROGRESS NOTES
Unable to reach patient for follow up call after recent hospitalization.  No PCP follow up  Left voicemail with call back number for patient to call if needed   If no voicemail available call attempts x 2 were made to contact the patient to assist with any questions or concerns patient may have.

## 2025-06-03 ENCOUNTER — HOSPITAL ENCOUNTER (OUTPATIENT)
Dept: OPERATING ROOM | Facility: HOSPITAL | Age: OVER 89
Setting detail: OUTPATIENT SURGERY
Discharge: HOME | End: 2025-06-03
Payer: MEDICARE

## 2025-06-03 VITALS
BODY MASS INDEX: 40.09 KG/M2 | RESPIRATION RATE: 20 BRPM | WEIGHT: 204.2 LBS | TEMPERATURE: 98.4 F | SYSTOLIC BLOOD PRESSURE: 141 MMHG | OXYGEN SATURATION: 91 % | HEIGHT: 60 IN | DIASTOLIC BLOOD PRESSURE: 86 MMHG | HEART RATE: 72 BPM

## 2025-06-03 DIAGNOSIS — M54.16 LUMBAR RADICULOPATHY: ICD-10-CM

## 2025-06-03 LAB — GLUCOSE BLD MANUAL STRIP-MCNC: 120 MG/DL (ref 74–99)

## 2025-06-03 PROCEDURE — 62323 NJX INTERLAMINAR LMBR/SAC: CPT | Performed by: STUDENT IN AN ORGANIZED HEALTH CARE EDUCATION/TRAINING PROGRAM

## 2025-06-03 PROCEDURE — 82947 ASSAY GLUCOSE BLOOD QUANT: CPT

## 2025-06-03 PROCEDURE — 2550000001 HC RX 255 CONTRASTS: Mod: JW | Performed by: STUDENT IN AN ORGANIZED HEALTH CARE EDUCATION/TRAINING PROGRAM

## 2025-06-03 PROCEDURE — 2500000005 HC RX 250 GENERAL PHARMACY W/O HCPCS: Performed by: STUDENT IN AN ORGANIZED HEALTH CARE EDUCATION/TRAINING PROGRAM

## 2025-06-03 PROCEDURE — 2500000004 HC RX 250 GENERAL PHARMACY W/ HCPCS (ALT 636 FOR OP/ED): Performed by: STUDENT IN AN ORGANIZED HEALTH CARE EDUCATION/TRAINING PROGRAM

## 2025-06-03 RX ORDER — LIDOCAINE HYDROCHLORIDE 20 MG/ML
INJECTION, SOLUTION EPIDURAL; INFILTRATION; INTRACAUDAL; PERINEURAL AS NEEDED
Status: COMPLETED | OUTPATIENT
Start: 2025-06-03 | End: 2025-06-03

## 2025-06-03 RX ORDER — SODIUM CHLORIDE 9 MG/ML
INJECTION, SOLUTION INTRAMUSCULAR; INTRAVENOUS; SUBCUTANEOUS AS NEEDED
Status: COMPLETED | OUTPATIENT
Start: 2025-06-03 | End: 2025-06-03

## 2025-06-03 RX ORDER — METHOCARBAMOL 500 MG/1
500 TABLET, FILM COATED ORAL EVERY 8 HOURS
COMMUNITY
Start: 2025-05-15

## 2025-06-03 RX ORDER — METHYLPREDNISOLONE ACETATE 40 MG/ML
INJECTION, SUSPENSION INTRA-ARTICULAR; INTRALESIONAL; INTRAMUSCULAR; SOFT TISSUE AS NEEDED
Status: COMPLETED | OUTPATIENT
Start: 2025-06-03 | End: 2025-06-03

## 2025-06-03 RX ADMIN — IOHEXOL 2 ML: 300 INJECTION, SOLUTION INTRAVENOUS at 14:08

## 2025-06-03 RX ADMIN — METHYLPREDNISOLONE ACETATE 40 MG: 40 INJECTION, SUSPENSION INTRA-ARTICULAR; INTRALESIONAL; INTRAMUSCULAR; SOFT TISSUE at 14:07

## 2025-06-03 RX ADMIN — SODIUM CHLORIDE 4 ML: 9 INJECTION INTRAMUSCULAR; INTRAVENOUS; SUBCUTANEOUS at 14:07

## 2025-06-03 RX ADMIN — LIDOCAINE HYDROCHLORIDE 2 ML: 20 INJECTION, SOLUTION EPIDURAL; INFILTRATION; INTRACAUDAL; PERINEURAL at 14:07

## 2025-06-03 ASSESSMENT — PAIN DESCRIPTION - DESCRIPTORS: DESCRIPTORS: SHARP

## 2025-06-03 ASSESSMENT — COLUMBIA-SUICIDE SEVERITY RATING SCALE - C-SSRS
1. IN THE PAST MONTH, HAVE YOU WISHED YOU WERE DEAD OR WISHED YOU COULD GO TO SLEEP AND NOT WAKE UP?: NO
2. HAVE YOU ACTUALLY HAD ANY THOUGHTS OF KILLING YOURSELF?: NO
6. HAVE YOU EVER DONE ANYTHING, STARTED TO DO ANYTHING, OR PREPARED TO DO ANYTHING TO END YOUR LIFE?: NO

## 2025-06-03 ASSESSMENT — PAIN - FUNCTIONAL ASSESSMENT
PAIN_FUNCTIONAL_ASSESSMENT: 0-10
PAIN_FUNCTIONAL_ASSESSMENT: 0-10

## 2025-06-03 ASSESSMENT — PAIN SCALES - GENERAL
PAINLEVEL_OUTOF10: 10 - WORST POSSIBLE PAIN
PAINLEVEL_OUTOF10: 5 - MODERATE PAIN

## 2025-06-03 NOTE — Clinical Note
Patient tolerated the procedure well and is comfortable with no complaints of pain.  Patient transported to PACU via stretcher. Handoff completed.

## 2025-06-06 ENCOUNTER — LAB REQUISITION (OUTPATIENT)
Dept: LAB | Facility: HOSPITAL | Age: OVER 89
End: 2025-06-06
Payer: MEDICARE

## 2025-06-06 ENCOUNTER — TELEPHONE (OUTPATIENT)
Dept: PRIMARY CARE | Facility: CLINIC | Age: OVER 89
End: 2025-06-06
Payer: MEDICARE

## 2025-06-06 DIAGNOSIS — R30.0 DYSURIA: ICD-10-CM

## 2025-06-06 DIAGNOSIS — R35.89 OTHER POLYURIA: ICD-10-CM

## 2025-06-06 LAB
APPEARANCE UR: CLEAR
BILIRUB UR STRIP.AUTO-MCNC: NEGATIVE MG/DL
COLOR UR: COLORLESS
GLUCOSE UR STRIP.AUTO-MCNC: NORMAL MG/DL
KETONES UR STRIP.AUTO-MCNC: NEGATIVE MG/DL
LEUKOCYTE ESTERASE UR QL STRIP.AUTO: NEGATIVE
NITRITE UR QL STRIP.AUTO: NEGATIVE
PH UR STRIP.AUTO: 6 [PH]
PROT UR STRIP.AUTO-MCNC: NEGATIVE MG/DL
RBC # UR STRIP.AUTO: NEGATIVE MG/DL
SP GR UR STRIP.AUTO: 1.01
UROBILINOGEN UR STRIP.AUTO-MCNC: NORMAL MG/DL

## 2025-06-06 PROCEDURE — 87086 URINE CULTURE/COLONY COUNT: CPT | Mod: OUT,SAMLAB

## 2025-06-06 PROCEDURE — 81003 URINALYSIS AUTO W/O SCOPE: CPT | Mod: OUT

## 2025-06-06 NOTE — TELEPHONE ENCOUNTER
Message from Paula at the [facility] requesting an order for UA C&S for patient.  She is complaining of dysuria and polyuria.    Please call her at [phone] direct nurse line

## 2025-06-08 LAB — BACTERIA UR CULT: NO GROWTH

## 2025-06-11 ENCOUNTER — APPOINTMENT (OUTPATIENT)
Dept: NEPHROLOGY | Facility: CLINIC | Age: OVER 89
End: 2025-06-11
Payer: MEDICARE

## 2025-06-12 DIAGNOSIS — N18.4 STAGE 4 CHRONIC KIDNEY DISEASE (MULTI): ICD-10-CM

## 2025-06-13 ENCOUNTER — TELEPHONE (OUTPATIENT)
Dept: PRIMARY CARE | Facility: CLINIC | Age: OVER 89
End: 2025-06-13
Payer: MEDICARE

## 2025-06-13 DIAGNOSIS — R25.2 LEG CRAMPS: Primary | ICD-10-CM

## 2025-06-13 DIAGNOSIS — D64.9 ANEMIA, UNSPECIFIED TYPE: ICD-10-CM

## 2025-06-13 NOTE — TELEPHONE ENCOUNTER
Pc to Shayy at the [facility] and let her know Alonso Westbrook is ordering a Metabolic profile and Mg level.  Patient should get those done as soon as possible.  She will have transport take patient to the lab as soon as they can.

## 2025-06-13 NOTE — TELEPHONE ENCOUNTER
Message from Antonia at [facility]  Patient has been complaining for the last 2 days or more of simba horses in her legs.  She almost fell today.  She started on Furosemide back in May so wondering if possibly it could be due to her Potassium?  Please advise

## 2025-06-13 NOTE — TELEPHONE ENCOUNTER
Let them know I will put in an order for a metabolic profile and magnesium level.  Please have them get this drawn ASAP

## 2025-06-16 ENCOUNTER — TELEPHONE (OUTPATIENT)
Dept: PRIMARY CARE | Facility: CLINIC | Age: OVER 89
End: 2025-06-16
Payer: MEDICARE

## 2025-06-16 NOTE — TELEPHONE ENCOUNTER
Carey PT from [facility]- she completed the patients discharge evaluation today- she is doing well and has less pain- pt no longer receiving PT   [phone] if you have questions

## 2025-06-24 ENCOUNTER — LAB REQUISITION (OUTPATIENT)
Dept: LAB | Facility: HOSPITAL | Age: OVER 89
End: 2025-06-24
Payer: MEDICARE

## 2025-06-24 DIAGNOSIS — N18.4 CHRONIC KIDNEY DISEASE, STAGE 4 (SEVERE) (MULTI): ICD-10-CM

## 2025-06-24 LAB
ALBUMIN SERPL-MCNC: 3.9 G/DL (ref 3.6–5.1)
ALP SERPL-CCNC: 65 U/L (ref 37–153)
ALT SERPL-CCNC: 15 U/L (ref 6–29)
ANION GAP SERPL CALCULATED.4IONS-SCNC: 13 MMOL/L (CALC) (ref 7–17)
APPEARANCE UR: CLEAR
AST SERPL-CCNC: 13 U/L (ref 10–35)
BILIRUB SERPL-MCNC: 0.4 MG/DL (ref 0.2–1.2)
BILIRUB UR STRIP.AUTO-MCNC: NEGATIVE MG/DL
BUN SERPL-MCNC: 45 MG/DL (ref 7–25)
CALCIUM SERPL-MCNC: 8.5 MG/DL (ref 8.6–10.4)
CHLORIDE SERPL-SCNC: 100 MMOL/L (ref 98–110)
CO2 SERPL-SCNC: 28 MMOL/L (ref 20–32)
COLOR UR: COLORLESS
CREAT SERPL-MCNC: 2.48 MG/DL (ref 0.6–0.95)
EGFRCR SERPLBLD CKD-EPI 2021: 18 ML/MIN/1.73M2
ERYTHROCYTE [DISTWIDTH] IN BLOOD BY AUTOMATED COUNT: 14.4 % (ref 11–15)
GLUCOSE SERPL-MCNC: 183 MG/DL (ref 65–99)
GLUCOSE UR STRIP.AUTO-MCNC: NORMAL MG/DL
HCT VFR BLD AUTO: 31.1 % (ref 35–45)
HGB BLD-MCNC: 9.7 G/DL (ref 11.7–15.5)
KETONES UR STRIP.AUTO-MCNC: NEGATIVE MG/DL
LEUKOCYTE ESTERASE UR QL STRIP.AUTO: NEGATIVE
MCH RBC QN AUTO: 32.4 PG (ref 27–33)
MCHC RBC AUTO-ENTMCNC: 31.2 G/DL (ref 32–36)
MCV RBC AUTO: 104 FL (ref 80–100)
NITRITE UR QL STRIP.AUTO: NEGATIVE
PH UR STRIP.AUTO: 6 [PH]
PLATELET # BLD AUTO: 327 THOUSAND/UL (ref 140–400)
PMV BLD REES-ECKER: 10.3 FL (ref 7.5–12.5)
POTASSIUM SERPL-SCNC: 4.5 MMOL/L (ref 3.5–5.3)
PROT SERPL-MCNC: 6.5 G/DL (ref 6.1–8.1)
PROT UR STRIP.AUTO-MCNC: NEGATIVE MG/DL
RBC # BLD AUTO: 2.99 MILLION/UL (ref 3.8–5.1)
RBC # UR STRIP.AUTO: NEGATIVE MG/DL
SODIUM SERPL-SCNC: 141 MMOL/L (ref 135–146)
SP GR UR STRIP.AUTO: 1.01
UROBILINOGEN UR STRIP.AUTO-MCNC: NORMAL MG/DL
WBC # BLD AUTO: 6.1 THOUSAND/UL (ref 3.8–10.8)

## 2025-06-24 PROCEDURE — 81003 URINALYSIS AUTO W/O SCOPE: CPT | Mod: OUT | Performed by: CLINICAL NURSE SPECIALIST

## 2025-06-24 PROCEDURE — 82570 ASSAY OF URINE CREATININE: CPT | Mod: OUT,SAMLAB | Performed by: CLINICAL NURSE SPECIALIST

## 2025-06-25 LAB
CREAT UR-MCNC: 23.6 MG/DL (ref 20–320)
MICROALBUMIN UR-MCNC: 16.5 MG/L
MICROALBUMIN/CREAT UR: 69.9 UG/MG CREAT

## 2025-06-28 LAB
ALBUMIN SERPL-MCNC: 3.8 G/DL (ref 3.6–5.1)
ALP SERPL-CCNC: 56 U/L (ref 37–153)
ALT SERPL-CCNC: 15 U/L (ref 6–29)
ANION GAP SERPL CALCULATED.4IONS-SCNC: 12 MMOL/L (CALC) (ref 7–17)
AST SERPL-CCNC: 17 U/L (ref 10–35)
BILIRUB SERPL-MCNC: 0.3 MG/DL (ref 0.2–1.2)
BUN SERPL-MCNC: 52 MG/DL (ref 7–25)
CALCIUM SERPL-MCNC: 8.6 MG/DL (ref 8.6–10.4)
CHLORIDE SERPL-SCNC: 98 MMOL/L (ref 98–110)
CO2 SERPL-SCNC: 30 MMOL/L (ref 20–32)
CREAT SERPL-MCNC: 2.62 MG/DL (ref 0.6–0.95)
EGFRCR SERPLBLD CKD-EPI 2021: 16 ML/MIN/1.73M2
ERYTHROCYTE [DISTWIDTH] IN BLOOD BY AUTOMATED COUNT: 14.4 % (ref 11–15)
FERRITIN SERPL-MCNC: 45 NG/ML (ref 16–288)
GLUCOSE SERPL-MCNC: 155 MG/DL (ref 65–99)
HCT VFR BLD AUTO: 31.1 % (ref 35–45)
HGB BLD-MCNC: 9.8 G/DL (ref 11.7–15.5)
IRON SATN MFR SERPL: 32 % (CALC) (ref 16–45)
IRON SERPL-MCNC: 92 MCG/DL (ref 45–160)
MAGNESIUM SERPL-MCNC: 2.1 MG/DL (ref 1.5–2.5)
MCH RBC QN AUTO: 32.9 PG (ref 27–33)
MCHC RBC AUTO-ENTMCNC: 31.5 G/DL (ref 32–36)
MCV RBC AUTO: 104.4 FL (ref 80–100)
PLATELET # BLD AUTO: 310 THOUSAND/UL (ref 140–400)
PMV BLD REES-ECKER: 10.2 FL (ref 7.5–12.5)
POTASSIUM SERPL-SCNC: 4.6 MMOL/L (ref 3.5–5.3)
PROT SERPL-MCNC: 6.2 G/DL (ref 6.1–8.1)
RBC # BLD AUTO: 2.98 MILLION/UL (ref 3.8–5.1)
SODIUM SERPL-SCNC: 140 MMOL/L (ref 135–146)
TIBC SERPL-MCNC: 284 MCG/DL (CALC) (ref 250–450)
WBC # BLD AUTO: 6.7 THOUSAND/UL (ref 3.8–10.8)

## 2025-06-30 ENCOUNTER — OFFICE VISIT (OUTPATIENT)
Dept: PAIN MEDICINE | Facility: CLINIC | Age: OVER 89
End: 2025-06-30
Payer: MEDICARE

## 2025-06-30 VITALS
RESPIRATION RATE: 20 BRPM | SYSTOLIC BLOOD PRESSURE: 133 MMHG | WEIGHT: 207 LBS | BODY MASS INDEX: 40.43 KG/M2 | DIASTOLIC BLOOD PRESSURE: 75 MMHG | HEART RATE: 70 BPM

## 2025-06-30 DIAGNOSIS — M54.16 LUMBAR RADICULOPATHY: ICD-10-CM

## 2025-06-30 DIAGNOSIS — M48.062 SPINAL STENOSIS OF LUMBAR REGION WITH NEUROGENIC CLAUDICATION: ICD-10-CM

## 2025-06-30 DIAGNOSIS — M46.1 SACROILIITIS: Primary | ICD-10-CM

## 2025-06-30 DIAGNOSIS — M47.816 LUMBAR FACET ARTHROPATHY: ICD-10-CM

## 2025-06-30 PROCEDURE — 99213 OFFICE O/P EST LOW 20 MIN: CPT

## 2025-06-30 RX ORDER — TRAMADOL HYDROCHLORIDE 50 MG/1
50 TABLET, FILM COATED ORAL 3 TIMES DAILY
COMMUNITY

## 2025-06-30 RX ORDER — LIDOCAINE HYDROCHLORIDE 20 MG/ML
6 INJECTION, SOLUTION EPIDURAL; INFILTRATION; INTRACAUDAL; PERINEURAL ONCE
OUTPATIENT
Start: 2025-06-30 | End: 2025-06-30

## 2025-06-30 RX ORDER — CYCLOBENZAPRINE HCL 10 MG
10 TABLET ORAL NIGHTLY
COMMUNITY

## 2025-06-30 RX ORDER — BUPROPION HYDROCHLORIDE 150 MG/1
150 TABLET, FILM COATED, EXTENDED RELEASE ORAL 2 TIMES DAILY
COMMUNITY
End: 2025-07-02 | Stop reason: WASHOUT

## 2025-06-30 RX ORDER — METHYLPREDNISOLONE ACETATE 40 MG/ML
40 INJECTION, SUSPENSION INTRA-ARTICULAR; INTRALESIONAL; INTRAMUSCULAR; SOFT TISSUE ONCE
OUTPATIENT
Start: 2025-06-30 | End: 2025-06-30

## 2025-06-30 RX ORDER — BUPIVACAINE HYDROCHLORIDE 5 MG/ML
4 INJECTION, SOLUTION EPIDURAL; INTRACAUDAL; PERINEURAL ONCE
OUTPATIENT
Start: 2025-06-30 | End: 2025-06-30

## 2025-06-30 ASSESSMENT — ENCOUNTER SYMPTOMS
BACK PAIN: 1
EYES NEGATIVE: 1
LIGHT-HEADEDNESS: 0
ALLERGIC/IMMUNOLOGIC NEGATIVE: 1
COUGH: 0
CONSTITUTIONAL NEGATIVE: 1
ADENOPATHY: 0
SHORTNESS OF BREATH: 0
ENDOCRINE NEGATIVE: 1
ARTHRALGIAS: 0
FACIAL ASYMMETRY: 0
BRUISES/BLEEDS EASILY: 0
WEAKNESS: 0
MYALGIAS: 1
PALPITATIONS: 0
DYSPHORIC MOOD: 0
WHEEZING: 0

## 2025-06-30 NOTE — PROGRESS NOTES
"Subjective   Patient ID: Lakeshia Park \"Nasra\" is a 89 y.o. female who presents for Pain (FUV for L5-S1 RIVER done 6-3-25 for rama low back pain with radiation down both legs to feet. Pain score is 5/10 today. Patient states she got \"great relief until the last 2 weeks.\" Getting up and down from chairs or bed causes or increases pain. She also feels pain when she \"coughs or breathes heavy.\" Patient states mornings are the worst. She feels weak. Tylenol helps relieve some of the pain. Patient is ambulating with a rolator walker. CRISTIN = 40/100. ETOH screen is negative. ).  Sherrell Bolanos RN 06/30/25 11:46 AM     The patient is an 84-year-old female presents today for follow-up appointment after undergoing an L5-S1 epidural steroid injection on 6/3/2025.  The patient reports significant relief from this procedure with 80% pain relief lasting for the first couple weeks following the injection.  She is still having moderate ongoing relief with 50% ongoing pain relief.  However, she is still having pain across her lower back and down the posterior aspect of her legs.  She notices the pain most when trying to climb in and out of bed in the morning or transitioning from sitting to standing.  She is open to further injections to help with her pain.        Review of Systems   Constitutional: Negative.    HENT: Negative.     Eyes: Negative.    Respiratory:  Negative for cough, shortness of breath and wheezing.    Cardiovascular:  Negative for chest pain, palpitations and leg swelling.   Endocrine: Negative.    Genitourinary: Negative.    Musculoskeletal:  Positive for back pain and myalgias. Negative for arthralgias.   Skin: Negative.    Allergic/Immunologic: Negative.    Neurological:  Negative for facial asymmetry, weakness and light-headedness.   Hematological:  Negative for adenopathy. Does not bruise/bleed easily.   Psychiatric/Behavioral:  Negative for dysphoric mood and suicidal ideas.        Objective   Physical " Exam  Constitutional:       General: She is not in acute distress.     Appearance: Normal appearance.   HENT:      Head: Normocephalic.      Mouth/Throat:      Mouth: Mucous membranes are moist.   Eyes:      Extraocular Movements: Extraocular movements intact.   Cardiovascular:      Rate and Rhythm: Normal rate and regular rhythm.      Pulses: Normal pulses.      Heart sounds: Normal heart sounds. No murmur heard.     No friction rub. No gallop.   Pulmonary:      Effort: Pulmonary effort is normal.      Breath sounds: Normal breath sounds. No wheezing, rhonchi or rales.   Abdominal:      General: Abdomen is flat.      Palpations: Abdomen is soft.   Musculoskeletal:      Cervical back: Normal range of motion.      Right lower leg: No edema.      Left lower leg: No edema.      Comments: Ambulates with a rollator  Strength 4/5 BLE  No lumbar paraspinal TTP  Anatoliy finger positive bilaterally  TAWANDA positive bilaterally  Distraction positive bilaterally  Compression positive bilaterally   Lymphadenopathy:      Cervical: No cervical adenopathy.   Skin:     General: Skin is warm and dry.   Neurological:      General: No focal deficit present.      Mental Status: She is alert and oriented to person, place, and time. Mental status is at baseline.   Psychiatric:         Mood and Affect: Mood normal.         Behavior: Behavior normal.         Assessment/Plan   Diagnoses and all orders for this visit:  Sacroiliitis  -     FL pain management; Future  -     Sacroiliac Joint Injection; Future  Lumbar radiculopathy  Spinal stenosis of lumbar region with neurogenic claudication  Lumbar facet arthropathy  Other orders  -     lidocaine PF (Xylocaine) 20 mg/mL (2 %) injection 120 mg  -     iohexol (OMNIPaque) 300 mg iodine/mL solution 3 mL  -     bupivacaine PF (Marcaine) 0.5 % (5 mg/mL) injection 20 mg  -     methylPREDNISolone acetate (DEPO-Medrol) injection 40 mg  -     NPO Diet Except: Sips with meds; Effective now; Standing  -      Height and weight; Standing  -     POCT Glucose; Standing  -     Adult diet Regular; Standing  -     Vital Signs; Standing  -     Neuro checks; Standing  -     Notify provider (specify parameters); Standing  -     Prior to Discharge O2 Weaning; Standing  -     Pulse oximetry, continuous; Standing  -     Discharge patient; Standing       The patient is a 89-year-old female with a past medical history significant for the above-mentioned problems.  She is following up after lumbar RIVER with significant relief initially and now with 50% ongoing relief.  She is still having low back pain that is still interfering with her ability to function and perform ADLs.  We reviewed her MRI imaging and based on findings, her pain pattern, her physical exam, failure to improve with previous conservative treatment including medications, I recommend pursuing bilateral sacroiliac joint injections under fluoroscopy.  The procedure was discussed, risks and benefits were discussed, patient is agreeable.  She will follow-up after the injection for reevaluation, call the clinic sooner if needed.

## 2025-07-01 ENCOUNTER — TELEPHONE (OUTPATIENT)
Dept: PAIN MEDICINE | Facility: CLINIC | Age: OVER 89
End: 2025-07-01
Payer: MEDICARE

## 2025-07-02 ENCOUNTER — APPOINTMENT (OUTPATIENT)
Dept: NEPHROLOGY | Facility: CLINIC | Age: OVER 89
End: 2025-07-02
Payer: MEDICARE

## 2025-07-02 VITALS
DIASTOLIC BLOOD PRESSURE: 66 MMHG | BODY MASS INDEX: 40.88 KG/M2 | SYSTOLIC BLOOD PRESSURE: 124 MMHG | HEART RATE: 77 BPM | WEIGHT: 208.2 LBS | HEIGHT: 60 IN

## 2025-07-02 DIAGNOSIS — N18.4 ANEMIA DUE TO STAGE 4 CHRONIC KIDNEY DISEASE: Primary | ICD-10-CM

## 2025-07-02 DIAGNOSIS — D63.1 ANEMIA DUE TO STAGE 4 CHRONIC KIDNEY DISEASE: Primary | ICD-10-CM

## 2025-07-02 DIAGNOSIS — E11.22 TYPE 2 DIABETES MELLITUS WITH STAGE 4 CHRONIC KIDNEY DISEASE, WITHOUT LONG-TERM CURRENT USE OF INSULIN (MULTI): ICD-10-CM

## 2025-07-02 DIAGNOSIS — N18.4 TYPE 2 DIABETES MELLITUS WITH STAGE 4 CHRONIC KIDNEY DISEASE, WITHOUT LONG-TERM CURRENT USE OF INSULIN (MULTI): ICD-10-CM

## 2025-07-02 DIAGNOSIS — I10 PRIMARY HYPERTENSION: Primary | ICD-10-CM

## 2025-07-02 DIAGNOSIS — N18.4 STAGE 4 CHRONIC KIDNEY DISEASE (MULTI): ICD-10-CM

## 2025-07-02 DIAGNOSIS — E21.3 HYPERPARATHYROIDISM (MULTI): ICD-10-CM

## 2025-07-02 DIAGNOSIS — D63.1 ANEMIA DUE TO STAGE 4 CHRONIC KIDNEY DISEASE: ICD-10-CM

## 2025-07-02 DIAGNOSIS — N18.4 ANEMIA DUE TO STAGE 4 CHRONIC KIDNEY DISEASE: ICD-10-CM

## 2025-07-02 PROCEDURE — 1159F MED LIST DOCD IN RCRD: CPT | Performed by: CLINICAL NURSE SPECIALIST

## 2025-07-02 PROCEDURE — 3078F DIAST BP <80 MM HG: CPT | Performed by: CLINICAL NURSE SPECIALIST

## 2025-07-02 PROCEDURE — 1036F TOBACCO NON-USER: CPT | Performed by: CLINICAL NURSE SPECIALIST

## 2025-07-02 PROCEDURE — 99213 OFFICE O/P EST LOW 20 MIN: CPT | Performed by: CLINICAL NURSE SPECIALIST

## 2025-07-02 PROCEDURE — 3074F SYST BP LT 130 MM HG: CPT | Performed by: CLINICAL NURSE SPECIALIST

## 2025-07-02 PROCEDURE — 1160F RVW MEDS BY RX/DR IN RCRD: CPT | Performed by: CLINICAL NURSE SPECIALIST

## 2025-07-02 RX ORDER — EPINEPHRINE 0.3 MG/.3ML
0.3 INJECTION SUBCUTANEOUS EVERY 5 MIN PRN
Status: CANCELLED | OUTPATIENT
Start: 2025-07-02

## 2025-07-02 RX ORDER — ALBUTEROL SULFATE 0.83 MG/ML
3 SOLUTION RESPIRATORY (INHALATION) AS NEEDED
Status: CANCELLED | OUTPATIENT
Start: 2025-07-02

## 2025-07-02 RX ORDER — DIPHENHYDRAMINE HYDROCHLORIDE 50 MG/ML
50 INJECTION, SOLUTION INTRAMUSCULAR; INTRAVENOUS AS NEEDED
Status: CANCELLED | OUTPATIENT
Start: 2025-07-02

## 2025-07-02 RX ORDER — FAMOTIDINE 10 MG/ML
20 INJECTION, SOLUTION INTRAVENOUS ONCE AS NEEDED
Status: CANCELLED | OUTPATIENT
Start: 2025-07-02

## 2025-07-02 ASSESSMENT — ENCOUNTER SYMPTOMS
EYES NEGATIVE: 1
RESPIRATORY NEGATIVE: 1
ENDOCRINE NEGATIVE: 1
NEUROLOGICAL NEGATIVE: 1
HEMATOLOGIC/LYMPHATIC NEGATIVE: 1
GASTROINTESTINAL NEGATIVE: 1
BACK PAIN: 1
PSYCHIATRIC NEGATIVE: 1
ALLERGIC/IMMUNOLOGIC NEGATIVE: 1
CONSTITUTIONAL NEGATIVE: 1

## 2025-07-02 NOTE — ASSESSMENT & PLAN NOTE
Creatinine is slightly elevated at 2.6, she was at 2.6 about 2 months ago also and then has been in the 2.2-2.4 range, her blood pressure is well-controlled, she does have some lower extremity edema, has urinary incontinence and wears depends, not candidate for SGLT2

## 2025-07-02 NOTE — PROGRESS NOTES
"Subjective   Patient ID: Lakeshia Park \"Mynor" is a 89 y.o. female who presents for Follow-up (3 Months/Review labs 6/27).  Patient being seen in follow-up for chronic kidney disease stage IIIb/IV with history of diabetic nephropathy and hypertension    Labs reviewed  Ferritin 45  H&H 9.8 and 31.1  Glucose 155  Renal function with BUN of 52 and creatinine of 2.62, GFR is 16  Sodium 140, potassium 4.6, chloride 98, bicarb 38  Calcium 8.6  Total iron 92 with iron binding capacity of 284 and saturation of 32  Magnesium 2.1    She has been doing fairly well  She does have some lower extremity edema, left greater than right, she states that in the morning before she gets up she does not have any edema but it does progress throughout the day  She is not having difficulties voiding  Her blood pressure is well-controlled  She states that she has a good appetite  She does complain of some weakness when she is walking and has some low back pain that she follows with pain management  Last hemoglobin A1c was 7.2        Review of Systems   Constitutional: Negative.    HENT: Negative.     Eyes: Negative.    Respiratory: Negative.     Cardiovascular:  Positive for leg swelling.   Gastrointestinal: Negative.    Endocrine: Negative.    Genitourinary: Negative.    Musculoskeletal:  Positive for back pain.   Skin: Negative.    Allergic/Immunologic: Negative.    Neurological: Negative.    Hematological: Negative.    Psychiatric/Behavioral: Negative.         Objective   Physical Exam  Constitutional:       Appearance: Normal appearance.   HENT:      Head: Normocephalic and atraumatic.      Mouth/Throat:      Mouth: Mucous membranes are moist.   Eyes:      Extraocular Movements: Extraocular movements intact.   Cardiovascular:      Rate and Rhythm: Normal rate and regular rhythm.      Heart sounds: Normal heart sounds.   Pulmonary:      Effort: Pulmonary effort is normal.      Breath sounds: Normal breath sounds.   Abdominal:      " General: Bowel sounds are normal.      Palpations: Abdomen is soft.   Musculoskeletal:         General: Normal range of motion.      Right lower leg: Edema (Right greater than left) present.      Left lower leg: Edema present.   Skin:     General: Skin is warm and dry.   Neurological:      Mental Status: She is alert.   Psychiatric:         Behavior: Behavior normal.         Thought Content: Thought content normal.         Assessment/Plan   Problem List Items Addressed This Visit           ICD-10-CM    Anemia D64.9    Has anemia of chronic disease with hemoglobin staying in the nines, will set up for erythropoietin injections         DM2 (diabetes mellitus, type 2) (Multi) E11.9    HTN (hypertension) - Primary I10    Blood pressure is well-controlled on metoprolol         Stage 4 chronic kidney disease (Multi) N18.4    Creatinine is slightly elevated at 2.6, she was at 2.6 about 2 months ago also and then has been in the 2.2-2.4 range, her blood pressure is well-controlled, she does have some lower extremity edema, has urinary incontinence and wears depends, not candidate for SGLT2         Relevant Orders    Basic metabolic panel    CBC    Follow Up In Nephrology    Hyperparathyroidism (Multi) E21.3     Chronic kidney disease stage IV with baseline creatinine 2-2.4  Diabetic Nephropathy   Diabetes mellitus type 2  Chronic diarrhea  History of gout on allopurinol  Hyperuricemia  Hypertension BP  Hyperlipidemia  Bilateral renal cysts  Positive REJI screen with slightly high RNP  Depression  Hypokalemia\  Spinal stenosis       JASSI Allen-LU, DNP 07/02/25 2:02 PM

## 2025-07-02 NOTE — ASSESSMENT & PLAN NOTE
Has anemia of chronic disease with hemoglobin staying in the nines, will set up for erythropoietin injections

## 2025-07-09 ENCOUNTER — INFUSION (OUTPATIENT)
Dept: HEMATOLOGY/ONCOLOGY | Facility: CLINIC | Age: OVER 89
End: 2025-07-09
Payer: MEDICARE

## 2025-07-09 VITALS
OXYGEN SATURATION: 93 % | DIASTOLIC BLOOD PRESSURE: 68 MMHG | TEMPERATURE: 97.3 F | RESPIRATION RATE: 18 BRPM | HEART RATE: 71 BPM | SYSTOLIC BLOOD PRESSURE: 105 MMHG | BODY MASS INDEX: 41.01 KG/M2 | WEIGHT: 210 LBS

## 2025-07-09 DIAGNOSIS — N18.4 ANEMIA DUE TO STAGE 4 CHRONIC KIDNEY DISEASE: Primary | ICD-10-CM

## 2025-07-09 DIAGNOSIS — D63.1 ANEMIA DUE TO STAGE 4 CHRONIC KIDNEY DISEASE: Primary | ICD-10-CM

## 2025-07-09 DIAGNOSIS — N18.4 STAGE 4 CHRONIC KIDNEY DISEASE (MULTI): ICD-10-CM

## 2025-07-09 LAB
ERYTHROCYTE [DISTWIDTH] IN BLOOD BY AUTOMATED COUNT: 15 % (ref 11.5–14.5)
HCT VFR BLD AUTO: 28.2 % (ref 36–46)
HGB BLD-MCNC: 8.8 G/DL (ref 12–16)
MCH RBC QN AUTO: 32.2 PG (ref 26–34)
MCHC RBC AUTO-ENTMCNC: 31.2 G/DL (ref 32–36)
MCV RBC AUTO: 103 FL (ref 80–100)
NRBC BLD-RTO: 0 /100 WBCS (ref 0–0)
PLATELET # BLD AUTO: 301 X10*3/UL (ref 150–450)
RBC # BLD AUTO: 2.73 X10*6/UL (ref 4–5.2)
WBC # BLD AUTO: 6.3 X10*3/UL (ref 4.4–11.3)

## 2025-07-09 PROCEDURE — 96372 THER/PROPH/DIAG INJ SC/IM: CPT

## 2025-07-09 PROCEDURE — 2500000004 HC RX 250 GENERAL PHARMACY W/ HCPCS (ALT 636 FOR OP/ED): Mod: JZ,EC | Performed by: CLINICAL NURSE SPECIALIST

## 2025-07-09 PROCEDURE — 85027 COMPLETE CBC AUTOMATED: CPT

## 2025-07-09 RX ORDER — ALBUTEROL SULFATE 0.83 MG/ML
3 SOLUTION RESPIRATORY (INHALATION) AS NEEDED
OUTPATIENT
Start: 2025-07-16

## 2025-07-09 RX ORDER — DIPHENHYDRAMINE HYDROCHLORIDE 50 MG/ML
50 INJECTION, SOLUTION INTRAMUSCULAR; INTRAVENOUS AS NEEDED
OUTPATIENT
Start: 2025-07-16

## 2025-07-09 RX ORDER — FAMOTIDINE 10 MG/ML
20 INJECTION, SOLUTION INTRAVENOUS ONCE AS NEEDED
OUTPATIENT
Start: 2025-07-16

## 2025-07-09 RX ORDER — EPINEPHRINE 0.3 MG/.3ML
0.3 INJECTION SUBCUTANEOUS EVERY 5 MIN PRN
OUTPATIENT
Start: 2025-07-16

## 2025-07-09 RX ADMIN — DARBEPOETIN ALFA 40 MCG: 40 INJECTION, SOLUTION INTRAVENOUS; SUBCUTANEOUS at 14:13

## 2025-07-09 ASSESSMENT — PAIN SCALES - GENERAL: PAINLEVEL_OUTOF10: 0-NO PAIN

## 2025-07-12 ENCOUNTER — APPOINTMENT (OUTPATIENT)
Dept: CARDIOLOGY | Facility: HOSPITAL | Age: OVER 89
DRG: 205 | End: 2025-07-12
Payer: MEDICARE

## 2025-07-12 ENCOUNTER — HOSPITAL ENCOUNTER (INPATIENT)
Facility: HOSPITAL | Age: OVER 89
DRG: 205 | End: 2025-07-12
Attending: EMERGENCY MEDICINE | Admitting: HOSPITALIST
Payer: MEDICARE

## 2025-07-12 ENCOUNTER — APPOINTMENT (OUTPATIENT)
Dept: RADIOLOGY | Facility: HOSPITAL | Age: OVER 89
DRG: 205 | End: 2025-07-12
Payer: MEDICARE

## 2025-07-12 DIAGNOSIS — N18.4 STAGE 4 CHRONIC KIDNEY DISEASE (MULTI): ICD-10-CM

## 2025-07-12 DIAGNOSIS — J96.01 ACUTE HYPOXEMIC RESPIRATORY FAILURE: Primary | ICD-10-CM

## 2025-07-12 DIAGNOSIS — J98.4 PNEUMONITIS: ICD-10-CM

## 2025-07-12 DIAGNOSIS — M79.89 SWELLING OF BOTH LOWER EXTREMITIES: ICD-10-CM

## 2025-07-12 DIAGNOSIS — R60.9 SWELLING: ICD-10-CM

## 2025-07-12 DIAGNOSIS — R60.0 LOCALIZED EDEMA: ICD-10-CM

## 2025-07-12 PROBLEM — R09.02 HYPOXEMIA: Status: ACTIVE | Noted: 2025-07-12

## 2025-07-12 LAB
ALBUMIN SERPL BCP-MCNC: 3.7 G/DL (ref 3.4–5)
ALP SERPL-CCNC: 55 U/L (ref 33–136)
ALT SERPL W P-5'-P-CCNC: 13 U/L (ref 7–45)
ANION GAP SERPL CALC-SCNC: 14 MMOL/L (ref 10–20)
APPEARANCE UR: CLEAR
AST SERPL W P-5'-P-CCNC: 12 U/L (ref 9–39)
BASOPHILS # BLD AUTO: 0.02 X10*3/UL (ref 0–0.1)
BASOPHILS NFR BLD AUTO: 0.3 %
BILIRUB SERPL-MCNC: 0.2 MG/DL (ref 0–1.2)
BILIRUB UR STRIP.AUTO-MCNC: NEGATIVE MG/DL
BNP SERPL-MCNC: 221 PG/ML (ref 0–99)
BUN SERPL-MCNC: 67 MG/DL (ref 6–23)
CALCIUM SERPL-MCNC: 8.6 MG/DL (ref 8.6–10.3)
CARDIAC TROPONIN I PNL SERPL HS: 7 NG/L (ref 0–13)
CHLORIDE SERPL-SCNC: 100 MMOL/L (ref 98–107)
CO2 SERPL-SCNC: 30 MMOL/L (ref 21–32)
COLOR UR: COLORLESS
CREAT SERPL-MCNC: 2.74 MG/DL (ref 0.5–1.05)
EGFRCR SERPLBLD CKD-EPI 2021: 16 ML/MIN/1.73M*2
EOSINOPHIL # BLD AUTO: 0.08 X10*3/UL (ref 0–0.4)
EOSINOPHIL NFR BLD AUTO: 1.1 %
ERYTHROCYTE [DISTWIDTH] IN BLOOD BY AUTOMATED COUNT: 15.4 % (ref 11.5–14.5)
GLUCOSE BLD MANUAL STRIP-MCNC: 237 MG/DL (ref 74–99)
GLUCOSE SERPL-MCNC: 209 MG/DL (ref 74–99)
GLUCOSE UR STRIP.AUTO-MCNC: NORMAL MG/DL
HCT VFR BLD AUTO: 24.8 % (ref 36–46)
HGB BLD-MCNC: 7.8 G/DL (ref 12–16)
IMM GRANULOCYTES # BLD AUTO: 0.07 X10*3/UL (ref 0–0.5)
IMM GRANULOCYTES NFR BLD AUTO: 0.9 % (ref 0–0.9)
KETONES UR STRIP.AUTO-MCNC: NEGATIVE MG/DL
LEUKOCYTE ESTERASE UR QL STRIP.AUTO: NEGATIVE
LYMPHOCYTES # BLD AUTO: 1.12 X10*3/UL (ref 0.8–3)
LYMPHOCYTES NFR BLD AUTO: 14.9 %
MAGNESIUM SERPL-MCNC: 2.12 MG/DL (ref 1.6–2.4)
MCH RBC QN AUTO: 32.1 PG (ref 26–34)
MCHC RBC AUTO-ENTMCNC: 31.5 G/DL (ref 32–36)
MCV RBC AUTO: 102 FL (ref 80–100)
MONOCYTES # BLD AUTO: 0.81 X10*3/UL (ref 0.05–0.8)
MONOCYTES NFR BLD AUTO: 10.8 %
NEUTROPHILS # BLD AUTO: 5.43 X10*3/UL (ref 1.6–5.5)
NEUTROPHILS NFR BLD AUTO: 72 %
NITRITE UR QL STRIP.AUTO: NEGATIVE
NRBC BLD-RTO: 0 /100 WBCS (ref 0–0)
PH UR STRIP.AUTO: 6 [PH]
PLATELET # BLD AUTO: 292 X10*3/UL (ref 150–450)
POTASSIUM SERPL-SCNC: 4.3 MMOL/L (ref 3.5–5.3)
PROT SERPL-MCNC: 5.8 G/DL (ref 6.4–8.2)
PROT UR STRIP.AUTO-MCNC: NEGATIVE MG/DL
RBC # BLD AUTO: 2.43 X10*6/UL (ref 4–5.2)
RBC # UR STRIP.AUTO: NEGATIVE MG/DL
SODIUM SERPL-SCNC: 140 MMOL/L (ref 136–145)
SP GR UR STRIP.AUTO: 1.01
UROBILINOGEN UR STRIP.AUTO-MCNC: NORMAL MG/DL
WBC # BLD AUTO: 7.5 X10*3/UL (ref 4.4–11.3)

## 2025-07-12 PROCEDURE — 2500000002 HC RX 250 W HCPCS SELF ADMINISTERED DRUGS (ALT 637 FOR MEDICARE OP, ALT 636 FOR OP/ED): Performed by: HOSPITALIST

## 2025-07-12 PROCEDURE — 2500000005 HC RX 250 GENERAL PHARMACY W/O HCPCS: Performed by: HOSPITALIST

## 2025-07-12 PROCEDURE — 2500000004 HC RX 250 GENERAL PHARMACY W/ HCPCS (ALT 636 FOR OP/ED): Performed by: EMERGENCY MEDICINE

## 2025-07-12 PROCEDURE — 1200000002 HC GENERAL ROOM WITH TELEMETRY DAILY

## 2025-07-12 PROCEDURE — 94664 DEMO&/EVAL PT USE INHALER: CPT

## 2025-07-12 PROCEDURE — 82947 ASSAY GLUCOSE BLOOD QUANT: CPT

## 2025-07-12 PROCEDURE — 99285 EMERGENCY DEPT VISIT HI MDM: CPT | Mod: 25 | Performed by: EMERGENCY MEDICINE

## 2025-07-12 PROCEDURE — 71250 CT THORAX DX C-: CPT

## 2025-07-12 PROCEDURE — 36415 COLL VENOUS BLD VENIPUNCTURE: CPT | Performed by: EMERGENCY MEDICINE

## 2025-07-12 PROCEDURE — 71045 X-RAY EXAM CHEST 1 VIEW: CPT

## 2025-07-12 PROCEDURE — 80053 COMPREHEN METABOLIC PANEL: CPT | Performed by: EMERGENCY MEDICINE

## 2025-07-12 PROCEDURE — 96374 THER/PROPH/DIAG INJ IV PUSH: CPT

## 2025-07-12 PROCEDURE — 71045 X-RAY EXAM CHEST 1 VIEW: CPT | Performed by: RADIOLOGY

## 2025-07-12 PROCEDURE — 9420000001 HC RT PATIENT EDUCATION 5 MIN

## 2025-07-12 PROCEDURE — 83735 ASSAY OF MAGNESIUM: CPT | Performed by: EMERGENCY MEDICINE

## 2025-07-12 PROCEDURE — 87075 CULTR BACTERIA EXCEPT BLOOD: CPT | Mod: SAMLAB | Performed by: HOSPITALIST

## 2025-07-12 PROCEDURE — 36415 COLL VENOUS BLD VENIPUNCTURE: CPT | Performed by: HOSPITALIST

## 2025-07-12 PROCEDURE — 81003 URINALYSIS AUTO W/O SCOPE: CPT | Performed by: EMERGENCY MEDICINE

## 2025-07-12 PROCEDURE — 2500000001 HC RX 250 WO HCPCS SELF ADMINISTERED DRUGS (ALT 637 FOR MEDICARE OP): Performed by: HOSPITALIST

## 2025-07-12 PROCEDURE — 93005 ELECTROCARDIOGRAM TRACING: CPT

## 2025-07-12 PROCEDURE — 71250 CT THORAX DX C-: CPT | Performed by: RADIOLOGY

## 2025-07-12 PROCEDURE — 94761 N-INVAS EAR/PLS OXIMETRY MLT: CPT

## 2025-07-12 PROCEDURE — 84484 ASSAY OF TROPONIN QUANT: CPT | Performed by: EMERGENCY MEDICINE

## 2025-07-12 PROCEDURE — 99223 1ST HOSP IP/OBS HIGH 75: CPT | Performed by: HOSPITALIST

## 2025-07-12 PROCEDURE — 2500000001 HC RX 250 WO HCPCS SELF ADMINISTERED DRUGS (ALT 637 FOR MEDICARE OP): Performed by: EMERGENCY MEDICINE

## 2025-07-12 PROCEDURE — 94640 AIRWAY INHALATION TREATMENT: CPT

## 2025-07-12 PROCEDURE — 85025 COMPLETE CBC W/AUTO DIFF WBC: CPT | Performed by: EMERGENCY MEDICINE

## 2025-07-12 PROCEDURE — 83880 ASSAY OF NATRIURETIC PEPTIDE: CPT | Performed by: EMERGENCY MEDICINE

## 2025-07-12 PROCEDURE — 2500000004 HC RX 250 GENERAL PHARMACY W/ HCPCS (ALT 636 FOR OP/ED): Performed by: HOSPITALIST

## 2025-07-12 RX ORDER — FLUTICASONE FUROATE 100 UG/1
1 POWDER RESPIRATORY (INHALATION) NIGHTLY
COMMUNITY
Start: 2025-05-11

## 2025-07-12 RX ORDER — LOVASTATIN 20 MG/1
20 TABLET ORAL NIGHTLY
COMMUNITY
Start: 2025-07-03

## 2025-07-12 RX ORDER — LOPERAMIDE HYDROCHLORIDE 2 MG/1
4 CAPSULE ORAL AS NEEDED
Status: DISCONTINUED | OUTPATIENT
Start: 2025-07-12 | End: 2025-07-16 | Stop reason: HOSPADM

## 2025-07-12 RX ORDER — BENZONATATE 100 MG/1
100 CAPSULE ORAL EVERY 8 HOURS PRN
Status: DISCONTINUED | OUTPATIENT
Start: 2025-07-12 | End: 2025-07-16 | Stop reason: HOSPADM

## 2025-07-12 RX ORDER — ACETAMINOPHEN 160 MG/5ML
650 SOLUTION ORAL EVERY 4 HOURS PRN
Status: DISCONTINUED | OUTPATIENT
Start: 2025-07-12 | End: 2025-07-16 | Stop reason: HOSPADM

## 2025-07-12 RX ORDER — ONDANSETRON HYDROCHLORIDE 2 MG/ML
4 INJECTION, SOLUTION INTRAVENOUS EVERY 8 HOURS PRN
Status: DISCONTINUED | OUTPATIENT
Start: 2025-07-12 | End: 2025-07-16 | Stop reason: HOSPADM

## 2025-07-12 RX ORDER — DEXTROSE 50 % IN WATER (D50W) INTRAVENOUS SYRINGE
25
Status: DISCONTINUED | OUTPATIENT
Start: 2025-07-12 | End: 2025-07-12 | Stop reason: SDUPTHER

## 2025-07-12 RX ORDER — ACETAMINOPHEN 325 MG/1
650 TABLET ORAL EVERY 4 HOURS PRN
Status: DISCONTINUED | OUTPATIENT
Start: 2025-07-12 | End: 2025-07-16 | Stop reason: HOSPADM

## 2025-07-12 RX ORDER — TRAMADOL HYDROCHLORIDE 50 MG/1
50 TABLET, FILM COATED ORAL 3 TIMES DAILY
Status: DISCONTINUED | OUTPATIENT
Start: 2025-07-12 | End: 2025-07-16 | Stop reason: HOSPADM

## 2025-07-12 RX ORDER — BUPROPION HYDROCHLORIDE 300 MG/1
300 TABLET ORAL DAILY
Status: DISCONTINUED | OUTPATIENT
Start: 2025-07-13 | End: 2025-07-16 | Stop reason: HOSPADM

## 2025-07-12 RX ORDER — ACETAMINOPHEN 650 MG/1
650 SUPPOSITORY RECTAL EVERY 4 HOURS PRN
Status: DISCONTINUED | OUTPATIENT
Start: 2025-07-12 | End: 2025-07-16 | Stop reason: HOSPADM

## 2025-07-12 RX ORDER — TORSEMIDE 20 MG/1
20 TABLET ORAL DAILY
COMMUNITY
Start: 2025-07-03 | End: 2025-07-16 | Stop reason: HOSPADM

## 2025-07-12 RX ORDER — PRAVASTATIN SODIUM 20 MG/1
20 TABLET ORAL NIGHTLY
Status: DISCONTINUED | OUTPATIENT
Start: 2025-07-12 | End: 2025-07-16 | Stop reason: HOSPADM

## 2025-07-12 RX ORDER — INSULIN LISPRO 100 [IU]/ML
0-5 INJECTION, SOLUTION INTRAVENOUS; SUBCUTANEOUS
Status: DISCONTINUED | OUTPATIENT
Start: 2025-07-13 | End: 2025-07-16 | Stop reason: HOSPADM

## 2025-07-12 RX ORDER — GABAPENTIN 300 MG/1
300 CAPSULE ORAL NIGHTLY
COMMUNITY
Start: 2025-07-03

## 2025-07-12 RX ORDER — BUPROPION HYDROCHLORIDE 150 MG/1
150 TABLET, EXTENDED RELEASE ORAL 2 TIMES DAILY
Status: DISCONTINUED | OUTPATIENT
Start: 2025-07-12 | End: 2025-07-12

## 2025-07-12 RX ORDER — METHOCARBAMOL 500 MG/1
500 TABLET, FILM COATED ORAL 3 TIMES DAILY
COMMUNITY
Start: 2025-07-03

## 2025-07-12 RX ORDER — POLYETHYLENE GLYCOL 3350 17 G/17G
17 POWDER, FOR SOLUTION ORAL DAILY
Status: DISCONTINUED | OUTPATIENT
Start: 2025-07-12 | End: 2025-07-16 | Stop reason: HOSPADM

## 2025-07-12 RX ORDER — LANOLIN ALCOHOL/MO/W.PET/CERES
100 CREAM (GRAM) TOPICAL DAILY
Status: DISCONTINUED | OUTPATIENT
Start: 2025-07-13 | End: 2025-07-16 | Stop reason: HOSPADM

## 2025-07-12 RX ORDER — IPRATROPIUM BROMIDE AND ALBUTEROL SULFATE 2.5; .5 MG/3ML; MG/3ML
3 SOLUTION RESPIRATORY (INHALATION)
COMMUNITY

## 2025-07-12 RX ORDER — TRAMADOL HYDROCHLORIDE 50 MG/1
50 TABLET, FILM COATED ORAL ONCE
Status: COMPLETED | OUTPATIENT
Start: 2025-07-12 | End: 2025-07-12

## 2025-07-12 RX ORDER — ALLOPURINOL 100 MG/1
100 TABLET ORAL DAILY
Status: DISCONTINUED | OUTPATIENT
Start: 2025-07-13 | End: 2025-07-16 | Stop reason: HOSPADM

## 2025-07-12 RX ORDER — GABAPENTIN 300 MG/1
300 CAPSULE ORAL NIGHTLY
Status: DISCONTINUED | OUTPATIENT
Start: 2025-07-12 | End: 2025-07-16 | Stop reason: HOSPADM

## 2025-07-12 RX ORDER — DEXTROSE 50 % IN WATER (D50W) INTRAVENOUS SYRINGE
25
Status: DISCONTINUED | OUTPATIENT
Start: 2025-07-12 | End: 2025-07-16 | Stop reason: HOSPADM

## 2025-07-12 RX ORDER — DOCUSATE SODIUM 100 MG/1
100 CAPSULE, LIQUID FILLED ORAL 2 TIMES DAILY PRN
Status: DISCONTINUED | OUTPATIENT
Start: 2025-07-12 | End: 2025-07-16 | Stop reason: HOSPADM

## 2025-07-12 RX ORDER — DEXTROSE 50 % IN WATER (D50W) INTRAVENOUS SYRINGE
12.5
Status: DISCONTINUED | OUTPATIENT
Start: 2025-07-12 | End: 2025-07-16 | Stop reason: HOSPADM

## 2025-07-12 RX ORDER — LANOLIN ALCOHOL/MO/W.PET/CERES
1000 CREAM (GRAM) TOPICAL DAILY
Status: DISCONTINUED | OUTPATIENT
Start: 2025-07-13 | End: 2025-07-16 | Stop reason: HOSPADM

## 2025-07-12 RX ORDER — ONDANSETRON 4 MG/1
4 TABLET, ORALLY DISINTEGRATING ORAL EVERY 8 HOURS PRN
Status: DISCONTINUED | OUTPATIENT
Start: 2025-07-12 | End: 2025-07-16 | Stop reason: HOSPADM

## 2025-07-12 RX ORDER — HEPARIN SODIUM 5000 [USP'U]/ML
5000 INJECTION, SOLUTION INTRAVENOUS; SUBCUTANEOUS EVERY 8 HOURS
Status: DISCONTINUED | OUTPATIENT
Start: 2025-07-12 | End: 2025-07-16 | Stop reason: HOSPADM

## 2025-07-12 RX ORDER — AMPICILLIN AND SULBACTAM 2; 1 G/1; G/1
INJECTION, POWDER, FOR SOLUTION INTRAMUSCULAR; INTRAVENOUS
Status: DISPENSED
Start: 2025-07-12 | End: 2025-07-13

## 2025-07-12 RX ORDER — MECLIZINE HCL 12.5 MG 12.5 MG/1
25 TABLET ORAL 3 TIMES DAILY PRN
Status: DISCONTINUED | OUTPATIENT
Start: 2025-07-12 | End: 2025-07-16 | Stop reason: HOSPADM

## 2025-07-12 RX ORDER — POLYETHYLENE GLYCOL 3350 17 G/17G
17 POWDER, FOR SOLUTION ORAL 2 TIMES DAILY PRN
Status: DISCONTINUED | OUTPATIENT
Start: 2025-07-12 | End: 2025-07-16 | Stop reason: HOSPADM

## 2025-07-12 RX ORDER — METOPROLOL TARTRATE 25 MG/1
25 TABLET, FILM COATED ORAL DAILY
COMMUNITY
Start: 2025-07-03

## 2025-07-12 RX ORDER — METOPROLOL TARTRATE 25 MG/1
25 TABLET, FILM COATED ORAL DAILY
Status: DISCONTINUED | OUTPATIENT
Start: 2025-07-13 | End: 2025-07-16 | Stop reason: HOSPADM

## 2025-07-12 RX ORDER — DEXTROSE 50 % IN WATER (D50W) INTRAVENOUS SYRINGE
12.5
Status: DISCONTINUED | OUTPATIENT
Start: 2025-07-12 | End: 2025-07-12 | Stop reason: SDUPTHER

## 2025-07-12 RX ORDER — CYCLOBENZAPRINE HCL 10 MG
10 TABLET ORAL NIGHTLY
Status: DISCONTINUED | OUTPATIENT
Start: 2025-07-12 | End: 2025-07-16 | Stop reason: HOSPADM

## 2025-07-12 RX ORDER — PREDNISONE 20 MG/1
40 TABLET ORAL DAILY
Status: DISCONTINUED | OUTPATIENT
Start: 2025-07-12 | End: 2025-07-16 | Stop reason: HOSPADM

## 2025-07-12 RX ORDER — IPRATROPIUM BROMIDE AND ALBUTEROL SULFATE 2.5; .5 MG/3ML; MG/3ML
3 SOLUTION RESPIRATORY (INHALATION)
Status: DISCONTINUED | OUTPATIENT
Start: 2025-07-12 | End: 2025-07-16 | Stop reason: HOSPADM

## 2025-07-12 RX ORDER — FUROSEMIDE 10 MG/ML
20 INJECTION INTRAMUSCULAR; INTRAVENOUS EVERY 24 HOURS
Status: DISCONTINUED | OUTPATIENT
Start: 2025-07-12 | End: 2025-07-13

## 2025-07-12 RX ORDER — FERROUS SULFATE 325(65) MG
1 TABLET ORAL DAILY
Status: DISCONTINUED | OUTPATIENT
Start: 2025-07-13 | End: 2025-07-16 | Stop reason: HOSPADM

## 2025-07-12 RX ORDER — SENNOSIDES 8.6 MG/1
1 TABLET ORAL 2 TIMES DAILY PRN
Status: DISCONTINUED | OUTPATIENT
Start: 2025-07-12 | End: 2025-07-14

## 2025-07-12 RX ADMIN — HEPARIN SODIUM 5000 UNITS: 5000 INJECTION, SOLUTION INTRAVENOUS; SUBCUTANEOUS at 21:20

## 2025-07-12 RX ADMIN — PRAVASTATIN SODIUM 20 MG: 20 TABLET ORAL at 21:20

## 2025-07-12 RX ADMIN — FUROSEMIDE 20 MG: 10 INJECTION, SOLUTION INTRAMUSCULAR; INTRAVENOUS at 21:21

## 2025-07-12 RX ADMIN — TRAMADOL HYDROCHLORIDE 50 MG: 50 TABLET, COATED ORAL at 21:20

## 2025-07-12 RX ADMIN — Medication 1 L/MIN: at 21:09

## 2025-07-12 RX ADMIN — TRAMADOL HYDROCHLORIDE 50 MG: 50 TABLET, COATED ORAL at 16:25

## 2025-07-12 RX ADMIN — CYCLOBENZAPRINE 10 MG: 10 TABLET, FILM COATED ORAL at 21:20

## 2025-07-12 RX ADMIN — IPRATROPIUM BROMIDE AND ALBUTEROL SULFATE 3 ML: 2.5; .5 SOLUTION RESPIRATORY (INHALATION) at 21:09

## 2025-07-12 RX ADMIN — PREDNISONE 40 MG: 20 TABLET ORAL at 21:20

## 2025-07-12 RX ADMIN — GABAPENTIN 300 MG: 300 CAPSULE ORAL at 21:20

## 2025-07-12 RX ADMIN — AMPICILLIN SODIUM AND SULBACTAM SODIUM 3 G: 2; 1 INJECTION, POWDER, FOR SOLUTION INTRAMUSCULAR; INTRAVENOUS at 19:17

## 2025-07-12 SDOH — SOCIAL STABILITY: SOCIAL INSECURITY: WITHIN THE LAST YEAR, HAVE YOU BEEN HUMILIATED OR EMOTIONALLY ABUSED IN OTHER WAYS BY YOUR PARTNER OR EX-PARTNER?: NO

## 2025-07-12 SDOH — ECONOMIC STABILITY: INCOME INSECURITY: IN THE PAST 12 MONTHS HAS THE ELECTRIC, GAS, OIL, OR WATER COMPANY THREATENED TO SHUT OFF SERVICES IN YOUR HOME?: NO

## 2025-07-12 SDOH — SOCIAL STABILITY: SOCIAL INSECURITY: ARE THERE ANY APPARENT SIGNS OF INJURIES/BEHAVIORS THAT COULD BE RELATED TO ABUSE/NEGLECT?: NO

## 2025-07-12 SDOH — SOCIAL STABILITY: SOCIAL INSECURITY: WITHIN THE LAST YEAR, HAVE YOU BEEN AFRAID OF YOUR PARTNER OR EX-PARTNER?: NO

## 2025-07-12 SDOH — ECONOMIC STABILITY: FOOD INSECURITY: WITHIN THE PAST 12 MONTHS, YOU WORRIED THAT YOUR FOOD WOULD RUN OUT BEFORE YOU GOT THE MONEY TO BUY MORE.: NEVER TRUE

## 2025-07-12 SDOH — SOCIAL STABILITY: SOCIAL INSECURITY: HAS ANYONE EVER THREATENED TO HURT YOUR FAMILY OR YOUR PETS?: NO

## 2025-07-12 SDOH — ECONOMIC STABILITY: FOOD INSECURITY: WITHIN THE PAST 12 MONTHS, THE FOOD YOU BOUGHT JUST DIDN'T LAST AND YOU DIDN'T HAVE MONEY TO GET MORE.: NEVER TRUE

## 2025-07-12 SDOH — SOCIAL STABILITY: SOCIAL INSECURITY: HAVE YOU HAD THOUGHTS OF HARMING ANYONE ELSE?: NO

## 2025-07-12 SDOH — SOCIAL STABILITY: SOCIAL INSECURITY: WERE YOU ABLE TO COMPLETE ALL THE BEHAVIORAL HEALTH SCREENINGS?: YES

## 2025-07-12 SDOH — SOCIAL STABILITY: SOCIAL INSECURITY: HAVE YOU HAD ANY THOUGHTS OF HARMING ANYONE ELSE?: NO

## 2025-07-12 SDOH — SOCIAL STABILITY: SOCIAL INSECURITY: DOES ANYONE TRY TO KEEP YOU FROM HAVING/CONTACTING OTHER FRIENDS OR DOING THINGS OUTSIDE YOUR HOME?: NO

## 2025-07-12 SDOH — SOCIAL STABILITY: SOCIAL INSECURITY: DO YOU FEEL UNSAFE GOING BACK TO THE PLACE WHERE YOU ARE LIVING?: NO

## 2025-07-12 SDOH — SOCIAL STABILITY: SOCIAL INSECURITY: ABUSE: ADULT

## 2025-07-12 SDOH — SOCIAL STABILITY: SOCIAL INSECURITY: DO YOU FEEL ANYONE HAS EXPLOITED OR TAKEN ADVANTAGE OF YOU FINANCIALLY OR OF YOUR PERSONAL PROPERTY?: NO

## 2025-07-12 ASSESSMENT — ACTIVITIES OF DAILY LIVING (ADL)
TOILETING: NEEDS ASSISTANCE
HEARING - RIGHT EAR: DIFFICULTY WITH NOISE
BATHING: NEEDS ASSISTANCE
JUDGMENT_ADEQUATE_SAFELY_COMPLETE_DAILY_ACTIVITIES: YES
LACK_OF_TRANSPORTATION: NO
PATIENT'S MEMORY ADEQUATE TO SAFELY COMPLETE DAILY ACTIVITIES?: YES
GROOMING: NEEDS ASSISTANCE
WALKS IN HOME: NEEDS ASSISTANCE
DRESSING YOURSELF: NEEDS ASSISTANCE
HEARING - LEFT EAR: DIFFICULTY WITH NOISE
FEEDING YOURSELF: INDEPENDENT
ADEQUATE_TO_COMPLETE_ADL: YES
ASSISTIVE_DEVICE: OXYGEN;WALKER

## 2025-07-12 ASSESSMENT — COGNITIVE AND FUNCTIONAL STATUS - GENERAL
MOBILITY SCORE: 17
PATIENT BASELINE BEDBOUND: NO
HELP NEEDED FOR BATHING: A LITTLE
MOVING TO AND FROM BED TO CHAIR: A LITTLE
MOVING FROM LYING ON BACK TO SITTING ON SIDE OF FLAT BED WITH BEDRAILS: A LITTLE
STANDING UP FROM CHAIR USING ARMS: A LITTLE
TOILETING: A LITTLE
PERSONAL GROOMING: A LITTLE
DRESSING REGULAR UPPER BODY CLOTHING: A LITTLE
WALKING IN HOSPITAL ROOM: A LITTLE
DAILY ACTIVITIY SCORE: 19
TURNING FROM BACK TO SIDE WHILE IN FLAT BAD: A LITTLE
DRESSING REGULAR LOWER BODY CLOTHING: A LITTLE
CLIMB 3 TO 5 STEPS WITH RAILING: A LOT

## 2025-07-12 ASSESSMENT — PAIN - FUNCTIONAL ASSESSMENT
PAIN_FUNCTIONAL_ASSESSMENT: 0-10
PAIN_FUNCTIONAL_ASSESSMENT: 0-10

## 2025-07-12 ASSESSMENT — PAIN SCALES - GENERAL
PAINLEVEL_OUTOF10: 0 - NO PAIN
PAINLEVEL_OUTOF10: 7

## 2025-07-12 ASSESSMENT — PAIN DESCRIPTION - DESCRIPTORS: DESCRIPTORS: ACHING

## 2025-07-12 ASSESSMENT — LIFESTYLE VARIABLES
AUDIT-C TOTAL SCORE: 0
HOW OFTEN DO YOU HAVE A DRINK CONTAINING ALCOHOL: NEVER
SKIP TO QUESTIONS 9-10: 1
HOW MANY STANDARD DRINKS CONTAINING ALCOHOL DO YOU HAVE ON A TYPICAL DAY: PATIENT DOES NOT DRINK
HOW OFTEN DO YOU HAVE 6 OR MORE DRINKS ON ONE OCCASION: NEVER
AUDIT-C TOTAL SCORE: 0

## 2025-07-12 NOTE — H&P
"History Of Present Illness  Lakeshia Park \"Nasra\" is a 89 y.o. female presenting with shortness of breath from assisted living facility.  Patient placed on supplemental oxygen 2 L while here in the ER.  She was noted to have more than usual swelling in left leg.  She has underlying heart failure with chronic swelling in legs.  Denies any chest pain or palpitations or syncope or lightheadedness.  No bleeding.  Denies any cough fever chills nausea vomiting diarrhea.  No joint pains or skin rash.  No runny nose sore throat sinus or nasal congestion.  No ear pain pressure fullness discharge.  No headache neck pain focal weakness.  Denies any urinary complaints.  No back pain flank pain hematuria dysuria.  Denies any wheezing.  No exposure to sick contacts or recent travel.  No alarming weight loss or appetite change.  Advancing age and decline overall.  She has chronic voice issues as per the patient but denies slurred speech or dysphagia or blurry vision.  No numbness paralysis or paresthesias or tingling.  CT chest with no volume overload but pneumonitis surrounding pneumocele.  .  Creatinine at 2.68.  Started on IV antibiotics Unasyn.  EKG sinus rhythm with first-degree AV block  Urinalysis fine     Past Medical History  Medical History[1]  CHF  COPD  Diabetes mellitus type 2  Hypertension  Arthritis  Anemia  Possible chronic kidney disease  Obesity  Surgical History  Surgical History[2]     Social History  She reports that she has never smoked. She has never used smokeless tobacco. She reports that she does not currently use alcohol. She reports that she does not use drugs.    Family History  Family History[3]     Allergies  Celecoxib, Ciprofloxacin, Diazepam, Esomeprazole, Ibuprofen, Milk, and Naproxen    Review of Systems  All other 12 point review of systems negative except HPI  Physical Exam   General Appearance: AAO x 3, obese  Skin: skin color pink, warm, and dry; no suspicious rashes or " "lesions  Eyes : PERRL, EOM's intact  ENT: mucous membranes pink and moist  Neck: normocephalic  Respiratory: lungs clear to auscultation anteriorly; no wheezing, rhonchi, or crackles.   Heart: regular rate and rhythm.   Abdomen: Nondistended, positive bowel sounds x4, soft,  nontender  Extremities: no edema   Peripheral pulses: normal x4 extremities  Neuro: alert, coherent and conversant, no focal motor deficits  Last Recorded Vitals  Blood pressure 152/77, pulse 58, temperature 36.6 °C (97.8 °F), temperature source Temporal, resp. rate 20, height 1.651 m (5' 5\"), weight 99.3 kg (219 lb), SpO2 97%.    Relevant Results  Results for orders placed or performed during the hospital encounter of 07/12/25 (from the past 24 hours)   CBC and Auto Differential   Result Value Ref Range    WBC 7.5 4.4 - 11.3 x10*3/uL    nRBC 0.0 0.0 - 0.0 /100 WBCs    RBC 2.43 (L) 4.00 - 5.20 x10*6/uL    Hemoglobin 7.8 (L) 12.0 - 16.0 g/dL    Hematocrit 24.8 (L) 36.0 - 46.0 %     (H) 80 - 100 fL    MCH 32.1 26.0 - 34.0 pg    MCHC 31.5 (L) 32.0 - 36.0 g/dL    RDW 15.4 (H) 11.5 - 14.5 %    Platelets 292 150 - 450 x10*3/uL    Neutrophils % 72.0 40.0 - 80.0 %    Immature Granulocytes %, Automated 0.9 0.0 - 0.9 %    Lymphocytes % 14.9 13.0 - 44.0 %    Monocytes % 10.8 2.0 - 10.0 %    Eosinophils % 1.1 0.0 - 6.0 %    Basophils % 0.3 0.0 - 2.0 %    Neutrophils Absolute 5.43 1.60 - 5.50 x10*3/uL    Immature Granulocytes Absolute, Automated 0.07 0.00 - 0.50 x10*3/uL    Lymphocytes Absolute 1.12 0.80 - 3.00 x10*3/uL    Monocytes Absolute 0.81 (H) 0.05 - 0.80 x10*3/uL    Eosinophils Absolute 0.08 0.00 - 0.40 x10*3/uL    Basophils Absolute 0.02 0.00 - 0.10 x10*3/uL   Comprehensive metabolic panel   Result Value Ref Range    Glucose 209 (H) 74 - 99 mg/dL    Sodium 140 136 - 145 mmol/L    Potassium 4.3 3.5 - 5.3 mmol/L    Chloride 100 98 - 107 mmol/L    Bicarbonate 30 21 - 32 mmol/L    Anion Gap 14 10 - 20 mmol/L    Urea Nitrogen 67 (H) 6 - 23 mg/dL "    Creatinine 2.74 (H) 0.50 - 1.05 mg/dL    eGFR 16 (L) >60 mL/min/1.73m*2    Calcium 8.6 8.6 - 10.3 mg/dL    Albumin 3.7 3.4 - 5.0 g/dL    Alkaline Phosphatase 55 33 - 136 U/L    Total Protein 5.8 (L) 6.4 - 8.2 g/dL    AST 12 9 - 39 U/L    Bilirubin, Total 0.2 0.0 - 1.2 mg/dL    ALT 13 7 - 45 U/L   Magnesium   Result Value Ref Range    Magnesium 2.12 1.60 - 2.40 mg/dL   Troponin I, High Sensitivity   Result Value Ref Range    Troponin I, High Sensitivity 7 0 - 13 ng/L   B-Type Natriuretic Peptide   Result Value Ref Range     (H) 0 - 99 pg/mL   Urinalysis with Reflex Culture and Microscopic   Result Value Ref Range    Color, Urine Colorless (N) Light-Yellow, Yellow, Dark-Yellow    Appearance, Urine Clear Clear    Specific Gravity, Urine 1.010 1.005 - 1.035    pH, Urine 6.0 5.0, 5.5, 6.0, 6.5, 7.0, 7.5, 8.0    Protein, Urine NEGATIVE NEGATIVE, 10 (TRACE), 20 (TRACE) mg/dL    Glucose, Urine Normal Normal mg/dL    Blood, Urine NEGATIVE NEGATIVE mg/dL    Ketones, Urine NEGATIVE NEGATIVE mg/dL    Bilirubin, Urine NEGATIVE NEGATIVE mg/dL    Urobilinogen, Urine Normal Normal mg/dL    Nitrite, Urine NEGATIVE NEGATIVE    Leukocyte Esterase, Urine NEGATIVE NEGATIVE     *Note: Due to a large number of results and/or encounters for the requested time period, some results have not been displayed. A complete set of results can be found in Results Review.       CT chest wo IV contrast  Result Date: 7/12/2025  Interpreted By:  Gabriele Suarez, STUDY: CT CHEST WO IV CONTRAST;  7/12/2025 3:56 pm   INDICATION: Signs/Symptoms:dyspnea.   COMPARISON: 07/17/2019   ACCESSION NUMBER(S): [id number]   ORDERING CLINICIAN: FORTUNATO SHIRLEY   TECHNIQUE: Helical data acquisition of the chest was obtained  without IV contrast material.  Images were reformatted in axial, coronal, and sagittal planes.   FINDINGS: Lungs and Pleura: Right upper lobe pneumatocele is again noted. There is greater surrounding ground-glass opacity measuring  3.4 cm in diameter, previously 2.4 cm. Stable 4 mm left upper lobe subsolid pulmonary nodule. Scattered streaky atelectasis. No pulmonary consolidation. No pleural effusion. No pneumothorax.   Mediastinum and axilla: Aortopulmonary lymphadenopathy measuring 17 mm. No cardiomegaly or pericardial effusion. No thoracic aortic aneurysm. Moderate-sized mixed sliding and paraesophageal hiatal hernia. Atherosclerosis. Coronary artery calcifications.   Visualized Upper Abdomen: Status post cholecystectomy. Atrophic kidneys. Left renal cyst, Bosniak 2 with internal thin calcification. Colonic diverticulosis. Nodularity and thickening of the left adrenal gland.   MSK/Chest Wall: No aggressive bony lesion identified. Bilateral glenohumeral joint degenerative change. Large effusion in the left subacromial bursa and possibly also on the right. Right thyroid nodule measuring 1.8 cm.         1. Stable right upper lobe pneumatocele with surrounding greater area of ground-glass opacities suggestive of pneumonitis. Recommend additional follow-up in 6-12 months given the interval enlargement. 2. Aortopulmonary lymphadenopathy. Attention during follow-up. 3. Hiatal hernia.   Signed by: Gabriele Suarez 7/12/2025 4:50 PM Dictation workstation:   [id number]    XR chest 1 view  Result Date: 7/12/2025  Interpreted By:  Bernabe Theodore, STUDY: XR CHEST 1 VIEW;  7/12/2025 1:54 pm   INDICATION: Signs/Symptoms:dyspnea.     COMPARISON: 05/04/2025   ACCESSION NUMBER(S): [id number]   ORDERING CLINICIAN: FORTUNATO SHIRLEY   FINDINGS: Low lung volumes accounts for vascular crowding. Widening of the mediastinum is likely related to the portable projection of the film.   No air bronchograms to suggest pneumonia. No pleural effusion. Retrocardiac density reflecting hiatal hernia, similar to prior examination   There is no interstitial edema. No air bronchograms to suggest pneumonia.       1.  Low lung volumes.       MACRO: None   Signed by: Bernabe  Arben 7/12/2025 2:32 PM Dictation workstation:   [id number]      Scheduled medications  Scheduled Medications[4]  Continuous medications  Continuous Medications[5]  PRN medications  PRN Medications[6]             Assessment & Plan  Hypoxemia    89-year-old female with history of  CHF  COPD  Diabetes mellitus type 2  Hypertension  Arthritis  Anemia  Possible chronic kidney disease  Obesity  Gout  Anxiety depression  Hyperlipidemia  B12 deficiency  Hypothyroidism  Presenting with  Pneumonitis  Possible NUBIA  Anemia likely chronic  Macrocytosis  Acute hypoxemic respiratory failure from pneumonitis    Plan  Telemetry  Monitor vital signs  Follow daily CBC BMP  Cultures  Supportive care  Unasyn IV  Neb treatments  Prednisone 40 Mg daily  Symptomatic management  Continue outpatient medicines as indicated  Supplemental oxygen 2 L nasal cannula  Allopurinol, Wellbutrin, vitamin B12, Colace, Neurontin, iron supplement, Synthroid,   meclizine for chronic vertigo  Bowel regimen for constipation prophylaxis  Statins for hyperlipidemia  Tramadol for pain control  Muscle relaxant for spasms  Carb controlled diet, insulin sliding scale  Heparin for DVT prophylaxis  Multi vitamin, nutrition  Stool FOBT, TSH, B12, folate level, PT  CBC BMP, urine output  Low-dose Lasix IV  Monitor renal function  Follow nephrology  Fall, aspiration, seizure precautions.  Watch polypharmacy      Eleni Mcmahon MD         [1]   Past Medical History:  Diagnosis Date    Arthritis     CHF (congestive heart failure)     COPD (chronic obstructive pulmonary disease) (Multi)     Diabetes mellitus (Multi)     Hypertension    [2]   Past Surgical History:  Procedure Laterality Date    ESOPHAGOGASTRODUODENOSCOPY  04/29/2024    INJECTION N/A 06/03/2025    L5-S1 RIVER    OTHER SURGICAL HISTORY  09/13/2019    Facial surgery    OTHER SURGICAL HISTORY  09/13/2019    Gallbladder surgery    OTHER SURGICAL HISTORY  09/13/2019    Appendectomy    OTHER SURGICAL HISTORY   09/13/2019    Hysterectomy    OTHER SURGICAL HISTORY  09/13/2019    Colon surgery    OTHER SURGICAL HISTORY  10/24/2019    Thyroid surgery   [3]   Family History  Problem Relation Name Age of Onset    Diabetes Mother      Brain cancer Mother      Brain cancer Brother     [4] ampicillin-sulbactam, 3 g, intravenous, Once  [5]    [6]

## 2025-07-12 NOTE — ED PROVIDER NOTES
HPI   Chief Complaint   Patient presents with    Shortness of Breath     Patient brought in by [facility] from the [facility] for shortness of breath with exertion today and swelling in her legs       Limitations to History: None    HPI: 89-year-old female presents concern for shortness of breath and lower extremity edema.  Patient cannot verbalize when this began.  Felt like she had swelling in her legs which she noticed today.  History of CHF.  Denies any chest pain, nausea, vomiting, abdominal pain, urinary symptoms.    Additional History Obtained from: EMS    ------------------------------------------------------------------------------------------------------------------------------------------  Physical Exam:    VS: As documented in the triage note and EMR flowsheet from this visit were reviewed.    Appearance: Alert. cooperative,  in no acute distress.   Skin: Intact,  dry skin, no lesions, rash, petechiae or purpura.   Eyes: PERRLA, EOMs intact,  Conjunctiva pink with no redness or exudates.   HENT: Normocephalic, atraumatic. Nares patent. No intraoral lesions.   Neck: Supple, without meningismus. Trachea at midline. No lymphadenopathy.  Pulmonary: Clear bilaterally with good chest wall excursion. No rales, rhonchi or wheezing. No accessory muscle use or stridor.  Cardiac: Regular rate and rhythm, no rubs, murmurs, or gallops.   Abdomen: Abdomen is soft, nontender, and nondistended.  No palpable organomegaly.  No rebound or guarding.  No CVA tenderness. Nonsurgical abdomen.  Genitourinary: Exam deferred.  Musculoskeletal: Full range of motion.  Pulses full and equal. No cyanosis, clubbing.  Bilateral 1-2+ pitting edema.  Neurological:  Cranial nerves are grossly intact, grossly normal sensation, no weakness, no focal findings identified.  Psychiatric: Appropriate mood and affect.                Patient History   Medical History[1]  Surgical History[2]  Family History[3]  Social History[4]    Physical Exam   ED  Triage Vitals [07/12/25 1328]   Temperature Heart Rate Respirations BP   36.6 °C (97.8 °F) 73 17 100/51      Pulse Ox Temp Source Heart Rate Source Patient Position   (!) 90 % Temporal Monitor --      BP Location FiO2 (%)     -- --       Physical Exam      ED Course & MDM   Diagnoses as of 07/12/25 1802   Acute hypoxemic respiratory failure   Pneumonitis                 No data recorded     [place] Coma Scale Score: 15 (07/12/25 1330 : Avril Soliman RN)                           Medical Decision Making  Labs Reviewed  CBC WITH AUTO DIFFERENTIAL - Abnormal     WBC                           7.5                    nRBC                          0.0                    RBC                           2.43 (*)               Hemoglobin                    7.8 (*)                Hematocrit                    24.8 (*)               MCV                           102 (*)                MCH                           32.1                   MCHC                          31.5 (*)               RDW                           15.4 (*)               Platelets                     292                    Neutrophils %                 72.0                   Immature Granulocytes %, Automated   0.9                    Lymphocytes %                 14.9                   Monocytes %                   10.8                   Eosinophils %                 1.1                    Basophils %                   0.3                    Neutrophils Absolute          5.43                   Immature Granulocytes Absolute, Au*   0.07                   Lymphocytes Absolute          1.12                   Monocytes Absolute            0.81 (*)               Eosinophils Absolute          0.08                   Basophils Absolute            0.02                COMPREHENSIVE METABOLIC PANEL - Abnormal     Glucose                       209 (*)                Sodium                        140                    Potassium                     4.3                     Chloride                      100                    Bicarbonate                   30                     Anion Gap                     14                     Urea Nitrogen                 67 (*)                 Creatinine                    2.74 (*)               eGFR                          16 (*)                 Calcium                       8.6                    Albumin                       3.7                    Alkaline Phosphatase          55                     Total Protein                 5.8 (*)                AST                           12                     Bilirubin, Total              0.2                    ALT                           13                  B-TYPE NATRIURETIC PEPTIDE - Abnormal     BNP                           221 (*)                    Narrative:    <100 pg/mL - Heart failure unlikely                  100-299 pg/mL - Intermediate probability of acute heart                                  failure exacerbation. Correlate with clinical                                  context and patient history.                    >=300 pg/mL - Heart Failure likely. Correlate with clinical                                  context and patient history.                                    BNP testing is performed using different testing methodology at [facility] than at other [facility]. Direct result comparisons should only be made within the same method.                     URINALYSIS WITH REFLEX CULTURE AND MICROSCOPIC - Abnormal     Color, Urine                  Colorless (*)               Appearance, Urine             Clear                  Specific Gravity, Urine       1.010                  pH, Urine                     6.0                    Protein, Urine                NEGATIVE                Glucose, Urine                Normal                 Blood, Urine                  NEGATIVE                Ketones, Urine                NEGATIVE                Bilirubin, Urine               NEGATIVE                Urobilinogen, Urine           Normal                 Nitrite, Urine                NEGATIVE                Leukocyte Esterase, Urine     NEGATIVE             MAGNESIUM - Normal     Magnesium                     2.12                TROPONIN I, HIGH SENSITIVITY - Normal     Troponin I, High Sensitivity   7                          Narrative: Less than 99th percentile of normal range cutoff-                  Female and children under 18 years old <14 ng/L; Male <21 ng/L: Negative                  Repeat testing should be performed if clinically indicated.                                     Female and children under 18 years old 14-50 ng/L; Male 21-50 ng/L:                  Consistent with possible cardiac damage and possible increased clinical                   risk. Serial measurements may help to assess extent of myocardial damage.                                     >50 ng/L: Consistent with cardiac damage, increased clinical risk and                  myocardial infarction. Serial measurements may help assess extent of                   myocardial damage.                                      NOTE: Children less than 1 year old may have higher baseline troponin                   levels and results should be interpreted in conjunction with the overall                   clinical context.                                     NOTE: Troponin I testing is performed using a different                   testing methodology at [facility] than at other                   [facility]. Direct result comparisons should only                   be made within the same method.  URINALYSIS WITH REFLEX CULTURE AND MICROSCOPIC         Narrative: The following orders were created for panel order Urinalysis with Reflex Culture and Microscopic.                  Procedure                               Abnormality         Status                                     ---------                                -----------         ------                                     Urinalysis with Reflex C...[[id number]]  Abnormal            Final result                               Extra Urine Gray Tube[[id number]]                            In process                                                   Please view results for these tests on the individual orders.  EXTRA URINE GRAY TUBE  CT chest wo IV contrast   Final Result    1. Stable right upper lobe pneumatocele with surrounding greater area    of ground-glass opacities suggestive of pneumonitis. Recommend    additional follow-up in 6-12 months given the interval enlargement.    2. Aortopulmonary lymphadenopathy. Attention during follow-up.    3. Hiatal hernia.          Signed by: Gabriele Suarez 2025 4:50 PM    Dictation workstation:   [id number]     XR chest 1 view   Final Result    1.  Low lung volumes.                      MACRO:    None          Signed by: Bernabe Theodore 2025 2:32 PM    Dictation workstation:   [id number]     Medical Decision Makin-year-old female presents from local group home with concern for shortness of breath. Patient has 1+ pitting edema bilaterally. History of stage IV kidney disease as well as heart failure. CT chest without contrast shows no volume overload. BNP of 211. Creatinine of 2.68. Pneumonitis on CT surrounding pneumocele.  Treated with Unasyn.  Patient has been trialed off of oxygen multiple times and remains hypoxemic. Remains on 2 L. Treated with Unasyn. Stable.    Differential Diagnoses Considered: Pneumonia, volume overload, electrolyte abnormality, CKD, pneumothorax, ACS    Independent Interpretation of Studies:  I independently interpreted: Chest x-ray shows no evidence of pneumonia or pneumothorax.      Escalation of Care:  Appropriate for admission for further treatment and evaluation.    Discussion of Management with Other Providers:   I discussed the patient/results with: Admitting  hospitalist.          Procedure  ECG 12 lead    Performed by: Paulino Wong DO  Authorized by: Paulino Wong DO    ECG interpreted by ED Physician in the absence of a cardiologist: yes    Comments:      EKG interpreted by Dr. Warren Wong: Normal sinus rhythm at 70 bpm.  First-degree AV block with NE interval 212 ms.  QTc of 466 ms.  Nonspecific ST changes.         [1]   Past Medical History:  Diagnosis Date    Arthritis     CHF (congestive heart failure)     COPD (chronic obstructive pulmonary disease) (Multi)     Diabetes mellitus (Multi)     Hypertension    [2]   Past Surgical History:  Procedure Laterality Date    ESOPHAGOGASTRODUODENOSCOPY  04/29/2024    INJECTION N/A 06/03/2025    L5-S1 RIVER    OTHER SURGICAL HISTORY  09/13/2019    Facial surgery    OTHER SURGICAL HISTORY  09/13/2019    Gallbladder surgery    OTHER SURGICAL HISTORY  09/13/2019    Appendectomy    OTHER SURGICAL HISTORY  09/13/2019    Hysterectomy    OTHER SURGICAL HISTORY  09/13/2019    Colon surgery    OTHER SURGICAL HISTORY  10/24/2019    Thyroid surgery   [3]   Family History  Problem Relation Name Age of Onset    Diabetes Mother      Brain cancer Mother      Brain cancer Brother     [4]   Social History  Tobacco Use    Smoking status: Never    Smokeless tobacco: Never   Vaping Use    Vaping status: Never Used   Substance Use Topics    Alcohol use: Not Currently    Drug use: Never        Paulino Wong DO  07/12/25 5530

## 2025-07-13 VITALS
WEIGHT: 210.76 LBS | SYSTOLIC BLOOD PRESSURE: 134 MMHG | OXYGEN SATURATION: 92 % | HEART RATE: 80 BPM | TEMPERATURE: 98.1 F | DIASTOLIC BLOOD PRESSURE: 72 MMHG | BODY MASS INDEX: 35.11 KG/M2 | HEIGHT: 65 IN | RESPIRATION RATE: 18 BRPM

## 2025-07-13 LAB
ANION GAP SERPL CALC-SCNC: 16 MMOL/L (ref 10–20)
BACTERIA BLD CULT: NORMAL
BUN SERPL-MCNC: 62 MG/DL (ref 6–23)
CALCIUM SERPL-MCNC: 8.7 MG/DL (ref 8.6–10.3)
CHLORIDE SERPL-SCNC: 98 MMOL/L (ref 98–107)
CO2 SERPL-SCNC: 29 MMOL/L (ref 21–32)
CREAT SERPL-MCNC: 2.54 MG/DL (ref 0.5–1.05)
EGFRCR SERPLBLD CKD-EPI 2021: 17 ML/MIN/1.73M*2
ERYTHROCYTE [DISTWIDTH] IN BLOOD BY AUTOMATED COUNT: 15.2 % (ref 11.5–14.5)
FERRITIN SERPL-MCNC: 48 NG/ML (ref 8–150)
FOLATE SERPL-MCNC: 15.8 NG/ML
GLUCOSE BLD MANUAL STRIP-MCNC: 191 MG/DL (ref 74–99)
GLUCOSE BLD MANUAL STRIP-MCNC: 199 MG/DL (ref 74–99)
GLUCOSE BLD MANUAL STRIP-MCNC: 208 MG/DL (ref 74–99)
GLUCOSE BLD MANUAL STRIP-MCNC: 221 MG/DL (ref 74–99)
GLUCOSE SERPL-MCNC: 245 MG/DL (ref 74–99)
HCT VFR BLD AUTO: 27 % (ref 36–46)
HGB BLD-MCNC: 8.4 G/DL (ref 12–16)
IRON SATN MFR SERPL: 7 % (ref 25–45)
IRON SERPL-MCNC: 23 UG/DL (ref 35–150)
MCH RBC QN AUTO: 32.2 PG (ref 26–34)
MCHC RBC AUTO-ENTMCNC: 31.1 G/DL (ref 32–36)
MCV RBC AUTO: 103 FL (ref 80–100)
NRBC BLD-RTO: 0 /100 WBCS (ref 0–0)
PLATELET # BLD AUTO: 330 X10*3/UL (ref 150–450)
POTASSIUM SERPL-SCNC: 4.5 MMOL/L (ref 3.5–5.3)
PROCALCITONIN SERPL-MCNC: 0.15 NG/ML
RBC # BLD AUTO: 2.61 X10*6/UL (ref 4–5.2)
SODIUM SERPL-SCNC: 138 MMOL/L (ref 136–145)
TIBC SERPL-MCNC: 308 UG/DL (ref 240–445)
TSH SERPL-ACNC: 1.78 MIU/L (ref 0.44–3.98)
UIBC SERPL-MCNC: 285 UG/DL (ref 110–370)
VIT B12 SERPL-MCNC: 778 PG/ML (ref 211–911)
WBC # BLD AUTO: 6.3 X10*3/UL (ref 4.4–11.3)

## 2025-07-13 PROCEDURE — 2500000005 HC RX 250 GENERAL PHARMACY W/O HCPCS: Performed by: HOSPITALIST

## 2025-07-13 PROCEDURE — 2500000004 HC RX 250 GENERAL PHARMACY W/ HCPCS (ALT 636 FOR OP/ED): Performed by: INTERNAL MEDICINE

## 2025-07-13 PROCEDURE — 99221 1ST HOSP IP/OBS SF/LOW 40: CPT | Performed by: INTERNAL MEDICINE

## 2025-07-13 PROCEDURE — 84145 PROCALCITONIN (PCT): CPT | Mod: SAMLAB | Performed by: HOSPITALIST

## 2025-07-13 PROCEDURE — 1200000002 HC GENERAL ROOM WITH TELEMETRY DAILY

## 2025-07-13 PROCEDURE — 82728 ASSAY OF FERRITIN: CPT | Performed by: HOSPITALIST

## 2025-07-13 PROCEDURE — 2500000001 HC RX 250 WO HCPCS SELF ADMINISTERED DRUGS (ALT 637 FOR MEDICARE OP)

## 2025-07-13 PROCEDURE — 82947 ASSAY GLUCOSE BLOOD QUANT: CPT

## 2025-07-13 PROCEDURE — 99233 SBSQ HOSP IP/OBS HIGH 50: CPT | Performed by: HOSPITALIST

## 2025-07-13 PROCEDURE — 82607 VITAMIN B-12: CPT | Mod: SAMLAB | Performed by: HOSPITALIST

## 2025-07-13 PROCEDURE — 94761 N-INVAS EAR/PLS OXIMETRY MLT: CPT

## 2025-07-13 PROCEDURE — 2500000001 HC RX 250 WO HCPCS SELF ADMINISTERED DRUGS (ALT 637 FOR MEDICARE OP): Performed by: HOSPITALIST

## 2025-07-13 PROCEDURE — 82746 ASSAY OF FOLIC ACID SERUM: CPT | Mod: SAMLAB | Performed by: HOSPITALIST

## 2025-07-13 PROCEDURE — 36415 COLL VENOUS BLD VENIPUNCTURE: CPT | Performed by: HOSPITALIST

## 2025-07-13 PROCEDURE — 85027 COMPLETE CBC AUTOMATED: CPT | Performed by: HOSPITALIST

## 2025-07-13 PROCEDURE — 2500000002 HC RX 250 W HCPCS SELF ADMINISTERED DRUGS (ALT 637 FOR MEDICARE OP, ALT 636 FOR OP/ED): Performed by: HOSPITALIST

## 2025-07-13 PROCEDURE — 84443 ASSAY THYROID STIM HORMONE: CPT | Performed by: HOSPITALIST

## 2025-07-13 PROCEDURE — 94640 AIRWAY INHALATION TREATMENT: CPT

## 2025-07-13 PROCEDURE — 83550 IRON BINDING TEST: CPT | Performed by: HOSPITALIST

## 2025-07-13 PROCEDURE — 80048 BASIC METABOLIC PNL TOTAL CA: CPT | Performed by: HOSPITALIST

## 2025-07-13 PROCEDURE — 2500000004 HC RX 250 GENERAL PHARMACY W/ HCPCS (ALT 636 FOR OP/ED): Performed by: HOSPITALIST

## 2025-07-13 RX ORDER — FUROSEMIDE 10 MG/ML
40 INJECTION INTRAMUSCULAR; INTRAVENOUS EVERY 24 HOURS
Status: DISCONTINUED | OUTPATIENT
Start: 2025-07-13 | End: 2025-07-14

## 2025-07-13 RX ORDER — METOPROLOL TARTRATE 50 MG/1
50 TABLET ORAL NIGHTLY
Status: DISCONTINUED | OUTPATIENT
Start: 2025-07-13 | End: 2025-07-16 | Stop reason: HOSPADM

## 2025-07-13 RX ADMIN — METOPROLOL TARTRATE 50 MG: 50 TABLET, FILM COATED ORAL at 20:12

## 2025-07-13 RX ADMIN — FUROSEMIDE 40 MG: 10 INJECTION, SOLUTION INTRAMUSCULAR; INTRAVENOUS at 20:06

## 2025-07-13 RX ADMIN — IPRATROPIUM BROMIDE AND ALBUTEROL SULFATE 3 ML: 2.5; .5 SOLUTION RESPIRATORY (INHALATION) at 10:45

## 2025-07-13 RX ADMIN — ACETAMINOPHEN 650 MG: 325 TABLET ORAL at 08:31

## 2025-07-13 RX ADMIN — FERROUS SULFATE TAB 325 MG (65 MG ELEMENTAL FE) 1 TABLET: 325 (65 FE) TAB at 08:30

## 2025-07-13 RX ADMIN — HEPARIN SODIUM 5000 UNITS: 5000 INJECTION, SOLUTION INTRAVENOUS; SUBCUTANEOUS at 05:36

## 2025-07-13 RX ADMIN — LEVOTHYROXINE SODIUM 175 MCG: 25 TABLET ORAL at 05:35

## 2025-07-13 RX ADMIN — Medication 21 PERCENT: at 17:00

## 2025-07-13 RX ADMIN — GABAPENTIN 300 MG: 300 CAPSULE ORAL at 20:06

## 2025-07-13 RX ADMIN — TRAMADOL HYDROCHLORIDE 50 MG: 50 TABLET, COATED ORAL at 08:30

## 2025-07-13 RX ADMIN — CYCLOBENZAPRINE 10 MG: 10 TABLET, FILM COATED ORAL at 20:06

## 2025-07-13 RX ADMIN — BUPROPION HYDROCHLORIDE 300 MG: 300 TABLET, EXTENDED RELEASE ORAL at 08:33

## 2025-07-13 RX ADMIN — HEPARIN SODIUM 5000 UNITS: 5000 INJECTION, SOLUTION INTRAVENOUS; SUBCUTANEOUS at 20:06

## 2025-07-13 RX ADMIN — IPRATROPIUM BROMIDE AND ALBUTEROL SULFATE 3 ML: 2.5; .5 SOLUTION RESPIRATORY (INHALATION) at 13:28

## 2025-07-13 RX ADMIN — IRON SUCROSE 300 MG: 20 INJECTION, SOLUTION INTRAVENOUS at 10:30

## 2025-07-13 RX ADMIN — TRAMADOL HYDROCHLORIDE 50 MG: 50 TABLET, COATED ORAL at 17:38

## 2025-07-13 RX ADMIN — METOPROLOL TARTRATE 25 MG: 25 TABLET, FILM COATED ORAL at 05:35

## 2025-07-13 RX ADMIN — TRAMADOL HYDROCHLORIDE 50 MG: 50 TABLET, COATED ORAL at 20:07

## 2025-07-13 RX ADMIN — Medication 21 PERCENT: at 22:18

## 2025-07-13 RX ADMIN — IPRATROPIUM BROMIDE AND ALBUTEROL SULFATE 3 ML: 2.5; .5 SOLUTION RESPIRATORY (INHALATION) at 17:00

## 2025-07-13 RX ADMIN — IPRATROPIUM BROMIDE AND ALBUTEROL SULFATE 3 ML: 2.5; .5 SOLUTION RESPIRATORY (INHALATION) at 06:12

## 2025-07-13 RX ADMIN — PRAVASTATIN SODIUM 20 MG: 20 TABLET ORAL at 20:06

## 2025-07-13 RX ADMIN — PREDNISONE 40 MG: 20 TABLET ORAL at 08:31

## 2025-07-13 RX ADMIN — IPRATROPIUM BROMIDE AND ALBUTEROL SULFATE 3 ML: 2.5; .5 SOLUTION RESPIRATORY (INHALATION) at 02:32

## 2025-07-13 RX ADMIN — IPRATROPIUM BROMIDE AND ALBUTEROL SULFATE 3 ML: 2.5; .5 SOLUTION RESPIRATORY (INHALATION) at 22:18

## 2025-07-13 RX ADMIN — Medication 1000 MCG: at 08:32

## 2025-07-13 RX ADMIN — INSULIN LISPRO 2 UNITS: 100 INJECTION, SOLUTION INTRAVENOUS; SUBCUTANEOUS at 17:37

## 2025-07-13 RX ADMIN — ALLOPURINOL 100 MG: 100 TABLET ORAL at 08:32

## 2025-07-13 RX ADMIN — INSULIN LISPRO 1 UNITS: 100 INJECTION, SOLUTION INTRAVENOUS; SUBCUTANEOUS at 08:32

## 2025-07-13 RX ADMIN — Medication 21 PERCENT: at 02:32

## 2025-07-13 RX ADMIN — HEPARIN SODIUM 5000 UNITS: 5000 INJECTION, SOLUTION INTRAVENOUS; SUBCUTANEOUS at 12:47

## 2025-07-13 ASSESSMENT — COGNITIVE AND FUNCTIONAL STATUS - GENERAL
STANDING UP FROM CHAIR USING ARMS: A LITTLE
HELP NEEDED FOR BATHING: A LITTLE
MOVING TO AND FROM BED TO CHAIR: A LITTLE
CLIMB 3 TO 5 STEPS WITH RAILING: A LITTLE
PERSONAL GROOMING: A LITTLE
PERSONAL GROOMING: A LITTLE
MOBILITY SCORE: 18
TURNING FROM BACK TO SIDE WHILE IN FLAT BAD: A LITTLE
MOBILITY SCORE: 18
MOVING TO AND FROM BED TO CHAIR: A LITTLE
DRESSING REGULAR LOWER BODY CLOTHING: A LITTLE
DAILY ACTIVITIY SCORE: 19
DAILY ACTIVITIY SCORE: 19
MOVING FROM LYING ON BACK TO SITTING ON SIDE OF FLAT BED WITH BEDRAILS: A LITTLE
WALKING IN HOSPITAL ROOM: A LITTLE
DRESSING REGULAR UPPER BODY CLOTHING: A LITTLE
TOILETING: A LITTLE
TURNING FROM BACK TO SIDE WHILE IN FLAT BAD: A LITTLE
DRESSING REGULAR LOWER BODY CLOTHING: A LITTLE
MOVING FROM LYING ON BACK TO SITTING ON SIDE OF FLAT BED WITH BEDRAILS: A LITTLE
CLIMB 3 TO 5 STEPS WITH RAILING: A LITTLE
WALKING IN HOSPITAL ROOM: A LITTLE
STANDING UP FROM CHAIR USING ARMS: A LITTLE
DRESSING REGULAR UPPER BODY CLOTHING: A LITTLE
HELP NEEDED FOR BATHING: A LITTLE
TOILETING: A LITTLE

## 2025-07-13 ASSESSMENT — PAIN - FUNCTIONAL ASSESSMENT: PAIN_FUNCTIONAL_ASSESSMENT: 0-10

## 2025-07-13 ASSESSMENT — PAIN SCALES - GENERAL
PAINLEVEL_OUTOF10: 0 - NO PAIN
PAINLEVEL_OUTOF10: 0 - NO PAIN

## 2025-07-13 NOTE — CONSULTS
"Reason For Consult  Renal dysfunction    History Of Present Illness  Lakeshia Park \"Nasra\" is a 89 y.o. female presenting with shortness of breath.   She presented to the emergency room secondary to shortness of breath from an assisted living facility.  She had increased swelling in her left leg.  This a.m. when I saw her she was resting comfortably in bed  No major complaints currently  Past Medical History  She has a past medical history of Arthritis, CHF (congestive heart failure), COPD (chronic obstructive pulmonary disease) (Multi), Diabetes mellitus (Multi), and Hypertension.    Surgical History  She has a past surgical history that includes Other surgical history (09/13/2019); Other surgical history (09/13/2019); Other surgical history (09/13/2019); Other surgical history (09/13/2019); Other surgical history (09/13/2019); Other surgical history (10/24/2019); Esophagogastroduodenoscopy (04/29/2024); and NM injection (N/A, 06/03/2025).     Social History  She reports that she has never smoked. She has never used smokeless tobacco. She reports that she does not currently use alcohol. She reports that she does not use drugs.    Family History  Family History[1]     Allergies  Celecoxib, Ciprofloxacin, Diazepam, Esomeprazole, Ibuprofen, and Naproxen    Review of Systems  A full 10 point review of systems was obtained and is negative except HPI as above     Physical Exam  Physical Exam  Constitutional:       Appearance: Normal appearance.   HENT:      Head: Normocephalic and atraumatic.      Right Ear: External ear normal.      Left Ear: External ear normal.      Nose: Nose normal.      Mouth/Throat:      Mouth: Mucous membranes are moist.      Pharynx: Oropharynx is clear.   Eyes:      Extraocular Movements: Extraocular movements intact.      Conjunctiva/sclera: Conjunctivae normal.      Pupils: Pupils are equal, round, and reactive to light.   Cardiovascular:      Rate and Rhythm: Normal rate and regular " "rhythm.   Pulmonary:      Effort: Pulmonary effort is normal.      Breath sounds: Normal breath sounds.   Abdominal:      General: Abdomen is flat.      Palpations: Abdomen is soft.   Musculoskeletal:         General: Swelling present.      Left lower leg: Edema present.   Skin:     General: Skin is warm and dry.   Neurological:      General: No focal deficit present.      Mental Status: She is alert and oriented to person, place, and time.   Psychiatric:         Mood and Affect: Mood normal.         Behavior: Behavior normal.                 I&O 24HR    Intake/Output Summary (Last 24 hours) at 7/13/2025 0835  Last data filed at 7/13/2025 0400  Gross per 24 hour   Intake 100 ml   Output 500 ml   Net -400 ml       Vitals 24HR  Heart Rate:  [58-78]   Temp:  [36 °C (96.8 °F)-36.6 °C (97.8 °F)]   Resp:  [16-20]   BP: (100-173)/(51-81)   Height:  [165.1 cm (5' 5\")]   Weight:  [95.6 kg (210 lb 12.2 oz)-99.3 kg (219 lb)]   SpO2:  [2 %-98 %]         Relevant Results  Results reviewed     Assessment & Plan  Hypoxemia    Chronic kidney disease stage IV with baseline creatinine 2-2.4  Diabetic Nephropathy   Diabetes mellitus type 2  Chronic diarrhea  History of gout on allopurinol  Hyperuricemia  Hypertension  Hyperlipidemia  Bilateral renal cysts  Positive REJI screen with slightly high RNP  Depression  Spinal stenosis  Hypoxia on presentation  Anemia with iron deficiency    Plan:  At this time her renal function is fairly stable  We will adjust Lasix to 40 mg IV  Discontinue antibiotics  I will give a dose of Venofer for her iron deficiency with anemia  Ultrasound of legs ordered  Thanks for the consult          Dharmesh Garcia DO         [1]   Family History  Problem Relation Name Age of Onset    Diabetes Mother      Brain cancer Mother      Brain cancer Brother       "

## 2025-07-13 NOTE — PROGRESS NOTES
"Lakeshia Park \"Nasra\" is a 89 y.o. female on day 1 of admission presenting with Hypoxemia.    Subjective   Generalized weakness       Objective     Physical Exam  General Appearance: AAO x 3, obese  Skin: skin color pink, warm, and dry; no suspicious rashes or lesions  Eyes : PERRL, EOM's intact  ENT: mucous membranes pink and moist  Neck: normocephalic  Respiratory: lungs clear to auscultation anteriorly; no wheezing, rhonchi, or crackles.   Heart: regular rate and rhythm.   Abdomen: Nondistended, positive bowel sounds x4, soft,  nontender  Extremities: no edema   Peripheral pulses: normal x4 extremities  Neuro: alert, coherent and conversant, no focal motor deficits  Last Recorded Vitals  Blood pressure (!) 159/99, pulse 71, temperature 36.1 °C (97 °F), resp. rate 16, height 1.651 m (5' 5\"), weight 95.6 kg (210 lb 12.2 oz), SpO2 91%.  Intake/Output last 3 Shifts:  I/O last 3 completed shifts:  In: 100 (1 mL/kg) [IV Piggyback:100]  Out: 500 (5.2 mL/kg) [Urine:500 (0.1 mL/kg/hr)]  Weight: 95.6 kg     Relevant Results    Results for orders placed or performed during the hospital encounter of 07/12/25 (from the past 24 hours)   POCT GLUCOSE   Result Value Ref Range    POCT Glucose 237 (H) 74 - 99 mg/dL   Blood Culture    Specimen: Peripheral Venipuncture; Blood culture   Result Value Ref Range    Blood Culture Loaded on Instrument - Culture in progress    Procalcitonin   Result Value Ref Range    Procalcitonin 0.15 (H) <=0.07 ng/mL   CBC   Result Value Ref Range    WBC 6.3 4.4 - 11.3 x10*3/uL    nRBC 0.0 0.0 - 0.0 /100 WBCs    RBC 2.61 (L) 4.00 - 5.20 x10*6/uL    Hemoglobin 8.4 (L) 12.0 - 16.0 g/dL    Hematocrit 27.0 (L) 36.0 - 46.0 %     (H) 80 - 100 fL    MCH 32.2 26.0 - 34.0 pg    MCHC 31.1 (L) 32.0 - 36.0 g/dL    RDW 15.2 (H) 11.5 - 14.5 %    Platelets 330 150 - 450 x10*3/uL   Basic metabolic panel   Result Value Ref Range    Glucose 245 (H) 74 - 99 mg/dL    Sodium 138 136 - 145 mmol/L    Potassium 4.5 " 3.5 - 5.3 mmol/L    Chloride 98 98 - 107 mmol/L    Bicarbonate 29 21 - 32 mmol/L    Anion Gap 16 10 - 20 mmol/L    Urea Nitrogen 62 (H) 6 - 23 mg/dL    Creatinine 2.54 (H) 0.50 - 1.05 mg/dL    eGFR 17 (L) >60 mL/min/1.73m*2    Calcium 8.7 8.6 - 10.3 mg/dL   TSH   Result Value Ref Range    Thyroid Stimulating Hormone 1.78 0.44 - 3.98 mIU/L   Vitamin B12   Result Value Ref Range    Vitamin B12 778 211 - 911 pg/mL   Folate   Result Value Ref Range    Folate, Serum 15.8 >5.0 ng/mL   Iron and TIBC   Result Value Ref Range    Iron 23 (L) 35 - 150 ug/dL    UIBC 285 110 - 370 ug/dL    TIBC 308 240 - 445 ug/dL    % Saturation 7 (L) 25 - 45 %   Ferritin   Result Value Ref Range    Ferritin 48 8 - 150 ng/mL   POCT GLUCOSE   Result Value Ref Range    POCT Glucose 199 (H) 74 - 99 mg/dL   POCT GLUCOSE   Result Value Ref Range    POCT Glucose 191 (H) 74 - 99 mg/dL   POCT GLUCOSE   Result Value Ref Range    POCT Glucose 208 (H) 74 - 99 mg/dL     *Note: Due to a large number of results and/or encounters for the requested time period, some results have not been displayed. A complete set of results can be found in Results Review.       CT chest wo IV contrast  Result Date: 7/12/2025  Interpreted By:  Gabriele Suarez, STUDY: CT CHEST WO IV CONTRAST;  7/12/2025 3:56 pm   INDICATION: Signs/Symptoms:dyspnea.   COMPARISON: 07/17/2019   ACCESSION NUMBER(S): [id number]   ORDERING CLINICIAN: FORTUNATO SHIRLEY   TECHNIQUE: Helical data acquisition of the chest was obtained  without IV contrast material.  Images were reformatted in axial, coronal, and sagittal planes.   FINDINGS: Lungs and Pleura: Right upper lobe pneumatocele is again noted. There is greater surrounding ground-glass opacity measuring 3.4 cm in diameter, previously 2.4 cm. Stable 4 mm left upper lobe subsolid pulmonary nodule. Scattered streaky atelectasis. No pulmonary consolidation. No pleural effusion. No pneumothorax.   Mediastinum and axilla: Aortopulmonary  lymphadenopathy measuring 17 mm. No cardiomegaly or pericardial effusion. No thoracic aortic aneurysm. Moderate-sized mixed sliding and paraesophageal hiatal hernia. Atherosclerosis. Coronary artery calcifications.   Visualized Upper Abdomen: Status post cholecystectomy. Atrophic kidneys. Left renal cyst, Bosniak 2 with internal thin calcification. Colonic diverticulosis. Nodularity and thickening of the left adrenal gland.   MSK/Chest Wall: No aggressive bony lesion identified. Bilateral glenohumeral joint degenerative change. Large effusion in the left subacromial bursa and possibly also on the right. Right thyroid nodule measuring 1.8 cm.         1. Stable right upper lobe pneumatocele with surrounding greater area of ground-glass opacities suggestive of pneumonitis. Recommend additional follow-up in 6-12 months given the interval enlargement. 2. Aortopulmonary lymphadenopathy. Attention during follow-up. 3. Hiatal hernia.   Signed by: Gabriele Suarez 7/12/2025 4:50 PM Dictation workstation:   [id number]    XR chest 1 view  Result Date: 7/12/2025  Interpreted By:  Bernabe Theodore, STUDY: XR CHEST 1 VIEW;  7/12/2025 1:54 pm   INDICATION: Signs/Symptoms:dyspnea.     COMPARISON: 05/04/2025   ACCESSION NUMBER(S): [id number]   ORDERING CLINICIAN: FORTUNATO SHIRLEY   FINDINGS: Low lung volumes accounts for vascular crowding. Widening of the mediastinum is likely related to the portable projection of the film.   No air bronchograms to suggest pneumonia. No pleural effusion. Retrocardiac density reflecting hiatal hernia, similar to prior examination   There is no interstitial edema. No air bronchograms to suggest pneumonia.       1.  Low lung volumes.       MACRO: None   Signed by: Bernabe Theodore 7/12/2025 2:32 PM Dictation workstation:   [id number]      Scheduled medications  Scheduled Medications[1]  Continuous medications  Continuous Medications[2]  PRN medications  PRN Medications[3]                             Assessment & Plan  Hypoxemia          89-year-old female with history of  CHF  COPD  Diabetes mellitus type 2  Hypertension  Arthritis  Anemia   chronic kidney disease stage IV baseline creatinine 2-2.4  Obesity  Spinal stenosis  Chronic diarrhea  Gout  Anxiety depression  Hyperlipidemia  B12 deficiency  Hypothyroidism  Presenting with  Pneumonitis  Possible NUBIA  Anemia likely chronic  Macrocytosis  Acute hypoxemic respiratory failure from pneumonitis     Plan  Telemetry  Monitor vital signs  Follow daily CBC BMP  Cultures  Supportive care  Stopped Unasyn IV  Neb treatments  Prednisone 40 Mg daily  Symptomatic management  Continue outpatient medicines as indicated  Supplemental oxygen 2 L nasal cannula  Allopurinol, Wellbutrin, vitamin B12, Colace, Neurontin, iron supplement, Synthroid,   meclizine for chronic vertigo  Bowel regimen for constipation prophylaxis  Statins for hyperlipidemia  Tramadol for pain control  Muscle relaxant for spasms  Carb controlled diet, insulin sliding scale  Heparin for DVT prophylaxis  Multi vitamin, nutrition   PT  B12, TSH, folate okay  CBC BMP, urine output   Lasix IV  IV iron  Monitor renal function  Follow nephrology  Fall, aspiration, seizure precautions.  Watch polypharmacy  Venous duplex ultrasound bilateral lower extremity to rule out DVT                                      Eleni Mcmahon MD         [1] allopurinol, 100 mg, oral, Daily  buPROPion XL, 300 mg, oral, Daily  cyanocobalamin, 1,000 mcg, oral, Daily  cyclobenzaprine, 10 mg, oral, Nightly  ferrous sulfate, 1 tablet, oral, Daily  furosemide, 40 mg, intravenous, q24h  gabapentin, 300 mg, oral, Nightly  heparin (porcine), 5,000 Units, subcutaneous, q8h  insulin lispro, 0-5 Units, subcutaneous, TID AC  ipratropium-albuteroL, 3 mL, nebulization, q4h  levothyroxine, 175 mcg, oral, Daily before breakfast  metoprolol tartrate, 25 mg, oral, Daily  metoprolol tartrate, 50 mg, oral, Nightly  polyethylene glycol, 17 g,  oral, Daily  pravastatin, 20 mg, oral, Nightly  predniSONE, 40 mg, oral, Daily  pyridoxine, 100 mg, oral, Daily  traMADol, 50 mg, oral, TID  [2]    [3] PRN medications: acetaminophen **OR** acetaminophen **OR** acetaminophen, acetaminophen **OR** acetaminophen **OR** acetaminophen, benzocaine-menthol, benzonatate, dextrose, dextrose, docusate sodium, glucagon, glucagon, loperamide, meclizine, ondansetron ODT **OR** ondansetron, oxygen, polyethylene glycol, sennosides

## 2025-07-13 NOTE — CARE PLAN
The patient's goals for the shift include      The clinical goals for the shift include Maintain 02 levels above 90% on 2l    Over the shift, the patient did not make progress toward the following goals. Barriers to progression include . Recommendations to address these barriers include .    Problem: Pain - Adult  Goal: Verbalizes/displays adequate comfort level or baseline comfort level  Outcome: Not Progressing     Problem: Safety - Adult  Goal: Free from fall injury  Outcome: Not Progressing     Problem: Discharge Planning  Goal: Discharge to home or other facility with appropriate resources  Outcome: Not Progressing     Problem: Chronic Conditions and Co-morbidities  Goal: Patient's chronic conditions and co-morbidity symptoms are monitored and maintained or improved  Outcome: Not Progressing     Problem: Nutrition  Goal: Nutrient intake appropriate for maintaining nutritional needs  Outcome: Not Progressing     Problem: Skin  Goal: Decreased wound size/increased tissue granulation at next dressing change  Outcome: Not Progressing  Goal: Participates in plan/prevention/treatment measures  Outcome: Not Progressing  Goal: Prevent/manage excess moisture  7/12/2025 2347 by Katia Hall RN  Outcome: Not Progressing  7/12/2025 2346 by Katia Hall RN  Flowsheets (Taken 7/12/2025 2346)  Prevent/manage excess moisture:   Cleanse incontinence/protect with barrier cream   Moisturize dry skin  Goal: Prevent/minimize sheer/friction injuries  Outcome: Not Progressing  Goal: Promote/optimize nutrition  Outcome: Not Progressing  Goal: Promote skin healing  7/12/2025 2347 by Katia Hall RN  Outcome: Not Progressing  7/12/2025 2346 by Katia Hall RN  Flowsheets (Taken 7/12/2025 2346)  Promote skin healing: Turn/reposition every 2 hours/use positioning/transfer devices

## 2025-07-13 NOTE — PROGRESS NOTES
"Physical Therapy                 Therapy Communication Note    Patient Name: Lakeshia Park \"Nasra\"  MRN: [id number]  Department: [facility]  Room: [address] [place], [place] [address]  Today's Date: 7/13/2025     Discipline: Physical Therapy    Missed Visit: PT Missed Visit: Yes     Missed Visit Reason: Missed Visit Reason: Patient placed on medical hold (Chart reviewed, discussed with bedside RN at 1245, pt waiting for LE doppler to rule out DVT. Will reattempt PT eval tomorrow 7/14 if medically appropriate.)    Missed Time: Attempt      "

## 2025-07-14 ENCOUNTER — APPOINTMENT (OUTPATIENT)
Dept: VASCULAR MEDICINE | Facility: HOSPITAL | Age: OVER 89
DRG: 205 | End: 2025-07-14
Payer: MEDICARE

## 2025-07-14 LAB
ALBUMIN SERPL BCP-MCNC: 3.8 G/DL (ref 3.4–5)
ALP SERPL-CCNC: 46 U/L (ref 33–136)
ALT SERPL W P-5'-P-CCNC: 12 U/L (ref 7–45)
ANION GAP SERPL CALC-SCNC: 13 MMOL/L (ref 10–20)
AST SERPL W P-5'-P-CCNC: 11 U/L (ref 9–39)
BILIRUB SERPL-MCNC: 0.2 MG/DL (ref 0–1.2)
BUN SERPL-MCNC: 73 MG/DL (ref 6–23)
CALCIUM SERPL-MCNC: 8.6 MG/DL (ref 8.6–10.3)
CHLORIDE SERPL-SCNC: 98 MMOL/L (ref 98–107)
CO2 SERPL-SCNC: 31 MMOL/L (ref 21–32)
CREAT SERPL-MCNC: 2.46 MG/DL (ref 0.5–1.05)
EGFRCR SERPLBLD CKD-EPI 2021: 18 ML/MIN/1.73M*2
ERYTHROCYTE [DISTWIDTH] IN BLOOD BY AUTOMATED COUNT: 15.5 % (ref 11.5–14.5)
GLUCOSE BLD MANUAL STRIP-MCNC: 133 MG/DL (ref 74–99)
GLUCOSE BLD MANUAL STRIP-MCNC: 148 MG/DL (ref 74–99)
GLUCOSE BLD MANUAL STRIP-MCNC: 235 MG/DL (ref 74–99)
GLUCOSE BLD MANUAL STRIP-MCNC: 259 MG/DL (ref 74–99)
GLUCOSE SERPL-MCNC: 145 MG/DL (ref 74–99)
HCT VFR BLD AUTO: 26.1 % (ref 36–46)
HGB BLD-MCNC: 8.1 G/DL (ref 12–16)
HOLD SPECIMEN: NORMAL
MCH RBC QN AUTO: 31.9 PG (ref 26–34)
MCHC RBC AUTO-ENTMCNC: 31 G/DL (ref 32–36)
MCV RBC AUTO: 103 FL (ref 80–100)
NRBC BLD-RTO: 0.3 /100 WBCS (ref 0–0)
PLATELET # BLD AUTO: 344 X10*3/UL (ref 150–450)
POTASSIUM SERPL-SCNC: 4.4 MMOL/L (ref 3.5–5.3)
PROT SERPL-MCNC: 6.6 G/DL (ref 6.4–8.2)
RBC # BLD AUTO: 2.54 X10*6/UL (ref 4–5.2)
SODIUM SERPL-SCNC: 138 MMOL/L (ref 136–145)
WBC # BLD AUTO: 9.9 X10*3/UL (ref 4.4–11.3)

## 2025-07-14 PROCEDURE — 1200000002 HC GENERAL ROOM WITH TELEMETRY DAILY

## 2025-07-14 PROCEDURE — 97165 OT EVAL LOW COMPLEX 30 MIN: CPT | Mod: GO

## 2025-07-14 PROCEDURE — 80053 COMPREHEN METABOLIC PANEL: CPT | Performed by: INTERNAL MEDICINE

## 2025-07-14 PROCEDURE — 97161 PT EVAL LOW COMPLEX 20 MIN: CPT | Mod: GP | Performed by: PHYSICAL THERAPIST

## 2025-07-14 PROCEDURE — 94640 AIRWAY INHALATION TREATMENT: CPT

## 2025-07-14 PROCEDURE — 2500000005 HC RX 250 GENERAL PHARMACY W/O HCPCS: Performed by: HOSPITALIST

## 2025-07-14 PROCEDURE — 2500000002 HC RX 250 W HCPCS SELF ADMINISTERED DRUGS (ALT 637 FOR MEDICARE OP, ALT 636 FOR OP/ED): Performed by: HOSPITALIST

## 2025-07-14 PROCEDURE — 93970 EXTREMITY STUDY: CPT

## 2025-07-14 PROCEDURE — 2500000001 HC RX 250 WO HCPCS SELF ADMINISTERED DRUGS (ALT 637 FOR MEDICARE OP): Performed by: HOSPITALIST

## 2025-07-14 PROCEDURE — 94761 N-INVAS EAR/PLS OXIMETRY MLT: CPT

## 2025-07-14 PROCEDURE — 99233 SBSQ HOSP IP/OBS HIGH 50: CPT | Performed by: HOSPITALIST

## 2025-07-14 PROCEDURE — 93970 EXTREMITY STUDY: CPT | Performed by: SURGERY

## 2025-07-14 PROCEDURE — 85027 COMPLETE CBC AUTOMATED: CPT | Performed by: INTERNAL MEDICINE

## 2025-07-14 PROCEDURE — 2500000001 HC RX 250 WO HCPCS SELF ADMINISTERED DRUGS (ALT 637 FOR MEDICARE OP)

## 2025-07-14 PROCEDURE — 82947 ASSAY GLUCOSE BLOOD QUANT: CPT

## 2025-07-14 PROCEDURE — 36415 COLL VENOUS BLD VENIPUNCTURE: CPT | Performed by: INTERNAL MEDICINE

## 2025-07-14 PROCEDURE — 99233 SBSQ HOSP IP/OBS HIGH 50: CPT | Performed by: INTERNAL MEDICINE

## 2025-07-14 PROCEDURE — 2500000004 HC RX 250 GENERAL PHARMACY W/ HCPCS (ALT 636 FOR OP/ED): Performed by: HOSPITALIST

## 2025-07-14 RX ORDER — SENNOSIDES 8.6 MG/1
1 TABLET ORAL 2 TIMES DAILY PRN
Status: DISCONTINUED | OUTPATIENT
Start: 2025-07-14 | End: 2025-07-16 | Stop reason: HOSPADM

## 2025-07-14 RX ORDER — ACETAMINOPHEN 325 MG/1
975 TABLET ORAL ONCE
Status: COMPLETED | OUTPATIENT
Start: 2025-07-14 | End: 2025-07-14

## 2025-07-14 RX ADMIN — HEPARIN SODIUM 5000 UNITS: 5000 INJECTION, SOLUTION INTRAVENOUS; SUBCUTANEOUS at 05:23

## 2025-07-14 RX ADMIN — BUPROPION HYDROCHLORIDE 300 MG: 300 TABLET, EXTENDED RELEASE ORAL at 08:59

## 2025-07-14 RX ADMIN — IPRATROPIUM BROMIDE AND ALBUTEROL SULFATE 3 ML: 2.5; .5 SOLUTION RESPIRATORY (INHALATION) at 09:41

## 2025-07-14 RX ADMIN — IPRATROPIUM BROMIDE AND ALBUTEROL SULFATE 3 ML: 2.5; .5 SOLUTION RESPIRATORY (INHALATION) at 22:35

## 2025-07-14 RX ADMIN — IPRATROPIUM BROMIDE AND ALBUTEROL SULFATE 3 ML: 2.5; .5 SOLUTION RESPIRATORY (INHALATION) at 05:55

## 2025-07-14 RX ADMIN — Medication 1 L/MIN: at 22:37

## 2025-07-14 RX ADMIN — HEPARIN SODIUM 5000 UNITS: 5000 INJECTION, SOLUTION INTRAVENOUS; SUBCUTANEOUS at 12:44

## 2025-07-14 RX ADMIN — CYCLOBENZAPRINE 10 MG: 10 TABLET, FILM COATED ORAL at 20:28

## 2025-07-14 RX ADMIN — IPRATROPIUM BROMIDE AND ALBUTEROL SULFATE 3 ML: 2.5; .5 SOLUTION RESPIRATORY (INHALATION) at 18:12

## 2025-07-14 RX ADMIN — ACETAMINOPHEN 975 MG: 325 TABLET ORAL at 12:44

## 2025-07-14 RX ADMIN — TRAMADOL HYDROCHLORIDE 50 MG: 50 TABLET, COATED ORAL at 20:28

## 2025-07-14 RX ADMIN — PRAVASTATIN SODIUM 20 MG: 20 TABLET ORAL at 20:28

## 2025-07-14 RX ADMIN — Medication 21 PERCENT: at 05:55

## 2025-07-14 RX ADMIN — TRAMADOL HYDROCHLORIDE 50 MG: 50 TABLET, COATED ORAL at 15:26

## 2025-07-14 RX ADMIN — Medication 21 PERCENT: at 18:16

## 2025-07-14 RX ADMIN — HEPARIN SODIUM 5000 UNITS: 5000 INJECTION, SOLUTION INTRAVENOUS; SUBCUTANEOUS at 20:29

## 2025-07-14 RX ADMIN — Medication 21 PERCENT: at 09:39

## 2025-07-14 RX ADMIN — GABAPENTIN 300 MG: 300 CAPSULE ORAL at 20:28

## 2025-07-14 RX ADMIN — IPRATROPIUM BROMIDE AND ALBUTEROL SULFATE 3 ML: 2.5; .5 SOLUTION RESPIRATORY (INHALATION) at 14:03

## 2025-07-14 RX ADMIN — POLYETHYLENE GLYCOL 3350 17 G: 17 POWDER, FOR SOLUTION ORAL at 08:59

## 2025-07-14 RX ADMIN — FERROUS SULFATE TAB 325 MG (65 MG ELEMENTAL FE) 1 TABLET: 325 (65 FE) TAB at 08:59

## 2025-07-14 RX ADMIN — PREDNISONE 40 MG: 20 TABLET ORAL at 08:59

## 2025-07-14 RX ADMIN — ALLOPURINOL 100 MG: 100 TABLET ORAL at 08:59

## 2025-07-14 RX ADMIN — METOPROLOL TARTRATE 50 MG: 50 TABLET, FILM COATED ORAL at 20:28

## 2025-07-14 RX ADMIN — Medication 1 L/MIN: at 18:17

## 2025-07-14 RX ADMIN — Medication 21 PERCENT: at 14:03

## 2025-07-14 RX ADMIN — Medication 100 MG: at 08:59

## 2025-07-14 RX ADMIN — INSULIN LISPRO 3 UNITS: 100 INJECTION, SOLUTION INTRAVENOUS; SUBCUTANEOUS at 17:34

## 2025-07-14 RX ADMIN — METOPROLOL TARTRATE 25 MG: 25 TABLET, FILM COATED ORAL at 09:02

## 2025-07-14 RX ADMIN — Medication 1000 MCG: at 08:59

## 2025-07-14 RX ADMIN — LEVOTHYROXINE SODIUM 175 MCG: 25 TABLET ORAL at 05:23

## 2025-07-14 RX ADMIN — TRAMADOL HYDROCHLORIDE 50 MG: 50 TABLET, COATED ORAL at 08:59

## 2025-07-14 ASSESSMENT — PAIN - FUNCTIONAL ASSESSMENT
PAIN_FUNCTIONAL_ASSESSMENT: 0-10

## 2025-07-14 ASSESSMENT — COGNITIVE AND FUNCTIONAL STATUS - GENERAL
DRESSING REGULAR UPPER BODY CLOTHING: A LITTLE
MOVING TO AND FROM BED TO CHAIR: A LITTLE
CLIMB 3 TO 5 STEPS WITH RAILING: A LITTLE
STANDING UP FROM CHAIR USING ARMS: A LITTLE
MOVING TO AND FROM BED TO CHAIR: A LITTLE
WALKING IN HOSPITAL ROOM: A LITTLE
HELP NEEDED FOR BATHING: A LITTLE
DRESSING REGULAR LOWER BODY CLOTHING: A LITTLE
PERSONAL GROOMING: A LITTLE
HELP NEEDED FOR BATHING: A LITTLE
MOVING FROM LYING ON BACK TO SITTING ON SIDE OF FLAT BED WITH BEDRAILS: A LITTLE
TOILETING: A LITTLE
STANDING UP FROM CHAIR USING ARMS: A LITTLE
DAILY ACTIVITIY SCORE: 22
MOBILITY SCORE: 18
WALKING IN HOSPITAL ROOM: A LITTLE
DAILY ACTIVITIY SCORE: 19
TURNING FROM BACK TO SIDE WHILE IN FLAT BAD: A LITTLE
CLIMB 3 TO 5 STEPS WITH RAILING: TOTAL
TOILETING: A LITTLE
MOBILITY SCORE: 18

## 2025-07-14 ASSESSMENT — PAIN DESCRIPTION - ORIENTATION: ORIENTATION: RIGHT

## 2025-07-14 ASSESSMENT — PAIN SCALES - GENERAL
PAINLEVEL_OUTOF10: 3
PAINLEVEL_OUTOF10: 0 - NO PAIN
PAINLEVEL_OUTOF10: 4
PAINLEVEL_OUTOF10: 3

## 2025-07-14 ASSESSMENT — ACTIVITIES OF DAILY LIVING (ADL)
LACK_OF_TRANSPORTATION: NO
BATHING_ASSISTANCE: MINIMAL
ADL_ASSISTANCE: NEEDS ASSISTANCE

## 2025-07-14 ASSESSMENT — PAIN DESCRIPTION - LOCATION: LOCATION: HIP

## 2025-07-14 ASSESSMENT — PAIN DESCRIPTION - DESCRIPTORS: DESCRIPTORS: ACHING

## 2025-07-14 NOTE — PROGRESS NOTES
"Occupational Therapy  Evaluation    Patient Name: Lakeshia Park \"Nasra\"  MRN: [id number]  Today's Date: 7/14/2025  Time Calculation  Start Time: 1509  Stop Time: 1526  Time Calculation (min): 17 min    Current Problem:   1. Acute hypoxemic respiratory failure    2. Pneumonitis    3. Swelling    4. Swelling of both lower extremities    5. Localized edema        OT order: OT eval and treat   Referred by:    Reason for referral: ADL  Past medical history related to rehab: Medical History[1]      Precautions:   Medical Precautions: Fall precautions    ASSESSMENT  OT Assessment: Pt is a 89 year old female presenting with hypoxemia. Prior to admit, pt  reports being independent with ADLs, and assist for iADLs, use of FWW and rollator at baseline. Upon eval pt independent for bed mobility, SBA  LB dressing. Pt able to complete transfers with close SUP and FWW. Pt appears near functional baseline and would benefit from one additional acute skilled OT session and low intensity therapy when medically stable for d/c.. Pt with Decreased ADL status, Decreased safe judgment during ADL, Decreased endurance, Decreased functional mobility  Prognosis: Good  Barriers to discharge home: Caregiver assistance, Physical needs  Patient lives alone and/or does not have reliable caregiver assistance  High falls risk due to function or environment     Tolerance: Patient tolerated treatment well    PLAN  Frequency: 1 time per week  Treatment Interventions: ADL retraining, Functional transfer training, Endurance training  Discharge Recommendations: Low intensity level of continued care  OT OK to discharge: Yes (when medically cleared)    GENERAL VISIT INFORMATION   Start of session communication: Bedside nurse  End of session communication:    Family/caregiver present: No    Co-Treatment: PT  Reason for co-treatment: To maximize pt safety while focusing on specifc discipline goals   Position Pt Received:  Alarm on, Up in chair  End of session " position: Up in chair, Alarm on (RN present, call light within reach\)    SUBJECTIVE  Home Living:  Home Living Comments: Pt lives at [facility]. States sleeps in flat bed, no rails. Walkin tub, with rails.     Prior Level of Function:  Level of [place]: Independent with ADLs and functional transfers, Needs assistance with homemaking  Prior Function Comments: Pt reports being independent with ADLs, however SUP for showering, and assistance with IADLs. Uses FWW within her apartment, and rollator for community.  Reports 2 falls within the last 2 months.Groceries are delieved and able to microwave simple meals    IADL History:  Homemaking Responsibilities: No  Current License: No    Pain:  Assessment: 0-10  Score: 3  Type: Chronic pain  Location: Hip  Interventions: Repositioned      OBJECTIVE  Vital Signs:  Vitals Session: During OT  Vital Signs Comment: No concerns    Cognition:  Orientation Level: Oriented X4  Safety/Judgement: Exceptions to WFL  Unable to Self-Monitor and Self-Correct Consistently: Minimal  Impulsive: Mildly             Current ADL function:   EATING:  Stand by Setup   GROOMING: Stand by (completed hair brushing seated in chair)     BATHING: Minimal     UB DRESSING: Stand by     LB DRESSING: Stand by (cynthia to don sock with figure 4 at EOB)     TOILETING: Minimal    ADL comments:  anticipated otherwise stated    Activity Tolerance:  Endurance: Decreased tolerance for upright activites    Bed Mobility/Transfers:   Bed Mobility  Bed Mobility: Yes  Bed Mobility 1  Bed Mobility 1: Supine to sitting, Sitting to supine  Level of Assistance 1: Modified independent, Independent  Bed Mobility Comments 1: bed flat  Transfers  Transfer: Yes  Transfer 1  Technique 1: Sit to stand, Stand to sit  Transfer Device 1: Gait belt, Walker  Transfer Level of Assistance 1: Close supervision  Trials/Comments 1: Moderate cueing for safety and hand placement with transfers    Ambulation/Gait  Training:  Functional Mobility  Functional Mobility Performed: Yes  Functional Mobility 1  Surface 1: Level tile  Device 1: Rolling walker  Functional Mobility Support Devices: Gait belt  Assistance 1: Contact guard (pt stated that she hasn't been up much and somtimes her knee gives out)  Comments 1: short functional distance, no LOB    Sitting Balance:  Static Sitting Balance  Static Sitting-Level of Assistance: Independent  Dynamic Sitting Balance  Dynamic Sitting-Level of Assistance: Close supervision    Standing Balance:  Static Standing Balance  Static Standing-Level of Assistance: Contact guard  Dynamic Standing Balance  Dynamic Standing-Level of Assistance: Contact guard    Vision: Vision - Basic Assessment  Current Vision: No visual deficits   and      Sensation:  Light Touch: No apparent deficits    Strength:  Strength Comments: B UE grossly X/5    Perception:  Inattention/Neglect: Appears intact    Coordination:  Movements are Fluid and Coordinated: Yes     Hand Function:  Hand Function  Gross Grasp: Functional  Coordination: Functional    Extremities: RUE   RUE : Within Functional Limits and LUE   LUE: Within Functional Limits    Outcome Measures: [facility] Daily Activity   Putting on and taking off regular lower body clothing: None  Bathing (including washing, rinsing, drying): A little  Putting on and taking off regular upper body clothing: None  Toileting, which includes using toilet, bedpan or urinal: A little  Taking care of personal grooming such as brushing teeth: None   Eating Meals: None   Daily Activity - Total Score: 22    EDUCATION:     Education Documentation  Body Mechanics, taught by Valery Pérez OT at 7/14/2025  3:29 PM.  Learner: Patient  Readiness: Acceptance  Method: Explanation  Response: Verbalizes Understanding  Comment: ADL and functional mobility safety and retraining    ADL Training, taught by Valery Pérez OT at 7/14/2025  3:29 PM.  Learner: Patient  Readiness: Acceptance  Method:  Explanation  Response: Verbalizes Understanding  Comment: ADL and functional mobility safety and retraining    Education Comments  No comments found.        Goals:   Encounter Problems       Encounter Problems (Active)       ADLs       Patient will complete daily grooming tasks with modified independent level of assistance and PRN adaptive equipment while standing.       Start:  07/14/25    Expected End:  07/21/25            Patient will complete toileting including hygiene clothing management/hygiene with modified independent level of assistance at toilet.       Start:  07/14/25    Expected End:  07/21/25               MOBILITY       Patient will perform Functional mobility max Household distances/Community Distances with modified independent level of assistance and front wheeled walker in order to improve safety and functional mobility.       Start:  07/14/25    Expected End:  07/21/25                        [1]   Past Medical History:  Diagnosis Date    Arthritis     CHF (congestive heart failure)     COPD (chronic obstructive pulmonary disease) (Multi)     Diabetes mellitus (Multi)     Hypertension

## 2025-07-14 NOTE — PROGRESS NOTES
"Physical Therapy    Physical Therapy Evaluation    Patient Name: Lakeshia Park \"Nasra\"  MRN: [id number]  Today's Date: 7/14/2025   Time Calculation  Start Time: 1511  Stop Time: 1527  Time Calculation (min): 16 min    Assessment/Plan   Skilled PT intervention is indicated due to patient presents with deficits in transfers, gait, strength, activity tolerance, and safety awareness.     Recommend continued physical therapy intervention at a low intensity following hospitalization to improve strength, balance, mobility and reduce fall risk.  Continue ambulation with FWW.    PT Assessment  PT Assessment Results: Decreased strength, Decreased endurance, Decreased mobility, Decreased safety awareness  Rehab Prognosis: Good  Barriers to Discharge Home: No anticipated barriers (return to [facility])  Evaluation/Treatment Tolerance: Patient tolerated treatment well  Barriers to Participation:  (patient reports she is not good with carrying over her HEP historically when she has had therapy)  End of Session Communication: Bedside nurse  Assessment Comment: communication on white board; call light in reach end of session  End of Session Patient Position: Up in chair, Alarm on  IP OR SWING BED PT PLAN  Inpatient or Swing Bed: Inpatient  PT Plan  Treatment/Interventions: Transfer training, Gait training, Balance training, Strengthening, Endurance training, Therapeutic exercise, Therapeutic activity, Home exercise program  PT Plan: Ongoing PT  PT Frequency: 3 times per week  PT Discharge Recommendations: Low intensity level of continued care  PT Recommended Transfer Status: Stand by assist  PT - OK to Discharge: Yes (once medically appropriate and safe DC plan in place)    Subjective     Current Problem:  Problem List[1]    General Visit Information:  General  Reason for Referral: impaired mobiity;\"Mynor" is a 89 y.o. female presenting with shortness of breath from assisted living facility  Referred By: Eleni Mcmahon " "MD  Family/Caregiver Present: No  Co-Treatment: OT  Co-Treatment Reason: to maximize patient safety with mobility while addressing discipline-specific goals  Prior to Session Communication: Bedside nurse  Patient Position Received: Alarm on, Up in chair  General Comment: patient agreeable to assessment; states she is weak bc she has been \"bed bound\" for a couple days in hospital    Home Living:  Home Living  Type of Home: Assisted living ([facility])  Lives With: Alone  Home Adaptive Equipment: Walker rolling or standard (rollator)  Home Layout: One level  Home Access: Level entry  Bathroom Shower/Tub: Walk-in tub  Bathroom Toilet:  (RTS)  Bathroom Equipment: Shower chair with back, Grab bars in shower  Home Living Comments: sleeps in regular bed no rails    Prior Level of Function:  Prior Function Per Pt/Caregiver Report  ADL Assistance: Needs assistance (supervision for showers, dresses herself, toilets herself)  Homemaking Assistance: Needs assistance (has groceries delivered)  Ambulatory Assistance: Independent (mod indep with FWW in apt and rollator outside of apt)  Prior Function Comments: +falls (unable to provide when her last fall was), -drives    Precautions:  Precautions  Medical Precautions: Fall precautions    Vital Signs:  Vital Signs  Vitals Session: During PT  Vital Signs Comment: no concerns  Objective     Pain:  Pain Assessment  Pain Assessment: 0-10  0-10 (Numeric) Pain Score: 4 (c/o sciatica in R hip/LE)  Pain Interventions: Repositioned (nurse notified)    Cognition:  Cognition  Orientation Level: Oriented X4  Safety/Judgement: Exceptions to WFL    General Assessments:  General Observation  General Observation: pleasant and cooperative but falling asleep during patient interview in chair   Activity Tolerance  Endurance: Decreased tolerance for upright activites  Sensation  Light Touch: No apparent deficits  Strength  Strength Comments: LEs grossly >/=4-/5     Coordination  Movements are Fluid " and Coordinated: Yes  Postural Control  Postural Control: Within Functional Limits  Static Sitting Balance  Static Sitting-Balance Support: Feet supported  Static Sitting-Level of Assistance: Independent  Dynamic Sitting Balance  Dynamic Sitting-Balance Support: Feet supported  Dynamic Sitting-Level of Assistance: Close supervision  Dynamic Sitting-Balance: Forward lean  Static Standing Balance  Static Standing-Balance Support: Bilateral upper extremity supported  Static Standing-Level of Assistance: Contact guard  Static Standing-Comment/Number of Minutes: FWW  Dynamic Standing Balance  Dynamic Standing-Balance Support: Bilateral upper extremity supported (FWW)  Dynamic Standing-Level of Assistance: Contact guard  Dynamic Standing-Balance: Turning    Functional Assessments:     Bed Mobility  Bed Mobility: Yes  Bed Mobility 1  Bed Mobility 1: Supine to sitting, Sitting to supine  Level of Assistance 1: Independent  Bed Mobility Comments 1: no rail, bed flat  Transfers  Transfer: Yes  Transfer 1  Technique 1: Sit to stand, Stand to sit  Transfer Device 1: Gait belt, Walker (FWW)  Transfer Level of Assistance 1: Close supervision, Moderate verbal cues  Trials/Comments 1: instruction and cues for hand placement and safety; multiple reps to/from recliner and bed, cues for hand placement especially with stand to sit each trial  Ambulation/Gait Training  Ambulation/Gait Training Performed: Yes  Ambulation/Gait Training 1  Surface 1: Level tile  Device 1: Rolling walker  Gait Support Devices: Gait belt  Assistance 1: Contact guard  Quality of Gait 1: Diminished heel strike, Inconsistent stride length (good jessica, steady)  Comments/Distance (ft) 1: 50ft in room;  patient states her knee will buckle sometimes         Outcome Measures:  [facility] Basic Mobility  Turning from your back to your side while in a flat bed without using bedrails: None  Moving from lying on your back to sitting on the side of a flat bed without using  bedrails: None  Moving to and from bed to chair (including a wheelchair): A little  Standing up from a chair using your arms (e.g. wheelchair or bedside chair): A little  To walk in hospital room: A little  Climbing 3-5 steps with railing: Total  Basic Mobility - Total Score: 18                            Goals:  Encounter Problems       Encounter Problems (Active)       PT Problem       PT Goal 1       Start:  07/14/25    Expected End:  07/28/25       Lakeshia Park will transfer sit to and from stand using least restrictive assistive device mod indep and use proper hand placement/good safety awareness           PT Goal 2       Start:  07/14/25    Expected End:  07/28/25       Lakeshia Park will ambulate 150ft with assistive device , level surface, good balance, steady mod Indep                Education Documentation  Mobility Training, taught by Jennifer Dickens, PT at 7/14/2025  3:42 PM.  Learner: Patient  Readiness: Acceptance  Method: Explanation  Response: Verbalizes Understanding, Demonstrated Understanding, Needs Reinforcement  Comment: safety with transfers, mobility and use of device    Education Comments  No comments found.               [1]   Patient Active Problem List  Diagnosis    Anemia    Asthma    Chronic low back pain    Claudication    Depression, major, recurrent, moderate    DM2 (diabetes mellitus, type 2) (Multi)    Eczema, dyshidrotic    Fecal incontinence    Frequent falls    Hiatal hernia    History of endometrial cancer    HTN (hypertension)    Hyperuricemia    Hypothyroidism    Iron deficiency anemia    Lower GI bleed    Mixed dyslipidemia    Stage 4 chronic kidney disease (Multi)    Tendinopathy of right rotator cuff    Vertigo    Lumbosacral pain    Class 2 severe obesity with serious comorbidity and body mass index (BMI) of 37.0 to 37.9 in adult    Hyperparathyroidism (Multi)    Closed nondisplaced fracture of styloid process of left radius with routine healing    Decreased  functional mobility    Chronic combined systolic and diastolic heart failure    Acute exacerbation of chronic low back pain    Lumbar radiculopathy    Spinal stenosis of lumbar region with neurogenic claudication    Lumbar facet arthropathy    Sacroiliitis    Hypoxemia

## 2025-07-14 NOTE — CONSULTS
Heart Failure Nurse Navigator    The role of the HF nurse navigator is to (1) characterize risk profiles of patients with heart failure transitioning from [id number] after hospitalization, (2) recommend interventions to improve care and reduce risks of worse post-hospitalization outcomes. Potential recommendations may touch base on patient/family education, additional consults that may bring value, and appointment scheduling.    History    I met with Lakeshia Park at the bedside and provided the following heart failure education:  - HF signs and symptoms, heart failure zones and when to call cardiologist.   - Controlling Heart Failure at Home: medication adherence, following up with cardiologist at least once yearly, staying healthy and active, limiting sodium and fluid intake as directed by cardiologist.  - Daily Weight Education    Patients Cardiologist(s): Franklin Coleman CNP    Patients Primary Care Provider: Alonso Westbrook PCP    1. Medical Domain  What is the patient's most recent LVEF? 45-50%  HFrEF (LVEF <= 40%) Quadruple therapy recommended  HFmrEF (LVEF 41-49%) Quadruple therapy recommended  HFpEF (LVEF >= 50%) Minimum recommendations: SGLT2i and MRA  Is the patient on OP GDMT for their condition?   ARNI/ACEI/ARB: No None  BB: Yes Metoprolol Tartrate 25mg daily 50mg every HS  MRA: No None  SGLT2i: No None  Is the patient prescribed a diuretic? Yes  Torsemide 20 mg daily      IP Medications:  ARNi/ACEi/ARB: None  BB: Metoprolol Tartrate 25mg daily, 50mg every hs  MRA: None  SGLT2i: None  Diuretic: Furosemide 40 mg IV daily

## 2025-07-14 NOTE — PROGRESS NOTES
07/14/25 1557   Discharge Planning   Living Arrangements Alone  ([facility])   Support Systems Children;Family members;Friends/neighbors;Other (Comment)  (Assisted living staff)   Assistance Needed walker, stand by assist   Type of Residence Assisted living   Do you have animals or pets at home? Yes   Type of Animals or Pets 1 cat   Care Facility Name [facility]   Who is requesting discharge planning? Provider   Home or Post Acute Services None   Expected Discharge Disposition Home   Does the patient need discharge transport arranged? Yes   [employer] coordination needed? Yes   Has discharge transport been arranged? No   Financial Resource Strain   How hard is it for you to pay for the very basics like food, housing, medical care, and heating? Not hard   Housing Stability   In the last 12 months, was there a time when you were not able to pay the mortgage or rent on time? N   In the past 12 months, how many times have you moved where you were living? 0   At any time in the past 12 months, were you homeless or living in a shelter (including now)? N   Transportation Needs   In the past 12 months, has lack of transportation kept you from medical appointments or from getting medications? no   In the past 12 months, has lack of transportation kept you from meetings, work, or from getting things needed for daily living? No   Stroke Family Assessment   Stroke Family Assessment Needed No   Intensity of Service   Intensity of Service 0-30 min     Care Transitions: Patient reviewed in care round meeting this AM, ADOD 24 hours. Met with patient in room, sitting up in chair. Role of TCC explained. Demographics and contacts verified. She resides at the [facility] for approx 2 years. PCP is Alonso Westbrook. [facility] manages her medications and meals. Patient goes to the shared dining room for meals. States she is mostly independent with her ADL's but has staff as stand by in case she  needs assistance while showering. She has a Rollator to use to the dining room and a front wheeled walker she uses in her apartment. She has a nebulizer at home. She receives financial assistance for rent through the [employer] [place] waiver. Her mode of transportation is [facility] transit that [facility] sets up for her. [facility] 19/18 per nursing. PT/OT evals pending. Medicare IMM reviewed, patient signed, copy provided, original placed in chart. Voiced understanding. Patient voiced her discharge wishes is to return to the [facility], denies any in home services at this time. Care team to follow. Maranda Villalobos RN/TCC    -1550 PT/OT evals completed with recommendations of stand by assist. Patient is safe to return to assisted living. Spoke to Rama CARVAJAL @ [facility]. Updated on ADOD and therapy evals. Rama states they are good to have patient return to them when medically ready. Maranda Villalobos RN/TCC

## 2025-07-14 NOTE — PROGRESS NOTES
"Lakeshia Park \"Nasra\" is a 89 y.o. female on day 2 of admission presenting with Hypoxemia.    Subjective   Generalized weakness  Left low back and hip area pain possible sciatica  Swelling lower extremities       Objective   General Appearance: AAO x 3, obese HEENT  Skin: skin color pink, warm, and dry; no suspicious rashes or lesions  Eyes : PERRL, EOM's intact  ENT: mucous membranes pink and moist  Neck: normocephalic  Respiratory: lungs clear to auscultation anteriorly; no wheezing, rhonchi, or crackles.   Heart: regular rate and rhythm.   Abdomen: Nondistended, positive bowel sounds x4, soft,  nontender  Extremities: LE  edema   Peripheral pulses: normal x4 extremities  Neuro: alert, coherent and conversant, no focal motor deficits  Physical Exam    Last Recorded Vitals  Blood pressure 130/71, pulse 66, temperature 36 °C (96.8 °F), resp. rate 18, height 1.651 m (5' 5\"), weight 95.6 kg (210 lb 12.2 oz), SpO2 93%.  Intake/Output last 3 Shifts:  I/O last 3 completed shifts:  In: 300 (3.1 mL/kg) [P.O.:200; IV Piggyback:100]  Out: 1500 (15.7 mL/kg) [Urine:1500 (0.4 mL/kg/hr)]  Weight: 95.6 kg     Relevant Results    Results for orders placed or performed during the hospital encounter of 07/12/25 (from the past 24 hours)   POCT GLUCOSE   Result Value Ref Range    POCT Glucose 221 (H) 74 - 99 mg/dL   CBC   Result Value Ref Range    WBC 9.9 4.4 - 11.3 x10*3/uL    nRBC 0.3 (H) 0.0 - 0.0 /100 WBCs    RBC 2.54 (L) 4.00 - 5.20 x10*6/uL    Hemoglobin 8.1 (L) 12.0 - 16.0 g/dL    Hematocrit 26.1 (L) 36.0 - 46.0 %     (H) 80 - 100 fL    MCH 31.9 26.0 - 34.0 pg    MCHC 31.0 (L) 32.0 - 36.0 g/dL    RDW 15.5 (H) 11.5 - 14.5 %    Platelets 344 150 - 450 x10*3/uL   Comprehensive Metabolic Panel   Result Value Ref Range    Glucose 145 (H) 74 - 99 mg/dL    Sodium 138 136 - 145 mmol/L    Potassium 4.4 3.5 - 5.3 mmol/L    Chloride 98 98 - 107 mmol/L    Bicarbonate 31 21 - 32 mmol/L    Anion Gap 13 10 - 20 mmol/L    Urea " Nitrogen 73 (H) 6 - 23 mg/dL    Creatinine 2.46 (H) 0.50 - 1.05 mg/dL    eGFR 18 (L) >60 mL/min/1.73m*2    Calcium 8.6 8.6 - 10.3 mg/dL    Albumin 3.8 3.4 - 5.0 g/dL    Alkaline Phosphatase 46 33 - 136 U/L    Total Protein 6.6 6.4 - 8.2 g/dL    AST 11 9 - 39 U/L    Bilirubin, Total 0.2 0.0 - 1.2 mg/dL    ALT 12 7 - 45 U/L   POCT GLUCOSE   Result Value Ref Range    POCT Glucose 133 (H) 74 - 99 mg/dL   POCT GLUCOSE   Result Value Ref Range    POCT Glucose 148 (H) 74 - 99 mg/dL   POCT GLUCOSE   Result Value Ref Range    POCT Glucose 259 (H) 74 - 99 mg/dL     *Note: Due to a large number of results and/or encounters for the requested time period, some results have not been displayed. A complete set of results can be found in Results Review.       Lower extremity venous duplex bilateral  Result Date: 7/14/2025  Preliminary Cardiology Report             [place] [address] Phone [phone], Fax [phone]          Preliminary Vascular Lab Report  [facility] LOWER EXTREMITY VENOUS DUPLEX BILATERAL  Patient Name:     RODOLFO Chavez Physician:  [id number] Christian Petersen MD Study Date:       7/14/2025        Ordering Provider:  [id number] HAYES POP MRN/PID:          [id number]         Fellow: Accession#:       [id number]     Technologist:       Earl Rust BOBBI YOB: 1930       Technologist 2: Gender:           F                Encounter#:         [id number] Admission Status: Inpatient        Location Performed: [facility]  Diagnosis/ICD: Localized (leg) edema-R60.0 CPT Codes:     97579 Peripheral venous duplex scan for DVT complete  PRELIMINARY CONCLUSIONS:  Right Lower Venous: No evidence of acute deep vein thrombus visualized in the right lower extremity. Left Lower Venous: No evidence of acute deep vein thrombus visualized in the left lower extremity. Additional Findings; Cystic structure noted in the popliteal fossa measuring 6.8  x 5.4 x 3.4 cm.  Imaging & Doppler Findings:  Right                 Compressible Thrombus        Flow Distal External Iliac                       Spontaneous/Phasic CFV                       Yes        None   Spontaneous/Phasic PFV                       Yes        None FV Proximal               Yes        None   Spontaneous/Phasic FV Mid                    Yes        None FV Distal                 Yes        None Popliteal                 Yes        None   Spontaneous/Phasic Peroneal                  Yes        None PTV                       Yes        None  Left                  Compress Thrombus        Flow Distal External Iliac                   Spontaneous/Phasic CFV                     Yes      None   Spontaneous/Phasic PFV                     Yes      None FV Proximal             Yes      None   Spontaneous/Phasic FV Mid                  Yes      None FV Distal               Yes      None Popliteal               Yes      None   Spontaneous/Phasic Peroneal                Yes      None PTV                     Yes      None  VASCULAR PRELIMINARY REPORT completed by Earl Rust RVBOBBI on 7/14/2025 at 12:01:40 PM  ** Final **     ECG 12 lead  Result Date: 7/14/2025  Sinus rhythm with 1st degree AV block Low voltage QRS Inferior infarct , age undetermined Cannot rule out Anterior infarct , age undetermined Abnormal ECG When compared with ECG of 12-JUL-2025 04:11, (unconfirmed) Significant changes have occurred      Scheduled medications  Scheduled Medications[1]  Continuous medications  Continuous Medications[2]  PRN medications  PRN Medications[3]                          Assessment & Plan  Hypoxemia      89-year-old female with history of  CHF  COPD  Diabetes mellitus type 2  Hypertension  Arthritis  Anemia  chronic kidney disease stage IV baseline creatinine 2-2.4  Obesity  Spinal stenosis  Chronic diarrhea  Gout  Anxiety depression  Hyperlipidemia  B12 deficiency  Hypothyroidism  Presenting  with  Pneumonitis  Possible NUBIA  Anemia likely chronic  Macrocytosis  Acute hypoxemic respiratory failure from pneumonitis  Peripheral edema with volume overload  Plan  Cardiopulmonary monitoring  Follow vitals  Daily CBC BMP  Blood culture no growth so far  Supplemental oxygen to wean as able  Follow BMP, urine output  Symptomatic management  Neb treatments  On prednisone 40 Mg daily  Had stopped antibiotics yesterday  Reduce Lasix dose as BUN/creatinine rising  RG hose to legs bilaterally  Consult palliative care  Continue current meds  Allopurinol, Wellbutrin, vitamin B12, Colace, Neurontin, Nexplanon, Synthroid  Meclizine for chronic vertigo  bowel regimen for constipation prophylaxis  Statins for hyperlipidemia  For pain control  Muscle relaxant for spasms  Carb controlled diet, insulin sliding scale  Heparin for DVT prophylaxis  Multivitamin, nutrition,  Her B12, TSH, folate okay  Patient was given IV iron here  PT  Fall, aspiration, seizure precautions  Monitor for any polypharmacy  Follow venous duplex ultrasound of bilateral lower extremity  No DVT but has popliteal cyst  Disposition, to monitor kidney function to equilibrate  Possible DC tomorrow if stable                    Eleni Mcmahon MD         [1] allopurinol, 100 mg, oral, Daily  buPROPion XL, 300 mg, oral, Daily  cyanocobalamin, 1,000 mcg, oral, Daily  cyclobenzaprine, 10 mg, oral, Nightly  ferrous sulfate, 1 tablet, oral, Daily  gabapentin, 300 mg, oral, Nightly  heparin (porcine), 5,000 Units, subcutaneous, q8h  insulin lispro, 0-5 Units, subcutaneous, TID AC  ipratropium-albuteroL, 3 mL, nebulization, q4h  levothyroxine, 175 mcg, oral, Daily before breakfast  metoprolol tartrate, 25 mg, oral, Daily  metoprolol tartrate, 50 mg, oral, Nightly  polyethylene glycol, 17 g, oral, Daily  pravastatin, 20 mg, oral, Nightly  predniSONE, 40 mg, oral, Daily  pyridoxine, 100 mg, oral, Daily  traMADol, 50 mg, oral, TID  [2]    [3] PRN medications:  acetaminophen **OR** acetaminophen **OR** acetaminophen, acetaminophen **OR** acetaminophen **OR** acetaminophen, benzocaine-menthol, benzonatate, dextrose, dextrose, docusate sodium, glucagon, glucagon, loperamide, meclizine, ondansetron ODT **OR** ondansetron, oxygen, polyethylene glycol, sennosides

## 2025-07-14 NOTE — CARE PLAN
Problem: Pain - Adult  Goal: Verbalizes/displays adequate comfort level or baseline comfort level  Outcome: Progressing     Problem: Safety - Adult  Goal: Free from fall injury  Outcome: Progressing     Problem: Discharge Planning  Goal: Discharge to home or other facility with appropriate resources  Outcome: Progressing     Problem: Chronic Conditions and Co-morbidities  Goal: Patient's chronic conditions and co-morbidity symptoms are monitored and maintained or improved  Outcome: Progressing     Problem: Nutrition  Goal: Nutrient intake appropriate for maintaining nutritional needs  Outcome: Progressing     Problem: Skin  Goal: Decreased wound size/increased tissue granulation at next dressing change  Outcome: Progressing  Goal: Participates in plan/prevention/treatment measures  Outcome: Progressing  Goal: Prevent/manage excess moisture  Outcome: Progressing  Goal: Prevent/minimize sheer/friction injuries  Outcome: Progressing  Goal: Promote/optimize nutrition  Outcome: Progressing  Goal: Promote skin healing  Outcome: Progressing     Problem: Fall/Injury  Goal: Not fall by end of shift  Outcome: Progressing  Goal: Be free from injury by end of the shift  Outcome: Progressing  Goal: Verbalize understanding of personal risk factors for fall in the hospital  Outcome: Progressing  Goal: Verbalize understanding of risk factor reduction measures to prevent injury from fall in the home  Outcome: Progressing  Goal: Use assistive devices by end of the shift  Outcome: Progressing  Goal: Pace activities to prevent fatigue by end of the shift  Outcome: Progressing   The patient's goals for the shift include  feel better and go home    The clinical goals for the shift include Maintain 02 levels above 90% on 2L no falls, labs and vs wdl, maintain O2 >90%

## 2025-07-14 NOTE — PROGRESS NOTES
"Lakeshia Park \"Nasra\" is a 89 y.o. female on day 2 of admission presenting with Hypoxemia.      Subjective   Patient seen and examined at the bedside  She is resting comfortably in bed  She does have some pretty bad pain of her left buttocks area she states it is her sciatic nerve that bothers her currently and constantly  Has swelling of lower extremities still       Objective          Vitals 24HR  Heart Rate:  [66-80]   Temp:  [36 °C (96.8 °F)-36.7 °C (98.1 °F)]   Resp:  [16-18]   BP: (130-159)/(71-99)   SpO2:  [91 %-99 %]         Intake/Output last 3 Shifts:    Intake/Output Summary (Last 24 hours) at 7/14/2025 1204  Last data filed at 7/14/2025 0900  Gross per 24 hour   Intake 240 ml   Output 1000 ml   Net -760 ml       Physical Exam  Constitutional:       Appearance: Normal appearance. She is obese.   HENT:      Head: Normocephalic and atraumatic.      Right Ear: External ear normal.      Left Ear: External ear normal.      Nose: Nose normal.      Mouth/Throat:      Mouth: Mucous membranes are moist.      Pharynx: Oropharynx is clear.   Eyes:      Extraocular Movements: Extraocular movements intact.      Conjunctiva/sclera: Conjunctivae normal.      Pupils: Pupils are equal, round, and reactive to light.   Cardiovascular:      Rate and Rhythm: Normal rate and regular rhythm.   Pulmonary:      Effort: Pulmonary effort is normal.      Breath sounds: Normal breath sounds.   Abdominal:      General: Abdomen is flat.      Palpations: Abdomen is soft.   Musculoskeletal:         General: Swelling present.      Right lower leg: Edema present.      Left lower leg: Edema present.   Skin:     General: Skin is warm and dry.   Neurological:      General: No focal deficit present.      Mental Status: She is alert and oriented to person, place, and time.   Psychiatric:         Mood and Affect: Mood normal.         Behavior: Behavior normal.         Relevant Results                          Assessment & " Plan  Hypoxemia    Chronic kidney disease stage IV with baseline creatinine 2-2.4  Diabetic Nephropathy   Diabetes mellitus type 2  Chronic diarrhea  History of gout on allopurinol  Hyperuricemia  Hypertension  Hyperlipidemia  Bilateral renal cysts  Positive REJI screen with slightly high RNP  Depression  Spinal stenosis  Hypoxia on presentation  Anemia with iron deficiency  Peripheral edema with volume overload    Plan:  At this time both BUN and creatinine are rising.  We need to back off of her diuresis  Place RG hose to legs bilaterally  She does have swelling of the lower extremities  Consult palliative care             Dharmesh Garcia, DO

## 2025-07-14 NOTE — PROGRESS NOTES
"Occupational Therapy                 Therapy Communication Note    Patient Name: Lakeshia Park \"Nasra\"  MRN: [id number]  Department: [facility]  Room: [address] [place], [place] [address]  Today's Date: 7/14/2025     Discipline: Occupational Therapy    Missed Visit:   Yes    Missed Visit Reason: Missed Visit Reason: Patient placed on medical hold (Awaiting venous duplex to r/o DVT, Will continue to monitor and reattempt as able)      "

## 2025-07-14 NOTE — CARE PLAN
The patient's goals for the shift include      The clinical goals for the shift include Maintain 02 levels above 90% on 2L    Over the shift, the patient did not make progress toward the following goals. Barriers to progression include . Recommendations to address these barriers include .    Problem: Pain - Adult  Goal: Verbalizes/displays adequate comfort level or baseline comfort level  Outcome: Not Progressing     Problem: Safety - Adult  Goal: Free from fall injury  Outcome: Not Progressing     Problem: Discharge Planning  Goal: Discharge to home or other facility with appropriate resources  Outcome: Not Progressing     Problem: Chronic Conditions and Co-morbidities  Goal: Patient's chronic conditions and co-morbidity symptoms are monitored and maintained or improved  Outcome: Not Progressing     Problem: Nutrition  Goal: Nutrient intake appropriate for maintaining nutritional needs  Outcome: Not Progressing     Problem: Skin  Goal: Decreased wound size/increased tissue granulation at next dressing change  Outcome: Not Progressing  Goal: Participates in plan/prevention/treatment measures  Outcome: Not Progressing  Goal: Prevent/manage excess moisture  Outcome: Not Progressing  Goal: Prevent/minimize sheer/friction injuries  Outcome: Not Progressing  Goal: Promote/optimize nutrition  Outcome: Not Progressing  Goal: Promote skin healing  Outcome: Not Progressing

## 2025-07-15 LAB
ANION GAP SERPL CALC-SCNC: 13 MMOL/L (ref 10–20)
BUN SERPL-MCNC: 73 MG/DL (ref 6–23)
CALCIUM SERPL-MCNC: 8.9 MG/DL (ref 8.6–10.3)
CHLORIDE SERPL-SCNC: 99 MMOL/L (ref 98–107)
CO2 SERPL-SCNC: 31 MMOL/L (ref 21–32)
CREAT SERPL-MCNC: 2.52 MG/DL (ref 0.5–1.05)
EGFRCR SERPLBLD CKD-EPI 2021: 17 ML/MIN/1.73M*2
ERYTHROCYTE [DISTWIDTH] IN BLOOD BY AUTOMATED COUNT: 15.6 % (ref 11.5–14.5)
GLUCOSE BLD MANUAL STRIP-MCNC: 137 MG/DL (ref 74–99)
GLUCOSE BLD MANUAL STRIP-MCNC: 219 MG/DL (ref 74–99)
GLUCOSE BLD MANUAL STRIP-MCNC: 249 MG/DL (ref 74–99)
GLUCOSE BLD MANUAL STRIP-MCNC: 310 MG/DL (ref 74–99)
GLUCOSE SERPL-MCNC: 153 MG/DL (ref 74–99)
HCT VFR BLD AUTO: 25.1 % (ref 36–46)
HGB BLD-MCNC: 7.8 G/DL (ref 12–16)
MCH RBC QN AUTO: 32.1 PG (ref 26–34)
MCHC RBC AUTO-ENTMCNC: 31.1 G/DL (ref 32–36)
MCV RBC AUTO: 103 FL (ref 80–100)
NRBC BLD-RTO: 0.4 /100 WBCS (ref 0–0)
PLATELET # BLD AUTO: 349 X10*3/UL (ref 150–450)
POTASSIUM SERPL-SCNC: 5.2 MMOL/L (ref 3.5–5.3)
RBC # BLD AUTO: 2.43 X10*6/UL (ref 4–5.2)
SODIUM SERPL-SCNC: 138 MMOL/L (ref 136–145)
WBC # BLD AUTO: 8.9 X10*3/UL (ref 4.4–11.3)

## 2025-07-15 PROCEDURE — 2500000001 HC RX 250 WO HCPCS SELF ADMINISTERED DRUGS (ALT 637 FOR MEDICARE OP): Performed by: HOSPITALIST

## 2025-07-15 PROCEDURE — 2500000004 HC RX 250 GENERAL PHARMACY W/ HCPCS (ALT 636 FOR OP/ED): Performed by: INTERNAL MEDICINE

## 2025-07-15 PROCEDURE — 85027 COMPLETE CBC AUTOMATED: CPT | Performed by: INTERNAL MEDICINE

## 2025-07-15 PROCEDURE — 99233 SBSQ HOSP IP/OBS HIGH 50: CPT | Performed by: INTERNAL MEDICINE

## 2025-07-15 PROCEDURE — 2500000004 HC RX 250 GENERAL PHARMACY W/ HCPCS (ALT 636 FOR OP/ED): Performed by: HOSPITALIST

## 2025-07-15 PROCEDURE — 2500000002 HC RX 250 W HCPCS SELF ADMINISTERED DRUGS (ALT 637 FOR MEDICARE OP, ALT 636 FOR OP/ED): Performed by: HOSPITALIST

## 2025-07-15 PROCEDURE — 2500000005 HC RX 250 GENERAL PHARMACY W/O HCPCS: Performed by: HOSPITALIST

## 2025-07-15 PROCEDURE — 97110 THERAPEUTIC EXERCISES: CPT | Mod: GP,CQ

## 2025-07-15 PROCEDURE — 1200000002 HC GENERAL ROOM WITH TELEMETRY DAILY

## 2025-07-15 PROCEDURE — 94640 AIRWAY INHALATION TREATMENT: CPT

## 2025-07-15 PROCEDURE — 84132 ASSAY OF SERUM POTASSIUM: CPT | Performed by: INTERNAL MEDICINE

## 2025-07-15 PROCEDURE — 82947 ASSAY GLUCOSE BLOOD QUANT: CPT

## 2025-07-15 PROCEDURE — 36415 COLL VENOUS BLD VENIPUNCTURE: CPT | Performed by: INTERNAL MEDICINE

## 2025-07-15 PROCEDURE — 2500000001 HC RX 250 WO HCPCS SELF ADMINISTERED DRUGS (ALT 637 FOR MEDICARE OP)

## 2025-07-15 PROCEDURE — 99233 SBSQ HOSP IP/OBS HIGH 50: CPT | Performed by: STUDENT IN AN ORGANIZED HEALTH CARE EDUCATION/TRAINING PROGRAM

## 2025-07-15 PROCEDURE — 94761 N-INVAS EAR/PLS OXIMETRY MLT: CPT

## 2025-07-15 RX ADMIN — Medication 21 PERCENT: at 10:29

## 2025-07-15 RX ADMIN — HEPARIN SODIUM 5000 UNITS: 5000 INJECTION, SOLUTION INTRAVENOUS; SUBCUTANEOUS at 20:07

## 2025-07-15 RX ADMIN — HEPARIN SODIUM 5000 UNITS: 5000 INJECTION, SOLUTION INTRAVENOUS; SUBCUTANEOUS at 04:43

## 2025-07-15 RX ADMIN — TRAMADOL HYDROCHLORIDE 50 MG: 50 TABLET, COATED ORAL at 20:06

## 2025-07-15 RX ADMIN — GABAPENTIN 300 MG: 300 CAPSULE ORAL at 20:07

## 2025-07-15 RX ADMIN — TRAMADOL HYDROCHLORIDE 50 MG: 50 TABLET, COATED ORAL at 14:48

## 2025-07-15 RX ADMIN — IRON SUCROSE 300 MG: 20 INJECTION, SOLUTION INTRAVENOUS at 11:10

## 2025-07-15 RX ADMIN — Medication 1 L/MIN: at 06:42

## 2025-07-15 RX ADMIN — TRAMADOL HYDROCHLORIDE 50 MG: 50 TABLET, COATED ORAL at 08:54

## 2025-07-15 RX ADMIN — Medication 1000 MCG: at 08:54

## 2025-07-15 RX ADMIN — METOPROLOL TARTRATE 25 MG: 25 TABLET, FILM COATED ORAL at 06:22

## 2025-07-15 RX ADMIN — BUPROPION HYDROCHLORIDE 300 MG: 300 TABLET, EXTENDED RELEASE ORAL at 08:54

## 2025-07-15 RX ADMIN — IPRATROPIUM BROMIDE AND ALBUTEROL SULFATE 3 ML: 2.5; .5 SOLUTION RESPIRATORY (INHALATION) at 02:01

## 2025-07-15 RX ADMIN — INSULIN LISPRO 2 UNITS: 100 INJECTION, SOLUTION INTRAVENOUS; SUBCUTANEOUS at 17:24

## 2025-07-15 RX ADMIN — Medication 1 L/MIN: at 02:02

## 2025-07-15 RX ADMIN — HEPARIN SODIUM 5000 UNITS: 5000 INJECTION, SOLUTION INTRAVENOUS; SUBCUTANEOUS at 12:48

## 2025-07-15 RX ADMIN — IPRATROPIUM BROMIDE AND ALBUTEROL SULFATE 3 ML: 2.5; .5 SOLUTION RESPIRATORY (INHALATION) at 22:11

## 2025-07-15 RX ADMIN — IPRATROPIUM BROMIDE AND ALBUTEROL SULFATE 3 ML: 2.5; .5 SOLUTION RESPIRATORY (INHALATION) at 10:28

## 2025-07-15 RX ADMIN — POLYETHYLENE GLYCOL 3350 17 G: 17 POWDER, FOR SOLUTION ORAL at 08:54

## 2025-07-15 RX ADMIN — ALLOPURINOL 100 MG: 100 TABLET ORAL at 08:54

## 2025-07-15 RX ADMIN — PREDNISONE 40 MG: 20 TABLET ORAL at 08:54

## 2025-07-15 RX ADMIN — INSULIN LISPRO 2 UNITS: 100 INJECTION, SOLUTION INTRAVENOUS; SUBCUTANEOUS at 12:47

## 2025-07-15 RX ADMIN — IPRATROPIUM BROMIDE AND ALBUTEROL SULFATE 3 ML: 2.5; .5 SOLUTION RESPIRATORY (INHALATION) at 19:26

## 2025-07-15 RX ADMIN — Medication 21 PERCENT: at 06:54

## 2025-07-15 RX ADMIN — CYCLOBENZAPRINE 10 MG: 10 TABLET, FILM COATED ORAL at 20:07

## 2025-07-15 RX ADMIN — LEVOTHYROXINE SODIUM 175 MCG: 25 TABLET ORAL at 06:23

## 2025-07-15 RX ADMIN — FERROUS SULFATE TAB 325 MG (65 MG ELEMENTAL FE) 1 TABLET: 325 (65 FE) TAB at 08:54

## 2025-07-15 RX ADMIN — Medication 21 PERCENT: at 13:41

## 2025-07-15 RX ADMIN — IPRATROPIUM BROMIDE AND ALBUTEROL SULFATE 3 ML: 2.5; .5 SOLUTION RESPIRATORY (INHALATION) at 13:40

## 2025-07-15 RX ADMIN — PRAVASTATIN SODIUM 20 MG: 20 TABLET ORAL at 20:06

## 2025-07-15 RX ADMIN — Medication 100 MG: at 08:54

## 2025-07-15 RX ADMIN — IPRATROPIUM BROMIDE AND ALBUTEROL SULFATE 3 ML: 2.5; .5 SOLUTION RESPIRATORY (INHALATION) at 06:41

## 2025-07-15 RX ADMIN — METOPROLOL TARTRATE 50 MG: 50 TABLET, FILM COATED ORAL at 20:06

## 2025-07-15 ASSESSMENT — COGNITIVE AND FUNCTIONAL STATUS - GENERAL
CLIMB 3 TO 5 STEPS WITH RAILING: TOTAL
MOVING TO AND FROM BED TO CHAIR: A LITTLE
PERSONAL GROOMING: A LITTLE
WALKING IN HOSPITAL ROOM: A LITTLE
WALKING IN HOSPITAL ROOM: A LOT
STANDING UP FROM CHAIR USING ARMS: A LITTLE
TURNING FROM BACK TO SIDE WHILE IN FLAT BAD: A LITTLE
MOVING FROM LYING ON BACK TO SITTING ON SIDE OF FLAT BED WITH BEDRAILS: A LITTLE
TOILETING: A LITTLE
HELP NEEDED FOR BATHING: A LITTLE
MOBILITY SCORE: 15
MOVING FROM LYING ON BACK TO SITTING ON SIDE OF FLAT BED WITH BEDRAILS: A LITTLE
MOVING TO AND FROM BED TO CHAIR: A LOT
DAILY ACTIVITIY SCORE: 19
DRESSING REGULAR LOWER BODY CLOTHING: A LITTLE
MOBILITY SCORE: 17
STANDING UP FROM CHAIR USING ARMS: A LITTLE
CLIMB 3 TO 5 STEPS WITH RAILING: A LOT
DRESSING REGULAR UPPER BODY CLOTHING: A LITTLE
MOBILITY SCORE: 18
TURNING FROM BACK TO SIDE WHILE IN FLAT BAD: A LITTLE
CLIMB 3 TO 5 STEPS WITH RAILING: A LOT
MOVING TO AND FROM BED TO CHAIR: A LITTLE
STANDING UP FROM CHAIR USING ARMS: A LITTLE
WALKING IN HOSPITAL ROOM: A LITTLE

## 2025-07-15 ASSESSMENT — PAIN SCALES - GENERAL
PAINLEVEL_OUTOF10: 0 - NO PAIN

## 2025-07-15 ASSESSMENT — PAIN - FUNCTIONAL ASSESSMENT: PAIN_FUNCTIONAL_ASSESSMENT: 0-10

## 2025-07-15 NOTE — PROGRESS NOTES
"Lakeshia Park \"Nasra\" is a 89 y.o. female on day 3 of admission presenting with Hypoxemia.      Subjective   Patient seen and examined at the bedside  She is sitting comfortably in the bedside chair eating breakfast  She states that she is feeling pretty well at this time  Still has some edema but does have RG hose in place  Denies shortness of breath  States she is urinating well       Objective          Vitals 24HR  Heart Rate:  [64-73]   Temp:  [36.1 °C (97 °F)-36.3 °C (97.3 °F)]   Resp:  [16-18]   BP: (118-169)/(71-83)   SpO2:  [85 %-96 %]         Intake/Output last 3 Shifts:    Intake/Output Summary (Last 24 hours) at 7/15/2025 0954  Last data filed at 7/14/2025 1800  Gross per 24 hour   Intake 480 ml   Output --   Net 480 ml       Physical Exam  Constitutional:       Appearance: Normal appearance. She is obese.   HENT:      Head: Normocephalic and atraumatic.      Right Ear: External ear normal.      Left Ear: External ear normal.      Nose: Nose normal.      Mouth/Throat:      Mouth: Mucous membranes are moist.      Pharynx: Oropharynx is clear.   Eyes:      Extraocular Movements: Extraocular movements intact.      Conjunctiva/sclera: Conjunctivae normal.      Pupils: Pupils are equal, round, and reactive to light.   Cardiovascular:      Rate and Rhythm: Normal rate and regular rhythm.   Pulmonary:      Effort: Pulmonary effort is normal.      Breath sounds: Normal breath sounds.   Abdominal:      General: Abdomen is flat.      Palpations: Abdomen is soft.   Musculoskeletal:         General: Swelling present.      Right lower leg: Edema present.      Left lower leg: Edema present.   Skin:     General: Skin is warm and dry.   Neurological:      General: No focal deficit present.      Mental Status: She is alert and oriented to person, place, and time.   Psychiatric:         Mood and Affect: Mood normal.         Behavior: Behavior normal.         Relevant Results                          Assessment & " Plan  Hypoxemia    Chronic kidney disease stage IV with baseline creatinine 2-2.4  Diabetic Nephropathy   Diabetes mellitus type 2  Chronic diarrhea  History of gout on allopurinol  Hyperuricemia  Hypertension  Hyperlipidemia  Bilateral renal cysts  Positive REJI screen with slightly high RNP  Depression  Spinal stenosis  Hypoxia on presentation  Anemia with iron deficiency  Peripheral edema with volume overload    Plan:  At this time her renal function is fairly stable  She is not on nephrotoxins  Hemoglobin continues to run low  She got 1 dose of Venofer I will give her another dose of Venofer for her iron deficient anemia  Her swelling is stable with RG hose  With her renal function the way it is need to stay away from lupus for now her renal function did worsen with increased BUN with diuresis  From my standpoint I would recommend supportive measures and palliative care to follow with no major interventions at this time             Dharmesh Garcia, DO

## 2025-07-15 NOTE — PROGRESS NOTES
"Lakeshia Graves \"Nasra\" is a 89 y.o. female on day 3 of admission presenting with Hypoxemia.      Subjective   Patient seen and examined at bedside.  She has been managed in the hospital for hypoxemia and renal failure.  She has since been weaned off of oxygen.  Creatinine is still significantly elevated.       Objective     Last Recorded Vitals  /84 (BP Location: Left arm, Patient Position: Sitting)   Pulse 69   Temp 36.4 °C (97.5 °F) (Temporal)   Resp 18   Wt 95.6 kg (210 lb 12.2 oz)   SpO2 96%   Intake/Output last 3 Shifts:  No intake or output data in the 24 hours ending 07/15/25 1806    Admission Weight  Weight: 99.3 kg (219 lb) (07/12/25 1328)    Daily Weight  07/12/25 : 95.6 kg (210 lb 12.2 oz)    Image Results  Lower extremity venous duplex [place] [address]  Phone [phone], Fax [phone]       Vascular Lab Report     [facility] LOWER EXTREMITY VENOUS DUPLEX BILATERAL    Patient Name:      LAKESHIA GRAVES      Reading Physician:  [id number] Christian Petersen MD  Study Date:        7/14/2025             Ordering Provider:  [id number] HAYES POP  MRN/PID:           [id number]              Fellow:  Accession#:        [id number]          Technologist:       Earl Rust RVT  Date of Birth/Age: 11/25/1930 / 89 years Technologist 2:  Gender:            F                     Encounter#:         [id number]  Admission Status:  Inpatient             Location Performed: [facility]       Diagnosis/ICD: Localized (leg) edema-R60.0  CPT Codes:     17578 Peripheral venous duplex scan for DVT complete       CONCLUSIONS:  Right Lower Venous: No evidence of acute deep vein thrombus visualized in the right lower extremity.  Left Lower Venous: No evidence of acute deep vein thrombus visualized in the " left lower extremity. Additional Findings; Cystic structure noted in the popliteal fossa measuring 6.8 x 5.4 x 3.4 cm.     Imaging & Doppler Findings:     Right                 Compressible Thrombus        Flow  Distal External Iliac                       Spontaneous/Phasic  CFV                       Yes        None   Spontaneous/Phasic  PFV                       Yes        None  FV Proximal               Yes        None   Spontaneous/Phasic  FV Mid                    Yes        None  FV Distal                 Yes        None  Popliteal                 Yes        None   Spontaneous/Phasic  Peroneal                  Yes        None  PTV                       Yes        None       Left                  Compress Thrombus        Flow  Distal External Iliac                   Spontaneous/Phasic  CFV                     Yes      None   Spontaneous/Phasic  PFV                     Yes      None  FV Proximal             Yes      None   Spontaneous/Phasic  FV Mid                  Yes      None  FV Distal               Yes      None  Popliteal               Yes      None   Spontaneous/Phasic  Peroneal                Yes      None  PTV                     Yes      None       [id number] Christian Petersen MD  Electronically signed by [id number] Christian Petersen MD on 7/15/2025 at 12:26:11 PM       ** Final **        Physical exam:  General:     Well appearing  HENT:      Head: Normocephalic and atraumatic.      Mouth: Mucous membranes are moist.   Eyes:      Pupils are equal, round, and reactive to light.   Cardiovascular:      Normal rate and regular rhythm. Normal S1, S2.  No murmurs, clicks, gallops.   Pulmonary:      Clear to auscultation bilaterally.  No wheezing, rhonchi, or crackles heard.   Abdominal:       Bowel sounds are normal.      Abdomen is obese, soft, nontender, nondistended  Skin:      No lesions seen  Musculoskeletal:         Extremities: +1 pitting edema bilateral lower extremities.  No deformities with no abnormal range  of motion     Neck supple.   Neurological:      Mental Status: Patient is alert and oriented X3     CN II-XII intact.  No focal neurologic deficits appreciated.     Relevant Results               Assessment/Plan                       Assessment & Plan  Hypoxemia  Secondary to fluid overload.  Patient has been since weaned off oxygen.  2.  CKD stage IV: Creatinine is 2.52 at this time.  Nephrology is consulted.  Continue following with their recommendations.    3.  Microcytic anemia: Continue Venofer for iron replacement.    4.  Hypertension: Blood pressure relatively stable.  Continue metoprolol    5.  Gout: Continue allopurinol    6.  Diabetes    7.  Hyperlipidemia    8.  Depression    9.  Positive REJI    10.  Obese: BMI 35.07              DVT ppx: Subcutaneous heparin  Diet: Carb controlled    Disposition: Continue monitoring kidney function.  Patient will receive additional dose of Venofer at this time.  Continue following with nephrology recommendations.  Discharge disposition is to return to assisted living once medically stable.  Anticipate this should hopefully happen in the next 24 hours.    Min Lara, DO

## 2025-07-15 NOTE — PROGRESS NOTES
"Physical Therapy    Physical Therapy Treatment    Patient Name: Lakeshia Park \"Nasra\"  MRN: [id number]  Department: [facility]  Room: [address] [place] [address]  Today's Date: 7/15/2025  Time Calculation  Start Time: 1253  Stop Time: 1310  Time Calculation (min): 17 min         Assessment/Plan   PT Assessment  PT Assessment Results: Decreased strength, Decreased endurance, Decreased mobility, Decreased safety awareness  Rehab Prognosis: Good  Barriers to Discharge Home: No anticipated barriers  End of Session Communication: Bedside nurse  Assessment Comment: Pt. just finished up her lunch and is agreeable to therapy.  Pt. is currently getting an iron infusion.  Pt. able to complete seated TE with ease, no c/o fatigue noted.  Gait deferred at this time.  Nurse and student nurse in room at end of session.  End of Session Patient Position: Up in chair, Alarm on (call light within reach)  PT Plan  Inpatient/Swing Bed or Outpatient: Inpatient  PT Plan  Treatment/Interventions: Transfer training, Gait training, Balance training, Strengthening, Endurance training, Therapeutic exercise, Therapeutic activity, Home exercise program  PT Plan: Ongoing PT  PT Frequency: 3 times per week  PT Discharge Recommendations: Low intensity level of continued care  PT Recommended Transfer Status: Stand by assist  PT - OK to Discharge: Yes    PT Visit Info:  PT Received On: 07/15/25     General Visit Information:   General  Reason for Referral: impaired mobiity;\"Nasra\" is a 89 y.o. female presenting with shortness of breath from assisted living facility  Referred By: Eleni Mcmahon MD  Family/Caregiver Present: No  Prior to Session Communication: Bedside nurse  Patient Position Received: Alarm on, Up in chair  General Comment: pt. agreeable to therapy    Subjective   Precautions:  Precautions  Medical Precautions: Fall precautions     Date/Time Vitals Session Patient Position Pulse Resp SpO2 BP MAP (mmHg)    07/15/25 1253 --  --  --  --  97 %  --  --       "     Vital Signs Comment: no concerns     Objective   Pain:  Pain Assessment  0-10 (Numeric) Pain Score: 0 - No pain  Cognition:  Cognition  Orientation Level: Oriented X4  Coordination:  Movements are Fluid and Coordinated: Yes  Postural Control:  Postural Control  Postural Control: Within Functional Limits  Static Sitting Balance  Static Sitting-Balance Support: Feet supported  Static Sitting-Level of Assistance: Independent  Dynamic Sitting Balance  Dynamic Sitting-Balance Support: Feet supported  Dynamic Sitting-Level of Assistance: Close supervision  Dynamic Sitting-Balance: Forward lean  Static Standing Balance  Static Standing-Balance Support: Bilateral upper extremity supported  Static Standing-Level of Assistance: Contact guard  Dynamic Standing Balance  Dynamic Standing-Balance Support: Bilateral upper extremity supported  Dynamic Standing-Level of Assistance: Contact guard  Dynamic Standing-Balance: Turning    Activity Tolerance:  Activity Tolerance  Endurance: Decreased tolerance for upright activites  Treatments:  Therapeutic Exercise  Therapeutic Exercise Performed: Yes  Therapeutic Exercise Activity 1: b/l heel raises x15  Therapeutic Exercise Activity 2: b/l toe raises x15  Therapeutic Exercise Activity 3: b/l LAQ's x15  Therapeutic Exercise Activity 4: b/l marches x15  Therapeutic Exercise Activity 5: b/l hip abd-add slides x15    Bed Mobility  Bed Mobility: No  Bed Mobility 1  Bed Mobility 1: Supine to sitting, Sitting to supine  Level of Assistance 1: Independent    Ambulation/Gait Training  Ambulation/Gait Training Performed: No  Ambulation/Gait Training 1  Surface 1: Level tile  Device 1: Rolling walker  Gait Support Devices: Gait belt  Assistance 1: Contact guard  Quality of Gait 1: Diminished heel strike, Inconsistent stride length  Transfers  Transfer: No  Transfer 1  Technique 1: Sit to stand, Stand to sit  Transfer Device 1: Gait belt, Walker  Transfer Level of Assistance 1: Close  supervision, Moderate verbal cues    Outcome Measures:  [facility] Basic Mobility  Turning from your back to your side while in a flat bed without using bedrails: None  Moving from lying on your back to sitting on the side of a flat bed without using bedrails: None  Moving to and from bed to chair (including a wheelchair): A little  Standing up from a chair using your arms (e.g. wheelchair or bedside chair): A little  To walk in hospital room: A little  Climbing 3-5 steps with railing: Total  Basic Mobility - Total Score: 18    Education Documentation  No documentation found.  Education Comments  No comments found.        OP EDUCATION:       Encounter Problems       Encounter Problems (Active)       PT Problem       PT Goal 1       Start:  07/14/25    Expected End:  07/28/25       Lakeshia Park will transfer sit to and from stand using least restrictive assistive device mod indep and use proper hand placement/good safety awareness           PT Goal 2       Start:  07/14/25    Expected End:  07/28/25       Lakeshia Park will ambulate 150ft with assistive device , level surface, good balance, steady mod Indep

## 2025-07-15 NOTE — PROGRESS NOTES
Care Transitions: Patient reviewed in care round meeting this AM and is not medically ready for discharge. Met with patient in room, siting up in chair. Discharge plan remains to return to [facility], no needs voiced at this time. Care team to follow. Maranda Villalobos RN/TCC

## 2025-07-15 NOTE — CARE PLAN
The patient's goals for the shift include      The clinical goals for the shift include Maintain 02 levels above 90% on 2L      Problem: Pain - Adult  Goal: Verbalizes/displays adequate comfort level or baseline comfort level  Outcome: Progressing     Problem: Safety - Adult  Goal: Free from fall injury  Outcome: Progressing     Problem: Skin  Goal: Promote/optimize nutrition  Outcome: Progressing  Flowsheets (Taken 7/14/2025 2132)  Promote/optimize nutrition: Consume > 50% meals/supplements

## 2025-07-15 NOTE — CARE PLAN
Problem: Pain - Adult  Goal: Verbalizes/displays adequate comfort level or baseline comfort level  Outcome: Progressing     Problem: Safety - Adult  Goal: Free from fall injury  Outcome: Progressing     Problem: Discharge Planning  Goal: Discharge to home or other facility with appropriate resources  Outcome: Progressing     Problem: Chronic Conditions and Co-morbidities  Goal: Patient's chronic conditions and co-morbidity symptoms are monitored and maintained or improved  Outcome: Progressing     Problem: Nutrition  Goal: Nutrient intake appropriate for maintaining nutritional needs  Outcome: Progressing     Problem: Skin  Goal: Decreased wound size/increased tissue granulation at next dressing change  Outcome: Progressing  Goal: Participates in plan/prevention/treatment measures  Outcome: Progressing  Goal: Prevent/manage excess moisture  Outcome: Progressing  Goal: Prevent/minimize sheer/friction injuries  Outcome: Progressing  Goal: Promote/optimize nutrition  Outcome: Progressing  Goal: Promote skin healing  Outcome: Progressing     Problem: Fall/Injury  Goal: Not fall by end of shift  Outcome: Progressing  Goal: Be free from injury by end of the shift  Outcome: Progressing  Goal: Verbalize understanding of personal risk factors for fall in the hospital  Outcome: Progressing  Goal: Verbalize understanding of risk factor reduction measures to prevent injury from fall in the home  Outcome: Progressing  Goal: Use assistive devices by end of the shift  Outcome: Progressing  Goal: Pace activities to prevent fatigue by end of the shift  Outcome: Progressing     Problem: Diabetes  Goal: Achieve decreasing blood glucose levels by end of shift  Outcome: Progressing  Goal: Increase stability of blood glucose readings by end of shift  Outcome: Progressing  Goal: Decrease in ketones present in urine by end of shift  Outcome: Progressing  Goal: Maintain electrolyte levels within acceptable range throughout shift  Outcome:  Progressing  Goal: Maintain glucose levels >70mg/dl to <250mg/dl throughout shift  Outcome: Progressing  Goal: No changes in neurological exam by end of shift  Outcome: Progressing  Goal: Learn about and adhere to nutrition recommendations by end of shift  Outcome: Progressing  Goal: Vital signs within normal range for age by end of shift  Outcome: Progressing  Goal: Increase self care and/or family involovement by end of shift  Outcome: Progressing  Goal: Receive DSME education by end of shift  Outcome: Progressing   The patient's goals for the shift include  feel better    The clinical goals for the shift include Remain free from injury no falls, labs and vs wdl

## 2025-07-16 ENCOUNTER — APPOINTMENT (OUTPATIENT)
Dept: HEMATOLOGY/ONCOLOGY | Facility: CLINIC | Age: OVER 89
End: 2025-07-16
Payer: MEDICARE

## 2025-07-16 VITALS
DIASTOLIC BLOOD PRESSURE: 79 MMHG | HEART RATE: 75 BPM | TEMPERATURE: 98.1 F | SYSTOLIC BLOOD PRESSURE: 134 MMHG | OXYGEN SATURATION: 90 % | BODY MASS INDEX: 35.11 KG/M2 | WEIGHT: 210.76 LBS | RESPIRATION RATE: 16 BRPM | HEIGHT: 65 IN

## 2025-07-16 DIAGNOSIS — D63.1 ANEMIA DUE TO STAGE 4 CHRONIC KIDNEY DISEASE: Primary | ICD-10-CM

## 2025-07-16 DIAGNOSIS — N18.4 ANEMIA DUE TO STAGE 4 CHRONIC KIDNEY DISEASE: Primary | ICD-10-CM

## 2025-07-16 PROBLEM — R09.02 HYPOXEMIA: Status: RESOLVED | Noted: 2025-07-12 | Resolved: 2025-07-16

## 2025-07-16 LAB
ANION GAP SERPL CALC-SCNC: 12 MMOL/L (ref 10–20)
ATRIAL RATE: 70 BPM
BUN SERPL-MCNC: 71 MG/DL (ref 6–23)
CALCIUM SERPL-MCNC: 9 MG/DL (ref 8.6–10.3)
CHLORIDE SERPL-SCNC: 100 MMOL/L (ref 98–107)
CO2 SERPL-SCNC: 31 MMOL/L (ref 21–32)
CREAT SERPL-MCNC: 2.39 MG/DL (ref 0.5–1.05)
EGFRCR SERPLBLD CKD-EPI 2021: 18 ML/MIN/1.73M*2
ERYTHROCYTE [DISTWIDTH] IN BLOOD BY AUTOMATED COUNT: 15.6 % (ref 11.5–14.5)
GLUCOSE BLD MANUAL STRIP-MCNC: 123 MG/DL (ref 74–99)
GLUCOSE BLD MANUAL STRIP-MCNC: 132 MG/DL (ref 74–99)
GLUCOSE BLD MANUAL STRIP-MCNC: 286 MG/DL (ref 74–99)
GLUCOSE SERPL-MCNC: 157 MG/DL (ref 74–99)
HCT VFR BLD AUTO: 25.4 % (ref 36–46)
HGB BLD-MCNC: 7.7 G/DL (ref 12–16)
MCH RBC QN AUTO: 31.8 PG (ref 26–34)
MCHC RBC AUTO-ENTMCNC: 30.3 G/DL (ref 32–36)
MCV RBC AUTO: 105 FL (ref 80–100)
NRBC BLD-RTO: 0.6 /100 WBCS (ref 0–0)
P AXIS: 63 DEGREES
P OFFSET: 146 MS
P ONSET: 112 MS
PLATELET # BLD AUTO: 347 X10*3/UL (ref 150–450)
POTASSIUM SERPL-SCNC: 4.9 MMOL/L (ref 3.5–5.3)
PR INTERVAL: 212 MS
Q ONSET: 218 MS
QRS COUNT: 11 BEATS
QRS DURATION: 84 MS
QT INTERVAL: 432 MS
QTC CALCULATION(BAZETT): 466 MS
QTC FREDERICIA: 455 MS
R AXIS: -27 DEGREES
RBC # BLD AUTO: 2.42 X10*6/UL (ref 4–5.2)
SODIUM SERPL-SCNC: 138 MMOL/L (ref 136–145)
T AXIS: 57 DEGREES
T OFFSET: 434 MS
VENTRICULAR RATE: 70 BPM
WBC # BLD AUTO: 10.1 X10*3/UL (ref 4.4–11.3)

## 2025-07-16 PROCEDURE — 80048 BASIC METABOLIC PNL TOTAL CA: CPT | Performed by: INTERNAL MEDICINE

## 2025-07-16 PROCEDURE — 94640 AIRWAY INHALATION TREATMENT: CPT

## 2025-07-16 PROCEDURE — 82947 ASSAY GLUCOSE BLOOD QUANT: CPT

## 2025-07-16 PROCEDURE — 94761 N-INVAS EAR/PLS OXIMETRY MLT: CPT

## 2025-07-16 PROCEDURE — 85027 COMPLETE CBC AUTOMATED: CPT | Performed by: INTERNAL MEDICINE

## 2025-07-16 PROCEDURE — 99232 SBSQ HOSP IP/OBS MODERATE 35: CPT | Performed by: INTERNAL MEDICINE

## 2025-07-16 PROCEDURE — 2500000005 HC RX 250 GENERAL PHARMACY W/O HCPCS: Performed by: HOSPITALIST

## 2025-07-16 PROCEDURE — 2500000002 HC RX 250 W HCPCS SELF ADMINISTERED DRUGS (ALT 637 FOR MEDICARE OP, ALT 636 FOR OP/ED): Performed by: HOSPITALIST

## 2025-07-16 PROCEDURE — 2500000004 HC RX 250 GENERAL PHARMACY W/ HCPCS (ALT 636 FOR OP/ED): Performed by: HOSPITALIST

## 2025-07-16 PROCEDURE — 2500000001 HC RX 250 WO HCPCS SELF ADMINISTERED DRUGS (ALT 637 FOR MEDICARE OP)

## 2025-07-16 PROCEDURE — 36415 COLL VENOUS BLD VENIPUNCTURE: CPT | Performed by: INTERNAL MEDICINE

## 2025-07-16 PROCEDURE — 2500000001 HC RX 250 WO HCPCS SELF ADMINISTERED DRUGS (ALT 637 FOR MEDICARE OP): Performed by: HOSPITALIST

## 2025-07-16 PROCEDURE — 99239 HOSP IP/OBS DSCHRG MGMT >30: CPT | Performed by: STUDENT IN AN ORGANIZED HEALTH CARE EDUCATION/TRAINING PROGRAM

## 2025-07-16 RX ADMIN — Medication 100 MG: at 10:16

## 2025-07-16 RX ADMIN — IPRATROPIUM BROMIDE AND ALBUTEROL SULFATE 3 ML: 2.5; .5 SOLUTION RESPIRATORY (INHALATION) at 06:21

## 2025-07-16 RX ADMIN — Medication 1000 MCG: at 10:12

## 2025-07-16 RX ADMIN — LEVOTHYROXINE SODIUM 175 MCG: 25 TABLET ORAL at 06:02

## 2025-07-16 RX ADMIN — METOPROLOL TARTRATE 25 MG: 25 TABLET, FILM COATED ORAL at 06:03

## 2025-07-16 RX ADMIN — Medication 21 PERCENT: at 06:22

## 2025-07-16 RX ADMIN — FERROUS SULFATE TAB 325 MG (65 MG ELEMENTAL FE) 1 TABLET: 325 (65 FE) TAB at 10:11

## 2025-07-16 RX ADMIN — TRAMADOL HYDROCHLORIDE 50 MG: 50 TABLET, COATED ORAL at 10:12

## 2025-07-16 RX ADMIN — Medication 21 PERCENT: at 13:12

## 2025-07-16 RX ADMIN — TRAMADOL HYDROCHLORIDE 50 MG: 50 TABLET, COATED ORAL at 14:21

## 2025-07-16 RX ADMIN — ALLOPURINOL 100 MG: 100 TABLET ORAL at 10:12

## 2025-07-16 RX ADMIN — HEPARIN SODIUM 5000 UNITS: 5000 INJECTION, SOLUTION INTRAVENOUS; SUBCUTANEOUS at 04:42

## 2025-07-16 RX ADMIN — IPRATROPIUM BROMIDE AND ALBUTEROL SULFATE 3 ML: 2.5; .5 SOLUTION RESPIRATORY (INHALATION) at 13:12

## 2025-07-16 RX ADMIN — IPRATROPIUM BROMIDE AND ALBUTEROL SULFATE 3 ML: 2.5; .5 SOLUTION RESPIRATORY (INHALATION) at 10:08

## 2025-07-16 RX ADMIN — PREDNISONE 40 MG: 20 TABLET ORAL at 10:11

## 2025-07-16 RX ADMIN — IPRATROPIUM BROMIDE AND ALBUTEROL SULFATE 3 ML: 2.5; .5 SOLUTION RESPIRATORY (INHALATION) at 02:13

## 2025-07-16 RX ADMIN — Medication 21 PERCENT: at 18:23

## 2025-07-16 RX ADMIN — BUPROPION HYDROCHLORIDE 300 MG: 300 TABLET, EXTENDED RELEASE ORAL at 10:11

## 2025-07-16 RX ADMIN — HEPARIN SODIUM 5000 UNITS: 5000 INJECTION, SOLUTION INTRAVENOUS; SUBCUTANEOUS at 12:41

## 2025-07-16 RX ADMIN — Medication 21 PERCENT: at 10:08

## 2025-07-16 RX ADMIN — IPRATROPIUM BROMIDE AND ALBUTEROL SULFATE 3 ML: 2.5; .5 SOLUTION RESPIRATORY (INHALATION) at 18:22

## 2025-07-16 ASSESSMENT — PAIN - FUNCTIONAL ASSESSMENT
PAIN_FUNCTIONAL_ASSESSMENT: 0-10

## 2025-07-16 ASSESSMENT — PAIN SCALES - GENERAL
PAINLEVEL_OUTOF10: 1
PAINLEVEL_OUTOF10: 0 - NO PAIN
PAINLEVEL_OUTOF10: 10 - WORST POSSIBLE PAIN

## 2025-07-16 ASSESSMENT — PAIN DESCRIPTION - LOCATION: LOCATION: OTHER (COMMENT)

## 2025-07-16 NOTE — DISCHARGE SUMMARY
Discharge Diagnosis  Hypoxemia    Issues Requiring Follow-Up  CKD    Discharge Meds     Medication List      CONTINUE taking these medications     acetaminophen 500 mg tablet; Commonly known as: Tylenol   albuterol 90 mcg/actuation inhaler; INHALE 2 PUFFS Every 4 hours as   needed for shortness of breath or wheezing   allopurinol 100 mg tablet; Commonly known as: Zyloprim   Arnuity Ellipta 100 mcg/actuation inhaler; Generic drug: fluticasone   furoate   benzonatate 100 mg capsule; Commonly known as: Tessalon   buPROPion  mg 12 hr tablet; Commonly known as: Wellbutrin SR; Take   1 tablet (150 mg) by mouth 2 times a day.   calcium carbonate 500 mg (200 mg elemental) chewable tablet; Commonly   known as: Tums   cyanocobalamin 1,000 mcg tablet; Commonly known as: Vitamin B-12   cyclobenzaprine 10 mg tablet; Commonly known as: Flexeril   Depend Underwear For Women Lrg misc; Generic drug:   diaper,brief,adult,disposable; Use 4 times per day   dextromethorphan-guaifenesin  mg/5 mL oral liquid; Commonly known   as: Robitussin DM   diclofenac sodium 1 % gel; Commonly known as: Voltaren; Apply 4.5 inches   (4 g) topically 4 times a day as needed (HAVE MEDICAL NURSE OK DUE TO   ALLERGIES).   docusate sodium 100 mg capsule; Commonly known as: Colace   ferrous sulfate 325 mg (65 mg elemental) tablet   gabapentin 300 mg capsule; Commonly known as: Neurontin   hydrocortisone 2.5 % cream   ipratropium-albuteroL 0.5-2.5 mg/3 mL nebulizer solution; Commonly known   as: Duo-Neb   levothyroxine 175 mcg tablet; Commonly known as: Synthroid, Levoxyl;   Take 1 tablet (175 mcg) by mouth once daily in the morning. Take before   meals.   loperamide 2 mg capsule; Commonly known as: Imodium   lovastatin 20 mg tablet; Commonly known as: Mevacor   lubricating eye drops ophthalmic solution   meclizine 25 mg tablet; Commonly known as: Antivert; TAKE 1 TABLET BY   MOUTH THREE TIMES DAILY AS NEEDED for dizziness   methocarbamol 500 mg  tablet; Commonly known as: Robaxin   * metoprolol tartrate 50 mg tablet; Commonly known as: Lopressor; Take   1/2 tablet in the morning and 1 tablet in the night   * metoprolol tartrate 25 mg tablet; Commonly known as: Lopressor   Mylanta Maximum Strength 400-400-40 mg/5 mL suspension; Generic drug:   alum-mag hydroxide-simeth   olopatadine 0.1 % ophthalmic solution; Commonly known as: Patanol   Pads For Women pad; Generic drug: incontinence pad, liner, disp; Extra   long pads up to 4 per day   polyethylene glycol 17 gram packet; Commonly known as: Glycolax, Miralax   pyridoxine 100 mg tablet; Commonly known as: Vitamin B-6   sennosides 8.6 mg tablet; Commonly known as: Senokot   SYSTANE COMPLETE OPHT   traMADol 50 mg tablet; Commonly known as: Ultram   Ultra-Light Rollator misc; Generic drug: walker; Use for ambulation  * This list has 2 medication(s) that are the same as other medications   prescribed for you. Read the directions carefully, and ask your doctor or   other care provider to review them with you.     STOP taking these medications     torsemide 20 mg tablet; Commonly known as: Demadex       Test Results Pending At Discharge  Pending Labs       Order Current Status    Blood Culture Preliminary result            Disposition:  Assisted living    Hospital Course   89 y.o. female presenting with shortness of breath from assisted living facility.  Patient placed on supplemental oxygen 2 L while here in the ER.  She was noted to have more than usual swelling in left leg.  She has underlying heart failure with chronic swelling in legs.  Denies any chest pain or palpitations or syncope or lightheadedness.  No bleeding.  Denies any cough fever chills nausea vomiting diarrhea.  No joint pains or skin rash.  No runny nose sore throat sinus or nasal congestion.  No ear pain pressure fullness discharge.  No headache neck pain focal weakness.  Denies any urinary complaints.  No back pain flank pain hematuria dysuria.   Denies any wheezing.  No exposure to sick contacts or recent travel.  No alarming weight loss or appetite change.  Advancing age and decline overall.  She has chronic voice issues as per the patient but denies slurred speech or dysphagia or blurry vision.  No numbness paralysis or paresthesias or tingling.  CT chest with no volume overload but pneumonitis surrounding pneumocele.  .  Creatinine at 2.68.  Started on IV antibiotics Unasyn.  EKG sinus rhythm with first-degree AV block  Urinalysis fine    During hospital stay, patient's breathing status would improve with nebulizer treatment and prednisone.  She would be seen by nephrology and be diuresed with IV lasix.  With rising creatinine and Bun, diuretics would be held.  She would also be started on IV venofer for her anemia.  She will continue following with nephrology outpatient for further management of her CKD as well as to determine if she will require EPO injections for her anemia.  Her diuretics will continue to be held at discharge.  She would complete steroids in the hospital.  She would be discharged back to her assisted living.   Follow up with PCP in one week.  Vital signs were stable at discharge.  Patient agreed to discharge plan.    35 minutes total was spent on discharge planning including chart review, medication ordering, and discussion with family and hospital staff.            Pertinent Physical Exam At Time of Discharge  General:     Well appearing  HENT:      Head: Normocephalic and atraumatic.      Mouth: Mucous membranes are moist.   Eyes:      Pupils are equal, round, and reactive to light.   Cardiovascular:      Normal rate and regular rhythm. Normal S1, S2.  No murmurs, clicks, gallops.   Pulmonary:      Clear to auscultation bilaterally.  No wheezing, rhonchi, or crackles heard.   Abdominal:       Bowel sounds are normal.      Abdomen is obese, soft, nontender, nondistended  Skin:      No lesions seen  Musculoskeletal:          Extremities: +1 pitting edema bilateral lower extremities.  No deformities with no abnormal range of motion     Neck supple.   Neurological:      Mental Status: Patient is alert and oriented X3     CN II-XII intact.  No focal neurologic deficits appreciated.     Outpatient Follow-Up  Future Appointments   Date Time Provider Department Center   7/22/2025 12:15 PM [address]   7/23/2025  1:30 PM INF 01 [facility] [place] [facility]""CC[facility]   7/30/2025  [facility]30 [facility]   8/6/2025  1:30 [facility]   8/13/2025  1:30 PM [facility]   8/20/2025  1:30 PM [facility]   10/1/2025 11:30 AM Tracy Garcia APRN-CNP, DNP [id number] [facility]   4/30/2026  9:40 AM JASSI Holm-CNP [id number] [facility]         Min Lara DO

## 2025-07-16 NOTE — PROGRESS NOTES
Per medical team, patient is medically appropriate for discharge today.  to meet with patient at bedside to review discharge plan and Medicare IM.  - 1700: SW spent significant time with patient over the course of the afternoon. Met with patient at bedside at 1445 to review plan and Medicare IM; Patient spoke at length about reasons why patient did not feel ready for discharge. Patient first wished to meet again with Dr. Garcia. SW relayed message to same. Patient attempted to appeal patient's discharge with call to [facility]. Patient did not have patient's straight Medicare number, only number for managed plan, so could not complete appeal call. In the meantime, Dr. Garcia met with patient, then SW met with patient again. Patient now agreeing to return home. Nursing to set up transport. Plan is for patient to return home to [facility] this evening with no Care Transitions needs foreseen. Care Transitions available upon request. GURJIT Valdes

## 2025-07-16 NOTE — CONSULTS
"Reason For Consult  COPD Education    Patient Characteristics: patient laying in bed on RA no distress noted. Patient thinks she has \"asthma not COPD\".   Comorbidities:                                            Exacerbation last year:                    Moderate: >= 2 exacerbations or >= 1 exacerbation leading to hospitalization    Spirometry: \"maybe\"  Date:             FVC:    (   %) FEV1:    (   %)  FEV1/FVC:    (   %)    Uses of Oxygen/CPAP/BIPAP: Patient does not wear any oxygen at home.                                                                 Smoking status:  Smoker: never Patient states \"I tired one in college\".      PPY:      Quit date:      Smoking cessation counseling:     Booklet given:             Pulmonary physician: Patient follows Dr. Westbrook for the management of her asthma.                     Name:                   Date last seen:                                     Pharmacotherapy:   Maintenance medications Arnuity Ellipta patient takes as prescribed.   LABA, LAMA, LABA/LAMA, ICS/LABA, ICS/LAMA, ICS/LABA/LAMA,   Name of the medication:  Albuterol inhaler as needed.     Prednisone: No Theophylline: No  Roflumilast: No  Macrolides: No     If not on maintenance meds:  Consider LAMA or LAMA/LABA   Consider ICS (if peripheral eosinophilia >300cells/microl, COPD exacerbation requiring hospitalization, >= 2 moderated COPD exacerbation, History of or concomitant asthma)    If low inspiratory force or challenges with inhalers   Consider Nebulizers   Consider RESPIMAT        COPD Education   COPD patient education book: patient, and I discussed medications patient was sleeping, not up for much education.      Inhaled medication teach-back: Yes     MIKA, other videos:      Patient demonstrated understanding/teach back method: Yes         Home Oxygen Evaluation: Patient currently at baseline RA. Patient may benefit from a home oxygen assessment prior to discharge. To be sure she does not need oxygen " with activity.                                               Pulmonary Rehabilitation referral:  Already attended:       When:                                Info in COPD patient education book              Vaccines                    Influenza:          Pneumococcal:       COVID 19:      Pertussis:                    Recommendations:   Follow up with PCP within two weeks of discharge may benefit         Anila Lucas, RRT

## 2025-07-16 NOTE — PROGRESS NOTES
"Lakeshia Park \"Nasra\" is a 89 y.o. female on day 4 of admission presenting with Hypoxemia.      Subjective   Patient seen and examined at the bedside  She is resting comfortably in bed  Early this morning I saw her and she was up and moving around  Currently no major complaints       Objective          Vitals 24HR  Heart Rate:  [60-75]   Temp:  [35.8 °C (96.4 °F)-36.4 °C (97.5 °F)]   Resp:  [18]   BP: (104-156)/(64-84)   SpO2:  [90 %-97 %]         Intake/Output last 3 Shifts:    Intake/Output Summary (Last 24 hours) at 7/16/2025 1154  Last data filed at 7/16/2025 0448  Gross per 24 hour   Intake 480 ml   Output 50 ml   Net 430 ml       Physical Exam  Constitutional:       Appearance: Normal appearance. She is obese.   HENT:      Head: Normocephalic and atraumatic.      Right Ear: External ear normal.      Left Ear: External ear normal.      Nose: Nose normal.      Mouth/Throat:      Mouth: Mucous membranes are moist.      Pharynx: Oropharynx is clear.     Eyes:      Extraocular Movements: Extraocular movements intact.      Conjunctiva/sclera: Conjunctivae normal.      Pupils: Pupils are equal, round, and reactive to light.       Cardiovascular:      Rate and Rhythm: Normal rate and regular rhythm.   Pulmonary:      Effort: Pulmonary effort is normal.      Breath sounds: Normal breath sounds.   Abdominal:      General: Abdomen is flat.      Palpations: Abdomen is soft.     Musculoskeletal:         General: Swelling present.      Right lower leg: Edema present.      Left lower leg: Edema present.     Skin:     General: Skin is warm and dry.     Neurological:      General: No focal deficit present.      Mental Status: She is alert and oriented to person, place, and time.     Psychiatric:         Mood and Affect: Mood normal.         Behavior: Behavior normal.         Relevant Results                          Assessment & Plan  Hypoxemia    Chronic kidney disease stage IV with baseline creatinine 2-2.4  Diabetic " Nephropathy   Diabetes mellitus type 2  Chronic diarrhea  History of gout on allopurinol  Hyperuricemia  Hypertension  Hyperlipidemia  Bilateral renal cysts  Positive REJI screen with slightly high RNP  Depression  Spinal stenosis  Hypoxia on presentation  Anemia with iron deficiency  Peripheral edema with volume overload    Plan:  At this time her renal function remains fairly stable  Hemoglobin continues to be low she has gotten 2 doses of Venofer  I would recommend continuing Venna for treatment to boost her iron and then we can see in the future if she would be a candidate for OMAIRA therapy or benefit from it.  However I would continue to recommend that palliative care should be involved  From the nephrology standpoint things are stable and really not anything else to add at this time  Please call with any further issues or needs           Dharmesh Garcia, DO

## 2025-07-16 NOTE — CARE PLAN
The clinical goals for the shift include No falls      Problem: Pain - Adult  Goal: Verbalizes/displays adequate comfort level or baseline comfort level  Outcome: Progressing     Problem: Safety - Adult  Goal: Free from fall injury  Outcome: Progressing     Problem: Discharge Planning  Goal: Discharge to home or other facility with appropriate resources  Outcome: Progressing     Problem: Chronic Conditions and Co-morbidities  Goal: Patient's chronic conditions and co-morbidity symptoms are monitored and maintained or improved  Outcome: Progressing     Problem: Nutrition  Goal: Nutrient intake appropriate for maintaining nutritional needs  Outcome: Progressing     Problem: Skin  Goal: Decreased wound size/increased tissue granulation at next dressing change  Outcome: Progressing  Goal: Participates in plan/prevention/treatment measures  Outcome: Progressing  Goal: Prevent/manage excess moisture  Outcome: Progressing  Goal: Prevent/minimize sheer/friction injuries  Outcome: Progressing  Goal: Promote/optimize nutrition  Outcome: Progressing  Goal: Promote skin healing  Outcome: Progressing     Problem: Fall/Injury  Goal: Not fall by end of shift  Outcome: Progressing  Goal: Be free from injury by end of the shift  Outcome: Progressing  Goal: Verbalize understanding of personal risk factors for fall in the hospital  Outcome: Progressing  Goal: Verbalize understanding of risk factor reduction measures to prevent injury from fall in the home  Outcome: Progressing  Goal: Use assistive devices by end of the shift  Outcome: Progressing  Goal: Pace activities to prevent fatigue by end of the shift  Outcome: Progressing     Problem: Diabetes  Goal: Achieve decreasing blood glucose levels by end of shift  Outcome: Progressing  Goal: Increase stability of blood glucose readings by end of shift  Outcome: Progressing  Goal: Decrease in ketones present in urine by end of shift  Outcome: Progressing  Goal: Maintain electrolyte  levels within acceptable range throughout shift  Outcome: Progressing  Goal: Maintain glucose levels >70mg/dl to <250mg/dl throughout shift  Outcome: Progressing  Goal: No changes in neurological exam by end of shift  Outcome: Progressing  Goal: Learn about and adhere to nutrition recommendations by end of shift  Outcome: Progressing  Goal: Vital signs within normal range for age by end of shift  Outcome: Progressing  Goal: Increase self care and/or family involovement by end of shift  Outcome: Progressing  Goal: Receive DSME education by end of shift  Outcome: Progressing

## 2025-07-16 NOTE — CARE PLAN
The patient's goals for the shift include      The clinical goals for the shift include Remain free from injury      Problem: Pain - Adult  Goal: Verbalizes/displays adequate comfort level or baseline comfort level  Outcome: Progressing     Problem: Safety - Adult  Goal: Free from fall injury  Outcome: Progressing     Problem: Nutrition  Goal: Nutrient intake appropriate for maintaining nutritional needs  Outcome: Progressing     Problem: Skin  Goal: Promote/optimize nutrition  Outcome: Progressing     Problem: Fall/Injury  Goal: Not fall by end of shift  Outcome: Progressing

## 2025-07-17 LAB — BACTERIA BLD CULT: NORMAL

## 2025-07-19 LAB
ERYTHROCYTE [DISTWIDTH] IN BLOOD BY AUTOMATED COUNT: 15.3 % (ref 11–15)
HCT VFR BLD AUTO: 29 % (ref 35–45)
HGB BLD-MCNC: 8.6 G/DL (ref 11.7–15.5)
MCH RBC QN AUTO: 32.6 PG (ref 27–33)
MCHC RBC AUTO-ENTMCNC: 29.7 G/DL (ref 32–36)
MCV RBC AUTO: 109.8 FL (ref 80–100)
PLATELET # BLD AUTO: 358 THOUSAND/UL (ref 140–400)
PMV BLD REES-ECKER: 10.8 FL (ref 7.5–12.5)
RBC # BLD AUTO: 2.64 MILLION/UL (ref 3.8–5.1)
WBC # BLD AUTO: 10.5 THOUSAND/UL (ref 3.8–10.8)

## 2025-07-20 NOTE — DOCUMENTATION CLARIFICATION NOTE
"    PATIENT:               RODOLFO GRAVES  ACCT #:                  [id number]  MRN:                       [id number]  :                       1930  ADMIT DATE:       2025 1:28 PM  DISCH DATE:        2025 9:00 PM  RESPONDING PROVIDER #:        [id number]          PROVIDER RESPONSE TEXT:    Acute on Chronic Systolic Congestive Heart Failure    CDI QUERY TEXT:    Clarification    Instruction:    Based on your assessment of the patient and the clinical information, please provide the requested documentation by clicking on the appropriate radio button and enter any additional information if prompted.    Question: Please further clarify the type and acuity of congestive heart failure    When answering this query, please exercise your independent professional judgment. The fact that a question is being asked, does not imply that any particular answer is desired or expected.    The patient's clinical indicators include:  Clinical Information:  * 89 year old female to ED with shortness of breath- and leg swelling    Clinical Indicators:  * 25 ED provider note and H&P both note history \"CHF\"  * 25   * Historical data ECHO 3/7/25 \"CONCLUSIONS:  1. Left ventricular ejection fraction is mildly decreased, by visual estimate at 45-50%.  2. There is global hypokinesis of the left ventricle with minor regional variations.  3. Spectral Doppler shows a Grade II (pseudonormal pattern) of left ventricular diastolic filling with an elevated left atrial pressure.  4. There is low normal right ventricular systolic function\"    Treatment:  * 25 IV Lasix 20 mg x 1 dose  * 25 - to date IV Lasix 40 mg q 24 hrs  * 25 to date daily Lopressor 25 mg  * 25 to date nightly Lopressor 50 mg    Risk Factors:  * HTN  * SOB  Options provided:  -- Acute on Chronic Systolic Congestive Heart Failure  -- Chronic Systolic Congestive Heart Failure  -- Acute on Chronic Diastolic Congestive Heart Failure  -- " Chronic Diastolic Congestive Heart Failure  -- Acute on Chronic combined systolic/diastolic Congestive Heart Failure  -- Chronic combined systolic/diastolic Congestive Heart Failure  -- Other - I will add my own diagnosis  -- Refer to Clinical Documentation Reviewer    Query created by: Cassandra Moeller on 7/14/2025 9:45 AM      Electronically signed by:  SHERRELL RUIZ DO 7/20/2025 11:16 AM

## 2025-07-21 RX ORDER — EPINEPHRINE 0.3 MG/.3ML
0.3 INJECTION SUBCUTANEOUS EVERY 5 MIN PRN
Status: CANCELLED | OUTPATIENT
Start: 2025-07-23

## 2025-07-21 RX ORDER — DIPHENHYDRAMINE HYDROCHLORIDE 50 MG/ML
50 INJECTION, SOLUTION INTRAMUSCULAR; INTRAVENOUS AS NEEDED
Status: CANCELLED | OUTPATIENT
Start: 2025-07-23

## 2025-07-21 RX ORDER — FAMOTIDINE 10 MG/ML
20 INJECTION, SOLUTION INTRAVENOUS ONCE AS NEEDED
Status: CANCELLED | OUTPATIENT
Start: 2025-07-23

## 2025-07-21 RX ORDER — ALBUTEROL SULFATE 0.83 MG/ML
3 SOLUTION RESPIRATORY (INHALATION) AS NEEDED
Status: CANCELLED | OUTPATIENT
Start: 2025-07-23

## 2025-07-22 ENCOUNTER — HOSPITAL ENCOUNTER (OUTPATIENT)
Dept: OPERATING ROOM | Facility: HOSPITAL | Age: OVER 89
Discharge: HOME | End: 2025-07-22
Payer: MEDICARE

## 2025-07-22 ENCOUNTER — APPOINTMENT (OUTPATIENT)
Dept: OPERATING ROOM | Facility: HOSPITAL | Age: OVER 89
End: 2025-07-22
Payer: MEDICARE

## 2025-07-22 VITALS
SYSTOLIC BLOOD PRESSURE: 129 MMHG | BODY MASS INDEX: 35.42 KG/M2 | DIASTOLIC BLOOD PRESSURE: 54 MMHG | HEART RATE: 65 BPM | TEMPERATURE: 98.7 F | HEIGHT: 65 IN | WEIGHT: 212.6 LBS | RESPIRATION RATE: 18 BRPM | OXYGEN SATURATION: 92 %

## 2025-07-22 DIAGNOSIS — D50.9 IRON DEFICIENCY ANEMIA, UNSPECIFIED IRON DEFICIENCY ANEMIA TYPE: ICD-10-CM

## 2025-07-22 DIAGNOSIS — M46.1 SACROILIITIS: ICD-10-CM

## 2025-07-22 PROCEDURE — 7100000009 HC PHASE TWO TIME - INITIAL BASE CHARGE

## 2025-07-22 PROCEDURE — 2500000004 HC RX 250 GENERAL PHARMACY W/ HCPCS (ALT 636 FOR OP/ED): Mod: JW

## 2025-07-22 PROCEDURE — 7100000010 HC PHASE TWO TIME - EACH INCREMENTAL 1 MINUTE

## 2025-07-22 PROCEDURE — 2550000001 HC RX 255 CONTRASTS: Mod: JW

## 2025-07-22 PROCEDURE — 27096 INJECT SACROILIAC JOINT: CPT | Mod: 50 | Performed by: STUDENT IN AN ORGANIZED HEALTH CARE EDUCATION/TRAINING PROGRAM

## 2025-07-22 PROCEDURE — 2500000004 HC RX 250 GENERAL PHARMACY W/ HCPCS (ALT 636 FOR OP/ED): Mod: JZ | Performed by: STUDENT IN AN ORGANIZED HEALTH CARE EDUCATION/TRAINING PROGRAM

## 2025-07-22 RX ORDER — METHYLPREDNISOLONE ACETATE 40 MG/ML
INJECTION, SUSPENSION INTRA-ARTICULAR; INTRALESIONAL; INTRAMUSCULAR; SOFT TISSUE AS NEEDED
Status: COMPLETED | OUTPATIENT
Start: 2025-07-22 | End: 2025-07-22

## 2025-07-22 RX ORDER — BUPIVACAINE HYDROCHLORIDE 5 MG/ML
INJECTION, SOLUTION EPIDURAL; INTRACAUDAL; PERINEURAL AS NEEDED
Status: COMPLETED | OUTPATIENT
Start: 2025-07-22 | End: 2025-07-22

## 2025-07-22 RX ORDER — LIDOCAINE HYDROCHLORIDE 20 MG/ML
6 INJECTION, SOLUTION EPIDURAL; INFILTRATION; INTRACAUDAL; PERINEURAL ONCE
Status: COMPLETED | OUTPATIENT
Start: 2025-07-22 | End: 2025-07-22

## 2025-07-22 RX ORDER — METHYLPREDNISOLONE ACETATE 40 MG/ML
40 INJECTION, SUSPENSION INTRA-ARTICULAR; INTRALESIONAL; INTRAMUSCULAR; SOFT TISSUE ONCE
Status: DISCONTINUED | OUTPATIENT
Start: 2025-07-22 | End: 2025-07-23 | Stop reason: HOSPADM

## 2025-07-22 RX ORDER — BUPIVACAINE HYDROCHLORIDE 5 MG/ML
4 INJECTION, SOLUTION EPIDURAL; INTRACAUDAL; PERINEURAL ONCE
Status: DISCONTINUED | OUTPATIENT
Start: 2025-07-22 | End: 2025-07-23 | Stop reason: HOSPADM

## 2025-07-22 RX ADMIN — LIDOCAINE HYDROCHLORIDE 4 ML: 20 INJECTION, SOLUTION EPIDURAL; INFILTRATION; INTRACAUDAL; PERINEURAL at 13:18

## 2025-07-22 RX ADMIN — IOHEXOL 1 ML: 300 INJECTION, SOLUTION INTRAVENOUS at 13:18

## 2025-07-22 RX ADMIN — BUPIVACAINE HYDROCHLORIDE 4 ML: 5 INJECTION, SOLUTION EPIDURAL; INTRACAUDAL; PERINEURAL at 13:18

## 2025-07-22 RX ADMIN — METHYLPREDNISOLONE ACETATE 40 MG: 40 INJECTION, SUSPENSION INTRA-ARTICULAR; INTRALESIONAL; INTRAMUSCULAR; SOFT TISSUE at 13:18

## 2025-07-22 ASSESSMENT — PAIN SCALES - GENERAL
PAINLEVEL_OUTOF10: 1
PAINLEVEL_OUTOF10: 4

## 2025-07-22 ASSESSMENT — PAIN - FUNCTIONAL ASSESSMENT
PAIN_FUNCTIONAL_ASSESSMENT: 0-10
PAIN_FUNCTIONAL_ASSESSMENT: 0-10

## 2025-07-22 ASSESSMENT — PAIN DESCRIPTION - DESCRIPTORS: DESCRIPTORS: ACHING

## 2025-07-22 NOTE — PERIOPERATIVE NURSING NOTE
Discharge instruction reviewed with pt   Maranda GALLEGO RN  written instruction provided. Discharged via w/c to main exit to be  [facility] with aide present Erika thurman no c/o.

## 2025-07-23 ENCOUNTER — INFUSION (OUTPATIENT)
Dept: HEMATOLOGY/ONCOLOGY | Facility: CLINIC | Age: OVER 89
End: 2025-07-23
Payer: MEDICARE

## 2025-07-23 ENCOUNTER — APPOINTMENT (OUTPATIENT)
Dept: HEMATOLOGY/ONCOLOGY | Facility: CLINIC | Age: OVER 89
End: 2025-07-23
Payer: MEDICARE

## 2025-07-23 VITALS
TEMPERATURE: 97.9 F | HEART RATE: 63 BPM | RESPIRATION RATE: 18 BRPM | DIASTOLIC BLOOD PRESSURE: 79 MMHG | SYSTOLIC BLOOD PRESSURE: 147 MMHG | OXYGEN SATURATION: 95 %

## 2025-07-23 DIAGNOSIS — D50.9 IRON DEFICIENCY ANEMIA, UNSPECIFIED IRON DEFICIENCY ANEMIA TYPE: ICD-10-CM

## 2025-07-23 DIAGNOSIS — I50.42 CHRONIC COMBINED SYSTOLIC AND DIASTOLIC HEART FAILURE: ICD-10-CM

## 2025-07-23 DIAGNOSIS — N18.4 ANEMIA DUE TO STAGE 4 CHRONIC KIDNEY DISEASE: ICD-10-CM

## 2025-07-23 DIAGNOSIS — N18.4 TYPE 2 DIABETES MELLITUS WITH STAGE 4 CHRONIC KIDNEY DISEASE, WITHOUT LONG-TERM CURRENT USE OF INSULIN (MULTI): Primary | ICD-10-CM

## 2025-07-23 DIAGNOSIS — E11.22 TYPE 2 DIABETES MELLITUS WITH STAGE 4 CHRONIC KIDNEY DISEASE, WITHOUT LONG-TERM CURRENT USE OF INSULIN (MULTI): Primary | ICD-10-CM

## 2025-07-23 DIAGNOSIS — D63.1 ANEMIA DUE TO STAGE 4 CHRONIC KIDNEY DISEASE: ICD-10-CM

## 2025-07-23 PROCEDURE — 2500000004 HC RX 250 GENERAL PHARMACY W/ HCPCS (ALT 636 FOR OP/ED): Performed by: CLINICAL NURSE SPECIALIST

## 2025-07-23 PROCEDURE — 96374 THER/PROPH/DIAG INJ IV PUSH: CPT

## 2025-07-23 RX ORDER — FAMOTIDINE 10 MG/ML
20 INJECTION, SOLUTION INTRAVENOUS ONCE AS NEEDED
OUTPATIENT
Start: 2025-07-26

## 2025-07-23 RX ORDER — DIPHENHYDRAMINE HYDROCHLORIDE 50 MG/ML
50 INJECTION, SOLUTION INTRAMUSCULAR; INTRAVENOUS AS NEEDED
OUTPATIENT
Start: 2025-07-26

## 2025-07-23 RX ORDER — EPINEPHRINE 0.3 MG/.3ML
0.3 INJECTION SUBCUTANEOUS EVERY 5 MIN PRN
OUTPATIENT
Start: 2025-07-26

## 2025-07-23 RX ORDER — ALBUTEROL SULFATE 0.83 MG/ML
3 SOLUTION RESPIRATORY (INHALATION) AS NEEDED
OUTPATIENT
Start: 2025-07-26

## 2025-07-23 RX ADMIN — IRON SUCROSE 200 MG: 20 INJECTION, SOLUTION INTRAVENOUS at 13:21

## 2025-07-23 ASSESSMENT — PAIN SCALES - GENERAL: PAINLEVEL_OUTOF10: 6

## 2025-07-27 ENCOUNTER — HOSPITAL ENCOUNTER (EMERGENCY)
Facility: HOSPITAL | Age: OVER 89
Discharge: HOME | End: 2025-07-27
Attending: EMERGENCY MEDICINE
Payer: MEDICARE

## 2025-07-27 ENCOUNTER — APPOINTMENT (OUTPATIENT)
Dept: CARDIOLOGY | Facility: HOSPITAL | Age: OVER 89
End: 2025-07-27
Payer: MEDICARE

## 2025-07-27 ENCOUNTER — APPOINTMENT (OUTPATIENT)
Dept: RADIOLOGY | Facility: HOSPITAL | Age: OVER 89
End: 2025-07-27
Payer: MEDICARE

## 2025-07-27 VITALS
WEIGHT: 219 LBS | BODY MASS INDEX: 40.3 KG/M2 | HEART RATE: 63 BPM | HEIGHT: 62 IN | RESPIRATION RATE: 16 BRPM | SYSTOLIC BLOOD PRESSURE: 175 MMHG | TEMPERATURE: 97 F | DIASTOLIC BLOOD PRESSURE: 94 MMHG | OXYGEN SATURATION: 92 %

## 2025-07-27 DIAGNOSIS — R60.9 EDEMA, UNSPECIFIED TYPE: Primary | ICD-10-CM

## 2025-07-27 DIAGNOSIS — N18.4 STAGE 4 CHRONIC KIDNEY DISEASE (MULTI): ICD-10-CM

## 2025-07-27 LAB
ALBUMIN SERPL BCP-MCNC: 3.7 G/DL (ref 3.4–5)
ALP SERPL-CCNC: 54 U/L (ref 33–136)
ALT SERPL W P-5'-P-CCNC: 15 U/L (ref 7–45)
ANION GAP SERPL CALC-SCNC: 12 MMOL/L (ref 10–20)
AST SERPL W P-5'-P-CCNC: 13 U/L (ref 9–39)
BASOPHILS # BLD AUTO: 0.03 X10*3/UL (ref 0–0.1)
BASOPHILS NFR BLD AUTO: 0.4 %
BILIRUB SERPL-MCNC: 0.3 MG/DL (ref 0–1.2)
BNP SERPL-MCNC: 371 PG/ML (ref 0–99)
BUN SERPL-MCNC: 37 MG/DL (ref 6–23)
CALCIUM SERPL-MCNC: 8.4 MG/DL (ref 8.6–10.3)
CARDIAC TROPONIN I PNL SERPL HS: 10 NG/L (ref 0–13)
CHLORIDE SERPL-SCNC: 105 MMOL/L (ref 98–107)
CO2 SERPL-SCNC: 28 MMOL/L (ref 21–32)
CREAT SERPL-MCNC: 2.12 MG/DL (ref 0.5–1.05)
EGFRCR SERPLBLD CKD-EPI 2021: 21 ML/MIN/1.73M*2
EOSINOPHIL # BLD AUTO: 0.13 X10*3/UL (ref 0–0.4)
EOSINOPHIL NFR BLD AUTO: 1.8 %
ERYTHROCYTE [DISTWIDTH] IN BLOOD BY AUTOMATED COUNT: 15.3 % (ref 11.5–14.5)
GLUCOSE SERPL-MCNC: 149 MG/DL (ref 74–99)
HCT VFR BLD AUTO: 26.7 % (ref 36–46)
HGB BLD-MCNC: 8.1 G/DL (ref 12–16)
IMM GRANULOCYTES # BLD AUTO: 0.07 X10*3/UL (ref 0–0.5)
IMM GRANULOCYTES NFR BLD AUTO: 1 % (ref 0–0.9)
LYMPHOCYTES # BLD AUTO: 1.1 X10*3/UL (ref 0.8–3)
LYMPHOCYTES NFR BLD AUTO: 15.1 %
MCH RBC QN AUTO: 32.7 PG (ref 26–34)
MCHC RBC AUTO-ENTMCNC: 30.3 G/DL (ref 32–36)
MCV RBC AUTO: 108 FL (ref 80–100)
MONOCYTES # BLD AUTO: 0.74 X10*3/UL (ref 0.05–0.8)
MONOCYTES NFR BLD AUTO: 10.2 %
NEUTROPHILS # BLD AUTO: 5.21 X10*3/UL (ref 1.6–5.5)
NEUTROPHILS NFR BLD AUTO: 71.5 %
NRBC BLD-RTO: 0 /100 WBCS (ref 0–0)
PLATELET # BLD AUTO: 290 X10*3/UL (ref 150–450)
POTASSIUM SERPL-SCNC: 4.6 MMOL/L (ref 3.5–5.3)
PROT SERPL-MCNC: 6.2 G/DL (ref 6.4–8.2)
RBC # BLD AUTO: 2.48 X10*6/UL (ref 4–5.2)
SODIUM SERPL-SCNC: 140 MMOL/L (ref 136–145)
WBC # BLD AUTO: 7.3 X10*3/UL (ref 4.4–11.3)

## 2025-07-27 PROCEDURE — 71045 X-RAY EXAM CHEST 1 VIEW: CPT

## 2025-07-27 PROCEDURE — 85025 COMPLETE CBC W/AUTO DIFF WBC: CPT | Performed by: EMERGENCY MEDICINE

## 2025-07-27 PROCEDURE — 71045 X-RAY EXAM CHEST 1 VIEW: CPT | Performed by: RADIOLOGY

## 2025-07-27 PROCEDURE — 80053 COMPREHEN METABOLIC PANEL: CPT | Performed by: EMERGENCY MEDICINE

## 2025-07-27 PROCEDURE — 36415 COLL VENOUS BLD VENIPUNCTURE: CPT | Performed by: EMERGENCY MEDICINE

## 2025-07-27 PROCEDURE — 99285 EMERGENCY DEPT VISIT HI MDM: CPT | Mod: 25 | Performed by: EMERGENCY MEDICINE

## 2025-07-27 PROCEDURE — 93005 ELECTROCARDIOGRAM TRACING: CPT

## 2025-07-27 PROCEDURE — 83880 ASSAY OF NATRIURETIC PEPTIDE: CPT | Performed by: EMERGENCY MEDICINE

## 2025-07-27 PROCEDURE — 84484 ASSAY OF TROPONIN QUANT: CPT | Performed by: EMERGENCY MEDICINE

## 2025-07-27 ASSESSMENT — PAIN - FUNCTIONAL ASSESSMENT: PAIN_FUNCTIONAL_ASSESSMENT: 0-10

## 2025-07-27 ASSESSMENT — PAIN DESCRIPTION - PAIN TYPE: TYPE: CHRONIC PAIN

## 2025-07-27 ASSESSMENT — PAIN SCALES - GENERAL: PAINLEVEL_OUTOF10: 1

## 2025-07-27 ASSESSMENT — PAIN DESCRIPTION - LOCATION: LOCATION: OTHER (COMMENT)

## 2025-07-27 NOTE — ED PROVIDER NOTES
HPI   Chief Complaint   Patient presents with    Leg Swelling     Pt arrived to ED via [facility] with c/o sciatica pain and BLL swelling. Pt was recently admitted, dx with NUBIA and Lasix discontinued at that point. Pt &O x4, states she gets SOB but that is chronic. Pt denies any fevers or recent illness.    Facial Swelling     Pt c/o eyelid swelling off and on for the last week.        Limitations to History: None    HPI: 89-year-old female presents from local group home with concern for facial swelling.  Patient was seen and discharged from the hospital approximately 2 weeks ago secondary to hypoxemia.  Patient at that time was treated with breathing treatments as well as steroid therapy with concern for pneumonitis surrounding a stable pneumocele.  Patient was diuresed as well which caused a worsening creatinine.  Her diuretics were held on discharge.  Patient states she has noticed increased swelling in her face over the past 1 week particularly around her eyes.  States that she is chronically short of breath particularly while getting dressed.  Denies any fever, chills, cough, nausea, vomiting, chest pain, abdominal pain, urinary symptoms.    Additional History Obtained from: EMS.    ------------------------------------------------------------------------------------------------------------------------------------------  Physical Exam:    VS: As documented in the triage note and EMR flowsheet from this visit were reviewed.    Appearance: Alert. cooperative,  in no acute distress.   Skin: Intact,  dry skin, no lesions, rash, petechiae or purpura.   Eyes: PERRLA, EOMs intact,  Conjunctiva pink with no redness or exudates.  Slight bilateral periorbital edema.    HENT: Normocephalic, atraumatic. Nares patent. No intraoral lesions.   Neck: Supple, without meningismus. Trachea at midline. No lymphadenopathy.  Pulmonary: Clear bilaterally with good chest wall excursion. No rales, rhonchi or wheezing. No accessory muscle use  or stridor.  Cardiac: Regular rate and rhythm, no rubs, murmurs, or gallops.   Abdomen: Abdomen is soft, nontender, and nondistended.  No palpable organomegaly.  No rebound or guarding.  No CVA tenderness. Nonsurgical abdomen.  Genitourinary: Exam deferred.  Musculoskeletal: Full range of motion.  Pulses full and equal. No cyanosis, clubbing.  Bilateral 1+ pitting edema.  Neurological:  Cranial nerves are grossly intact, grossly normal sensation, no weakness, no focal findings identified.  Psychiatric: Appropriate mood and affect.                Patient History   Medical History[1]  Surgical History[2]  Family History[3]  Social History[4]    Physical Exam   ED Triage Vitals   Temperature Heart Rate Respirations BP   07/27/25 0715 07/27/25 0716 07/27/25 0716 07/27/25 0716   36.1 °C (97 °F) 68 20 (!) 162/98      Pulse Ox Temp Source Heart Rate Source Patient Position   07/27/25 0716 07/27/25 0715 07/27/25 0716 07/27/25 0716   94 % Temporal Monitor Lying      BP Location FiO2 (%)     07/27/25 0716 --     Left arm        Physical Exam      ED Course & MDM   Diagnoses as of 07/27/25 0835   Edema, unspecified type   Stage 4 chronic kidney disease (Multi)                 No data recorded     Terrence Coma Scale Score: 15 (07/27/25 0715 : Carmella Beckman, RN)                           Medical Decision Making  Labs Reviewed  CBC WITH AUTO DIFFERENTIAL - Abnormal     WBC                           7.3                    nRBC                          0.0                    RBC                           2.48 (*)               Hemoglobin                    8.1 (*)                Hematocrit                    26.7 (*)               MCV                           108 (*)                MCH                           32.7                   MCHC                          30.3 (*)               RDW                           15.3 (*)               Platelets                     290                    Neutrophils %                 71.5                    Immature Granulocytes %, Automated   1.0 (*)                Lymphocytes %                 15.1                   Monocytes %                   10.2                   Eosinophils %                 1.8                    Basophils %                   0.4                    Neutrophils Absolute          5.21                   Immature Granulocytes Absolute, Au*   0.07                   Lymphocytes Absolute          1.10                   Monocytes Absolute            0.74                   Eosinophils Absolute          0.13                   Basophils Absolute            0.03                COMPREHENSIVE METABOLIC PANEL - Abnormal     Glucose                       149 (*)                Sodium                        140                    Potassium                     4.6                    Chloride                      105                    Bicarbonate                   28                     Anion Gap                     12                     Urea Nitrogen                 37 (*)                 Creatinine                    2.12 (*)               eGFR                          21 (*)                 Calcium                       8.4 (*)                Albumin                       3.7                    Alkaline Phosphatase          54                     Total Protein                 6.2 (*)                AST                           13                     Bilirubin, Total              0.3                    ALT                           15                  B-TYPE NATRIURETIC PEPTIDE - Abnormal     BNP                           371 (*)                    Narrative:    <100 pg/mL - Heart failure unlikely                  100-299 pg/mL - Intermediate probability of acute heart                                  failure exacerbation. Correlate with clinical                                  context and patient history.                    >=300 pg/mL - Heart Failure likely. Correlate with clinical                                   context and patient history.                                    BNP testing is performed using different testing methodology at [facility] than at other [facility] hospitals. Direct result comparisons should only be made within the same method.                     TROPONIN I, HIGH SENSITIVITY - Normal  XR chest 1 view   Final Result    1. Generalized increased interstitial prominence component which may    be related to hypoinflation and bronchovascular crowding with    interstitial edema of concern.          2. Asymmetric increased infiltrate in the left perihilar location.    Retrocardiac areas obscured.                MACRO:    None          Signed by: Rita Leon 7/27/2025 8:19 AM    Dictation workstation:   [id number]     Medical Decision Making:    Patient appears well nontoxic.  No hypoxemia.  Chronic anemia at baseline.  Chronic kidney disease improved from baseline.  Chest x-ray shows no significant pleural effusion or volume overload.  I do not appreciate any significant facial or lower extremity edema.  After discussion with patient I will not change any medications at this time.  I did advise follow-up with primary care as well as nephrology and they can further manage her diuresis if necessary.  Stable at time of discharge.    Differential Diagnoses Considered: CHF, lymphedema, CKD    Independent Interpretation of Studies:  I independently interpreted: Chest x-ray shows no evidence of pneumonia or pneumothorax.  No pleural effusion.    Escalation of Care:  Appropriate for discharge and follow-up with primary care and nephrology.          Procedure  ECG 12 lead    Performed by: Paulino Wong DO  Authorized by: Paulino Wong DO    ECG interpreted by ED Physician in the absence of a cardiologist: yes    Comments:      EKG interpreted by Dr. Paulino Wong: Normal sinus rhythm at 67 bpm.  First-degree AV block with a MN interval 226 ms.  QTc of 433  ms.  Nonspecific ST changes.         [1]   Past Medical History:  Diagnosis Date    Arthritis     CHF (congestive heart failure)     COPD (chronic obstructive pulmonary disease) (Multi)     Diabetes mellitus (Multi)     Hypertension    [2]   Past Surgical History:  Procedure Laterality Date    ESOPHAGOGASTRODUODENOSCOPY  04/29/2024    INJECTION N/A 06/03/2025    L5-S1 RIVER    INJECTION Bilateral 07/22/2025    BILAT SIJ    OTHER SURGICAL HISTORY  09/13/2019    Facial surgery    OTHER SURGICAL HISTORY  09/13/2019    Gallbladder surgery    OTHER SURGICAL HISTORY  09/13/2019    Appendectomy    OTHER SURGICAL HISTORY  09/13/2019    Hysterectomy    OTHER SURGICAL HISTORY  09/13/2019    Colon surgery    OTHER SURGICAL HISTORY  10/24/2019    Thyroid surgery   [3]   Family History  Problem Relation Name Age of Onset    Diabetes Mother      Brain cancer Mother      Brain cancer Brother     [4]   Social History  Tobacco Use    Smoking status: Never    Smokeless tobacco: Never   Vaping Use    Vaping status: Never Used   Substance Use Topics    Alcohol use: Not Currently    Drug use: Never        Paulino Veronica DO  07/27/25 0837

## 2025-07-28 LAB
ATRIAL RATE: 67 BPM
P AXIS: 59 DEGREES
P OFFSET: 165 MS
P ONSET: 106 MS
PR INTERVAL: 226 MS
Q ONSET: 219 MS
QRS COUNT: 11 BEATS
QRS DURATION: 82 MS
QT INTERVAL: 410 MS
QTC CALCULATION(BAZETT): 433 MS
QTC FREDERICIA: 425 MS
R AXIS: -32 DEGREES
T AXIS: 258 DEGREES
T OFFSET: 424 MS
VENTRICULAR RATE: 67 BPM

## 2025-07-29 ENCOUNTER — TELEPHONE (OUTPATIENT)
Dept: PRIMARY CARE | Facility: CLINIC | Age: OVER 89
End: 2025-07-29
Payer: MEDICARE

## 2025-07-29 NOTE — TELEPHONE ENCOUNTER
Went through pt chart and there was no order put in for an electric wheelchair however back in May there was an order put in for a Jose Wheelchair. Called and left Hattie a message and then called son Omayra who is on HIP[employer], who states that PCP marked that pt can get by with a walker and now they can't get the wheelchair and medicaid is refusing to pay for the wheelchair. I pulled the document and found where PCP said pt COULD NOT get by with a cane or a walker. I called [facility] and talked to Samuel who is the manager and he reports on June 2nd pt's wheelchair was delivered and signed for. They also report for question pcp marked could not get by with cane or walker. Called Hattie and Omayra both and did not receive answers from either one. Waiting on return phone call from either omayra or hattie.

## 2025-07-29 NOTE — TELEPHONE ENCOUNTER
Message from Charan at [facility].   Patient had a Rx for an electric power wheelchair and when her son went to get it, there was some wording on the order that was incorrect.  You can call her son, Tommy about it at [phone] or call Charan at [phone]    Also, she has an appt on 8/21/25 but wanted it known and addressed at her appt that she has had a couple falls recently.

## 2025-07-30 ENCOUNTER — APPOINTMENT (OUTPATIENT)
Dept: HEMATOLOGY/ONCOLOGY | Facility: CLINIC | Age: OVER 89
End: 2025-07-30
Payer: MEDICARE

## 2025-07-30 ENCOUNTER — APPOINTMENT (OUTPATIENT)
Dept: NEPHROLOGY | Facility: CLINIC | Age: OVER 89
End: 2025-07-30
Payer: MEDICARE

## 2025-07-30 ENCOUNTER — INFUSION (OUTPATIENT)
Dept: HEMATOLOGY/ONCOLOGY | Facility: CLINIC | Age: OVER 89
End: 2025-07-30
Payer: MEDICARE

## 2025-07-30 VITALS
OXYGEN SATURATION: 92 % | HEART RATE: 76 BPM | SYSTOLIC BLOOD PRESSURE: 148 MMHG | RESPIRATION RATE: 18 BRPM | DIASTOLIC BLOOD PRESSURE: 83 MMHG | TEMPERATURE: 97.7 F

## 2025-07-30 DIAGNOSIS — N18.4 ANEMIA DUE TO STAGE 4 CHRONIC KIDNEY DISEASE: ICD-10-CM

## 2025-07-30 DIAGNOSIS — D63.1 ANEMIA DUE TO STAGE 4 CHRONIC KIDNEY DISEASE: ICD-10-CM

## 2025-07-30 DIAGNOSIS — D50.9 IRON DEFICIENCY ANEMIA, UNSPECIFIED IRON DEFICIENCY ANEMIA TYPE: ICD-10-CM

## 2025-07-30 DIAGNOSIS — R60.0 EDEMA OF UPPER EXTREMITY: ICD-10-CM

## 2025-07-30 DIAGNOSIS — E11.22 TYPE 2 DIABETES MELLITUS WITH STAGE 4 CHRONIC KIDNEY DISEASE, WITHOUT LONG-TERM CURRENT USE OF INSULIN (MULTI): ICD-10-CM

## 2025-07-30 DIAGNOSIS — D50.8 OTHER IRON DEFICIENCY ANEMIA: ICD-10-CM

## 2025-07-30 DIAGNOSIS — N18.4 TYPE 2 DIABETES MELLITUS WITH STAGE 4 CHRONIC KIDNEY DISEASE, WITHOUT LONG-TERM CURRENT USE OF INSULIN (MULTI): ICD-10-CM

## 2025-07-30 DIAGNOSIS — N18.4 STAGE 4 CHRONIC KIDNEY DISEASE (MULTI): Primary | ICD-10-CM

## 2025-07-30 DIAGNOSIS — I10 PRIMARY HYPERTENSION: ICD-10-CM

## 2025-07-30 PROCEDURE — 76937 US GUIDE VASCULAR ACCESS: CPT

## 2025-07-30 PROCEDURE — 1159F MED LIST DOCD IN RCRD: CPT | Performed by: CLINICAL NURSE SPECIALIST

## 2025-07-30 PROCEDURE — 1160F RVW MEDS BY RX/DR IN RCRD: CPT | Performed by: CLINICAL NURSE SPECIALIST

## 2025-07-30 PROCEDURE — 99214 OFFICE O/P EST MOD 30 MIN: CPT | Performed by: CLINICAL NURSE SPECIALIST

## 2025-07-30 PROCEDURE — 1036F TOBACCO NON-USER: CPT | Performed by: CLINICAL NURSE SPECIALIST

## 2025-07-30 PROCEDURE — 96374 THER/PROPH/DIAG INJ IV PUSH: CPT

## 2025-07-30 PROCEDURE — 2500000004 HC RX 250 GENERAL PHARMACY W/ HCPCS (ALT 636 FOR OP/ED): Performed by: CLINICAL NURSE SPECIALIST

## 2025-07-30 PROCEDURE — 1111F DSCHRG MED/CURRENT MED MERGE: CPT | Performed by: CLINICAL NURSE SPECIALIST

## 2025-07-30 RX ORDER — EPINEPHRINE 0.3 MG/.3ML
0.3 INJECTION SUBCUTANEOUS EVERY 5 MIN PRN
OUTPATIENT
Start: 2025-08-01

## 2025-07-30 RX ORDER — FAMOTIDINE 10 MG/ML
20 INJECTION, SOLUTION INTRAVENOUS ONCE AS NEEDED
OUTPATIENT
Start: 2025-08-01

## 2025-07-30 RX ORDER — DIPHENHYDRAMINE HYDROCHLORIDE 50 MG/ML
50 INJECTION, SOLUTION INTRAMUSCULAR; INTRAVENOUS AS NEEDED
OUTPATIENT
Start: 2025-08-01

## 2025-07-30 RX ORDER — ALBUTEROL SULFATE 0.83 MG/ML
3 SOLUTION RESPIRATORY (INHALATION) AS NEEDED
OUTPATIENT
Start: 2025-08-01

## 2025-07-30 RX ADMIN — IRON SUCROSE 200 MG: 20 INJECTION, SOLUTION INTRAVENOUS at 14:27

## 2025-07-30 ASSESSMENT — ENCOUNTER SYMPTOMS
MUSCULOSKELETAL NEGATIVE: 1
GASTROINTESTINAL NEGATIVE: 1
NEUROLOGICAL NEGATIVE: 1
CONSTITUTIONAL NEGATIVE: 1
PSYCHIATRIC NEGATIVE: 1
RESPIRATORY NEGATIVE: 1
HEMATOLOGIC/LYMPHATIC NEGATIVE: 1
ENDOCRINE NEGATIVE: 1
EYES NEGATIVE: 1
ALLERGIC/IMMUNOLOGIC NEGATIVE: 1

## 2025-07-30 ASSESSMENT — PAIN SCALES - GENERAL: PAINLEVEL_OUTOF10: 6

## 2025-07-30 NOTE — PROGRESS NOTES
"Subjective   Patient ID: Lakeshia Park \"Nasra\" is a 89 y.o. female who presents for No chief complaint on file..  Patient being seen in follow-up from discharge from the hospital for chronic kidney disease stage IV, she was admitted to the hospital with shortness of breath and increased left leg swelling  She was treated with IV diuretics and had worsening of her renal function with this  At time of discharge she was discharged without diuretics, she had been on torsemide 20 mg daily in the past  Labs are completed on the 27th showing a glucose of 149  Sodium 140, potassium 4.6, chloride 105, bicarb 28  Renal function a BUN of 37 and creatinine of 2.12 with a GFR of 21  Calcium was 8.4  H&H 8.1 and 26.7    She presented today to the infusion clinic for iron and was noted to have increased swelling in her upper extremities  She also feels that her face is more puffy  Does not have any increase in her shortness of breath  Is wearing compression stockings at this time with minimal lower extremity edema  Is voiding without difficulties  Friend is at the bedside with her, I went over her current heart function along with her renal parameters, when she was in the hospital she had diuresis however she had worsening of her renal function with this, diuretics were discontinued at that time        Review of Systems   Constitutional: Negative.    HENT: Negative.     Eyes: Negative.    Respiratory: Negative.     Cardiovascular:  Positive for leg swelling.   Gastrointestinal: Negative.    Endocrine: Negative.    Genitourinary: Negative.    Musculoskeletal: Negative.    Skin: Negative.    Allergic/Immunologic: Negative.    Neurological: Negative.    Hematological: Negative.    Psychiatric/Behavioral: Negative.         Objective   Physical Exam    Musculoskeletal:         General: Swelling (Generalized swelling of the upper extremities, left greater than right) present.      Right lower leg: Edema (Mild, compression stockings " on) present.      Left lower leg: Edema (Mild, compression stockings on) present.         Assessment/Plan   Problem List Items Addressed This Visit           ICD-10-CM    DM2 (diabetes mellitus, type 2) (Multi) E11.9    HTN (hypertension) I10    Iron deficiency anemia D50.9    Stage 4 chronic kidney disease (Multi) - Primary N18.4    Relevant Orders    Follow Up In Nephrology    Comprehensive metabolic panel    CBC    Edema of upper extremity R60.0    Relevant Orders    Vascular US upper extremity venous duplex bilateral     Chronic kidney disease stage IV with baseline creatinine 2-2.4  Diabetic Nephropathy   Diabetes mellitus type 2  Chronic diarrhea  History of gout on allopurinol  Hyperuricemia  Hypertension  Hyperlipidemia  Bilateral renal cysts  Positive REJI screen with slightly high RNP  Depression  Spinal stenosis  Hypoxia on presentation  Anemia with iron deficiency  Peripheral edema with volume overload     Encourage good protein intake  Try to limit fluid intake to 1500 mL to 2 L/day  Will continue not to use diuretics  Will get ultrasound of the upper extremities  Iron infusion next week  Will repeat labs in 1 month    DUSTY Allen, DNP 07/30/25 1:58 PM

## 2025-07-31 ENCOUNTER — APPOINTMENT (OUTPATIENT)
Dept: VASCULAR MEDICINE | Facility: HOSPITAL | Age: OVER 89
End: 2025-07-31
Payer: MEDICARE

## 2025-07-31 ENCOUNTER — HOSPITAL ENCOUNTER (OUTPATIENT)
Dept: VASCULAR MEDICINE | Facility: HOSPITAL | Age: OVER 89
Discharge: HOME | End: 2025-07-31
Payer: MEDICARE

## 2025-07-31 DIAGNOSIS — R60.1 GENERALIZED EDEMA: ICD-10-CM

## 2025-07-31 DIAGNOSIS — R60.0 EDEMA OF UPPER EXTREMITY: ICD-10-CM

## 2025-07-31 PROCEDURE — 93970 EXTREMITY STUDY: CPT

## 2025-08-06 ENCOUNTER — APPOINTMENT (OUTPATIENT)
Dept: HEMATOLOGY/ONCOLOGY | Facility: CLINIC | Age: OVER 89
End: 2025-08-06
Payer: MEDICARE

## 2025-08-07 ENCOUNTER — HOSPITAL ENCOUNTER (EMERGENCY)
Facility: HOSPITAL | Age: OVER 89
Discharge: HOME | End: 2025-08-07
Payer: MEDICARE

## 2025-08-07 ENCOUNTER — TELEPHONE (OUTPATIENT)
Dept: PRIMARY CARE | Facility: CLINIC | Age: OVER 89
End: 2025-08-07
Payer: MEDICARE

## 2025-08-07 ENCOUNTER — APPOINTMENT (OUTPATIENT)
Dept: HEMATOLOGY/ONCOLOGY | Facility: CLINIC | Age: OVER 89
End: 2025-08-07
Payer: MEDICARE

## 2025-08-07 ENCOUNTER — TELEPHONE (OUTPATIENT)
Dept: NEPHROLOGY | Facility: CLINIC | Age: OVER 89
End: 2025-08-07
Payer: MEDICARE

## 2025-08-07 ENCOUNTER — PHARMACY VISIT (OUTPATIENT)
Dept: PHARMACY | Facility: CLINIC | Age: OVER 89
End: 2025-08-07
Payer: MEDICARE

## 2025-08-07 VITALS
OXYGEN SATURATION: 95 % | TEMPERATURE: 97.7 F | DIASTOLIC BLOOD PRESSURE: 83 MMHG | SYSTOLIC BLOOD PRESSURE: 144 MMHG | HEIGHT: 62 IN | RESPIRATION RATE: 18 BRPM | HEART RATE: 58 BPM | WEIGHT: 220 LBS | BODY MASS INDEX: 40.48 KG/M2

## 2025-08-07 DIAGNOSIS — R60.0 EDEMA OF UPPER EXTREMITY: Primary | ICD-10-CM

## 2025-08-07 DIAGNOSIS — S51.851A CAT BITE OF FOREARM, RIGHT, INITIAL ENCOUNTER: Primary | ICD-10-CM

## 2025-08-07 DIAGNOSIS — H11.31 SUBCONJUNCTIVAL HEMORRHAGE OF RIGHT EYE: ICD-10-CM
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
## 2025-08-07 PROCEDURE — 99283 EMERGENCY DEPT VISIT LOW MDM: CPT

## 2025-08-07 PROCEDURE — 2500000001 HC RX 250 WO HCPCS SELF ADMINISTERED DRUGS (ALT 637 FOR MEDICARE OP): Performed by: NURSE PRACTITIONER

## 2025-08-07 PROCEDURE — RXMED WILLOW AMBULATORY MEDICATION CHARGE

## 2025-08-07 RX ORDER — FUROSEMIDE 40 MG/1
40 TABLET ORAL DAILY
Qty: 30 TABLET | Refills: 11 | Status: SHIPPED | OUTPATIENT
Start: 2025-08-07 | End: 2025-08-14 | Stop reason: ALTCHOICE

## 2025-08-07 RX ORDER — AMOXICILLIN AND CLAVULANATE POTASSIUM 875; 125 MG/1; MG/1
1 TABLET, FILM COATED ORAL ONCE
Status: COMPLETED | OUTPATIENT
Start: 2025-08-07 | End: 2025-08-07

## 2025-08-07 RX ORDER — AMOXICILLIN AND CLAVULANATE POTASSIUM 875; 125 MG/1; MG/1
1 TABLET, FILM COATED ORAL EVERY 12 HOURS
Qty: 10 TABLET | Refills: 0 | Status: SHIPPED | OUTPATIENT
Start: 2025-08-07 | End: 2025-08-12

## 2025-08-07 RX ADMIN — AMOXICILLIN AND CLAVULANATE POTASSIUM 1 TABLET: 875; 125 TABLET, FILM COATED ORAL at 13:26

## 2025-08-07 ASSESSMENT — PAIN - FUNCTIONAL ASSESSMENT: PAIN_FUNCTIONAL_ASSESSMENT: 0-10

## 2025-08-07 ASSESSMENT — VISUAL ACUITY
OS: 20/20
OU: 20/20

## 2025-08-07 ASSESSMENT — PAIN SCALES - GENERAL
PAINLEVEL_OUTOF10: 0 - NO PAIN

## 2025-08-07 NOTE — DISCHARGE INSTRUCTIONS
Keep your eye lubricated using the artificial tears you are already using - increase use from twice daily to every hour until your hemorrhage improves and your eye is no longer feeling dry. You may tape your eye shut as well to keep your eye lubricated.     Start taking Augmentin today for the cat bite on your right forearm to prevent infection. You were given a dose in the ED today to start. Watch the bite for redness/pus drainage.

## 2025-08-07 NOTE — TELEPHONE ENCOUNTER
Message from Shayy, Nurse at [facility]  She sent patient to ER today. She has increased edema all the way up and down her arms to her hands and one arm is seeping.  They didn't do anything for her at ER, no medication changes, they only wrapped arm where it was seeping.  Patients clothes aren't fitting and she's having a hard time walking.  Has appt 8/21/25, but not sure she should wait that long.  Please call Shayy at [phone]

## 2025-08-07 NOTE — ED PROVIDER NOTES
"Chief Complaint   Patient presents with    Eye Problem     Right eye red and swelling started this am. Denies injury        Patient History    Medical History[1]   Surgical History[2]   Family History[3]   Social History     Social History Narrative    Not on file      RX Allergies[4]     PMH: Reviewed  PSH: Reviewed  Social History: Reviewed.   Allergies reviewed.     HPI: Lakeshia Park \"Mynor" is a 89 y.o. female who presents to the ED today unaccompanied with complaints of right eye redness and swelling.  No injury or trauma.  States she noticed it this morning after breakfast.  Not on blood thinners.  Patient also concerned with weeping from her right upper extremity.  She has been dealing with edema for quite some time.  Right upper arm has been swollen and she had an ultrasound last week which was negative for DVT.  She had been admitted for diuresis recently but the diuresis was stopped upon discharge due to worsening kidney function.  Patient does however note that she was playing with her cat last night and has a small bite omayra on her right forearm which is where the arm is weeping.    PHYSICAL EXAM:    GENERAL: Vitals noted, no distress. Alert and oriented x 3. Non-toxic.       HEAD: Normocephalic, atraumatic. Pupils equally round and reactive to light. EOMI. Right eye with a subconjunctival hemorrhage noted which is limiting the closure of her eye.    NECK: Supple. No midline or paraspinal tenderness through full range of motion.      CARDIAC: Regular rate, rhythm. No murmurs or rubs.    RESPIRATORY: Lungs clear and equal bilaterally. No respiratory distress.     MUSCULOSKELETAL & SKIN:  Warm, dry, and intact. No rash/lesions.  Bilateral upper extremity peripheral edema.  Right upper extremity with 2 small puncture wounds on the lateral forearm, weeping clear fluid.    NEURO: No focal neurologic deficits, acting appropriately.     Labs Reviewed - No data to display     No orders to display    "     Medical Decision Making         ED COURSE: This patient was seen and examined by myself independently.  Visual acuity is noted the nursing chart.  Patient has a subconjunctival hemorrhage noted.  She is already on artificial tears which she uses twice a day per her MAR.  Advised to increase this to every hour while the swelling is present and is limiting the closure of her eyelid.  Also started her on Augmentin, given your first dose here in the ED for the bite omayra on her right forearm.  Advised to continue to monitor this.  Referred to PCP for follow-up care.  Discharged home in stable condition with computer instructions given.      DIAGNOSTIC IMPRESSION: #1 right subconjunctival hemorrhage #2 right forearm cat bite       [1]   Past Medical History:  Diagnosis Date    Arthritis     CHF (congestive heart failure)     COPD (chronic obstructive pulmonary disease) (Multi)     Diabetes mellitus (Multi)     Hypertension    [2]   Past Surgical History:  Procedure Laterality Date    ESOPHAGOGASTRODUODENOSCOPY  04/29/2024    INJECTION N/A 06/03/2025    L5-S1 RIVER    INJECTION Bilateral 07/22/2025    BILAT SIJ    OTHER SURGICAL HISTORY  09/13/2019    Facial surgery    OTHER SURGICAL HISTORY  09/13/2019    Gallbladder surgery    OTHER SURGICAL HISTORY  09/13/2019    Appendectomy    OTHER SURGICAL HISTORY  09/13/2019    Hysterectomy    OTHER SURGICAL HISTORY  09/13/2019    Colon surgery    OTHER SURGICAL HISTORY  10/24/2019    Thyroid surgery   [3]   Family History  Problem Relation Name Age of Onset    Diabetes Mother      Brain cancer Mother      Brain cancer Brother     [4]   Allergies  Allergen Reactions    Celecoxib Other     Abdominal pain     Ciprofloxacin Unknown    Diazepam Hallucinations    Esomeprazole Other     Stomach pain    Ibuprofen Other     Kidney problems    Naproxen Unknown        Jocelyne Urena, JASSI-CNP  08/07/25 [id number]     100

## 2025-08-07 NOTE — TELEPHONE ENCOUNTER
I called and spoke with both the emergency department provider and the nurse at [facility].  They are appears to be a miscommunication as to why the patient was sent to the emergency department.  The nurse at [facility] is going to call Tracy Garcia for possible prescription for management of edema.  If Tracy does not give them any guidance she will send her back to the emergency room for evaluation and treatment.

## 2025-08-11 DIAGNOSIS — R60.0 EDEMA OF UPPER EXTREMITY: ICD-10-CM

## 2025-08-12 ENCOUNTER — LAB REQUISITION (OUTPATIENT)
Dept: LAB | Facility: HOSPITAL | Age: OVER 89
End: 2025-08-12
Payer: MEDICARE

## 2025-08-12 DIAGNOSIS — R30.0 DYSURIA: ICD-10-CM

## 2025-08-12 LAB
ALBUMIN SERPL-MCNC: 3.7 G/DL (ref 3.6–5.1)
ALP SERPL-CCNC: 58 U/L (ref 37–153)
ALT SERPL-CCNC: 13 U/L (ref 6–29)
ANION GAP SERPL CALCULATED.4IONS-SCNC: 12 MMOL/L (CALC) (ref 7–17)
APPEARANCE UR: CLEAR
AST SERPL-CCNC: 16 U/L (ref 10–35)
BILIRUB SERPL-MCNC: 0.2 MG/DL (ref 0.2–1.2)
BILIRUB UR STRIP.AUTO-MCNC: NEGATIVE MG/DL
BUN SERPL-MCNC: 46 MG/DL (ref 7–25)
CALCIUM SERPL-MCNC: 8.5 MG/DL (ref 8.6–10.4)
CHLORIDE SERPL-SCNC: 100 MMOL/L (ref 98–110)
CO2 SERPL-SCNC: 29 MMOL/L (ref 20–32)
COLOR UR: COLORLESS
CREAT SERPL-MCNC: 2.5 MG/DL (ref 0.6–0.95)
EGFRCR SERPLBLD CKD-EPI 2021: 17 ML/MIN/1.73M2
ERYTHROCYTE [DISTWIDTH] IN BLOOD BY AUTOMATED COUNT: 13.5 % (ref 11–15)
GLUCOSE SERPL-MCNC: 181 MG/DL (ref 65–99)
GLUCOSE UR STRIP.AUTO-MCNC: NORMAL MG/DL
HCT VFR BLD AUTO: 28.1 % (ref 35–45)
HGB BLD-MCNC: 8.4 G/DL (ref 11.7–15.5)
KETONES UR STRIP.AUTO-MCNC: NEGATIVE MG/DL
LEUKOCYTE ESTERASE UR QL STRIP.AUTO: NEGATIVE
MCH RBC QN AUTO: 31.6 PG (ref 27–33)
MCHC RBC AUTO-ENTMCNC: 29.9 G/DL (ref 32–36)
MCV RBC AUTO: 105.6 FL (ref 80–100)
NITRITE UR QL STRIP.AUTO: NEGATIVE
PH UR STRIP.AUTO: 5 [PH]
PLATELET # BLD AUTO: 343 THOUSAND/UL (ref 140–400)
PMV BLD REES-ECKER: 9.9 FL (ref 7.5–12.5)
POTASSIUM SERPL-SCNC: 4.2 MMOL/L (ref 3.5–5.3)
PROT SERPL-MCNC: 6 G/DL (ref 6.1–8.1)
PROT UR STRIP.AUTO-MCNC: NEGATIVE MG/DL
RBC # BLD AUTO: 2.66 MILLION/UL (ref 3.8–5.1)
RBC # UR STRIP.AUTO: NEGATIVE MG/DL
SODIUM SERPL-SCNC: 141 MMOL/L (ref 135–146)
SP GR UR STRIP.AUTO: 1.01
UROBILINOGEN UR STRIP.AUTO-MCNC: NORMAL MG/DL
WBC # BLD AUTO: 7.3 THOUSAND/UL (ref 3.8–10.8)

## 2025-08-12 PROCEDURE — 87086 URINE CULTURE/COLONY COUNT: CPT | Mod: OUT,SAMLAB | Performed by: CLINICAL NURSE SPECIALIST

## 2025-08-12 PROCEDURE — 81003 URINALYSIS AUTO W/O SCOPE: CPT | Mod: OUT | Performed by: CLINICAL NURSE SPECIALIST

## 2025-08-13 ENCOUNTER — INFUSION (OUTPATIENT)
Dept: HEMATOLOGY/ONCOLOGY | Facility: CLINIC | Age: OVER 89
End: 2025-08-13
Payer: MEDICARE

## 2025-08-13 ENCOUNTER — TELEPHONE (OUTPATIENT)
Dept: NEPHROLOGY | Facility: CLINIC | Age: OVER 89
End: 2025-08-13

## 2025-08-13 ENCOUNTER — APPOINTMENT (OUTPATIENT)
Dept: RADIOLOGY | Facility: HOSPITAL | Age: OVER 89
End: 2025-08-13
Payer: MEDICARE

## 2025-08-13 ENCOUNTER — APPOINTMENT (OUTPATIENT)
Dept: HEMATOLOGY/ONCOLOGY | Facility: CLINIC | Age: OVER 89
End: 2025-08-13
Payer: MEDICARE

## 2025-08-13 ENCOUNTER — HOSPITAL ENCOUNTER (EMERGENCY)
Facility: HOSPITAL | Age: OVER 89
Discharge: NURSING, REHAB OR LONG-TERM CARE | End: 2025-08-13
Payer: MEDICARE

## 2025-08-13 VITALS
DIASTOLIC BLOOD PRESSURE: 63 MMHG | HEIGHT: 61 IN | BODY MASS INDEX: 39.65 KG/M2 | TEMPERATURE: 97.2 F | HEART RATE: 69 BPM | OXYGEN SATURATION: 92 % | SYSTOLIC BLOOD PRESSURE: 149 MMHG | RESPIRATION RATE: 16 BRPM | WEIGHT: 210 LBS

## 2025-08-13 VITALS — HEART RATE: 71 BPM | TEMPERATURE: 97.5 F | SYSTOLIC BLOOD PRESSURE: 115 MMHG | DIASTOLIC BLOOD PRESSURE: 69 MMHG

## 2025-08-13 DIAGNOSIS — N18.4 ANEMIA DUE TO STAGE 4 CHRONIC KIDNEY DISEASE: ICD-10-CM

## 2025-08-13 DIAGNOSIS — E87.70 GENERALIZED EDEMA DUE TO FLUID OVERLOAD: Primary | ICD-10-CM

## 2025-08-13 DIAGNOSIS — D63.1 ANEMIA DUE TO STAGE 4 CHRONIC KIDNEY DISEASE: ICD-10-CM

## 2025-08-13 DIAGNOSIS — D50.9 IRON DEFICIENCY ANEMIA, UNSPECIFIED IRON DEFICIENCY ANEMIA TYPE: ICD-10-CM

## 2025-08-13 LAB
ALBUMIN SERPL BCP-MCNC: 3.6 G/DL (ref 3.4–5)
ALP SERPL-CCNC: 61 U/L (ref 33–136)
ALT SERPL W P-5'-P-CCNC: 11 U/L (ref 7–45)
ANION GAP SERPL CALC-SCNC: 12 MMOL/L (ref 10–20)
AST SERPL W P-5'-P-CCNC: 13 U/L (ref 9–39)
BACTERIA UR CULT: NO GROWTH
BASOPHILS # BLD AUTO: 0.02 X10*3/UL (ref 0–0.1)
BASOPHILS NFR BLD AUTO: 0.3 %
BILIRUB SERPL-MCNC: 0.2 MG/DL (ref 0–1.2)
BNP SERPL-MCNC: 180 PG/ML (ref 0–99)
BUN SERPL-MCNC: 43 MG/DL (ref 6–23)
CALCIUM SERPL-MCNC: 8.4 MG/DL (ref 8.6–10.3)
CARDIAC TROPONIN I PNL SERPL HS: 8 NG/L (ref 0–13)
CARDIAC TROPONIN I PNL SERPL HS: 8 NG/L (ref 0–13)
CHLORIDE SERPL-SCNC: 102 MMOL/L (ref 98–107)
CO2 SERPL-SCNC: 28 MMOL/L (ref 21–32)
CREAT SERPL-MCNC: 2.14 MG/DL (ref 0.5–1.05)
EGFRCR SERPLBLD CKD-EPI 2021: 21 ML/MIN/1.73M*2
EOSINOPHIL # BLD AUTO: 0.1 X10*3/UL (ref 0–0.4)
EOSINOPHIL NFR BLD AUTO: 1.4 %
ERYTHROCYTE [DISTWIDTH] IN BLOOD BY AUTOMATED COUNT: 14.6 % (ref 11.5–14.5)
GLUCOSE SERPL-MCNC: 145 MG/DL (ref 74–99)
HCT VFR BLD AUTO: 26.5 % (ref 36–46)
HGB BLD-MCNC: 8.2 G/DL (ref 12–16)
IMM GRANULOCYTES # BLD AUTO: 0.06 X10*3/UL (ref 0–0.5)
IMM GRANULOCYTES NFR BLD AUTO: 0.8 % (ref 0–0.9)
LYMPHOCYTES # BLD AUTO: 0.82 X10*3/UL (ref 0.8–3)
LYMPHOCYTES NFR BLD AUTO: 11.2 %
MAGNESIUM SERPL-MCNC: 1.91 MG/DL (ref 1.6–2.4)
MCH RBC QN AUTO: 32.2 PG (ref 26–34)
MCHC RBC AUTO-ENTMCNC: 30.9 G/DL (ref 32–36)
MCV RBC AUTO: 104 FL (ref 80–100)
MONOCYTES # BLD AUTO: 0.86 X10*3/UL (ref 0.05–0.8)
MONOCYTES NFR BLD AUTO: 11.8 %
NEUTROPHILS # BLD AUTO: 5.43 X10*3/UL (ref 1.6–5.5)
NEUTROPHILS NFR BLD AUTO: 74.5 %
NRBC BLD-RTO: 0 /100 WBCS (ref 0–0)
PLATELET # BLD AUTO: 272 X10*3/UL (ref 150–450)
POTASSIUM SERPL-SCNC: 4.2 MMOL/L (ref 3.5–5.3)
PROT SERPL-MCNC: 6.5 G/DL (ref 6.4–8.2)
RBC # BLD AUTO: 2.55 X10*6/UL (ref 4–5.2)
SODIUM SERPL-SCNC: 138 MMOL/L (ref 136–145)
WBC # BLD AUTO: 7.3 X10*3/UL (ref 4.4–11.3)

## 2025-08-13 PROCEDURE — 99284 EMERGENCY DEPT VISIT MOD MDM: CPT

## 2025-08-13 PROCEDURE — 84484 ASSAY OF TROPONIN QUANT: CPT | Performed by: NURSE PRACTITIONER

## 2025-08-13 PROCEDURE — 96374 THER/PROPH/DIAG INJ IV PUSH: CPT

## 2025-08-13 PROCEDURE — 80053 COMPREHEN METABOLIC PANEL: CPT | Performed by: NURSE PRACTITIONER

## 2025-08-13 PROCEDURE — 83735 ASSAY OF MAGNESIUM: CPT | Performed by: NURSE PRACTITIONER

## 2025-08-13 PROCEDURE — 85025 COMPLETE CBC W/AUTO DIFF WBC: CPT | Performed by: NURSE PRACTITIONER

## 2025-08-13 PROCEDURE — 71045 X-RAY EXAM CHEST 1 VIEW: CPT

## 2025-08-13 PROCEDURE — 2500000004 HC RX 250 GENERAL PHARMACY W/ HCPCS (ALT 636 FOR OP/ED)

## 2025-08-13 PROCEDURE — 71045 X-RAY EXAM CHEST 1 VIEW: CPT | Performed by: RADIOLOGY

## 2025-08-13 PROCEDURE — 83880 ASSAY OF NATRIURETIC PEPTIDE: CPT | Performed by: NURSE PRACTITIONER

## 2025-08-13 PROCEDURE — 36415 COLL VENOUS BLD VENIPUNCTURE: CPT | Performed by: NURSE PRACTITIONER

## 2025-08-13 PROCEDURE — 2500000004 HC RX 250 GENERAL PHARMACY W/ HCPCS (ALT 636 FOR OP/ED): Performed by: NURSE PRACTITIONER

## 2025-08-13 RX ORDER — ALBUTEROL SULFATE 0.83 MG/ML
3 SOLUTION RESPIRATORY (INHALATION) AS NEEDED
OUTPATIENT
Start: 2025-08-14

## 2025-08-13 RX ORDER — EPINEPHRINE 0.3 MG/.3ML
0.3 INJECTION SUBCUTANEOUS EVERY 5 MIN PRN
OUTPATIENT
Start: 2025-08-14

## 2025-08-13 RX ORDER — FUROSEMIDE 10 MG/ML
40 INJECTION INTRAMUSCULAR; INTRAVENOUS ONCE
Status: COMPLETED | OUTPATIENT
Start: 2025-08-13 | End: 2025-08-13

## 2025-08-13 RX ORDER — FUROSEMIDE 40 MG/1
40 TABLET ORAL DAILY
Qty: 10 TABLET | Refills: 0 | Status: SHIPPED | OUTPATIENT
Start: 2025-08-13 | End: 2025-08-23

## 2025-08-13 RX ORDER — DIPHENHYDRAMINE HYDROCHLORIDE 50 MG/ML
50 INJECTION, SOLUTION INTRAMUSCULAR; INTRAVENOUS AS NEEDED
OUTPATIENT
Start: 2025-08-14

## 2025-08-13 RX ORDER — FAMOTIDINE 10 MG/ML
20 INJECTION, SOLUTION INTRAVENOUS ONCE AS NEEDED
OUTPATIENT
Start: 2025-08-14

## 2025-08-13 RX ADMIN — IRON SUCROSE 200 MG: 20 INJECTION, SOLUTION INTRAVENOUS at 13:24

## 2025-08-13 RX ADMIN — FUROSEMIDE 40 MG: 10 INJECTION, SOLUTION INTRAMUSCULAR; INTRAVENOUS at 18:45

## 2025-08-13 ASSESSMENT — PAIN SCALES - GENERAL
PAINLEVEL_OUTOF10: 0-NO PAIN
PAINLEVEL_OUTOF10: 0 - NO PAIN

## 2025-08-13 ASSESSMENT — PAIN - FUNCTIONAL ASSESSMENT: PAIN_FUNCTIONAL_ASSESSMENT: 0-10

## 2025-08-14 ENCOUNTER — OFFICE VISIT (OUTPATIENT)
Dept: PAIN MEDICINE | Facility: CLINIC | Age: OVER 89
End: 2025-08-14
Payer: MEDICARE

## 2025-08-14 ENCOUNTER — APPOINTMENT (OUTPATIENT)
Dept: NEPHROLOGY | Facility: CLINIC | Age: OVER 89
End: 2025-08-14
Payer: MEDICARE

## 2025-08-14 VITALS
WEIGHT: 215 LBS | BODY MASS INDEX: 40.62 KG/M2 | SYSTOLIC BLOOD PRESSURE: 97 MMHG | HEART RATE: 72 BPM | DIASTOLIC BLOOD PRESSURE: 61 MMHG | RESPIRATION RATE: 16 BRPM

## 2025-08-14 DIAGNOSIS — M48.062 SPINAL STENOSIS OF LUMBAR REGION WITH NEUROGENIC CLAUDICATION: ICD-10-CM

## 2025-08-14 DIAGNOSIS — M54.16 LUMBAR RADICULOPATHY: Primary | ICD-10-CM

## 2025-08-14 LAB — HOLD SPECIMEN: NORMAL

## 2025-08-14 PROCEDURE — 99213 OFFICE O/P EST LOW 20 MIN: CPT

## 2025-08-14 RX ORDER — SODIUM CHLORIDE 9 MG/ML
1 INJECTION, SOLUTION INTRAMUSCULAR; INTRAVENOUS; SUBCUTANEOUS ONCE
OUTPATIENT
Start: 2025-08-14 | End: 2025-08-14

## 2025-08-14 RX ORDER — BUPROPION HYDROCHLORIDE 150 MG/1
150 TABLET, EXTENDED RELEASE ORAL 2 TIMES DAILY
COMMUNITY

## 2025-08-14 RX ORDER — LIDOCAINE HYDROCHLORIDE 20 MG/ML
1 INJECTION, SOLUTION EPIDURAL; INFILTRATION; INTRACAUDAL; PERINEURAL ONCE
OUTPATIENT
Start: 2025-08-14 | End: 2025-08-14

## 2025-08-14 RX ORDER — DEXAMETHASONE SODIUM PHOSPHATE 10 MG/ML
10 INJECTION INTRAMUSCULAR; INTRAVENOUS ONCE
OUTPATIENT
Start: 2025-08-14 | End: 2025-08-14

## 2025-08-14 RX ORDER — LIDOCAINE HYDROCHLORIDE 20 MG/ML
6 INJECTION, SOLUTION EPIDURAL; INFILTRATION; INTRACAUDAL; PERINEURAL ONCE
OUTPATIENT
Start: 2025-08-14 | End: 2025-08-14

## 2025-08-14 ASSESSMENT — ENCOUNTER SYMPTOMS
BRUISES/BLEEDS EASILY: 0
CONSTITUTIONAL NEGATIVE: 1
MYALGIAS: 1
BACK PAIN: 1
WHEEZING: 0
LIGHT-HEADEDNESS: 0
ALLERGIC/IMMUNOLOGIC NEGATIVE: 1
ENDOCRINE NEGATIVE: 1
ADENOPATHY: 0
ARTHRALGIAS: 0
SHORTNESS OF BREATH: 0
COUGH: 0
FACIAL ASYMMETRY: 0
PALPITATIONS: 0
WEAKNESS: 0
DYSPHORIC MOOD: 0

## 2025-08-15 ENCOUNTER — TELEPHONE (OUTPATIENT)
Dept: PAIN MEDICINE | Facility: CLINIC | Age: OVER 89
End: 2025-08-15
Payer: MEDICARE

## 2025-08-20 ENCOUNTER — APPOINTMENT (OUTPATIENT)
Dept: HEMATOLOGY/ONCOLOGY | Facility: CLINIC | Age: OVER 89
End: 2025-08-20
Payer: MEDICARE

## 2025-08-20 ENCOUNTER — INFUSION (OUTPATIENT)
Dept: HEMATOLOGY/ONCOLOGY | Facility: CLINIC | Age: OVER 89
End: 2025-08-20
Payer: MEDICARE

## 2025-08-20 VITALS
SYSTOLIC BLOOD PRESSURE: 158 MMHG | OXYGEN SATURATION: 93 % | TEMPERATURE: 97.9 F | HEART RATE: 67 BPM | DIASTOLIC BLOOD PRESSURE: 69 MMHG | RESPIRATION RATE: 18 BRPM

## 2025-08-20 DIAGNOSIS — D50.9 IRON DEFICIENCY ANEMIA, UNSPECIFIED IRON DEFICIENCY ANEMIA TYPE: ICD-10-CM

## 2025-08-20 PROCEDURE — 96374 THER/PROPH/DIAG INJ IV PUSH: CPT

## 2025-08-20 PROCEDURE — 2500000004 HC RX 250 GENERAL PHARMACY W/ HCPCS (ALT 636 FOR OP/ED): Performed by: CLINICAL NURSE SPECIALIST

## 2025-08-20 RX ORDER — EPINEPHRINE 0.3 MG/.3ML
0.3 INJECTION SUBCUTANEOUS EVERY 5 MIN PRN
OUTPATIENT
Start: 2025-08-22

## 2025-08-20 RX ORDER — FAMOTIDINE 10 MG/ML
20 INJECTION, SOLUTION INTRAVENOUS ONCE AS NEEDED
OUTPATIENT
Start: 2025-08-22

## 2025-08-20 RX ORDER — DIPHENHYDRAMINE HYDROCHLORIDE 50 MG/ML
50 INJECTION, SOLUTION INTRAMUSCULAR; INTRAVENOUS AS NEEDED
OUTPATIENT
Start: 2025-08-22

## 2025-08-20 RX ORDER — ALBUTEROL SULFATE 0.83 MG/ML
3 SOLUTION RESPIRATORY (INHALATION) AS NEEDED
OUTPATIENT
Start: 2025-08-22

## 2025-08-20 RX ADMIN — IRON SUCROSE 200 MG: 20 INJECTION, SOLUTION INTRAVENOUS at 13:21

## 2025-08-20 ASSESSMENT — PAIN SCALES - GENERAL: PAINLEVEL_OUTOF10: 2

## 2025-08-21 ENCOUNTER — APPOINTMENT (OUTPATIENT)
Dept: PRIMARY CARE | Facility: CLINIC | Age: OVER 89
End: 2025-08-21
Payer: MEDICARE

## 2025-08-21 VITALS
DIASTOLIC BLOOD PRESSURE: 80 MMHG | OXYGEN SATURATION: 92 % | SYSTOLIC BLOOD PRESSURE: 134 MMHG | HEART RATE: 64 BPM | HEIGHT: 61 IN | BODY MASS INDEX: 40.46 KG/M2 | WEIGHT: 214.3 LBS

## 2025-08-21 DIAGNOSIS — E03.9 ACQUIRED HYPOTHYROIDISM: ICD-10-CM

## 2025-08-21 DIAGNOSIS — N18.4 STAGE 4 CHRONIC KIDNEY DISEASE (MULTI): Primary | ICD-10-CM

## 2025-08-21 PROCEDURE — 3079F DIAST BP 80-89 MM HG: CPT

## 2025-08-21 PROCEDURE — 99214 OFFICE O/P EST MOD 30 MIN: CPT

## 2025-08-21 PROCEDURE — 1036F TOBACCO NON-USER: CPT

## 2025-08-21 PROCEDURE — 3075F SYST BP GE 130 - 139MM HG: CPT

## 2025-08-21 PROCEDURE — 1160F RVW MEDS BY RX/DR IN RCRD: CPT

## 2025-08-21 PROCEDURE — 1159F MED LIST DOCD IN RCRD: CPT

## 2025-08-21 RX ORDER — AMOXICILLIN AND CLAVULANATE POTASSIUM 875; 125 MG/1; MG/1
TABLET, FILM COATED ORAL
COMMUNITY
Start: 2025-08-14

## 2025-08-21 ASSESSMENT — ENCOUNTER SYMPTOMS
HEADACHES: 0
TROUBLE SWALLOWING: 0
WOUND: 0
ABDOMINAL PAIN: 0
SHORTNESS OF BREATH: 0
CHEST TIGHTNESS: 0
POLYDIPSIA: 0
DIFFICULTY URINATING: 0
UNEXPECTED WEIGHT CHANGE: 0
NAUSEA: 0
CONSTIPATION: 0
ARTHRALGIAS: 0
FREQUENCY: 0
NUMBNESS: 0
POLYPHAGIA: 0
CHILLS: 0
NERVOUS/ANXIOUS: 0
DIARRHEA: 0
MYALGIAS: 0
DIAPHORESIS: 0
WEAKNESS: 1
RECTAL PAIN: 0
PALPITATIONS: 0
FATIGUE: 0

## 2025-08-27 ENCOUNTER — INFUSION (OUTPATIENT)
Dept: HEMATOLOGY/ONCOLOGY | Facility: CLINIC | Age: OVER 89
End: 2025-08-27
Payer: MEDICARE

## 2025-08-27 ENCOUNTER — APPOINTMENT (OUTPATIENT)
Dept: NEPHROLOGY | Facility: CLINIC | Age: OVER 89
End: 2025-08-27
Payer: MEDICARE

## 2025-08-27 VITALS
HEART RATE: 69 BPM | BODY MASS INDEX: 39.49 KG/M2 | SYSTOLIC BLOOD PRESSURE: 132 MMHG | DIASTOLIC BLOOD PRESSURE: 78 MMHG | RESPIRATION RATE: 18 BRPM | WEIGHT: 209 LBS | OXYGEN SATURATION: 94 % | TEMPERATURE: 97.2 F

## 2025-08-27 VITALS — DIASTOLIC BLOOD PRESSURE: 70 MMHG | SYSTOLIC BLOOD PRESSURE: 134 MMHG | HEART RATE: 68 BPM

## 2025-08-27 DIAGNOSIS — N18.4 TYPE 2 DIABETES MELLITUS WITH STAGE 4 CHRONIC KIDNEY DISEASE, WITHOUT LONG-TERM CURRENT USE OF INSULIN (MULTI): Primary | ICD-10-CM

## 2025-08-27 DIAGNOSIS — N18.4 ANEMIA DUE TO STAGE 4 CHRONIC KIDNEY DISEASE: ICD-10-CM

## 2025-08-27 DIAGNOSIS — N18.4 STAGE 4 CHRONIC KIDNEY DISEASE (MULTI): ICD-10-CM

## 2025-08-27 DIAGNOSIS — D63.1 ANEMIA DUE TO STAGE 4 CHRONIC KIDNEY DISEASE: ICD-10-CM

## 2025-08-27 DIAGNOSIS — E11.22 TYPE 2 DIABETES MELLITUS WITH STAGE 4 CHRONIC KIDNEY DISEASE, WITHOUT LONG-TERM CURRENT USE OF INSULIN (MULTI): Primary | ICD-10-CM

## 2025-08-27 DIAGNOSIS — D50.9 IRON DEFICIENCY ANEMIA, UNSPECIFIED IRON DEFICIENCY ANEMIA TYPE: ICD-10-CM

## 2025-08-27 LAB
ALBUMIN SERPL BCP-MCNC: 3.9 G/DL (ref 3.4–5)
ALP SERPL-CCNC: 71 U/L (ref 33–136)
ALT SERPL W P-5'-P-CCNC: 14 U/L (ref 7–45)
ANION GAP SERPL CALC-SCNC: 14 MMOL/L
AST SERPL W P-5'-P-CCNC: 19 U/L (ref 9–39)
BILIRUB SERPL-MCNC: 0.2 MG/DL (ref 0–1.2)
BUN SERPL-MCNC: 42 MG/DL (ref 6–23)
CALCIUM SERPL-MCNC: 8.7 MG/DL (ref 8.6–10.3)
CHLORIDE SERPL-SCNC: 101 MMOL/L (ref 98–107)
CO2 SERPL-SCNC: 29 MMOL/L (ref 21–32)
CREAT SERPL-MCNC: 2.09 MG/DL (ref 0.5–1.05)
EGFRCR SERPLBLD CKD-EPI 2021: 22 ML/MIN/1.73M*2
ERYTHROCYTE [DISTWIDTH] IN BLOOD BY AUTOMATED COUNT: 14.6 % (ref 11.5–14.5)
GLUCOSE SERPL-MCNC: 193 MG/DL (ref 74–99)
HCT VFR BLD AUTO: 31.7 % (ref 36–46)
HGB BLD-MCNC: 9.5 G/DL (ref 12–16)
IRON SATN MFR SERPL: 17 % (ref 25–45)
IRON SERPL-MCNC: 45 UG/DL (ref 35–150)
MCH RBC QN AUTO: 31.3 PG (ref 26–34)
MCHC RBC AUTO-ENTMCNC: 30 G/DL (ref 32–36)
MCV RBC AUTO: 104 FL (ref 80–100)
NRBC BLD-RTO: 0 /100 WBCS (ref 0–0)
PLATELET # BLD AUTO: 301 X10*3/UL (ref 150–450)
POTASSIUM SERPL-SCNC: 4.6 MMOL/L (ref 3.5–5.3)
PROT SERPL-MCNC: 6.5 G/DL (ref 6.4–8.2)
RBC # BLD AUTO: 3.04 X10*6/UL (ref 4–5.2)
SODIUM SERPL-SCNC: 139 MMOL/L (ref 136–145)
TIBC SERPL-MCNC: 262 UG/DL (ref 240–445)
UIBC SERPL-MCNC: 217 UG/DL (ref 110–370)
WBC # BLD AUTO: 6.4 X10*3/UL (ref 4.4–11.3)

## 2025-08-27 PROCEDURE — 83036 HEMOGLOBIN GLYCOSYLATED A1C: CPT | Mod: SAMLAB | Performed by: CLINICAL NURSE SPECIALIST

## 2025-08-27 PROCEDURE — 36415 COLL VENOUS BLD VENIPUNCTURE: CPT

## 2025-08-27 PROCEDURE — 80053 COMPREHEN METABOLIC PANEL: CPT | Performed by: CLINICAL NURSE SPECIALIST

## 2025-08-27 PROCEDURE — 96372 THER/PROPH/DIAG INJ SC/IM: CPT

## 2025-08-27 PROCEDURE — 85027 COMPLETE CBC AUTOMATED: CPT | Performed by: CLINICAL NURSE SPECIALIST

## 2025-08-27 PROCEDURE — 83540 ASSAY OF IRON: CPT | Performed by: CLINICAL NURSE SPECIALIST

## 2025-08-27 PROCEDURE — 2500000004 HC RX 250 GENERAL PHARMACY W/ HCPCS (ALT 636 FOR OP/ED): Mod: JZ,EC | Performed by: CLINICAL NURSE SPECIALIST

## 2025-08-27 RX ORDER — ALBUTEROL SULFATE 0.83 MG/ML
3 SOLUTION RESPIRATORY (INHALATION) AS NEEDED
OUTPATIENT
Start: 2025-08-29

## 2025-08-27 RX ORDER — DIPHENHYDRAMINE HYDROCHLORIDE 50 MG/ML
50 INJECTION, SOLUTION INTRAMUSCULAR; INTRAVENOUS AS NEEDED
OUTPATIENT
Start: 2025-09-03

## 2025-08-27 RX ORDER — FAMOTIDINE 10 MG/ML
20 INJECTION, SOLUTION INTRAVENOUS ONCE AS NEEDED
OUTPATIENT
Start: 2025-09-03

## 2025-08-27 RX ORDER — FAMOTIDINE 10 MG/ML
20 INJECTION, SOLUTION INTRAVENOUS ONCE AS NEEDED
OUTPATIENT
Start: 2025-08-29

## 2025-08-27 RX ORDER — DIPHENHYDRAMINE HYDROCHLORIDE 50 MG/ML
50 INJECTION, SOLUTION INTRAMUSCULAR; INTRAVENOUS AS NEEDED
OUTPATIENT
Start: 2025-08-29

## 2025-08-27 RX ORDER — ALBUTEROL SULFATE 0.83 MG/ML
3 SOLUTION RESPIRATORY (INHALATION) AS NEEDED
OUTPATIENT
Start: 2025-09-03

## 2025-08-27 RX ORDER — EPINEPHRINE 0.3 MG/.3ML
0.3 INJECTION SUBCUTANEOUS EVERY 5 MIN PRN
OUTPATIENT
Start: 2025-08-29

## 2025-08-27 RX ORDER — EPINEPHRINE 0.3 MG/.3ML
0.3 INJECTION SUBCUTANEOUS EVERY 5 MIN PRN
OUTPATIENT
Start: 2025-09-03

## 2025-08-27 RX ADMIN — DARBEPOETIN ALFA 40 MCG: 40 INJECTION, SOLUTION INTRAVENOUS; SUBCUTANEOUS at 14:20

## 2025-08-27 ASSESSMENT — ENCOUNTER SYMPTOMS
CONSTITUTIONAL NEGATIVE: 1
GASTROINTESTINAL NEGATIVE: 1
EYES NEGATIVE: 1
ALLERGIC/IMMUNOLOGIC NEGATIVE: 1
PSYCHIATRIC NEGATIVE: 1
HEMATOLOGIC/LYMPHATIC NEGATIVE: 1
NEUROLOGICAL NEGATIVE: 1
RESPIRATORY NEGATIVE: 1
MUSCULOSKELETAL NEGATIVE: 1
ENDOCRINE NEGATIVE: 1

## 2025-08-27 ASSESSMENT — PAIN SCALES - GENERAL: PAINLEVEL_OUTOF10: 2

## 2025-08-28 ENCOUNTER — APPOINTMENT (OUTPATIENT)
Dept: NEPHROLOGY | Facility: CLINIC | Age: OVER 89
End: 2025-08-28
Payer: MEDICARE

## 2025-08-28 LAB
EST. AVERAGE GLUCOSE BLD GHB EST-MCNC: 128 MG/DL
HBA1C MFR BLD: 6.1 % (ref ?–5.7)

## 2025-08-30 DIAGNOSIS — N18.4 STAGE 4 CHRONIC KIDNEY DISEASE (MULTI): ICD-10-CM

## 2025-09-03 ENCOUNTER — INFUSION (OUTPATIENT)
Dept: HEMATOLOGY/ONCOLOGY | Facility: CLINIC | Age: OVER 89
End: 2025-09-03
Payer: MEDICARE

## 2025-09-03 VITALS
RESPIRATION RATE: 18 BRPM | DIASTOLIC BLOOD PRESSURE: 70 MMHG | TEMPERATURE: 97.3 F | HEART RATE: 61 BPM | SYSTOLIC BLOOD PRESSURE: 122 MMHG | OXYGEN SATURATION: 92 %

## 2025-09-03 DIAGNOSIS — D63.1 ANEMIA DUE TO STAGE 4 CHRONIC KIDNEY DISEASE: ICD-10-CM

## 2025-09-03 DIAGNOSIS — N18.4 ANEMIA DUE TO STAGE 4 CHRONIC KIDNEY DISEASE: ICD-10-CM

## 2025-09-03 LAB
ERYTHROCYTE [DISTWIDTH] IN BLOOD BY AUTOMATED COUNT: 15.2 % (ref 11.5–14.5)
HCT VFR BLD AUTO: 31.5 % (ref 36–46)
HGB BLD-MCNC: 9.2 G/DL (ref 12–16)
MCH RBC QN AUTO: 31 PG (ref 26–34)
MCHC RBC AUTO-ENTMCNC: 29.2 G/DL (ref 32–36)
MCV RBC AUTO: 106 FL (ref 80–100)
NRBC BLD-RTO: 0 /100 WBCS (ref 0–0)
PLATELET # BLD AUTO: 283 X10*3/UL (ref 150–450)
RBC # BLD AUTO: 2.97 X10*6/UL (ref 4–5.2)
WBC # BLD AUTO: 5.8 X10*3/UL (ref 4.4–11.3)

## 2025-09-03 PROCEDURE — 96372 THER/PROPH/DIAG INJ SC/IM: CPT

## 2025-09-03 PROCEDURE — 85027 COMPLETE CBC AUTOMATED: CPT

## 2025-09-03 PROCEDURE — 36415 COLL VENOUS BLD VENIPUNCTURE: CPT

## 2025-09-03 PROCEDURE — 2500000004 HC RX 250 GENERAL PHARMACY W/ HCPCS (ALT 636 FOR OP/ED): Mod: JZ,EC | Performed by: CLINICAL NURSE SPECIALIST

## 2025-09-03 RX ORDER — DIPHENHYDRAMINE HYDROCHLORIDE 50 MG/ML
50 INJECTION, SOLUTION INTRAMUSCULAR; INTRAVENOUS AS NEEDED
OUTPATIENT
Start: 2025-09-10

## 2025-09-03 RX ORDER — FAMOTIDINE 10 MG/ML
20 INJECTION, SOLUTION INTRAVENOUS ONCE AS NEEDED
OUTPATIENT
Start: 2025-09-10

## 2025-09-03 RX ORDER — EPINEPHRINE 0.3 MG/.3ML
0.3 INJECTION SUBCUTANEOUS EVERY 5 MIN PRN
OUTPATIENT
Start: 2025-09-10

## 2025-09-03 RX ORDER — ALBUTEROL SULFATE 0.83 MG/ML
3 SOLUTION RESPIRATORY (INHALATION) AS NEEDED
OUTPATIENT
Start: 2025-09-10

## 2025-09-03 RX ADMIN — DARBEPOETIN ALFA 40 MCG: 40 INJECTION, SOLUTION INTRAVENOUS; SUBCUTANEOUS at 14:00

## 2025-09-03 ASSESSMENT — PAIN SCALES - GENERAL: PAINLEVEL_OUTOF10: 5

## 2025-10-01 ENCOUNTER — APPOINTMENT (OUTPATIENT)
Dept: NEPHROLOGY | Facility: CLINIC | Age: OVER 89
End: 2025-10-01
Payer: MEDICARE

## 2025-11-20 ENCOUNTER — APPOINTMENT (OUTPATIENT)
Dept: NEPHROLOGY | Facility: CLINIC | Age: OVER 89
End: 2025-11-20
Payer: MEDICARE

## 2026-02-25 ENCOUNTER — APPOINTMENT (OUTPATIENT)
Dept: PRIMARY CARE | Facility: CLINIC | Age: OVER 89
End: 2026-02-25
Payer: MEDICARE

## 2026-04-30 ENCOUNTER — APPOINTMENT (OUTPATIENT)
Dept: PRIMARY CARE | Facility: CLINIC | Age: OVER 89
End: 2026-04-30
Payer: MEDICARE